# Patient Record
Sex: MALE | Race: WHITE | NOT HISPANIC OR LATINO | Employment: OTHER | URBAN - METROPOLITAN AREA
[De-identification: names, ages, dates, MRNs, and addresses within clinical notes are randomized per-mention and may not be internally consistent; named-entity substitution may affect disease eponyms.]

---

## 2017-02-02 ENCOUNTER — HISTORICAL (OUTPATIENT)
Dept: LAB | Facility: HOSPITAL | Age: 67
End: 2017-02-02

## 2018-06-27 LAB — CRC RECOMMENDATION EXT: NORMAL

## 2019-01-23 ENCOUNTER — HISTORICAL (OUTPATIENT)
Dept: PREADMISSION TESTING | Facility: HOSPITAL | Age: 69
End: 2019-01-23

## 2019-01-23 ENCOUNTER — HISTORICAL (OUTPATIENT)
Dept: LAB | Facility: HOSPITAL | Age: 69
End: 2019-01-23

## 2019-02-06 ENCOUNTER — HISTORICAL (OUTPATIENT)
Dept: SURGERY | Facility: HOSPITAL | Age: 69
End: 2019-02-06

## 2019-07-02 ENCOUNTER — HISTORICAL (OUTPATIENT)
Dept: LAB | Facility: HOSPITAL | Age: 69
End: 2019-07-02

## 2019-12-02 ENCOUNTER — HISTORICAL (OUTPATIENT)
Dept: LAB | Facility: HOSPITAL | Age: 69
End: 2019-12-02

## 2019-12-02 LAB
BUN SERPL-MCNC: 28.5 MG/DL (ref 7–18)
CALCIUM SERPL-MCNC: 10.6 MG/DL (ref 8.5–10.1)
CHLORIDE SERPL-SCNC: 103 MMOL/L (ref 98–107)
CO2 SERPL-SCNC: 26.8 MMOL/L (ref 21–32)
CREAT SERPL-MCNC: 1.38 MG/DL (ref 0.6–1.3)
CREAT/UREA NIT SERPL: 21
GLUCOSE SERPL-MCNC: 159 MG/DL (ref 74–106)
POTASSIUM SERPL-SCNC: 4.8 MMOL/L (ref 3.5–5.1)
SODIUM SERPL-SCNC: 142 MMOL/L (ref 136–145)

## 2020-01-07 ENCOUNTER — HISTORICAL (OUTPATIENT)
Dept: LAB | Facility: HOSPITAL | Age: 70
End: 2020-01-07

## 2020-01-07 LAB
ABS NEUT (OLG): 5.26 X10(3)/MCL (ref 2.1–9.2)
ALBUMIN SERPL-MCNC: 4.2 GM/DL (ref 3.4–5)
ALBUMIN/GLOB SERPL: 1.2 RATIO (ref 1.1–2)
ALP SERPL-CCNC: 46 UNIT/L (ref 46–116)
ALT SERPL-CCNC: 44 UNIT/L (ref 12–78)
APPEARANCE, UA: CLEAR
AST SERPL-CCNC: 20 UNIT/L (ref 15–37)
BACTERIA SPEC CULT: NORMAL
BASOPHILS # BLD AUTO: 0 X10(3)/MCL (ref 0–0.2)
BASOPHILS NFR BLD AUTO: 0 %
BILIRUB SERPL-MCNC: 0.7 MG/DL (ref 0.2–1)
BILIRUB UR QL STRIP: NEGATIVE
BILIRUBIN DIRECT+TOT PNL SERPL-MCNC: 0.14 MG/DL (ref 0–0.2)
BILIRUBIN DIRECT+TOT PNL SERPL-MCNC: 0.56 MG/DL (ref 0–0.8)
BUN SERPL-MCNC: 28.2 MG/DL (ref 7–18)
CALCIUM SERPL-MCNC: 10 MG/DL (ref 8.5–10.1)
CHLORIDE SERPL-SCNC: 103 MMOL/L (ref 98–107)
CHOLEST SERPL-MCNC: 146 MG/DL (ref 0–200)
CHOLEST/HDLC SERPL: 3.7 {RATIO} (ref 0–5)
CO2 SERPL-SCNC: 28.3 MMOL/L (ref 21–32)
COLOR UR: YELLOW
CREAT SERPL-MCNC: 1.44 MG/DL (ref 0.6–1.3)
EOSINOPHIL # BLD AUTO: 0.3 X10(3)/MCL (ref 0–0.9)
EOSINOPHIL NFR BLD AUTO: 4 %
ERYTHROCYTE [DISTWIDTH] IN BLOOD BY AUTOMATED COUNT: 13.5 % (ref 11.5–17)
EST. AVERAGE GLUCOSE BLD GHB EST-MCNC: 166 MG/DL
GLOBULIN SER-MCNC: 3.5 GM/DL (ref 2.4–3.5)
GLUCOSE (UA): NEGATIVE
GLUCOSE SERPL-MCNC: 158 MG/DL (ref 74–106)
HBA1C MFR BLD: 7.4 % (ref 4.5–6.2)
HCT VFR BLD AUTO: 41.7 % (ref 42–52)
HDLC SERPL-MCNC: 39 MG/DL (ref 40–60)
HGB BLD-MCNC: 13.9 GM/DL (ref 14–18)
HGB UR QL STRIP: NEGATIVE
IMM GRANULOCYTES # BLD AUTO: 0.11 % (ref 0–0.02)
IMM GRANULOCYTES NFR BLD AUTO: 1.2 % (ref 0–0.43)
KETONES UR QL STRIP: NEGATIVE
LDLC SERPL CALC-MCNC: 72 MG/DL (ref 0–129)
LEUKOCYTE ESTERASE UR QL STRIP: NEGATIVE
LYMPHOCYTES # BLD AUTO: 2.3 X10(3)/MCL (ref 0.6–4.6)
LYMPHOCYTES NFR BLD AUTO: 26 %
MCH RBC QN AUTO: 29.3 PG (ref 27–31)
MCHC RBC AUTO-ENTMCNC: 33.3 GM/DL (ref 33–36)
MCV RBC AUTO: 87.8 FL (ref 80–94)
MONOCYTES # BLD AUTO: 0.8 X10(3)/MCL (ref 0.1–1.3)
MONOCYTES NFR BLD AUTO: 9 %
NEUTROPHILS # BLD AUTO: 5.26 X10(3)/MCL (ref 1.4–7.9)
NEUTROPHILS NFR BLD AUTO: 60 %
NITRITE UR QL STRIP: NEGATIVE
PH UR STRIP: 5 [PH] (ref 5–9)
PLATELET # BLD AUTO: 312 X10(3)/MCL (ref 130–400)
PMV BLD AUTO: 9.5 FL (ref 9.4–12.4)
POTASSIUM SERPL-SCNC: 5.5 MMOL/L (ref 3.5–5.1)
PROT SERPL-MCNC: 7.7 GM/DL (ref 6.4–8.2)
PROT UR QL STRIP: NEGATIVE
RBC # BLD AUTO: 4.75 X10(6)/MCL (ref 4.7–6.1)
RBC #/AREA URNS HPF: NORMAL /[HPF]
SODIUM SERPL-SCNC: 141 MMOL/L (ref 136–145)
SP GR UR STRIP: 1.02 (ref 1–1.03)
SQUAMOUS EPITHELIAL, UA: NORMAL
TRIGL SERPL-MCNC: 176 MG/DL
TSH SERPL-ACNC: 1.12 MIU/ML (ref 0.36–3.74)
UROBILINOGEN UR STRIP-ACNC: 0.2
VLDLC SERPL CALC-MCNC: 35 MG/DL
WBC # SPEC AUTO: 8.8 X10(3)/MCL (ref 4.5–11.5)
WBC #/AREA URNS HPF: NORMAL /[HPF]

## 2020-06-19 PROBLEM — G51.0 BELL'S PALSY: Status: ACTIVE | Noted: 2020-06-19

## 2020-06-20 ENCOUNTER — HOSPITAL ENCOUNTER (INPATIENT)
Facility: HOSPITAL | Age: 70
LOS: 1 days | Discharge: ANOTHER HOSPITAL | DRG: 280 | End: 2020-06-21
Attending: EMERGENCY MEDICINE | Admitting: INTERNAL MEDICINE
Payer: COMMERCIAL

## 2020-06-20 DIAGNOSIS — I16.1 HYPERTENSIVE EMERGENCY: Primary | ICD-10-CM

## 2020-06-20 DIAGNOSIS — N17.9 AKI (ACUTE KIDNEY INJURY): ICD-10-CM

## 2020-06-20 DIAGNOSIS — I16.0 HYPERTENSIVE URGENCY: ICD-10-CM

## 2020-06-20 DIAGNOSIS — J96.01 ACUTE RESPIRATORY FAILURE WITH HYPOXIA: ICD-10-CM

## 2020-06-20 DIAGNOSIS — R73.9 HYPERGLYCEMIA: ICD-10-CM

## 2020-06-20 DIAGNOSIS — R07.9 CHEST PAIN: ICD-10-CM

## 2020-06-20 DIAGNOSIS — I21.4 NSTEMI (NON-ST ELEVATED MYOCARDIAL INFARCTION): ICD-10-CM

## 2020-06-20 PROBLEM — E11.65 TYPE 2 DIABETES MELLITUS WITH HYPERGLYCEMIA, WITH LONG-TERM CURRENT USE OF INSULIN: Status: ACTIVE | Noted: 2020-06-20

## 2020-06-20 PROBLEM — Z79.4 TYPE 2 DIABETES MELLITUS WITH HYPERGLYCEMIA, WITH LONG-TERM CURRENT USE OF INSULIN: Status: ACTIVE | Noted: 2020-06-20

## 2020-06-20 PROBLEM — E78.5 HLD (HYPERLIPIDEMIA): Status: ACTIVE | Noted: 2020-06-20

## 2020-06-20 PROBLEM — I25.10 CORONARY ARTERY DISEASE INVOLVING NATIVE CORONARY ARTERY OF NATIVE HEART: Status: ACTIVE | Noted: 2020-06-20

## 2020-06-20 PROBLEM — R79.89 ELEVATED TROPONIN: Status: ACTIVE | Noted: 2020-06-20

## 2020-06-20 PROBLEM — G47.33 OSA (OBSTRUCTIVE SLEEP APNEA): Status: ACTIVE | Noted: 2020-06-20

## 2020-06-20 LAB
ALBUMIN SERPL BCP-MCNC: 4.1 G/DL (ref 3.5–5.2)
ALLENS TEST: ABNORMAL
ALP SERPL-CCNC: 51 U/L (ref 55–135)
ALT SERPL W/O P-5'-P-CCNC: 38 U/L (ref 10–44)
ANION GAP SERPL CALC-SCNC: 14 MMOL/L (ref 8–16)
AST SERPL-CCNC: 17 U/L (ref 10–40)
B-OH-BUTYR BLD STRIP-SCNC: 0.5 MMOL/L (ref 0–0.5)
BACTERIA #/AREA URNS HPF: NORMAL /HPF
BASOPHILS # BLD AUTO: 0.03 K/UL (ref 0–0.2)
BASOPHILS NFR BLD: 0.3 % (ref 0–1.9)
BILIRUB SERPL-MCNC: 0.7 MG/DL (ref 0.1–1)
BILIRUB UR QL STRIP: NEGATIVE
BNP SERPL-MCNC: 29 PG/ML (ref 0–99)
BUN SERPL-MCNC: 36 MG/DL (ref 8–23)
CALCIUM SERPL-MCNC: 9.6 MG/DL (ref 8.7–10.5)
CHLORIDE SERPL-SCNC: 104 MMOL/L (ref 95–110)
CLARITY UR: CLEAR
CO2 SERPL-SCNC: 20 MMOL/L (ref 23–29)
COLOR UR: YELLOW
CREAT SERPL-MCNC: 1.8 MG/DL (ref 0.5–1.4)
D DIMER PPP IA.FEU-MCNC: <0.19 MG/L FEU
DELSYS: ABNORMAL
DIFFERENTIAL METHOD: ABNORMAL
EOSINOPHIL # BLD AUTO: 0 K/UL (ref 0–0.5)
EOSINOPHIL NFR BLD: 0 % (ref 0–8)
ERYTHROCYTE [DISTWIDTH] IN BLOOD BY AUTOMATED COUNT: 13.8 % (ref 11.5–14.5)
EST. GFR  (AFRICAN AMERICAN): 44 ML/MIN/1.73 M^2
EST. GFR  (NON AFRICAN AMERICAN): 38 ML/MIN/1.73 M^2
GLUCOSE SERPL-MCNC: 616 MG/DL (ref 70–110)
GLUCOSE UR QL STRIP: ABNORMAL
HCO3 UR-SCNC: 22.5 MMOL/L (ref 24–28)
HCT VFR BLD AUTO: 37.2 % (ref 40–54)
HGB BLD-MCNC: 12.3 G/DL (ref 14–18)
HGB UR QL STRIP: ABNORMAL
IMM GRANULOCYTES # BLD AUTO: 0.19 K/UL (ref 0–0.04)
IMM GRANULOCYTES NFR BLD AUTO: 2.2 % (ref 0–0.5)
KETONES UR QL STRIP: NEGATIVE
LEUKOCYTE ESTERASE UR QL STRIP: NEGATIVE
LYMPHOCYTES # BLD AUTO: 0.5 K/UL (ref 1–4.8)
LYMPHOCYTES NFR BLD: 6.1 % (ref 18–48)
MCH RBC QN AUTO: 28.9 PG (ref 27–31)
MCHC RBC AUTO-ENTMCNC: 33.1 G/DL (ref 32–36)
MCV RBC AUTO: 88 FL (ref 82–98)
MICROSCOPIC COMMENT: NORMAL
MONOCYTES # BLD AUTO: 0.2 K/UL (ref 0.3–1)
MONOCYTES NFR BLD: 1.7 % (ref 4–15)
NEUTROPHILS # BLD AUTO: 7.8 K/UL (ref 1.8–7.7)
NEUTROPHILS NFR BLD: 89.7 % (ref 38–73)
NITRITE UR QL STRIP: NEGATIVE
NRBC BLD-RTO: 0 /100 WBC
PCO2 BLDA: 38.9 MMHG (ref 35–45)
PH SMN: 7.37 [PH] (ref 7.35–7.45)
PH UR STRIP: 6 [PH] (ref 5–8)
PLATELET # BLD AUTO: 229 K/UL (ref 150–350)
PMV BLD AUTO: 10.4 FL (ref 9.2–12.9)
PO2 BLDA: 26 MMHG (ref 40–60)
POC BE: -3 MMOL/L
POC SATURATED O2: 47 % (ref 95–100)
POC TCO2: 24 MMOL/L (ref 24–29)
POCT GLUCOSE: 373 MG/DL (ref 70–110)
POCT GLUCOSE: 397 MG/DL (ref 70–110)
POCT GLUCOSE: 414 MG/DL (ref 70–110)
POTASSIUM SERPL-SCNC: 5.4 MMOL/L (ref 3.5–5.1)
PROT SERPL-MCNC: 7.5 G/DL (ref 6–8.4)
PROT UR QL STRIP: ABNORMAL
RBC # BLD AUTO: 4.25 M/UL (ref 4.6–6.2)
RBC #/AREA URNS HPF: 1 /HPF (ref 0–4)
SAMPLE: ABNORMAL
SARS-COV-2 RDRP RESP QL NAA+PROBE: NEGATIVE
SITE: ABNORMAL
SODIUM SERPL-SCNC: 138 MMOL/L (ref 136–145)
SP GR UR STRIP: 1.01 (ref 1–1.03)
SQUAMOUS #/AREA URNS HPF: 1 /HPF
TROPONIN I SERPL DL<=0.01 NG/ML-MCNC: 0.06 NG/ML (ref 0–0.03)
URN SPEC COLLECT METH UR: ABNORMAL
UROBILINOGEN UR STRIP-ACNC: 1 EU/DL
WBC # BLD AUTO: 8.67 K/UL (ref 3.9–12.7)
WBC #/AREA URNS HPF: 0 /HPF (ref 0–5)
YEAST URNS QL MICRO: NORMAL

## 2020-06-20 PROCEDURE — 36415 COLL VENOUS BLD VENIPUNCTURE: CPT

## 2020-06-20 PROCEDURE — 84484 ASSAY OF TROPONIN QUANT: CPT

## 2020-06-20 PROCEDURE — 80053 COMPREHEN METABOLIC PANEL: CPT

## 2020-06-20 PROCEDURE — 96374 THER/PROPH/DIAG INJ IV PUSH: CPT

## 2020-06-20 PROCEDURE — 82962 GLUCOSE BLOOD TEST: CPT

## 2020-06-20 PROCEDURE — 85379 FIBRIN DEGRADATION QUANT: CPT

## 2020-06-20 PROCEDURE — 20000000 HC ICU ROOM

## 2020-06-20 PROCEDURE — 63600175 PHARM REV CODE 636 W HCPCS: Performed by: EMERGENCY MEDICINE

## 2020-06-20 PROCEDURE — 83880 ASSAY OF NATRIURETIC PEPTIDE: CPT

## 2020-06-20 PROCEDURE — 81000 URINALYSIS NONAUTO W/SCOPE: CPT

## 2020-06-20 PROCEDURE — 82010 KETONE BODYS QUAN: CPT

## 2020-06-20 PROCEDURE — 82803 BLOOD GASES ANY COMBINATION: CPT

## 2020-06-20 PROCEDURE — 25000003 PHARM REV CODE 250: Performed by: NURSE PRACTITIONER

## 2020-06-20 PROCEDURE — 96361 HYDRATE IV INFUSION ADD-ON: CPT

## 2020-06-20 PROCEDURE — U0002 COVID-19 LAB TEST NON-CDC: HCPCS

## 2020-06-20 PROCEDURE — 99285 EMERGENCY DEPT VISIT HI MDM: CPT | Mod: 25

## 2020-06-20 PROCEDURE — 25000003 PHARM REV CODE 250: Performed by: EMERGENCY MEDICINE

## 2020-06-20 PROCEDURE — 63600175 PHARM REV CODE 636 W HCPCS: Performed by: NURSE PRACTITIONER

## 2020-06-20 PROCEDURE — 96375 TX/PRO/DX INJ NEW DRUG ADDON: CPT

## 2020-06-20 PROCEDURE — 85025 COMPLETE CBC W/AUTO DIFF WBC: CPT

## 2020-06-20 PROCEDURE — 93005 ELECTROCARDIOGRAM TRACING: CPT

## 2020-06-20 RX ORDER — AMOXICILLIN 250 MG
1 CAPSULE ORAL DAILY PRN
Status: DISCONTINUED | OUTPATIENT
Start: 2020-06-20 | End: 2020-06-21 | Stop reason: HOSPADM

## 2020-06-20 RX ORDER — ACYCLOVIR 200 MG/1
400 CAPSULE ORAL
Status: DISCONTINUED | OUTPATIENT
Start: 2020-06-20 | End: 2020-06-21 | Stop reason: HOSPADM

## 2020-06-20 RX ORDER — FENOFIBRATE 134 MG/1
134 CAPSULE ORAL
COMMUNITY
End: 2023-03-28

## 2020-06-20 RX ORDER — PANTOPRAZOLE SODIUM 40 MG/10ML
40 INJECTION, POWDER, LYOPHILIZED, FOR SOLUTION INTRAVENOUS DAILY
Status: DISCONTINUED | OUTPATIENT
Start: 2020-06-21 | End: 2020-06-21 | Stop reason: HOSPADM

## 2020-06-20 RX ORDER — NITROGLYCERIN 0.4 MG/1
0.4 TABLET SUBLINGUAL EVERY 5 MIN PRN
Status: DISCONTINUED | OUTPATIENT
Start: 2020-06-20 | End: 2020-06-21 | Stop reason: HOSPADM

## 2020-06-20 RX ORDER — IBUPROFEN 200 MG
16 TABLET ORAL
Status: DISCONTINUED | OUTPATIENT
Start: 2020-06-20 | End: 2020-06-21 | Stop reason: HOSPADM

## 2020-06-20 RX ORDER — SODIUM CHLORIDE 0.9 % (FLUSH) 0.9 %
10 SYRINGE (ML) INJECTION
Status: DISCONTINUED | OUTPATIENT
Start: 2020-06-20 | End: 2020-06-21 | Stop reason: HOSPADM

## 2020-06-20 RX ORDER — PRAVASTATIN SODIUM 40 MG/1
40 TABLET ORAL NIGHTLY
Status: DISCONTINUED | OUTPATIENT
Start: 2020-06-20 | End: 2020-06-21 | Stop reason: HOSPADM

## 2020-06-20 RX ORDER — ACETAMINOPHEN 325 MG/1
650 TABLET ORAL EVERY 6 HOURS PRN
Status: DISCONTINUED | OUTPATIENT
Start: 2020-06-20 | End: 2020-06-21 | Stop reason: HOSPADM

## 2020-06-20 RX ORDER — HYDROCHLOROTHIAZIDE 12.5 MG/1
12.5 TABLET ORAL DAILY
Status: DISCONTINUED | OUTPATIENT
Start: 2020-06-21 | End: 2020-06-21

## 2020-06-20 RX ORDER — FENOFIBRATE 134 MG/1
134 CAPSULE ORAL
Status: DISCONTINUED | OUTPATIENT
Start: 2020-06-21 | End: 2020-06-20 | Stop reason: CLARIF

## 2020-06-20 RX ORDER — FENOFIBRATE 145 MG/1
145 TABLET, FILM COATED ORAL
Status: DISCONTINUED | OUTPATIENT
Start: 2020-06-21 | End: 2020-06-21 | Stop reason: HOSPADM

## 2020-06-20 RX ORDER — AMLODIPINE BESYLATE 5 MG/1
10 TABLET ORAL DAILY
Status: DISCONTINUED | OUTPATIENT
Start: 2020-06-21 | End: 2020-06-21

## 2020-06-20 RX ORDER — ENOXAPARIN SODIUM 100 MG/ML
40 INJECTION SUBCUTANEOUS EVERY 24 HOURS
Status: DISCONTINUED | OUTPATIENT
Start: 2020-06-20 | End: 2020-06-21

## 2020-06-20 RX ORDER — DULOXETIN HYDROCHLORIDE 30 MG/1
30 CAPSULE, DELAYED RELEASE ORAL DAILY
Status: ON HOLD | COMMUNITY
End: 2020-06-25

## 2020-06-20 RX ORDER — NITROGLYCERIN 20 MG/100ML
10 INJECTION INTRAVENOUS CONTINUOUS
Status: DISCONTINUED | OUTPATIENT
Start: 2020-06-20 | End: 2020-06-20

## 2020-06-20 RX ORDER — INSULIN ASPART 100 [IU]/ML
40 INJECTION, SUSPENSION SUBCUTANEOUS 2 TIMES DAILY WITH MEALS
Status: DISCONTINUED | OUTPATIENT
Start: 2020-06-21 | End: 2020-06-21 | Stop reason: HOSPADM

## 2020-06-20 RX ORDER — IBUPROFEN 200 MG
24 TABLET ORAL
Status: DISCONTINUED | OUTPATIENT
Start: 2020-06-20 | End: 2020-06-21 | Stop reason: HOSPADM

## 2020-06-20 RX ORDER — DULOXETIN HYDROCHLORIDE 30 MG/1
30 CAPSULE, DELAYED RELEASE ORAL DAILY
Status: DISCONTINUED | OUTPATIENT
Start: 2020-06-21 | End: 2020-06-21

## 2020-06-20 RX ORDER — ASPIRIN 81 MG/1
81 TABLET ORAL DAILY
Status: DISCONTINUED | OUTPATIENT
Start: 2020-06-21 | End: 2020-06-21 | Stop reason: HOSPADM

## 2020-06-20 RX ORDER — GLUCAGON 1 MG
1 KIT INJECTION
Status: DISCONTINUED | OUTPATIENT
Start: 2020-06-20 | End: 2020-06-21 | Stop reason: HOSPADM

## 2020-06-20 RX ORDER — TALC
6 POWDER (GRAM) TOPICAL NIGHTLY PRN
Status: DISCONTINUED | OUTPATIENT
Start: 2020-06-20 | End: 2020-06-21 | Stop reason: HOSPADM

## 2020-06-20 RX ORDER — DULOXETIN HYDROCHLORIDE 60 MG/1
60 CAPSULE, DELAYED RELEASE ORAL DAILY
COMMUNITY
End: 2020-06-20 | Stop reason: CLARIF

## 2020-06-20 RX ORDER — PREDNISONE 20 MG/1
60 TABLET ORAL DAILY
Status: DISCONTINUED | OUTPATIENT
Start: 2020-06-21 | End: 2020-06-21 | Stop reason: HOSPADM

## 2020-06-20 RX ORDER — PRAVASTATIN SODIUM 40 MG/1
40 TABLET ORAL NIGHTLY
Status: ON HOLD | COMMUNITY
End: 2020-07-01 | Stop reason: HOSPADM

## 2020-06-20 RX ORDER — METFORMIN HYDROCHLORIDE 1000 MG/1
1000 TABLET ORAL 2 TIMES DAILY WITH MEALS
COMMUNITY
End: 2023-01-05

## 2020-06-20 RX ORDER — AMLODIPINE BESYLATE 10 MG/1
10 TABLET ORAL DAILY
COMMUNITY
End: 2022-11-21

## 2020-06-20 RX ORDER — INSULIN ASPART 100 [IU]/ML
1-10 INJECTION, SOLUTION INTRAVENOUS; SUBCUTANEOUS
Status: DISCONTINUED | OUTPATIENT
Start: 2020-06-20 | End: 2020-06-21 | Stop reason: HOSPADM

## 2020-06-20 RX ORDER — NICARDIPINE HYDROCHLORIDE 0.2 MG/ML
1 INJECTION INTRAVENOUS CONTINUOUS
Status: DISCONTINUED | OUTPATIENT
Start: 2020-06-20 | End: 2020-06-20

## 2020-06-20 RX ORDER — NICARDIPINE HYDROCHLORIDE 0.2 MG/ML
1 INJECTION INTRAVENOUS CONTINUOUS
Status: DISCONTINUED | OUTPATIENT
Start: 2020-06-20 | End: 2020-06-21

## 2020-06-20 RX ORDER — ONDANSETRON 2 MG/ML
4 INJECTION INTRAMUSCULAR; INTRAVENOUS EVERY 8 HOURS PRN
Status: DISCONTINUED | OUTPATIENT
Start: 2020-06-20 | End: 2020-06-21 | Stop reason: HOSPADM

## 2020-06-20 RX ORDER — LISINOPRIL 10 MG/1
20 TABLET ORAL DAILY
Status: DISCONTINUED | OUTPATIENT
Start: 2020-06-21 | End: 2020-06-20

## 2020-06-20 RX ADMIN — INSULIN ASPART 5 UNITS: 100 INJECTION, SOLUTION INTRAVENOUS; SUBCUTANEOUS at 11:06

## 2020-06-20 RX ADMIN — PRAVASTATIN SODIUM 40 MG: 40 TABLET ORAL at 11:06

## 2020-06-20 RX ADMIN — INSULIN HUMAN 9 UNITS: 100 INJECTION, SOLUTION PARENTERAL at 07:06

## 2020-06-20 RX ADMIN — SODIUM CHLORIDE 1000 ML: 0.9 INJECTION, SOLUTION INTRAVENOUS at 06:06

## 2020-06-20 RX ADMIN — ACYCLOVIR 400 MG: 200 CAPSULE ORAL at 11:06

## 2020-06-20 RX ADMIN — SODIUM CHLORIDE 1000 ML: 0.9 INJECTION, SOLUTION INTRAVENOUS at 07:06

## 2020-06-20 RX ADMIN — NITROGLYCERIN 5 MCG/MIN: 20 INJECTION INTRAVENOUS at 08:06

## 2020-06-20 RX ADMIN — ENOXAPARIN SODIUM 40 MG: 40 INJECTION SUBCUTANEOUS at 11:06

## 2020-06-20 NOTE — ED PROVIDER NOTES
Encounter Date: 6/20/2020    SCRIBE #1 NOTE: IGayatri, am scribing for, and in the presence of, Dr. Rosa.       History     Chief Complaint   Patient presents with    Chest Pain     pt presents to ED with c/o MS chest pain onset 1630; also c/o SOB; Pt received 2 SL Nitro and 325 ASA PTA; Pain resolved at present        Time seen by provider: 6:14 PM on 06/20/2020    Sincere Malave is a 70 y.o. male with DM and HTN who presents to the ED via EMS with c/o non radiating CP x2 hours that started suddenly when he was walking.  He reports associated SOB, diaphoresis and dental pain. Patient was given 2 SL NTG and 325 mg of ASA en route. His CP and SOB have resolved. He reports prior episodes of CP but this episode was more intense. Patient reports a blood sugar in the 500s after eating cake. No n/v/d, fever, dysuria, abdominal pain, changes in urination or weakness. Family hx of MI. No known cardiac PSHx.     The history is provided by the patient and the EMS personnel.     Review of patient's allergies indicates:  No Known Allergies  Past Medical History:   Diagnosis Date    Anemia     Bell's palsy 06/20/2020    CAD (coronary artery disease)     Diabetes mellitus     Diabetic polyneuropathy     HLD (hyperlipidemia)     Hypertension     Sleep apnea      History reviewed. No pertinent surgical history.  Family History   Problem Relation Age of Onset    No Known Problems Mother     Heart disease Father     Heart attack Father     Heart attack Sister      Social History     Tobacco Use    Smoking status: Never Smoker   Substance Use Topics    Alcohol use: Yes     Alcohol/week: 1.0 standard drinks     Types: 1 Cans of beer per week     Comment: couple of times a year    Drug use: Never     Review of Systems   Constitutional: Positive for diaphoresis. Negative for activity change and fever.   HENT: Negative for drooling, rhinorrhea, sore throat and trouble swallowing.         + dental pain    Eyes: Negative for pain and visual disturbance.   Respiratory: Positive for shortness of breath. Negative for cough and stridor.    Cardiovascular: Positive for chest pain. Negative for leg swelling.   Gastrointestinal: Negative for abdominal distention, abdominal pain, constipation, diarrhea, nausea and vomiting.   Genitourinary: Negative for discharge, dysuria and hematuria.   Musculoskeletal: Negative for gait problem and myalgias.   Skin: Negative for rash.   Neurological: Negative for seizures, facial asymmetry and headaches.   Psychiatric/Behavioral: Negative for hallucinations and suicidal ideas.       Physical Exam     Initial Vitals   BP Pulse Resp Temp SpO2   06/20/20 1803 06/20/20 1747 06/20/20 1747 06/20/20 1747 06/20/20 1747   (!) 222/86 67 18 98.2 °F (36.8 °C) 98 %      MAP       --                Physical Exam    Nursing note and vitals reviewed.  Constitutional: He appears well-developed. He is Obese . No distress.   HENT:   Head: Normocephalic and atraumatic.   Nose: Nose normal.   Eyes: EOM are normal.   Neck: Neck supple. No tracheal deviation present. No JVD present.   Cardiovascular: Normal rate, regular rhythm, normal heart sounds and intact distal pulses. Exam reveals no gallop and no friction rub.    No murmur heard.  Pulmonary/Chest: Breath sounds normal. No respiratory distress. He has no wheezes. He has no rhonchi. He has no rales.   Abdominal: Soft. Bowel sounds are normal. There is no abdominal tenderness.   Musculoskeletal: Normal range of motion.   Neurological: He is alert and oriented to person, place, and time. No cranial nerve deficit.   Slight facial droop on the right.    Skin: Skin is warm and dry. Capillary refill takes less than 2 seconds. No rash noted.   Psychiatric: He has a normal mood and affect.         ED Course   Critical Care    Date/Time: 6/22/2020 10:23 AM  Performed by: Kevin Rosa MD  Authorized by: Thomas Rai MD   Total critical care time  (exclusive of procedural time) : 45 minutes  Critical care time was exclusive of separately billable procedures and treating other patients and teaching time.  Critical care was necessary to treat or prevent imminent or life-threatening deterioration of the following conditions: respiratory failure and cardiac failure.  Critical care was time spent personally by me on the following activities: discussions with primary provider, development of treatment plan with patient or surrogate, interpretation of cardiac output measurements, evaluation of patient's response to treatment, examination of patient, obtaining history from patient or surrogate, ordering and review of laboratory studies, ordering and review of radiographic studies, re-evaluation of patient's condition and pulse oximetry.  Subsequent provider of critical care: I assumed direction of critical care for this patient from another provider of my specialty.        Labs Reviewed   CBC W/ AUTO DIFFERENTIAL - Abnormal; Notable for the following components:       Result Value    RBC 4.25 (*)     Hemoglobin 12.3 (*)     Hematocrit 37.2 (*)     Immature Granulocytes 2.2 (*)     Gran # (ANC) 7.8 (*)     Immature Grans (Abs) 0.19 (*)     Lymph # 0.5 (*)     Mono # 0.2 (*)     Gran% 89.7 (*)     Lymph% 6.1 (*)     Mono% 1.7 (*)     All other components within normal limits   COMPREHENSIVE METABOLIC PANEL - Abnormal; Notable for the following components:    Potassium 5.4 (*)     CO2 20 (*)     Glucose 616 (*)     BUN, Bld 36 (*)     Creatinine 1.8 (*)     Alkaline Phosphatase 51 (*)     eGFR if  44 (*)     eGFR if non  38 (*)     All other components within normal limits    Narrative:      GLU  critical result(s) called and verbal readback obtained from Efren Olivares by BTI1 06/20/2020 19:04   URINALYSIS, REFLEX TO URINE CULTURE - Abnormal; Notable for the following components:    Protein, UA Trace (*)     Glucose, UA 4+ (*)     Occult  Blood UA Trace (*)     All other components within normal limits    Narrative:     Preferred Collection Type->Urine, Clean Catch  Specimen Source->Urine   TROPONIN I - Abnormal; Notable for the following components:    Troponin I 0.061 (*)     All other components within normal limits   ISTAT PROCEDURE - Abnormal; Notable for the following components:    POC PO2 26 (*)     POC HCO3 22.5 (*)     POC SATURATED O2 47 (*)     All other components within normal limits   BETA - HYDROXYBUTYRATE, SERUM   B-TYPE NATRIURETIC PEPTIDE   URINALYSIS MICROSCOPIC    Narrative:     Preferred Collection Type->Urine, Clean Catch  Specimen Source->Urine   SARS-COV-2 RNA AMPLIFICATION, QUAL   D DIMER, QUANTITATIVE     EKG Readings: (Independently Interpreted)   Initial Reading: No STEMI.   Normal sinus rhythm at rate of 71 bpm with normal axis and normal intervals. LVH; non specific ST changes             ECG Results          EKG 12-lead (Final result)  Result time 06/22/20 09:23:22    Final result by Interface, Lab In [facility] (06/22/20 09:23:22)                 Narrative:    Test Reason : R07.9,    Vent. Rate : 071 BPM     Atrial Rate : 071 BPM     P-R Int : 142 ms          QRS Dur : 104 ms      QT Int : 388 ms       P-R-T Axes : 000 -53 097 degrees     QTc Int : 421 ms    Normal sinus rhythm  Left anterior fascicular block  LVH with repolarization abnormality  Cannot rule out Septal infarct ,age undetermined  Abnormal ECG lvh  When compared with ECG of 19-JUN-2020 13:07,  Vent. rate has decreased BY  53 BPM  Left anterior fascicular block is now Present  Minimal criteria for Septal infarct are now Present  T wave inversion no longer evident in Anterior leads  Confirmed by Job LOPEZ, James ROBERSON ([id number]) on 6/22/2020 9:23:09 AM    Referred By: [email or web address]   SELF           Confirmed By:James Kaiser MD                            Imaging Results          X-Ray Chest AP Portable (Final result)  Result time 06/20/20 20:10:44    Final result by  Terrence Metz MD (06/20/20 20:10:44)                 Impression:      1. Pulmonary hypoinflation.  2. Subtle bilateral interstitial opacities.  This may represent interstitial edema.  These changes can also be seen with viral pneumonia.  Correlate clinically with possible fever.  3. Cardiomegaly.  As deemed clinically necessary, further evaluation may be obtained with dedicated PA and lateral views of the chest.      Electronically signed by: Terrence Metz  Date:    06/20/2020  Time:    20:10             Narrative:    EXAMINATION:  XR CHEST AP PORTABLE    CLINICAL HISTORY:  hyperglycemia;    TECHNIQUE:  Single frontal view of the chest was performed.    COMPARISON:  06/19/2020.    FINDINGS:  Mild pulmonary hypoinflation mild crowding of the bronchopulmonary vessels.  There are subtle bilateral interstitial opacities within the perihilar lungs extending into the bilateral lower lobes.  This may represent interstitial edema.  No significant pleural effusion.    Heart size is enlarged.  Mediastinal contours unremarkable.  Trachea midline.    Bony thorax intact.                                 Medical Decision Making:   History:   Old Medical Records: I decided to obtain old medical records.  Independently Interpreted Test(s):   I have ordered and independently interpreted EKG Reading(s) - see prior notes  Clinical Tests:   Lab Tests: Ordered and Reviewed  Radiological Study: Ordered and Reviewed  Medical Tests: Ordered and Reviewed            Scribe Attestation:   Scribe #1: I performed the above scribed service and the documentation accurately describes the services I performed. I attest to the accuracy of the note.            ED Course as of Jun 22 1009   Sat Jun 20, 2020 1950 70-year-old male past medical history of diabetes, hypertension presents today with chest pressure. Patient was diagnosed yesterday with Bell's palsy given steroids. AF. Hypertensive on arrival with otherwise reassuring VS. Lungs CTAB  on arrival. Pt hyperglycemic with glucose 616 with worsening acute kidney injury with a creatinine of 1.8 as well as elevated troponin of 0.061. EKG with no STEMI. Pt with worsening hypoxia in the ED therefore started on NC with improvement. CXR with possible edema. Pt remained hypertensive and CP returned therefore started on NTG gtt for possible hypertensive emergency.  Will admit to ICU for further management of hypoxia, hyperglycemia, acute kidney injury, hypertensive emergency and acs workup.    [BD]   2023 Patient accepted to ICU for further workup and management.  D-dimer pending at the time of admission.    [BD]      ED Course User Index  [BD] Kevin Rosa MD           Attending Attestation:     Physician Attestation for Scribe:    I, Dr. Kevin Rosa, personally performed the services described in this documentation.   All medical record entries made by the scribe were at my direction and in my presence.   I have reviewed the chart and agree that the record is accurate and complete.   Kevin Rosa MD  10:23 AM 06/22/2020     DISCLAIMER: This note was prepared with [employer] voice recognition transcription software. Garbled syntax, mangled pronouns, and other bizarre constructions may be attributed to that software system.          Clinical Impression:       ICD-10-CM ICD-9-CM   1. Hypertensive emergency  I16.1 401.9   2. Chest pain  R07.9 786.50   3. Hyperglycemia  R73.9 790.29   4. Hypertensive urgency  I16.0 401.9   5. Acute respiratory failure with hypoxia  J96.01 518.81   6. NSTEMI (non-ST elevated myocardial infarction)  I21.4 410.70   7. OLGA (acute kidney injury)  N17.9 584.9         Disposition:   Disposition: Admitted     ED Disposition Condition    Admit                           Kevin Rosa MD  06/22/20 1027

## 2020-06-21 ENCOUNTER — HOSPITAL ENCOUNTER (INPATIENT)
Facility: HOSPITAL | Age: 70
LOS: 11 days | Discharge: HOME | DRG: 233 | End: 2020-07-02
Attending: HOSPITALIST | Admitting: HOSPITALIST
Payer: COMMERCIAL

## 2020-06-21 VITALS
WEIGHT: 203.06 LBS | OXYGEN SATURATION: 93 % | HEIGHT: 67 IN | TEMPERATURE: 99 F | DIASTOLIC BLOOD PRESSURE: 65 MMHG | BODY MASS INDEX: 31.87 KG/M2 | RESPIRATION RATE: 27 BRPM | HEART RATE: 52 BPM | SYSTOLIC BLOOD PRESSURE: 148 MMHG

## 2020-06-21 DIAGNOSIS — Z79.4 TYPE 2 DIABETES MELLITUS WITH HYPERGLYCEMIA, WITH LONG-TERM CURRENT USE OF INSULIN: ICD-10-CM

## 2020-06-21 DIAGNOSIS — G47.33 OSA (OBSTRUCTIVE SLEEP APNEA): ICD-10-CM

## 2020-06-21 DIAGNOSIS — I25.10 CORONARY ARTERY DISEASE, ANGINA PRESENCE UNSPECIFIED, UNSPECIFIED VESSEL OR LESION TYPE, UNSPECIFIED WHETHER NATIVE OR TRANSPLANTED HEART: ICD-10-CM

## 2020-06-21 DIAGNOSIS — I25.10 CORONARY ARTERY DISEASE: ICD-10-CM

## 2020-06-21 DIAGNOSIS — I16.0 HYPERTENSIVE URGENCY: ICD-10-CM

## 2020-06-21 DIAGNOSIS — Z98.890 POST-OPERATIVE STATE: ICD-10-CM

## 2020-06-21 DIAGNOSIS — Z95.1 HX OF CABG: ICD-10-CM

## 2020-06-21 DIAGNOSIS — I25.10 ASCVD (ARTERIOSCLEROTIC CARDIOVASCULAR DISEASE): ICD-10-CM

## 2020-06-21 DIAGNOSIS — I10 ESSENTIAL HYPERTENSION: ICD-10-CM

## 2020-06-21 DIAGNOSIS — Z95.1 S/P CABG X 6: ICD-10-CM

## 2020-06-21 DIAGNOSIS — N18.3 ACUTE RENAL FAILURE SUPERIMPOSED ON STAGE 3 CHRONIC KIDNEY DISEASE, UNSPECIFIED ACUTE RENAL FAILURE TYPE: ICD-10-CM

## 2020-06-21 DIAGNOSIS — R07.9 CHEST PAIN: ICD-10-CM

## 2020-06-21 DIAGNOSIS — E78.5 HYPERLIPIDEMIA, UNSPECIFIED HYPERLIPIDEMIA TYPE: ICD-10-CM

## 2020-06-21 DIAGNOSIS — I97.410 FEMORAL ARTERY HEMATOMA COMPLICATING CARDIAC CATHETERIZATION: ICD-10-CM

## 2020-06-21 DIAGNOSIS — G51.0 BELL'S PALSY: ICD-10-CM

## 2020-06-21 DIAGNOSIS — E11.65 TYPE 2 DIABETES MELLITUS WITH HYPERGLYCEMIA, WITH LONG-TERM CURRENT USE OF INSULIN: ICD-10-CM

## 2020-06-21 DIAGNOSIS — I25.110 CORONARY ARTERY DISEASE INVOLVING NATIVE CORONARY ARTERY OF NATIVE HEART WITH UNSTABLE ANGINA PECTORIS: Primary | ICD-10-CM

## 2020-06-21 DIAGNOSIS — I21.4 NSTEMI (NON-ST ELEVATED MYOCARDIAL INFARCTION): ICD-10-CM

## 2020-06-21 DIAGNOSIS — R07.9 CHEST PAIN SYNDROME: ICD-10-CM

## 2020-06-21 DIAGNOSIS — N17.9 ACUTE RENAL FAILURE SUPERIMPOSED ON STAGE 3 CHRONIC KIDNEY DISEASE, UNSPECIFIED ACUTE RENAL FAILURE TYPE: ICD-10-CM

## 2020-06-21 PROBLEM — E11.40 DIABETIC NEUROPATHY ASSOCIATED WITH TYPE 2 DIABETES MELLITUS: Status: ACTIVE | Noted: 2020-06-21

## 2020-06-21 PROBLEM — G47.30 SLEEP APNEA: Status: ACTIVE | Noted: 2020-06-21

## 2020-06-21 PROBLEM — D64.9 ANEMIA: Status: ACTIVE | Noted: 2020-06-21

## 2020-06-21 PROBLEM — J81.0 PULMONARY EDEMA, ACUTE: Status: ACTIVE | Noted: 2020-06-21

## 2020-06-21 PROBLEM — E66.09 CLASS 1 OBESITY DUE TO EXCESS CALORIES WITH SERIOUS COMORBIDITY IN ADULT: Status: ACTIVE | Noted: 2020-06-21

## 2020-06-21 PROBLEM — E87.5 HYPERKALEMIA: Status: ACTIVE | Noted: 2020-06-21

## 2020-06-21 PROBLEM — N18.9 ACUTE-ON-CHRONIC KIDNEY INJURY: Status: ACTIVE | Noted: 2020-06-21

## 2020-06-21 PROBLEM — E66.811 CLASS 1 OBESITY DUE TO EXCESS CALORIES WITH SERIOUS COMORBIDITY IN ADULT: Status: ACTIVE | Noted: 2020-06-21

## 2020-06-21 LAB
ALBUMIN SERPL BCP-MCNC: 4 G/DL (ref 3.5–5.2)
ALP SERPL-CCNC: 52 U/L (ref 55–135)
ALT SERPL W/O P-5'-P-CCNC: 42 U/L (ref 10–44)
ANION GAP SERPL CALC-SCNC: 11 MMOL/L (ref 8–16)
ANION GAP SERPL CALC-SCNC: 12 MMOL/L (ref 8–16)
AST SERPL-CCNC: 23 U/L (ref 10–40)
BASOPHILS # BLD AUTO: 0.04 K/UL (ref 0–0.2)
BASOPHILS NFR BLD: 0.2 % (ref 0–1.9)
BILIRUB SERPL-MCNC: 0.7 MG/DL (ref 0.1–1)
BUN SERPL-MCNC: 31 MG/DL (ref 8–23)
BUN SERPL-MCNC: 32 MG/DL (ref 8–23)
CALCIUM SERPL-MCNC: 9.3 MG/DL (ref 8.7–10.5)
CALCIUM SERPL-MCNC: 9.7 MG/DL (ref 8.7–10.5)
CHLORIDE SERPL-SCNC: 106 MMOL/L (ref 95–110)
CHLORIDE SERPL-SCNC: 108 MMOL/L (ref 95–110)
CHOLEST SERPL-MCNC: 169 MG/DL (ref 120–199)
CHOLEST/HDLC SERPL: 3.4 {RATIO} (ref 2–5)
CO2 SERPL-SCNC: 22 MMOL/L (ref 23–29)
CO2 SERPL-SCNC: 25 MMOL/L (ref 23–29)
CREAT SERPL-MCNC: 1.5 MG/DL (ref 0.5–1.4)
CREAT SERPL-MCNC: 1.6 MG/DL (ref 0.5–1.4)
CREAT UR-MCNC: 16.1 MG/DL (ref 23–375)
DIFFERENTIAL METHOD: ABNORMAL
EOSINOPHIL # BLD AUTO: 0 K/UL (ref 0–0.5)
EOSINOPHIL NFR BLD: 0.1 % (ref 0–8)
ERYTHROCYTE [DISTWIDTH] IN BLOOD BY AUTOMATED COUNT: 14.1 % (ref 11.5–14.5)
EST. GFR  (AFRICAN AMERICAN): 50 ML/MIN/1.73 M^2
EST. GFR  (AFRICAN AMERICAN): 55 ML/MIN/1.73 M^2
EST. GFR  (NON AFRICAN AMERICAN): 44 ML/MIN/1.73 M^2
EST. GFR  (NON AFRICAN AMERICAN): 47 ML/MIN/1.73 M^2
ESTIMATED AVG GLUCOSE: 174 MG/DL (ref 68–131)
GLUCOSE SERPL-MCNC: 310 MG/DL (ref 70–110)
GLUCOSE SERPL-MCNC: 349 MG/DL (ref 70–110)
GLUCOSE SERPL-MCNC: 369 MG/DL (ref 70–110)
HBA1C MFR BLD HPLC: 7.7 % (ref 4–5.6)
HCT VFR BLD AUTO: 40.9 % (ref 40–54)
HDLC SERPL-MCNC: 49 MG/DL (ref 40–75)
HDLC SERPL: 29 % (ref 20–50)
HGB BLD-MCNC: 13.2 G/DL (ref 14–18)
IMM GRANULOCYTES # BLD AUTO: 0.22 K/UL (ref 0–0.04)
IMM GRANULOCYTES NFR BLD AUTO: 1.3 % (ref 0–0.5)
LDLC SERPL CALC-MCNC: 91.4 MG/DL (ref 63–159)
LYMPHOCYTES # BLD AUTO: 1.6 K/UL (ref 1–4.8)
LYMPHOCYTES NFR BLD: 9.6 % (ref 18–48)
MAGNESIUM SERPL-MCNC: 1.6 MG/DL (ref 1.6–2.6)
MCH RBC QN AUTO: 28.3 PG (ref 27–31)
MCHC RBC AUTO-ENTMCNC: 32.3 G/DL (ref 32–36)
MCV RBC AUTO: 88 FL (ref 82–98)
MONOCYTES # BLD AUTO: 1.5 K/UL (ref 0.3–1)
MONOCYTES NFR BLD: 8.9 % (ref 4–15)
NEUTROPHILS # BLD AUTO: 13.1 K/UL (ref 1.8–7.7)
NEUTROPHILS NFR BLD: 79.9 % (ref 38–73)
NONHDLC SERPL-MCNC: 120 MG/DL
NRBC BLD-RTO: 0 /100 WBC
PHOSPHATE SERPL-MCNC: 3.4 MG/DL (ref 2.7–4.5)
PLATELET # BLD AUTO: 266 K/UL (ref 150–350)
PMV BLD AUTO: 9.9 FL (ref 9.2–12.9)
POCT GLUCOSE: 202 MG/DL (ref 70–110)
POCT GLUCOSE: 257 MG/DL (ref 70–110)
POCT GLUCOSE: 298 MG/DL (ref 70–110)
POTASSIUM SERPL-SCNC: 4.8 MMOL/L (ref 3.5–5.1)
POTASSIUM SERPL-SCNC: 5.6 MMOL/L (ref 3.5–5.1)
PROCALCITONIN SERPL IA-MCNC: 0.09 NG/ML
PROT SERPL-MCNC: 7.4 G/DL (ref 6–8.4)
PROT UR-MCNC: <7 MG/DL (ref 0–15)
RBC # BLD AUTO: 4.67 M/UL (ref 4.6–6.2)
SODIUM SERPL-SCNC: 142 MMOL/L (ref 136–145)
SODIUM SERPL-SCNC: 142 MMOL/L (ref 136–145)
SODIUM UR-SCNC: 128 MMOL/L (ref 20–250)
TRIGL SERPL-MCNC: 143 MG/DL (ref 30–150)
TROPONIN I SERPL DL<=0.01 NG/ML-MCNC: 1.52 NG/ML (ref 0–0.03)
TROPONIN I SERPL DL<=0.01 NG/ML-MCNC: 2.92 NG/ML (ref 0–0.03)
TROPONIN I SERPL DL<=0.01 NG/ML-MCNC: 3.62 NG/ML (ref 0–0.03)
TROPONIN I SERPL DL<=0.01 NG/ML-MCNC: 3.64 NG/ML (ref 0–0.03)
TSH SERPL DL<=0.005 MIU/L-ACNC: 0.9 UIU/ML (ref 0.4–4)
WBC # BLD AUTO: 16.43 K/UL (ref 3.9–12.7)

## 2020-06-21 PROCEDURE — 25000003 PHARM REV CODE 250: Performed by: INTERNAL MEDICINE

## 2020-06-21 PROCEDURE — 27000221 HC OXYGEN, UP TO 24 HOURS

## 2020-06-21 PROCEDURE — 83735 ASSAY OF MAGNESIUM: CPT

## 2020-06-21 PROCEDURE — 25000003 PHARM REV CODE 250: Performed by: SPECIALIST

## 2020-06-21 PROCEDURE — C9248 INJ, CLEVIDIPINE BUTYRATE: HCPCS | Performed by: HOSPITALIST

## 2020-06-21 PROCEDURE — 27000190 HC CPAP FULL FACE MASK W/VALVE

## 2020-06-21 PROCEDURE — 94761 N-INVAS EAR/PLS OXIMETRY MLT: CPT

## 2020-06-21 PROCEDURE — 63700000 PHARM REV CODE 250 ALT 637 W/O HCPCS: Performed by: INTERNAL MEDICINE

## 2020-06-21 PROCEDURE — 84145 PROCALCITONIN (PCT): CPT

## 2020-06-21 PROCEDURE — 84156 ASSAY OF PROTEIN URINE: CPT

## 2020-06-21 PROCEDURE — 94660 CPAP INITIATION&MGMT: CPT

## 2020-06-21 PROCEDURE — 84484 ASSAY OF TROPONIN QUANT: CPT | Mod: 91

## 2020-06-21 PROCEDURE — 25000003 PHARM REV CODE 250: Performed by: HOSPITALIST

## 2020-06-21 PROCEDURE — 36415 COLL VENOUS BLD VENIPUNCTURE: CPT

## 2020-06-21 PROCEDURE — 63600175 PHARM REV CODE 636 W HCPCS: Performed by: NURSE PRACTITIONER

## 2020-06-21 PROCEDURE — 99900035 HC TECH TIME PER 15 MIN (STAT)

## 2020-06-21 PROCEDURE — 80053 COMPREHEN METABOLIC PANEL: CPT

## 2020-06-21 PROCEDURE — 85025 COMPLETE CBC W/AUTO DIFF WBC: CPT

## 2020-06-21 PROCEDURE — 80061 LIPID PANEL: CPT

## 2020-06-21 PROCEDURE — 84484 ASSAY OF TROPONIN QUANT: CPT

## 2020-06-21 PROCEDURE — 82570 ASSAY OF URINE CREATININE: CPT

## 2020-06-21 PROCEDURE — 84443 ASSAY THYROID STIM HORMONE: CPT

## 2020-06-21 PROCEDURE — 84300 ASSAY OF URINE SODIUM: CPT

## 2020-06-21 PROCEDURE — C9113 INJ PANTOPRAZOLE SODIUM, VIA: HCPCS | Performed by: NURSE PRACTITIONER

## 2020-06-21 PROCEDURE — 25000003 PHARM REV CODE 250: Performed by: NURSE PRACTITIONER

## 2020-06-21 PROCEDURE — 63600175 PHARM REV CODE 636 W HCPCS: Performed by: INTERNAL MEDICINE

## 2020-06-21 PROCEDURE — 83036 HEMOGLOBIN GLYCOSYLATED A1C: CPT

## 2020-06-21 PROCEDURE — 84100 ASSAY OF PHOSPHORUS: CPT

## 2020-06-21 PROCEDURE — 63600175 PHARM REV CODE 636 W HCPCS: Performed by: HOSPITALIST

## 2020-06-21 PROCEDURE — 80048 BASIC METABOLIC PNL TOTAL CA: CPT

## 2020-06-21 PROCEDURE — 20000000 HC ICU ROOM

## 2020-06-21 RX ORDER — INSULIN ASPART 100 [IU]/ML
1-10 INJECTION, SOLUTION INTRAVENOUS; SUBCUTANEOUS
Status: CANCELLED | OUTPATIENT
Start: 2020-06-21

## 2020-06-21 RX ORDER — ASPIRIN 325 MG
325 TABLET, DELAYED RELEASE (ENTERIC COATED) ORAL DAILY
Status: DISCONTINUED | OUTPATIENT
Start: 2020-06-22 | End: 2020-06-22

## 2020-06-21 RX ORDER — NITROGLYCERIN 0.4 MG/1
0.4 TABLET SUBLINGUAL EVERY 5 MIN PRN
Status: CANCELLED | OUTPATIENT
Start: 2020-06-21

## 2020-06-21 RX ORDER — DULOXETIN HYDROCHLORIDE 30 MG/1
30 CAPSULE, DELAYED RELEASE ORAL NIGHTLY
Status: CANCELLED | OUTPATIENT
Start: 2020-06-21

## 2020-06-21 RX ORDER — CALCIUM CHLORIDE IN 0.9 % NACL 1 G/100 ML
1 INTRAVENOUS SOLUTION, PIGGYBACK (ML) INTRAVENOUS
Status: DISCONTINUED | OUTPATIENT
Start: 2020-06-21 | End: 2020-06-25

## 2020-06-21 RX ORDER — ENOXAPARIN SODIUM 100 MG/ML
1 INJECTION SUBCUTANEOUS
Status: DISCONTINUED | OUTPATIENT
Start: 2020-06-22 | End: 2020-06-21

## 2020-06-21 RX ORDER — MAGNESIUM SULFATE HEPTAHYDRATE 40 MG/ML
4 INJECTION, SOLUTION INTRAVENOUS
Status: DISCONTINUED | OUTPATIENT
Start: 2020-06-21 | End: 2020-06-25

## 2020-06-21 RX ORDER — GLUCAGON 1 MG
1 KIT INJECTION
Status: CANCELLED | OUTPATIENT
Start: 2020-06-21

## 2020-06-21 RX ORDER — FUROSEMIDE 10 MG/ML
40 INJECTION INTRAMUSCULAR; INTRAVENOUS ONCE
Status: COMPLETED | OUTPATIENT
Start: 2020-06-21 | End: 2020-06-21

## 2020-06-21 RX ORDER — ATORVASTATIN CALCIUM 40 MG/1
40 TABLET, FILM COATED ORAL NIGHTLY
Status: DISCONTINUED | OUTPATIENT
Start: 2020-06-21 | End: 2020-06-25

## 2020-06-21 RX ORDER — INSULIN ASPART 100 [IU]/ML
40 INJECTION, SUSPENSION SUBCUTANEOUS 2 TIMES DAILY WITH MEALS
Status: DISCONTINUED | OUTPATIENT
Start: 2020-06-22 | End: 2020-06-25

## 2020-06-21 RX ORDER — FUROSEMIDE 10 MG/ML
20 INJECTION INTRAMUSCULAR; INTRAVENOUS ONCE
Status: DISCONTINUED | OUTPATIENT
Start: 2020-06-21 | End: 2020-06-21

## 2020-06-21 RX ORDER — ASPIRIN 81 MG/1
81 TABLET ORAL DAILY
Status: CANCELLED | OUTPATIENT
Start: 2020-06-22

## 2020-06-21 RX ORDER — AZITHROMYCIN 250 MG/1
250 TABLET, FILM COATED ORAL DAILY
Status: DISCONTINUED | OUTPATIENT
Start: 2020-06-22 | End: 2020-06-21 | Stop reason: HOSPADM

## 2020-06-21 RX ORDER — IBUPROFEN 200 MG
24 TABLET ORAL
Status: CANCELLED | OUTPATIENT
Start: 2020-06-21

## 2020-06-21 RX ORDER — POTASSIUM CHLORIDE 7.45 MG/ML
20 INJECTION INTRAVENOUS
Status: DISCONTINUED | OUTPATIENT
Start: 2020-06-21 | End: 2020-06-25

## 2020-06-21 RX ORDER — HYDROCHLOROTHIAZIDE 25 MG/1
25 TABLET ORAL DAILY
Status: DISCONTINUED | OUTPATIENT
Start: 2020-06-22 | End: 2020-06-21

## 2020-06-21 RX ORDER — AZITHROMYCIN 250 MG/1
250 TABLET, FILM COATED ORAL DAILY
Status: CANCELLED | OUTPATIENT
Start: 2020-06-22

## 2020-06-21 RX ORDER — MAGNESIUM SULFATE HEPTAHYDRATE 40 MG/ML
2 INJECTION, SOLUTION INTRAVENOUS
Status: DISCONTINUED | OUTPATIENT
Start: 2020-06-21 | End: 2020-06-25

## 2020-06-21 RX ORDER — POTASSIUM CHLORIDE 7.45 MG/ML
40 INJECTION INTRAVENOUS
Status: DISCONTINUED | OUTPATIENT
Start: 2020-06-21 | End: 2020-06-25

## 2020-06-21 RX ORDER — IBUPROFEN 200 MG
16 TABLET ORAL
Status: CANCELLED | OUTPATIENT
Start: 2020-06-21

## 2020-06-21 RX ORDER — TALC
6 POWDER (GRAM) TOPICAL NIGHTLY PRN
Status: CANCELLED | OUTPATIENT
Start: 2020-06-21

## 2020-06-21 RX ORDER — PANTOPRAZOLE SODIUM 40 MG/10ML
40 INJECTION, POWDER, LYOPHILIZED, FOR SOLUTION INTRAVENOUS DAILY
Status: CANCELLED | OUTPATIENT
Start: 2020-06-22

## 2020-06-21 RX ORDER — ONDANSETRON 2 MG/ML
4 INJECTION INTRAMUSCULAR; INTRAVENOUS EVERY 8 HOURS PRN
Status: DISCONTINUED | OUTPATIENT
Start: 2020-06-21 | End: 2020-06-25

## 2020-06-21 RX ORDER — AZITHROMYCIN 250 MG/1
500 TABLET, FILM COATED ORAL ONCE
Status: COMPLETED | OUTPATIENT
Start: 2020-06-21 | End: 2020-06-21

## 2020-06-21 RX ORDER — IBUPROFEN 200 MG
16 TABLET ORAL
Status: DISCONTINUED | OUTPATIENT
Start: 2020-06-21 | End: 2020-06-25

## 2020-06-21 RX ORDER — NICARDIPINE HYDROCHLORIDE 0.2 MG/ML
1 INJECTION INTRAVENOUS CONTINUOUS
Status: DISCONTINUED | OUTPATIENT
Start: 2020-06-21 | End: 2020-06-21

## 2020-06-21 RX ORDER — POTASSIUM CHLORIDE 20 MEQ/1
40 TABLET, EXTENDED RELEASE ORAL
Status: DISCONTINUED | OUTPATIENT
Start: 2020-06-21 | End: 2020-06-25

## 2020-06-21 RX ORDER — GLUCAGON 1 MG
1 KIT INJECTION
Status: DISCONTINUED | OUTPATIENT
Start: 2020-06-21 | End: 2020-06-25

## 2020-06-21 RX ORDER — HYDROCHLOROTHIAZIDE 12.5 MG/1
25 TABLET ORAL DAILY
Status: DISCONTINUED | OUTPATIENT
Start: 2020-06-22 | End: 2020-06-21 | Stop reason: HOSPADM

## 2020-06-21 RX ORDER — METOPROLOL SUCCINATE 25 MG/1
25 TABLET, EXTENDED RELEASE ORAL DAILY
Status: DISCONTINUED | OUTPATIENT
Start: 2020-06-21 | End: 2020-06-21 | Stop reason: HOSPADM

## 2020-06-21 RX ORDER — TALC
6 POWDER (GRAM) TOPICAL NIGHTLY PRN
Status: DISCONTINUED | OUTPATIENT
Start: 2020-06-21 | End: 2020-06-25

## 2020-06-21 RX ORDER — ASPIRIN 81 MG/1
81 TABLET ORAL DAILY
Status: DISCONTINUED | OUTPATIENT
Start: 2020-06-22 | End: 2020-06-21

## 2020-06-21 RX ORDER — ENOXAPARIN SODIUM 100 MG/ML
1 INJECTION SUBCUTANEOUS
Status: DISCONTINUED | OUTPATIENT
Start: 2020-06-22 | End: 2020-06-21 | Stop reason: HOSPADM

## 2020-06-21 RX ORDER — DULOXETIN HYDROCHLORIDE 30 MG/1
30 CAPSULE, DELAYED RELEASE ORAL NIGHTLY
Status: DISCONTINUED | OUTPATIENT
Start: 2020-06-21 | End: 2020-06-21 | Stop reason: HOSPADM

## 2020-06-21 RX ORDER — PREDNISONE 20 MG/1
60 TABLET ORAL DAILY
Status: CANCELLED | OUTPATIENT
Start: 2020-06-22 | End: 2020-06-23

## 2020-06-21 RX ORDER — HYDROCHLOROTHIAZIDE 12.5 MG/1
25 TABLET ORAL DAILY
Status: CANCELLED | OUTPATIENT
Start: 2020-06-22

## 2020-06-21 RX ORDER — ENOXAPARIN SODIUM 100 MG/ML
1 INJECTION SUBCUTANEOUS
Status: CANCELLED | OUTPATIENT
Start: 2020-06-22

## 2020-06-21 RX ORDER — POTASSIUM CHLORIDE 20 MEQ/1
20 TABLET, EXTENDED RELEASE ORAL
Status: DISCONTINUED | OUTPATIENT
Start: 2020-06-21 | End: 2020-06-25

## 2020-06-21 RX ORDER — PRAVASTATIN SODIUM 40 MG/1
40 TABLET ORAL NIGHTLY
Status: DISCONTINUED | OUTPATIENT
Start: 2020-06-21 | End: 2020-06-21

## 2020-06-21 RX ORDER — NICARDIPINE HYDROCHLORIDE 0.2 MG/ML
1 INJECTION INTRAVENOUS CONTINUOUS
Status: DISCONTINUED | OUTPATIENT
Start: 2020-06-21 | End: 2020-06-21 | Stop reason: HOSPADM

## 2020-06-21 RX ORDER — ACYCLOVIR 200 MG/1
400 CAPSULE ORAL
Status: DISPENSED | OUTPATIENT
Start: 2020-06-21 | End: 2020-06-29

## 2020-06-21 RX ORDER — PRAVASTATIN SODIUM 40 MG/1
40 TABLET ORAL NIGHTLY
Status: CANCELLED | OUTPATIENT
Start: 2020-06-21

## 2020-06-21 RX ORDER — ACYCLOVIR 200 MG/1
400 CAPSULE ORAL
Status: CANCELLED | OUTPATIENT
Start: 2020-06-21 | End: 2020-06-29

## 2020-06-21 RX ORDER — FENOFIBRATE 145 MG/1
145 TABLET, FILM COATED ORAL
Status: CANCELLED | OUTPATIENT
Start: 2020-06-22

## 2020-06-21 RX ORDER — INSULIN ASPART 100 [IU]/ML
40 INJECTION, SUSPENSION SUBCUTANEOUS 2 TIMES DAILY WITH MEALS
Status: CANCELLED | OUTPATIENT
Start: 2020-06-22

## 2020-06-21 RX ORDER — ENOXAPARIN SODIUM 100 MG/ML
50 INJECTION SUBCUTANEOUS ONCE
Status: DISCONTINUED | OUTPATIENT
Start: 2020-06-21 | End: 2020-06-21 | Stop reason: HOSPADM

## 2020-06-21 RX ORDER — METOPROLOL SUCCINATE 25 MG/1
25 TABLET, EXTENDED RELEASE ORAL DAILY
Status: DISCONTINUED | OUTPATIENT
Start: 2020-06-22 | End: 2020-06-21

## 2020-06-21 RX ORDER — METOPROLOL SUCCINATE 25 MG/1
25 TABLET, EXTENDED RELEASE ORAL DAILY
Status: CANCELLED | OUTPATIENT
Start: 2020-06-22

## 2020-06-21 RX ORDER — ONDANSETRON 2 MG/ML
4 INJECTION INTRAMUSCULAR; INTRAVENOUS EVERY 8 HOURS PRN
Status: CANCELLED | OUTPATIENT
Start: 2020-06-21

## 2020-06-21 RX ORDER — ACETAMINOPHEN 325 MG/1
650 TABLET ORAL EVERY 6 HOURS PRN
Status: DISCONTINUED | OUTPATIENT
Start: 2020-06-21 | End: 2020-06-25

## 2020-06-21 RX ORDER — SODIUM CHLORIDE 0.9 % (FLUSH) 0.9 %
10 SYRINGE (ML) INJECTION
Status: CANCELLED | OUTPATIENT
Start: 2020-06-21

## 2020-06-21 RX ORDER — MAGNESIUM SULFATE 1 G/100ML
1 INJECTION INTRAVENOUS
Status: DISCONTINUED | OUTPATIENT
Start: 2020-06-21 | End: 2020-06-25

## 2020-06-21 RX ORDER — DULOXETIN HYDROCHLORIDE 30 MG/1
30 CAPSULE, DELAYED RELEASE ORAL NIGHTLY
Status: DISCONTINUED | OUTPATIENT
Start: 2020-06-21 | End: 2020-06-25

## 2020-06-21 RX ORDER — INSULIN ASPART 100 [IU]/ML
1-10 INJECTION, SOLUTION INTRAVENOUS; SUBCUTANEOUS
Status: DISCONTINUED | OUTPATIENT
Start: 2020-06-21 | End: 2020-06-23

## 2020-06-21 RX ORDER — NITROGLYCERIN 0.4 MG/1
0.4 TABLET SUBLINGUAL EVERY 5 MIN PRN
Status: DISCONTINUED | OUTPATIENT
Start: 2020-06-21 | End: 2020-06-26

## 2020-06-21 RX ORDER — AMOXICILLIN 250 MG
1 CAPSULE ORAL DAILY PRN
Status: DISCONTINUED | OUTPATIENT
Start: 2020-06-21 | End: 2020-06-25

## 2020-06-21 RX ORDER — AMOXICILLIN 250 MG
1 CAPSULE ORAL DAILY PRN
Status: CANCELLED | OUTPATIENT
Start: 2020-06-21

## 2020-06-21 RX ORDER — IBUPROFEN 200 MG
24 TABLET ORAL
Status: DISCONTINUED | OUTPATIENT
Start: 2020-06-21 | End: 2020-06-25

## 2020-06-21 RX ORDER — NICARDIPINE HYDROCHLORIDE 0.2 MG/ML
1 INJECTION INTRAVENOUS CONTINUOUS
Status: CANCELLED | OUTPATIENT
Start: 2020-06-21

## 2020-06-21 RX ORDER — FENOFIBRATE 145 MG/1
145 TABLET, FILM COATED ORAL
Status: DISCONTINUED | OUTPATIENT
Start: 2020-06-22 | End: 2020-06-25

## 2020-06-21 RX ORDER — LANOLIN ALCOHOL/MO/W.PET/CERES
800 CREAM (GRAM) TOPICAL
Status: DISCONTINUED | OUTPATIENT
Start: 2020-06-21 | End: 2020-06-25

## 2020-06-21 RX ORDER — SODIUM CHLORIDE 0.9 % (FLUSH) 0.9 %
10 SYRINGE (ML) INJECTION
Status: DISCONTINUED | OUTPATIENT
Start: 2020-06-21 | End: 2020-06-25

## 2020-06-21 RX ORDER — HYDROCHLOROTHIAZIDE 12.5 MG/1
50 TABLET ORAL DAILY
Status: DISCONTINUED | OUTPATIENT
Start: 2020-06-21 | End: 2020-06-21

## 2020-06-21 RX ORDER — PANTOPRAZOLE SODIUM 40 MG/10ML
40 INJECTION, POWDER, LYOPHILIZED, FOR SOLUTION INTRAVENOUS DAILY
Status: DISCONTINUED | OUTPATIENT
Start: 2020-06-22 | End: 2020-06-25

## 2020-06-21 RX ORDER — ACETAMINOPHEN 325 MG/1
650 TABLET ORAL EVERY 6 HOURS PRN
Status: CANCELLED | OUTPATIENT
Start: 2020-06-21

## 2020-06-21 RX ORDER — PREDNISONE 20 MG/1
60 TABLET ORAL DAILY
Status: ACTIVE | OUTPATIENT
Start: 2020-06-22 | End: 2020-06-23

## 2020-06-21 RX ORDER — NIFEDIPINE 30 MG/1
30 TABLET, EXTENDED RELEASE ORAL 2 TIMES DAILY
Status: DISCONTINUED | OUTPATIENT
Start: 2020-06-21 | End: 2020-06-25

## 2020-06-21 RX ORDER — AZITHROMYCIN 250 MG/1
250 TABLET, FILM COATED ORAL DAILY
Status: DISCONTINUED | OUTPATIENT
Start: 2020-06-22 | End: 2020-06-21

## 2020-06-21 RX ADMIN — CLEVIPIDINE 4 MG/HR: 0.5 EMULSION INTRAVENOUS at 07:06

## 2020-06-21 RX ADMIN — NITROGLYCERIN 1 INCH: 20 OINTMENT TOPICAL at 09:06

## 2020-06-21 RX ADMIN — NICARDIPINE HYDROCHLORIDE 5 MG/HR: 0.2 INJECTION, SOLUTION INTRAVENOUS at 06:06

## 2020-06-21 RX ADMIN — NICARDIPINE HYDROCHLORIDE 8 MG/HR: 0.2 INJECTION, SOLUTION INTRAVENOUS at 12:06

## 2020-06-21 RX ADMIN — ENOXAPARIN SODIUM 40 MG: 40 INJECTION SUBCUTANEOUS at 04:06

## 2020-06-21 RX ADMIN — NITROGLYCERIN 1 INCH: 20 OINTMENT TOPICAL at 03:06

## 2020-06-21 RX ADMIN — FENOFIBRATE 145 MG: 145 TABLET ORAL at 09:06

## 2020-06-21 RX ADMIN — FUROSEMIDE 40 MG: 10 INJECTION, SOLUTION INTRAMUSCULAR; INTRAVENOUS at 01:06

## 2020-06-21 RX ADMIN — ACYCLOVIR 400 MG: 200 CAPSULE ORAL at 06:06

## 2020-06-21 RX ADMIN — DEXTROSE MONOHYDRATE 25 G: 25 INJECTION, SOLUTION INTRAVENOUS at 01:06

## 2020-06-21 RX ADMIN — MAGNESIUM OXIDE 800 MG: 400 TABLET ORAL at 08:06

## 2020-06-21 RX ADMIN — METOPROLOL SUCCINATE 25 MG: 25 TABLET, EXTENDED RELEASE ORAL at 09:06

## 2020-06-21 RX ADMIN — DULOXETINE 30 MG: 30 CAPSULE, DELAYED RELEASE ORAL at 08:06

## 2020-06-21 RX ADMIN — SENNOSIDES AND DOCUSATE SODIUM 1 TABLET: 8.6; 5 TABLET ORAL at 09:06

## 2020-06-21 RX ADMIN — INSULIN HUMAN 10 UNITS: 100 INJECTION, SOLUTION PARENTERAL at 01:06

## 2020-06-21 RX ADMIN — NIFEDIPINE 30 MG: 30 TABLET, FILM COATED, EXTENDED RELEASE ORAL at 08:06

## 2020-06-21 RX ADMIN — ATORVASTATIN CALCIUM 40 MG: 40 TABLET, FILM COATED ORAL at 08:06

## 2020-06-21 RX ADMIN — INSULIN ASPART 4 UNITS: 100 INJECTION, SOLUTION INTRAVENOUS; SUBCUTANEOUS at 11:06

## 2020-06-21 RX ADMIN — INSULIN ASPART 40 UNITS: 100 INJECTION, SUSPENSION SUBCUTANEOUS at 09:06

## 2020-06-21 RX ADMIN — PANTOPRAZOLE SODIUM 40 MG: 40 INJECTION, POWDER, LYOPHILIZED, FOR SOLUTION INTRAVENOUS at 09:06

## 2020-06-21 RX ADMIN — HYPROMELLOSE 2910 1 DROP: 5 SOLUTION OPHTHALMIC at 02:06

## 2020-06-21 RX ADMIN — INSULIN ASPART 6 UNITS: 100 INJECTION, SOLUTION INTRAVENOUS; SUBCUTANEOUS at 06:06

## 2020-06-21 RX ADMIN — HYPROMELLOSE 2910 1 DROP: 5 SOLUTION OPHTHALMIC at 09:06

## 2020-06-21 RX ADMIN — PREDNISONE 60 MG: 20 TABLET ORAL at 09:06

## 2020-06-21 RX ADMIN — AZITHROMYCIN MONOHYDRATE 500 MG: 250 TABLET ORAL at 03:06

## 2020-06-21 RX ADMIN — HYDROCHLOROTHIAZIDE 50 MG: 12.5 CAPSULE ORAL at 09:06

## 2020-06-21 RX ADMIN — NICARDIPINE HYDROCHLORIDE 1 MG/HR: 0.2 INJECTION, SOLUTION INTRAVENOUS at 12:06

## 2020-06-21 RX ADMIN — ASPIRIN 81 MG: 81 TABLET, COATED ORAL at 09:06

## 2020-06-21 RX ADMIN — CEFTRIAXONE 2 G: 1 INJECTION, SOLUTION INTRAVENOUS at 03:06

## 2020-06-21 RX ADMIN — INSULIN ASPART 40 UNITS: 100 INJECTION, SUSPENSION SUBCUTANEOUS at 04:06

## 2020-06-21 RX ADMIN — ACYCLOVIR 400 MG: 200 CAPSULE ORAL at 08:06

## 2020-06-21 RX ADMIN — INSULIN ASPART 6 UNITS: 100 INJECTION, SOLUTION INTRAVENOUS; SUBCUTANEOUS at 04:06

## 2020-06-21 RX ADMIN — NITROGLYCERIN 1 INCH: 20 OINTMENT TOPICAL at 08:06

## 2020-06-21 RX ADMIN — ACYCLOVIR 400 MG: 200 CAPSULE ORAL at 02:06

## 2020-06-21 RX ADMIN — ACYCLOVIR 400 MG: 200 CAPSULE ORAL at 09:06

## 2020-06-21 NOTE — PLAN OF CARE
Pt has denied chest pains all day. Pt has been NPO all day pending what cardiologist wanted to do. Tray ordered. Wife remains at bedside. New troponin pending. Pt's wife is upset about the fact that pt can not be transferred to [place]. She is very concerned about being able to visit pt and how long she will be able to stay with him.will find out times when report called to [facility].

## 2020-06-21 NOTE — ASSESSMENT & PLAN NOTE
Likely secondary to pulmonary edema.  Administer Lasix IV.  Patient's CXR and last ABG were reviewed.       ABG  Recent Labs   Lab 06/20/20  1827   PH 7.370   PO2 26*   PCO2 38.9   HCO3 22.5*   BE -3     Continue supplemental oxygen to keep oxygen saturation >92%.

## 2020-06-21 NOTE — NURSING
Pt received to room 2215 as transfer from [facility]. Pt awake and alert without complaints. Attached to monitor. See nursing exam.

## 2020-06-21 NOTE — NURSING
Pt's primary MD is Dr Himanshu Huggins. Pt's cardiologist is Dr Tony Lantigua. Both doctors are at [facility].

## 2020-06-21 NOTE — ASSESSMENT & PLAN NOTE
Acute, trend troponin, ASA, telemetry, consult cardiology for further risk stratification with non-invasive stress test.  Echo ordered.  NPO for now pending cardiology consult.  Is likely related to hypertensive urgency.   Patient is currently chest pain-free.  Sublingual nitroglycerin p.r.n. for any additional chest pain.

## 2020-06-21 NOTE — HOSPITAL COURSE
Patient was admitted with the suspicion of NSTEMI in hypertensive emergency.  Patient developed chest pain shortness of breath and pain radiating to the jaw  the night of 6/21 and came in the emergency room where his blood pressure is high.  He received 2 nitroglycerin and the chest pain abated he has not had any further chest pain and EKGs have shown no acute changes but troponins are trending upward.  He has a history of high blood pressure and diabetes he was started 2 days ago on prednisone therapy for Bell's palsy on the right side.  Patient has a significant cardiac history with history of stent placed Fifteen years ago . Six months ago he had 2 treadmills because he was feeling a little bit off but no chest pain.  Patient symptomatically has been feeling better blood pressure improving  on Cardene drip nitroglycerin.  Started on beta-blockers continued hydrochlorothiazide but at higher dose. Chest x-ray is abnormal demonstrating opacification right lower lung with some fluid in the fissures BNP is normal and he is not coughing up any phlegm COVID test is negative.  Started on ceftriaxone azithromycin for community-acquired pneumonia.  Cardiology evaluated patient and recommended patient to be transferred to [facility] for angiogram.  The patient discussed with the hospitalist team at [facility] who accepted the patient.  Patient was given full-dose Lovenox.  Currently stable for discharge for higher level of care.

## 2020-06-21 NOTE — H&P
[facility] Medicine  History & Physical    Dos: 06/21/2020 at 06:10pm    Patient Name: Sincere Malave  MRN: [id number]  Admission Date: 6/21/2020  Attending Physician: Pita Herbert MD   Primary Care Provider: Primary Doctor No         Patient information was obtained from patient and ER records.     Subjective:     Principal Problem:NSTEMI (non-ST elevated myocardial infarction)    Chief Complaint:   Chief Complaint   Patient presents with    Chest Pain        HPI: 70 y.o. male with a PMHx of CAD with remote PCI, CKD 3, IDDM, hypertension, hyperlipidemia, neuropathy, sleep apnea, anemia who presents to [facility] ED with chief complaint of chest pain for 2 hr.  He described chest pain as a substernal chest tightness and tells me that his lower jaw hurts.  Patient was placed in ICU on Cardene drip however subsequent cardiac enzymes up trended and Cardiology was involved.  Patient was also given Rocephin and azithromycin for presumed pneumonia.  He also received aspirin, statin, beta-blocker and weight based Lovenox.  Patient was transferred to [facility] for angiographic evaluation tomorrow morning.  Upon my interview he reports mild chest discomfort and jaw pain and associated shortness of breath.  Otherwise denies any fever, chills, headache, dizziness, palpitations, nausea, vomiting, bladder or bowel symptoms.    Past Medical History:   Diagnosis Date    Anemia     Bell's palsy 06/20/2020    CAD (coronary artery disease)     Diabetes mellitus     Diabetic polyneuropathy     HLD (hyperlipidemia)     Hypertension     Sleep apnea        History reviewed. No pertinent surgical history.    Review of patient's allergies indicates:  No Known Allergies    Current Facility-Administered Medications on File Prior to Encounter   Medication    [COMPLETED] azithromycin tablet 500 mg    [COMPLETED] dextrose 50% injection 25 g    [COMPLETED] furosemide injection 40 mg    [COMPLETED]  insulin regular injection 10 Units    [COMPLETED] insulin regular injection 9 Units    [COMPLETED] sodium chloride 0.9% bolus 1,000 mL    [COMPLETED] sodium chloride 0.9% bolus 1,000 mL    [DISCONTINUED] acetaminophen tablet 650 mg    [DISCONTINUED] acyclovir capsule 400 mg    [DISCONTINUED] amLODIPine tablet 10 mg    [DISCONTINUED] artificial tears 0.5 % ophthalmic solution 1 drop    [DISCONTINUED] aspirin EC tablet 81 mg    [DISCONTINUED] azithromycin tablet 250 mg    [DISCONTINUED] cefTRIAXone (ROCEPHIN) 1 g/50 mL D5W IVPB    [DISCONTINUED] dextrose 50% injection 12.5 g    [DISCONTINUED] dextrose 50% injection 25 g    [DISCONTINUED] DULoxetine DR capsule 30 mg    [DISCONTINUED] DULoxetine DR capsule 30 mg    [DISCONTINUED] enoxaparin injection 40 mg    [DISCONTINUED] enoxaparin injection 50 mg    [DISCONTINUED] enoxaparin injection 90 mg    [DISCONTINUED] fenofibrate micronized capsule 134 mg    [DISCONTINUED] fenofibrate tablet 145 mg    [DISCONTINUED] furosemide injection 20 mg    [DISCONTINUED] furosemide injection 20 mg    [DISCONTINUED] glucagon (human recombinant) injection 1 mg    [DISCONTINUED] glucose chewable tablet 16 g    [DISCONTINUED] glucose chewable tablet 24 g    [DISCONTINUED] hydroCHLOROthiazide tablet 12.5 mg    [DISCONTINUED] hydroCHLOROthiazide tablet 25 mg    [DISCONTINUED] hydroCHLOROthiazide tablet 50 mg    [DISCONTINUED] insulin aspart protamine-insulin aspart (NovoLOG 70/30) injection    [DISCONTINUED] insulin aspart U-100 pen 1-10 Units    [DISCONTINUED] lisinopriL tablet 20 mg    [DISCONTINUED] melatonin tablet 6 mg    [DISCONTINUED] metoprolol succinate (TOPROL-XL) 24 hr tablet 25 mg    [DISCONTINUED] niCARdipine 40 mg/200 mL infusion    [DISCONTINUED] niCARdipine 40 mg/200 mL infusion    [DISCONTINUED] niCARdipine 40 mg/200 mL infusion    [DISCONTINUED] nitroGLYCERIN 2% TD oint ointment 0.5 inch    [DISCONTINUED] nitroGLYCERIN 2% TD oint ointment  1 inch    [DISCONTINUED] nitroGLYCERIN 50 mg in dextrose 5 % 250 mL infusion (TITRATING)    [DISCONTINUED] nitroGLYCERIN SL tablet 0.4 mg    [DISCONTINUED] ondansetron injection 4 mg    [DISCONTINUED] pantoprazole injection 40 mg    [DISCONTINUED] pravastatin tablet 40 mg    [DISCONTINUED] predniSONE tablet 60 mg    [DISCONTINUED] senna-docusate 8.6-50 mg per tablet 1 tablet    [DISCONTINUED] sodium chloride 0.9% flush 10 mL     Current Outpatient Medications on File Prior to Encounter   Medication Sig    acyclovir (ZOVIRAX) 400 MG tablet Take 1 tablet (400 mg total) by mouth 5 (five) times daily. for 10 days    amLODIPine (NORVASC) 10 MG tablet Take 10 mg by mouth once daily.    aspirin (ASPIR-81 ORAL) Take 81 mg by mouth once daily.    DULoxetine (CYMBALTA) 30 MG capsule Take 30 mg by mouth once daily.    fenofibrate micronized (LOFIBRA) 134 MG Cap Take 134 mg by mouth daily with breakfast.    insulin NPH-insulin regular, 70/30, (NOVOLIN 70/30) 100 unit/mL (70-30) injection Inject into the skin 2 (two) times daily before meals. 40 units Q a.m. and 60 units q.p.m.    INV LISINOPRIL-HCTZ 20-12.5 Take 1 tablet by mouth once daily. FOR INVESTIGATIONAL USE ONLY    metFORMIN (GLUCOPHAGE) 1000 MG tablet Take 1,000 mg by mouth 2 (two) times daily with meals.    pravastatin (PRAVACHOL) 40 MG tablet Take 40 mg by mouth every evening.    predniSONE (DELTASONE) 20 MG tablet Take 3 tablets (60 mg total) by mouth once daily. for 4 days     Family History     Problem Relation (Age of Onset)    Heart attack Father, Sister    Heart disease Father    No Known Problems Mother        Tobacco Use    Smoking status: Never Smoker   Substance and Sexual Activity    Alcohol use: Yes     Alcohol/week: 1.0 standard drinks     Types: 1 Cans of beer per week     Comment: couple of times a year    Drug use: Never    Sexual activity: Not Currently     Review of Systems   Constitutional: Positive for diaphoresis. Negative  for activity change and appetite change.   HENT: Negative for congestion and sore throat.    Eyes: Negative for discharge and visual disturbance.   Respiratory: Positive for chest tightness and shortness of breath. Negative for cough.    Cardiovascular: Negative for chest pain and leg swelling.   Gastrointestinal: Negative for abdominal pain and nausea.   Genitourinary: Negative for dysuria and hematuria.   Musculoskeletal: Positive for back pain. Negative for myalgias.   Skin: Negative for pallor and rash.   Neurological: Negative for dizziness and weakness.   Psychiatric/Behavioral: Negative for behavioral problems. The patient is not nervous/anxious.    All other systems reviewed and are negative.    Objective:     Vital Signs (Most Recent):    Vital Signs (24h Range):  Temp:  [97.2 °F (36.2 °C)-98.6 °F (37 °C)] 98.6 °F (37 °C)  Pulse:  [48-87] 52  Resp:  [19-30] 27  SpO2:  [84 %-100 %] 93 %  BP: (128-243)/() 148/65        There is no height or weight on file to calculate BMI.    Physical Exam  Vitals signs and nursing note reviewed.   Constitutional:       Appearance: He is well-developed.   HENT:      Head: Normocephalic and atraumatic.   Eyes:      Pupils: Pupils are equal, round, and reactive to light.   Neck:      Musculoskeletal: Normal range of motion and neck supple.      Vascular: No JVD.   Cardiovascular:      Rate and Rhythm: Normal rate and regular rhythm.      Heart sounds: Normal heart sounds. No murmur. No gallop.    Pulmonary:      Effort: No respiratory distress.      Breath sounds: Normal breath sounds. No wheezing.      Comments: On nasal cannula  Abdominal:      General: Bowel sounds are normal. There is no distension.      Palpations: Abdomen is soft.      Tenderness: There is no guarding or rebound.   Lymphadenopathy:      Cervical: No cervical adenopathy.   Skin:     General: Skin is warm.      Capillary Refill: Capillary refill takes less than 2 seconds.      Findings: No rash.    Neurological:      Mental Status: He is alert and oriented to person, place, and time.      Cranial Nerves: No cranial nerve deficit.   Psychiatric:         Behavior: Behavior normal.           CRANIAL NERVES     CN III, IV, VI   Pupils are equal, round, and reactive to light.       Significant Labs: All pertinent labs within the past 24 hours have been reviewed.    Significant Imaging: I have reviewed all pertinent imaging results/findings within the past 24 hours.    Assessment/Plan:     Assessment:  70-year-old gentleman presented to our sister facility with chest pain, accelerated hypertension, acute hypoxic respiratory failure due to flash pulmonary edema was referred to our facility for angiographic evaluation.  He received medical management including aspirin, statin, weight based Lovenox at that facility.  Procalcitonin is negative.     Active Hospital Problems    Diagnosis  POA    *NSTEMI (non-ST elevated myocardial infarction) [I21.4]  Yes     Priority: 1 - High    Coronary artery disease involving native coronary artery of native heart [I25.10]  Yes     Priority: 2     Acute-on-chronic kidney injury [N17.9, N18.9]  Yes     Priority: 3     Type 2 diabetes mellitus with hyperglycemia, with long-term current use of insulin [E11.65, Z79.4]  Not Applicable     Priority: 4     Essential hypertension [I10]  Yes     Priority: 5     Pulmonary edema, acute [J81.0]  Yes     Priority: 6     HLD (hyperlipidemia) [E78.5]  Yes     Priority: 7     TERESO (obstructive sleep apnea) [G47.33]  Yes     Priority: 8     Bell's palsy [G51.0]  Yes     Priority: 9     Acute hypoxemic respiratory failure [J96.01]  Yes     Priority: 10       Resolved Hospital Problems   No resolved problems to display.     Plan:  -Already received aspirin, statin, BB,  weight based Lovenox, was on Cardene drip.  He is bradycardic with heart rate in 50s.  Will hold beta-blocker  -serial EKGs, cardiac enzymes, 2D echo, telemetry  monitor  -procalcitonin negative- discontinue antibiotics  -hold on further diuretics considering his poor kidney functions and anticipated LHC in AM  -cardiac diet.  NPO after midnight  -switch Cardene to Cleviprex drip.  If active chest pain we might switch it to nitroglycerin drip  -continue outpatient prednisone and acyclovir he is getting for his Bell's palsy  -elevated blood sugars due to steroid use.  Basal bolus insulin regimen, Accu-Checks  -daily labs  -consult Dr. RIMA Poe-Planning C in AM  -further management as per [facility] findings    VTE Risk Mitigation (From admission, onward)         Ordered     enoxaparin injection 90 mg  Every 12 hours (non-standard times)      06/21/20 1804     IP VTE HIGH RISK PATIENT  Once      06/21/20 1804     Place sequential compression device  Until discontinued      06/21/20 1804                   Pita Herbert MD  Department of Hospital Medicine   [facility]

## 2020-06-21 NOTE — CONSULTS
[facility]  Cardiology  Consult Note    Patient Name: Sincere Malave  MRN: [id number]  Admission Date: 6/20/2020  Hospital Length of Stay: 1 days  Code Status: Full Code   Attending Provider: Thomas Rai MD   Consulting Provider: James Kaiser MD  Primary Care Physician: Primary Doctor No  Principal Problem:Hypertensive urgency    Patient information was obtained from patient, spouse/SO and ER records.     Consults  Subjective:     Chief Complaint:  Chest pain     HPI:  Patient developed chest pain shortness of breath and pain radiating to the jaw was yesterday and came in the emergency room where his blood pressure is high.  He received 2 nitroglycerin and the pain abated he has not had any further chest pain and EKGs have shown no acute changes but troponins are trending upward.  He has a history of high blood pressure and diabetes he was started 2 days ago on prednisone therapy for Bell's palsy on the right  Fifteen years ago he had stent in the heart  Six months ago he had 2 treadmills because he was feeling a little bit off but no chest pain he is currently feeling well on Cardene drip nitroglycerin beta-blockers    Chest x-ray is abnormal demonstrating opacification right lower lung with some fluid in the fissures BNP is normal and he is not coughing up any phlegm COVID test is negative    Past Medical History:   Diagnosis Date    Anemia     Bell's palsy 06/20/2020    CAD (coronary artery disease)     Diabetes mellitus     Diabetic polyneuropathy     HLD (hyperlipidemia)     Hypertension     Sleep apnea        History reviewed. No pertinent surgical history.    Review of patient's allergies indicates:  No Known Allergies    No current facility-administered medications on file prior to encounter.      Current Outpatient Medications on File Prior to Encounter   Medication Sig    acyclovir (ZOVIRAX) 400 MG tablet Take 1 tablet (400 mg total) by mouth 5 (five) times daily.  for 10 days    amLODIPine (NORVASC) 10 MG tablet Take 10 mg by mouth once daily.    aspirin (ASPIR-81 ORAL) Take 81 mg by mouth once daily.    fenofibrate micronized (LOFIBRA) 134 MG Cap Take 134 mg by mouth daily with breakfast.    insulin NPH-insulin regular, 70/30, (NOVOLIN 70/30) 100 unit/mL (70-30) injection Inject into the skin 2 (two) times daily before meals. 40 units Q a.m. and 60 units q.p.m.    INV LISINOPRIL-HCTZ 20-12.5 Take 1 tablet by mouth once daily. FOR INVESTIGATIONAL USE ONLY    metFORMIN (GLUCOPHAGE) 1000 MG tablet Take 1,000 mg by mouth 2 (two) times daily with meals.    pravastatin (PRAVACHOL) 40 MG tablet Take 40 mg by mouth every evening.    predniSONE (DELTASONE) 20 MG tablet Take 3 tablets (60 mg total) by mouth once daily. for 4 days    DULoxetine (CYMBALTA) 30 MG capsule Take 30 mg by mouth once daily.     Family History     Problem Relation (Age of Onset)    Heart attack Father, Sister    Heart disease Father    No Known Problems Mother        Tobacco Use    Smoking status: Never Smoker   Substance and Sexual Activity    Alcohol use: Yes     Alcohol/week: 1.0 standard drinks     Types: 1 Cans of beer per week     Comment: couple of times a year    Drug use: Never    Sexual activity: Not Currently     ROS  Objective:     Vital Signs (Most Recent):  Temp: 98.3 °F (36.8 °C) (06/21/20 1200)  Pulse: (!) 52 (06/21/20 1245)  Resp: (!) 26 (06/21/20 1245)  BP: (!) 140/67 (06/21/20 1245)  SpO2: (!) 93 % (06/21/20 1245) Vital Signs (24h Range):  Temp:  [97.2 °F (36.2 °C)-98.3 °F (36.8 °C)] 98.3 °F (36.8 °C)  Pulse:  [51-87] 52  Resp:  [18-30] 26  SpO2:  [84 %-100 %] 93 %  BP: (136-243)/() 140/67     Weight: 92.1 kg (203 lb 0.7 oz)  Body mass index is 31.8 kg/m².    SpO2: (!) 93 %  O2 Device (Oxygen Therapy): nasal cannula      Intake/Output Summary (Last 24 hours) at 6/21/2020 1350  Last data filed at 6/21/2020 1215  Gross per 24 hour   Intake 311.75 ml   Output 2425 ml   Net  -2113.25 ml       Lines/Drains/Airways     Peripheral Intravenous Line                 Peripheral IV - Single Lumen 06/20/20 1824 20 G Left Antecubital less than 1 day         Peripheral IV - Single Lumen 06/20/20 2200 20 G Right Hand less than 1 day                Physical Exam blood pressure is 140/67 pulse rate is 60  Head neck question of bruit on the right carotid noted  Lungs reveal few crackles but no rales  Cardiac sounds are normal without rub  Abdomen soft  Of femoral pulses and posterior tibials are palpable normal  Neurologic he has paresis of the right side of the face    Significant Labs:   CMP   Recent Labs   Lab 06/20/20 1824 06/21/20  0012 06/21/20  0455    142 142   K 5.4* 5.6* 4.8    108 106   CO2 20* 22* 25   * 369* 310*   BUN 36* 32* 31*   CREATININE 1.8* 1.6* 1.5*   CALCIUM 9.6 9.7 9.3   PROT 7.5  --  7.4   ALBUMIN 4.1  --  4.0   BILITOT 0.7  --  0.7   ALKPHOS 51*  --  52*   AST 17  --  23   ALT 38  --  42   ANIONGAP 14 12 11   ESTGFRAFRICA 44* 50* 55*   EGFRNONAA 38* 44* 47*   , CBC   Recent Labs   Lab 06/20/20 1824 06/21/20  0455   WBC 8.67 16.43*   HGB 12.3* 13.2*   HCT 37.2* 40.9    266   , Lipid Panel   Recent Labs   Lab 06/21/20  0456   CHOL 169   HDL 49   LDLCALC 91.4   TRIG 143   CHOLHDL 29.0    and Troponin   Recent Labs   Lab 06/21/20  0012 06/21/20  0456 06/21/20  0944   TROPONINI 1.519* 2.924* 3.625*       Significant Imaging: EKG: EKG demonstrates sinus rhythm left axis old anterior infarction  Assessment and Plan:   1.  Non-STEMI with elevation of troponin patient is stable now without chest pain or shortness of breath EKG shows no acute ST-T changes  2.  Abnormal chest x-ray demonstrating infiltrate right lower lung question of edema and some atelectasis  3.  Diabetes mellitus out of control secondary to prednisone secondary to Rx for Bell's palsy  4.  Question of right carotid bruit  5.  Chronic kidney disease  Plan continue Lovenox aspirin Cardene  nitrates low-dose beta-blocker  Patient needs to have cardiac catheterization  Family wishes to transfer [facility]ur to [facility] for angiogram of this is being looked into  Active Diagnoses:    Diagnosis Date Noted POA    PRINCIPAL PROBLEM:  Hypertensive urgency [I16.0] 06/20/2020 Yes    Pulmonary edema, acute [J81.0] 06/21/2020 Yes    Hyperkalemia [E87.5] 06/21/2020 Yes    Essential hypertension [I10] 06/21/2020 Yes    Acute-on-chronic kidney injury [N17.9, N18.9] 06/21/2020 Yes    Diabetic neuropathy associated with type 2 diabetes mellitus [E11.40] 06/21/2020 Yes    Anemia [D64.9] 06/21/2020 Yes    Class 1 obesity due to excess calories with serious comorbidity in adult [E66.09] 06/21/2020 Yes    Coronary artery disease involving native coronary artery of native heart [I25.10] 06/20/2020 Yes    Elevated troponin [R79.89] 06/20/2020 Yes    Type 2 diabetes mellitus with hyperglycemia, with long-term current use of insulin [E11.65, Z79.4] 06/20/2020 Not Applicable    TERESO (obstructive sleep apnea) [G47.33] 06/20/2020 Yes    HLD (hyperlipidemia) [E78.5] 06/20/2020 Yes    Chest pain [R07.9] 06/20/2020 Yes    Acute hypoxemic respiratory failure [J96.01] 06/20/2020 Yes    Bell's palsy [G51.0] 06/19/2020 Yes      Problems Resolved During this Admission:       VTE Risk Mitigation (From admission, onward)         Ordered     IP VTE HIGH RISK PATIENT  Once      06/20/20 2229     Place sequential compression device  Until discontinued      06/20/20 2229     enoxaparin injection 40 mg  Every 24 hours      06/20/20 2229              I personally reviewed chart and imaging studies ,examined patient, and discussed with the patient her illness and treatment including diet and follow-up care. This consult was prolonged and required approximately 50% time for review and 50% time examining patient and writing notes and orders     Thank you for your consult.     James Kaiser MD  Cardiology   [facility]

## 2020-06-21 NOTE — ASSESSMENT & PLAN NOTE
Patient appears to have some chronic kidney disease at baseline but no prior labs to compare.  Appears to have some worsening of kidney function over the last 24 hours.  Unable to continue IV fluid hydration at this point given acute pulmonary edema.  Follow renal panel and electrolytes closely.  Adjust renal dose medications for Estimated Creatinine Clearance: 47.8 mL/min (A) (based on SCr of 1.6 mg/dL (H)).   Avoid NSAIDs, Bee-II inhibitors, ACE-I, Angiotensin Receptor Blockers, or Aminoglycosides.  Urinalysis.  Check  Urine Na, Cr, Protein.

## 2020-06-21 NOTE — H&P
[facility] Hospital Medicine  History & Physical    Patient Name: Sincere Malave  MRN: [id number]  Admission Date: 6/20/2020  Attending Physician: Thomas Rai MD   Primary Care Provider: Primary Doctor No         Patient information was obtained from patient, past medical records and ER records.     Subjective:     Principal Problem:Hypertensive urgency    Chief Complaint:   Chief Complaint   Patient presents with    Chest Pain     pt presents to ED with c/o MS chest pain onset 1630; also c/o SOB; Pt received 2 SL Nitro and 325 ASA PTA; Pain resolved at present         HPI: Sincere Malave is a 70 y.o. male with a PMHx of hypertension, hyperlipidemia, neuropathy, sleep apnea, CAD with stent placement, anemia, and insulin-dependent type 2 diabetes who presents to the ED with complaints of chest pain x2 hours that started suddenly when he was walking.  The patient describes the pain as pressure in the substernal chest with no radiation of pain.  He endorses associated shortness of breath, diaphoresis, and dental pain. The patient received 2 sublingual nitroglycerin and aspirin EN route per EMS with resolution of his symptoms.  The patient does report prior episodes of chest pain but states this one was more intense than usual.  He does note that his blood sugars were in the 500s at home after eating cake and jambalaya.  The patient was also placed on steroids yesterday after being diagnosed with Bell's palsy.  The patient currently denies any chest pain, shortness of breath, fever/chills, weakness, nausea, vomiting, diarrhea, abdominal pain, dysuria, or bilateral lower extremity edema.  He does report having a mild headache earlier today but denies having one currently.  The patient does endorse a nonproductive cough that has begun since admit to the hospital. His ED workup is significant for hypertensive urgency requiring IV nitroglycerin, elevated troponin, hyperglycemia, hypoxia,  hyperkalemia, and acute on chronic kidney injury. The patient will be admitted to ICU under Hospital Medicine for further workup and evaluation.    Past Medical History:   Diagnosis Date    Anemia     Bell's palsy 06/20/2020    CAD (coronary artery disease)     Diabetes mellitus     Diabetic polyneuropathy     HLD (hyperlipidemia)     Hypertension     Sleep apnea        History reviewed. No pertinent surgical history.    Review of patient's allergies indicates:  No Known Allergies    No current facility-administered medications on file prior to encounter.      Current Outpatient Medications on File Prior to Encounter   Medication Sig    acyclovir (ZOVIRAX) 400 MG tablet Take 1 tablet (400 mg total) by mouth 5 (five) times daily. for 10 days    amLODIPine (NORVASC) 10 MG tablet Take 10 mg by mouth once daily.    aspirin (ASPIR-81 ORAL) Take 81 mg by mouth once daily.    DULoxetine (CYMBALTA) 30 MG capsule Take 30 mg by mouth once daily.    fenofibrate micronized (LOFIBRA) 134 MG Cap Take 134 mg by mouth daily with breakfast.    insulin NPH-insulin regular, 70/30, (NOVOLIN 70/30) 100 unit/mL (70-30) injection Inject into the skin 2 (two) times daily before meals. 40 units Q a.m. and 60 units q.p.m.    INV LISINOPRIL-HCTZ 20-12.5 Take 1 tablet by mouth once daily. FOR INVESTIGATIONAL USE ONLY    metFORMIN (GLUCOPHAGE) 1000 MG tablet Take 1,000 mg by mouth 2 (two) times daily with meals.    pravastatin (PRAVACHOL) 40 MG tablet Take 40 mg by mouth every evening.    predniSONE (DELTASONE) 20 MG tablet Take 3 tablets (60 mg total) by mouth once daily. for 4 days     Family History     None        Tobacco Use    Smoking status: Never Smoker   Substance and Sexual Activity    Alcohol use: Yes     Alcohol/week: 1.0 standard drinks     Types: 1 Cans of beer per week     Comment: couple of times a year    Drug use: Never    Sexual activity: Not Currently     Review of Systems   Constitutional: Positive for  diaphoresis. Negative for activity change, appetite change, chills, fatigue and fever.   HENT: Negative for congestion, ear pain, rhinorrhea, sneezing and sore throat.         +dental pain   Eyes: Negative for photophobia and visual disturbance.   Respiratory: Positive for cough (non productive) and shortness of breath (resolved). Negative for choking and wheezing.    Cardiovascular: Positive for chest pain (resolved). Negative for palpitations and leg swelling.   Gastrointestinal: Negative for abdominal distention, abdominal pain, constipation, diarrhea, nausea and vomiting.   Endocrine:        Elevated blood glucose level of >500   Genitourinary: Negative for dysuria, flank pain, frequency and hematuria.   Musculoskeletal: Negative for arthralgias, gait problem, myalgias and neck pain.   Skin: Negative for color change, rash and wound.   Neurological: Negative for dizziness, speech difficulty, weakness, numbness and headaches.   Psychiatric/Behavioral: Negative for agitation, confusion and hallucinations. The patient is not nervous/anxious.      Objective:     Vital Signs (Most Recent):  Temp: 97.7 °F (36.5 °C) (06/21/20 0054)  Pulse: 60 (06/21/20 0054)  Resp: (!) 21 (06/21/20 0054)  BP: (!) 155/71 (06/21/20 0054)  SpO2: (!) 91 % (06/21/20 0054) Vital Signs (24h Range):  Temp:  [97.7 °F (36.5 °C)-98.2 °F (36.8 °C)] 97.7 °F (36.5 °C)  Pulse:  [60-87] 60  Resp:  [18-28] 21  SpO2:  [84 %-98 %] 91 %  BP: (155-243)/() 155/71     Weight: 92.1 kg (203 lb 0.7 oz)  Body mass index is 31.8 kg/m².    Physical Exam  Vitals signs and nursing note reviewed.   Constitutional:       General: He is not in acute distress.     Appearance: He is well-developed. He is obese. He is not diaphoretic.   HENT:      Head: Normocephalic and atraumatic.      Right Ear: External ear normal.      Left Ear: External ear normal.      Mouth/Throat:      Mouth: Mucous membranes are moist.   Eyes:      Pupils: Pupils are equal, round, and  reactive to light.   Neck:      Musculoskeletal: Normal range of motion.      Vascular: No JVD.   Cardiovascular:      Rate and Rhythm: Normal rate and regular rhythm.      Heart sounds: No murmur.   Pulmonary:      Effort: Tachypnea present. No respiratory distress.      Breath sounds: Examination of the right-lower field reveals decreased breath sounds. Examination of the left-lower field reveals decreased breath sounds. Decreased breath sounds present. No wheezing.      Comments: Crackles noted throughout; currently on 3 L nasal cannula.  Nonproductive cough noted intermittently throughout exam.  Chest:      Chest wall: No tenderness.   Abdominal:      General: Bowel sounds are normal. There is no distension.      Palpations: Abdomen is soft.      Tenderness: There is no abdominal tenderness.   Genitourinary:     Penis: Normal.    Musculoskeletal: Normal range of motion.         General: No deformity.      Right lower leg: No edema.      Left lower leg: No edema.   Skin:     General: Skin is warm and dry.      Capillary Refill: Capillary refill takes less than 2 seconds.   Neurological:      Mental Status: He is alert and oriented to person, place, and time.      Cranial Nerves: Facial asymmetry (Right facial droop, + history for Bell's palsy) present. No dysarthria.      Motor: No weakness or tremor.   Psychiatric:         Mood and Affect: Mood normal.         Behavior: Behavior normal.         Thought Content: Thought content normal.         Judgment: Judgment normal.           CRANIAL NERVES     CN III, IV, VI   Pupils are equal, round, and reactive to light.       Significant Labs:   CBC:   Recent Labs   Lab 06/19/20  1245 06/20/20  1824   WBC 6.85 8.67   HGB 13.6* 12.3*   HCT 41.3 37.2*    229     CMP:   Recent Labs   Lab 06/19/20  1245 06/20/20  1824 06/21/20  0012    138 142   K 4.8 5.4* 5.6*    104 108   CO2 26 20* 22*   * 616* 369*   BUN 29* 36* 32*   CREATININE 1.5* 1.8* 1.6*    CALCIUM 10.0 9.6 9.7   PROT 7.7 7.5  --    ALBUMIN 4.1 4.1  --    BILITOT 0.7 0.7  --    ALKPHOS 52* 51*  --    AST 23 17  --    ALT 39 38  --    ANIONGAP 8 14 12   EGFRNONAA 47* 38* 44*     Troponin:   Recent Labs   Lab 06/20/20  1824 06/21/20  0012   TROPONINI 0.061* 1.519*     Urine Studies:   Recent Labs   Lab 06/20/20  1814   COLORU Yellow   APPEARANCEUA Clear   PHUR 6.0   SPECGRAV 1.010   PROTEINUA Trace*   GLUCUA 4+*   KETONESU Negative   BILIRUBINUA Negative   OCCULTUA Trace*   NITRITE Negative   UROBILINOGEN 1.0   LEUKOCYTESUR Negative   RBCUA 1   WBCUA 0   BACTERIA None   SQUAMEPITHEL 1     All pertinent labs within the past 24 hours have been reviewed.    Significant Imaging: I have reviewed and interpreted all pertinent imaging results/findings within the past 24 hours.    Assessment/Plan:     * Hypertensive urgency   uncontrolled.  Latest blood pressure and vitals reviewed-   Temp:  [97.7 °F (36.5 °C)-98.2 °F (36.8 °C)]   Pulse:  [60-87]   Resp:  [18-28]   BP: (155-243)/()   SpO2:  [84 %-98 %] .   Hold lisinopril given continued hyperkalemia and restart when clinically indicated.  Continue Cardene drip for blood pressure control.  Cardiology consulted.  Echo ordered.  Trend troponin overnight.  Chest x-ray suggestive of pulmonary edema, give 40 mg of Lasix x1.    Anemia  Patient's anemia is currently controlled. S/p 0 units of PRBCs.  Current CBC reviewed-   Lab Results   Component Value Date    HGB 12.3 (L) 06/20/2020    HCT 37.2 (L) 06/20/2020     Monitor serial CBC and transfuse if patient becomes hemodynamically unstable, symptomatic or H/H drops below 7/21.     Diabetic neuropathy associated with type 2 diabetes mellitus  Chronic, stable.  Continue home Cymbalta.    Acute-on-chronic kidney injury  Patient appears to have some chronic kidney disease at baseline but no prior labs to compare.  Appears to have some worsening of kidney function over the last 24 hours.  Unable to continue IV  fluid hydration at this point given acute pulmonary edema.  Follow renal panel and electrolytes closely.  Adjust renal dose medications for Estimated Creatinine Clearance: 47.8 mL/min (A) (based on SCr of 1.6 mg/dL (H)).   Avoid NSAIDs, Bee-II inhibitors, ACE-I, Angiotensin Receptor Blockers, or Aminoglycosides.  Urinalysis.  Check  Urine Na, Cr, Protein.    Essential hypertension  Chronic, uncontrolled.  Latest blood pressure and vitals reviewed-   Temp:  [97.7 °F (36.5 °C)-98.2 °F (36.8 °C)]   Pulse:  [58-87]   Resp:  [18-28]   BP: (150-243)/()   SpO2:  [84 %-98 %] .   Home meds for hypertension were reviewed and noted below. Hospital anti-hypertensive changes were made as shown below.  Hypertension Medications             amLODIPine (NORVASC) 10 MG tablet Take 10 mg by mouth once daily.      Hospital Medications             amLODIPine tablet 10 mg 10 mg, Oral, Daily    furosemide injection 40 mg (Completed) 40 mg, Intravenous, Once, Administer IV push at a max rate of 40 mg/min    hydroCHLOROthiazide tablet 12.5 mg 12.5 mg, Oral, Daily    niCARdipine 40 mg/200 mL infusion 1 mg/hr (1 mg/hr), Intravenous, Continuous @ 5 mL/hr, Administer nicardipine only through LARGE peripheral veins or central veins.  If nicardipine is administered in a peripheral vein, to minimize the risk of venous irritation, change the site of infusion every 12 hours.    nitroGLYCERIN SL tablet 0.4 mg 0.4 mg, Sublingual, Every 5 min PRN, DO NOT CRUSH OR CHEW; DISSOLVE UNDER TONGUE; MAXIMUM OF 3 DOSES IN 15 MINUTES        Will utilize p.r.n. blood pressure medication only if patient's blood pressure greater than  180/110 and he develops symptoms such as worsening chest pain or shortness of breath.    Hyperkalemia  Patient has hyperkalemia which is currently uncontrolled. Last electrolytes reviewed-   Recent Labs   Lab 06/19/20  1245 06/20/20  1824 06/21/20  0012   K 4.8 5.4* 5.6*   .  Shifted patient using regular insulin and D50.   Continue to trend.  Continuous telemetry.    Pulmonary edema, acute  Likely secondary to hypertensive urgency.  Patient received 2 L of normal saline in the ED.  Upon my examination in the ICU the patient was exhibiting signs of pulmonary edema with worsening hypoxia so a chest x-ray was obtained.  The chest x-ray revealed pulmonary edema.  The patient received 40 mg of Lasix IV with improvement of symptoms.  Will continue med management of hypertensive urgency with IV Cardene.  Will continue supplemental oxygen as needed and monitor respiratory status closely.    Acute hypoxemic respiratory failure  Likely secondary to pulmonary edema.  Administer Lasix IV.  Patient's CXR and last ABG were reviewed.       ABG  Recent Labs   Lab 06/20/20  1827   PH 7.370   PO2 26*   PCO2 38.9   HCO3 22.5*   BE -3     Continue supplemental oxygen to keep oxygen saturation >92%.    Chest pain  Acute, trend troponin, ASA, telemetry, consult cardiology for further risk stratification with non-invasive stress test.  Echo ordered.  NPO for now pending cardiology consult.  Is likely related to hypertensive urgency.   Patient is currently chest pain-free.  Sublingual nitroglycerin p.r.n. for any additional chest pain.    HLD (hyperlipidemia)   Patient is chronically on statin.will continue for now. Monitor clinically. Last LDL was   Lab Results   Component Value Date    LDLCALC 57.2 (L) 06/19/2020        TERESO (obstructive sleep apnea)  Chronic, stable.  Continue home CPAP.    Type 2 diabetes mellitus with hyperglycemia, with long-term current use of insulin  Patient's FSGs are not controlled on current hypoglycemics.  Likely extremely elevated secondary to receiving steroids.  Last A1c reviewed- No results found for: LABA1C, HGBA1C  Most recent fingerstick glucose reviewed-   Recent Labs   Lab 06/20/20 2009 06/20/20 2130 06/20/20  2256   POCTGLUCOSE 414* 397* 373*     Current correctional scale  Medium  Maintain anti-hyperglycemic dose as  follows-   Antihyperglycemics (From admission, onward)    Start     Stop Route Frequency Ordered    06/21/20 0715  insulin aspart protamine-insulin aspart (NovoLOG 70/30) injection      -- SubQ 2 times daily with meals 06/20/20 2229 06/20/20 2229  insulin aspart U-100 pen 1-10 Units      -- SubQ Before meals & nightly PRN 06/20/20 2229       Hemoglobin A1c ordered.  Accu-Cheks a.c./HS.  Continue NovoLog 70/30 b.i.d. and moderate dose sliding scale p.r.n.  Dietitian consult.  Diabetic diet.    Elevated troponin  Likely secondary to hypertensive urgency.  EKG reviewed.  Continue to trend overnight.  Cardiology consulted.    Coronary artery disease involving native coronary artery of native heart  Patient with known CAD s/p stent placement. Will continue ASA and Statin and monitor for S/Sx of angina/ACS. Continue to monitor on telemetry.     Bell's palsy  Appears stable. Patient was started on acyclovir and prednisone yesterday, will continue for now.  House Brackmann Grade III per vascular neurologist evaluation.    Add lubricating eyedrops for right eye.      VTE Risk Mitigation (From admission, onward)         Ordered     IP VTE HIGH RISK PATIENT  Once      06/20/20 2229     Place sequential compression device  Until discontinued      06/20/20 2229     enoxaparin injection 40 mg  Every 24 hours      06/20/20 2229              Critical care time spent on the evaluation and treatment of severe organ dysfunction, review of pertinent labs and imaging studies, discussions with consulting providers and discussions with patient/family: 60 minutes.     Elicia Ireland NP  Department of Hospital Medicine   [facility]

## 2020-06-21 NOTE — Clinical Note
Catheter is repositioned to the ostium   left main. Angiography performed of the left coronary arteries. Angiography performed via power injection with 10 mL contrast at 5 mL/s.

## 2020-06-21 NOTE — HPI
Sincere Malave is a 70 y.o. male with a PMHx of hypertension, hyperlipidemia, neuropathy, sleep apnea, CAD with stent placement, anemia, and insulin-dependent type 2 diabetes who presents to the ED with complaints of chest pain x2 hours that started suddenly when he was walking.  The patient describes the pain as pressure in the substernal chest with no radiation of pain.  He endorses associated shortness of breath, diaphoresis, and dental pain. The patient received 2 sublingual nitroglycerin and aspirin EN route per EMS with resolution of his symptoms.  The patient does report prior episodes of chest pain but states this one was more intense than usual.  He does note that his blood sugars were in the 500s at home after eating cake and jambalaya.  The patient was also placed on steroids yesterday after being diagnosed with Bell's palsy.  The patient currently denies any chest pain, shortness of breath, fever/chills, weakness, nausea, vomiting, diarrhea, abdominal pain, dysuria, or bilateral lower extremity edema.  He does report having a mild headache earlier today but denies having one currently.  The patient does endorse a nonproductive cough that has begun since admit to the hospital. His ED workup is significant for hypertensive urgency requiring IV nitroglycerin, elevated troponin, hyperglycemia, hypoxia, hyperkalemia, and acute on chronic kidney injury. The patient will be admitted to ICU under Hospital Medicine for further workup and evaluation.

## 2020-06-21 NOTE — ASSESSMENT & PLAN NOTE
Patient's FSGs are not controlled on current hypoglycemics.  Likely extremely elevated secondary to receiving steroids.  Last A1c reviewed- No results found for: LABA1C, HGBA1C  Most recent fingerstick glucose reviewed-   Recent Labs   Lab 06/20/20 2009 06/20/20 2130 06/20/20 2256   POCTGLUCOSE 414* 397* 373*     Current correctional scale  Medium  Maintain anti-hyperglycemic dose as follows-   Antihyperglycemics (From admission, onward)    Start     Stop Route Frequency Ordered    06/21/20 0715  insulin aspart protamine-insulin aspart (NovoLOG 70/30) injection      -- SubQ 2 times daily with meals 06/20/20 2229 06/20/20 2229  insulin aspart U-100 pen 1-10 Units      -- SubQ Before meals & nightly PRN 06/20/20 2229       Hemoglobin A1c ordered.  Accu-Cheks a.c./HS.  Continue NovoLog 70/30 b.i.d. and moderate dose sliding scale p.r.n.  Dietitian consult.  Diabetic diet.

## 2020-06-21 NOTE — DISCHARGE SUMMARY
[facility] Hospital Medicine  Discharge Summary      Patient Name: Sincere Malave  MRN: [id number]  Admission Date: 6/20/2020  Hospital Length of Stay: 1 days  Discharge Date and Time:  06/21/2020 4:48 PM  Attending Physician: Thomas Rai MD   Discharging Provider: Thomas Rai MD  Primary Care Provider: Primary Doctor No      HPI:   Sincere Malave is a 70 y.o. male with a PMHx of hypertension, hyperlipidemia, neuropathy, sleep apnea, CAD with stent placement, anemia, and insulin-dependent type 2 diabetes who presents to the ED with complaints of chest pain x2 hours that started suddenly when he was walking.  The patient describes the pain as pressure in the substernal chest with no radiation of pain.  He endorses associated shortness of breath, diaphoresis, and dental pain. The patient received 2 sublingual nitroglycerin and aspirin EN route per EMS with resolution of his symptoms.  The patient does report prior episodes of chest pain but states this one was more intense than usual.  He does note that his blood sugars were in the 500s at home after eating cake and jambalaya.  The patient was also placed on steroids yesterday after being diagnosed with Bell's palsy.  The patient currently denies any chest pain, shortness of breath, fever/chills, weakness, nausea, vomiting, diarrhea, abdominal pain, dysuria, or bilateral lower extremity edema.  He does report having a mild headache earlier today but denies having one currently.  The patient does endorse a nonproductive cough that has begun since admit to the hospital. His ED workup is significant for hypertensive urgency requiring IV nitroglycerin, elevated troponin, hyperglycemia, hypoxia, hyperkalemia, and acute on chronic kidney injury. The patient will be admitted to ICU under Hospital Medicine for further workup and evaluation.    * No surgery found *      Hospital Course:    Patient was admitted with the suspicion of  NSTEMI in hypertensive emergency.  Patient developed chest pain shortness of breath and pain radiating to the jaw  the night of 6/21 and came in the emergency room where his blood pressure is high.  He received 2 nitroglycerin and the chest pain abated he has not had any further chest pain and EKGs have shown no acute changes but troponins are trending upward.  He has a history of high blood pressure and diabetes he was started 2 days ago on prednisone therapy for Bell's palsy on the right side.  Patient has a significant cardiac history with history of stent placed Fifteen years ago . Six months ago he had 2 treadmills because he was feeling a little bit off but no chest pain.  Patient symptomatically has been feeling better blood pressure improving  on Cardene drip nitroglycerin.  Started on beta-blockers continued hydrochlorothiazide but at higher dose. Chest x-ray is abnormal demonstrating opacification right lower lung with some fluid in the fissures BNP is normal and he is not coughing up any phlegm COVID test is negative.  Started on ceftriaxone azithromycin for community-acquired pneumonia.  Cardiology evaluated patient and recommended patient to be transferred to [facility] for angiogram.  The patient discussed with the hospitalist team at [facility] who accepted the patient.  Patient was given full-dose Lovenox.  Currently stable for discharge for higher level of care.      Consults:   Consults (From admission, onward)        Status Ordering Provider     Inpatient consult to Cardiology  Once     Provider:  James Kaiser MD    Acknowledged RICHARD ZEE     Inpatient consult to Registered Dietitian/Nutritionist  Once     Provider:  (Not yet assigned)    Acknowledged OPAL HEREDIA          No new Assessment & Plan notes have been filed under this hospital service since the last note was generated.  Service: Hospital Medicine    Final Active Diagnoses:    Diagnosis  Date Noted POA    PRINCIPAL PROBLEM:  Hypertensive urgency [I16.0] 06/20/2020 Yes    Pulmonary edema, acute [J81.0] 06/21/2020 Yes    Hyperkalemia [E87.5] 06/21/2020 Yes    Essential hypertension [I10] 06/21/2020 Yes    Acute-on-chronic kidney injury [N17.9, N18.9] 06/21/2020 Yes    Diabetic neuropathy associated with type 2 diabetes mellitus [E11.40] 06/21/2020 Yes    Anemia [D64.9] 06/21/2020 Yes    Class 1 obesity due to excess calories with serious comorbidity in adult [E66.09] 06/21/2020 Yes    NSTEMI (non-ST elevated myocardial infarction) [I21.4] 06/21/2020 Yes    Coronary artery disease involving native coronary artery of native heart [I25.10] 06/20/2020 Yes    Elevated troponin [R79.89] 06/20/2020 Yes    Type 2 diabetes mellitus with hyperglycemia, with long-term current use of insulin [E11.65, Z79.4] 06/20/2020 Not Applicable    TERESO (obstructive sleep apnea) [G47.33] 06/20/2020 Yes    HLD (hyperlipidemia) [E78.5] 06/20/2020 Yes    Chest pain [R07.9] 06/20/2020 Yes    Acute hypoxemic respiratory failure [J96.01] 06/20/2020 Yes    Bell's palsy [G51.0] 06/19/2020 Yes      Problems Resolved During this Admission:       Discharged Condition: stable    Disposition: Another Health Care Inst*    Follow Up:    Patient Instructions:   No discharge procedures on file.    Significant Diagnostic Studies: Labs:   BMP:   Recent Labs   Lab 06/20/20 1824 06/21/20  0012 06/21/20  0455   * 369* 310*    142 142   K 5.4* 5.6* 4.8    108 106   CO2 20* 22* 25   BUN 36* 32* 31*   CREATININE 1.8* 1.6* 1.5*   CALCIUM 9.6 9.7 9.3   MG  --   --  1.6   , CBC   Recent Labs   Lab 06/20/20  1824 06/21/20  0455   WBC 8.67 16.43*   HGB 12.3* 13.2*   HCT 37.2* 40.9    266   , INR   Lab Results   Component Value Date    INR 0.9 06/19/2020   , Troponin   Recent Labs   Lab 06/21/20  0944   TROPONINI 3.625*    and A1C:   Recent Labs   Lab 06/21/20  0456   HGBA1C 7.7*     Cardiac Graphics: ECG:  No ST  changes noted    Pending Diagnostic Studies:     Procedure Component Value Units Date/Time    EKG 12-lead [[id number]]     Order Status: Sent Lab Status: No result     Echo Color Flow Doppler? Yes [[id number]]     Order Status: Sent Lab Status: No result     Troponin I [[id number]]     Order Status: Sent Lab Status: No result     Specimen: Blood          Medications:  Transfer Medications (for Discharge Readmit only):   Current Facility-Administered Medications   Medication Dose Route Frequency Provider Last Rate Last Dose    acetaminophen tablet 650 mg  650 mg Oral Q6H PRN Elicia Ireland NP        acyclovir capsule 400 mg  400 mg Oral 5x Daily Elicia Ireland NP   400 mg at 06/21/20 1444    artificial tears 0.5 % ophthalmic solution 1 drop  1 drop Right Eye TID Elicia Ireland NP   1 drop at 06/21/20 1444    aspirin EC tablet 81 mg  81 mg Oral Daily Elicia Ireland NP   81 mg at 06/21/20 0929    [START ON 6/22/2020] azithromycin tablet 250 mg  250 mg Oral Daily Thomas Rai MD        cefTRIAXone (ROCEPHIN) 1 g/50 mL D5W IVPB  2 g Intravenous Q24H Thomas Rai MD   2 g at 06/21/20 1541    dextrose 50% injection 12.5 g  12.5 g Intravenous PRN Elicia Ireland NP        dextrose 50% injection 25 g  25 g Intravenous PRN Elicia Ireland NP        DULoxetine DR capsule 30 mg  30 mg Oral QHS Thomas Rai MD        enoxaparin injection 50 mg  50 mg Subcutaneous Once Thomas Rai MD        [START ON 6/22/2020] enoxaparin injection 90 mg  1 mg/kg Subcutaneous Q12H Thomas Rai MD        fenofibrate tablet 145 mg  145 mg Oral Daily with breakfast Thomas Rai MD   145 mg at 06/21/20 0928    glucagon (human recombinant) injection 1 mg  1 mg Intramuscular PRN Elicia Ireland NP        glucose chewable tablet 16 g  16 g Oral PRN Elicia Ireland NP        glucose chewable tablet 24 g  24 g Oral PRN Elicia Ireland NP        [START ON 6/22/2020]  hydroCHLOROthiazide tablet 25 mg  25 mg Oral Daily James Kaiser MD        insulin aspart protamine-insulin aspart (NovoLOG 70/30) injection  40 Units Subcutaneous BIDWM Elicia Ireland NP   40 Units at 06/21/20 1611    insulin aspart U-100 pen 1-10 Units  1-10 Units Subcutaneous QID (AC + HS) PRN Elicia Ireland NP   6 Units at 06/21/20 1611    melatonin tablet 6 mg  6 mg Oral Nightly PRN Elicia Ireland NP        metoprolol succinate (TOPROL-XL) 24 hr tablet 25 mg  25 mg Oral Daily Thomas Rai MD   25 mg at 06/21/20 0928    niCARdipine 40 mg/200 mL infusion  1 mg/hr Intravenous Continuous James Kaiser MD 35 mL/hr at 06/21/20 1319 7 mg/hr at 06/21/20 1319    nitroGLYCERIN 2% TD oint ointment 1 inch  1 inch Topical (Top) Q6H James Kaiser MD   1 inch at 06/21/20 1540    nitroGLYCERIN SL tablet 0.4 mg  0.4 mg Sublingual Q5 Min PRN Elicia Ireland NP        ondansetron injection 4 mg  4 mg Intravenous Q8H PRN Elicia Ireland NP        pantoprazole injection 40 mg  40 mg Intravenous Daily Elicia Ireland NP   40 mg at 06/21/20 0929    pravastatin tablet 40 mg  40 mg Oral QHS Elicia Ireland NP   40 mg at 06/20/20 2318    predniSONE tablet 60 mg  60 mg Oral Daily Elicia Ireland NP   60 mg at 06/21/20 0929    senna-docusate 8.6-50 mg per tablet 1 tablet  1 tablet Oral Daily PRN Elicia Ireland NP   1 tablet at 06/21/20 0929    sodium chloride 0.9% flush 10 mL  10 mL Intravenous PRN Elicia Ireland NP           Indwelling Lines/Drains at time of discharge:   Lines/Drains/Airways     None                 Time spent on the discharge of patient: 60 minutes  Patient was seen and examined on the date of discharge and determined to be suitable for discharge.    Critical care time spent on the evaluation and treatment of severe organ dysfunction, review of pertinent labs and imaging studies, discussions with consulting providers and discussions with patient/family: 60  minutes.     Thomas Rai MD  Department of Hospital Medicine  [facility]

## 2020-06-21 NOTE — ASSESSMENT & PLAN NOTE
uncontrolled.  Latest blood pressure and vitals reviewed-   Temp:  [97.7 °F (36.5 °C)-98.2 °F (36.8 °C)]   Pulse:  [60-87]   Resp:  [18-28]   BP: (155-243)/()   SpO2:  [84 %-98 %] .   Hold lisinopril given continued hyperkalemia and restart when clinically indicated.  Continue Cardene drip for blood pressure control.  Cardiology consulted.  Echo ordered.  Trend troponin overnight.  Chest x-ray suggestive of pulmonary edema, give 40 mg of Lasix x1.

## 2020-06-21 NOTE — ASSESSMENT & PLAN NOTE
Chronic, uncontrolled.  Latest blood pressure and vitals reviewed-   Temp:  [97.7 °F (36.5 °C)-98.2 °F (36.8 °C)]   Pulse:  [58-87]   Resp:  [18-28]   BP: (150-243)/()   SpO2:  [84 %-98 %] .   Home meds for hypertension were reviewed and noted below. Hospital anti-hypertensive changes were made as shown below.  Hypertension Medications             amLODIPine (NORVASC) 10 MG tablet Take 10 mg by mouth once daily.      Hospital Medications             amLODIPine tablet 10 mg 10 mg, Oral, Daily    furosemide injection 40 mg (Completed) 40 mg, Intravenous, Once, Administer IV push at a max rate of 40 mg/min    hydroCHLOROthiazide tablet 12.5 mg 12.5 mg, Oral, Daily    niCARdipine 40 mg/200 mL infusion 1 mg/hr (1 mg/hr), Intravenous, Continuous @ 5 mL/hr, Administer nicardipine only through LARGE peripheral veins or central veins.  If nicardipine is administered in a peripheral vein, to minimize the risk of venous irritation, change the site of infusion every 12 hours.    nitroGLYCERIN SL tablet 0.4 mg 0.4 mg, Sublingual, Every 5 min PRN, DO NOT CRUSH OR CHEW; DISSOLVE UNDER TONGUE; MAXIMUM OF 3 DOSES IN 15 MINUTES        Will utilize p.r.n. blood pressure medication only if patient's blood pressure greater than  180/110 and he develops symptoms such as worsening chest pain or shortness of breath.

## 2020-06-21 NOTE — SUBJECTIVE & OBJECTIVE
Past Medical History:   Diagnosis Date    Anemia     Bell's palsy 06/20/2020    CAD (coronary artery disease)     Diabetes mellitus     Diabetic polyneuropathy     HLD (hyperlipidemia)     Hypertension     Sleep apnea        History reviewed. No pertinent surgical history.    Review of patient's allergies indicates:  No Known Allergies    Current Facility-Administered Medications on File Prior to Encounter   Medication    [COMPLETED] azithromycin tablet 500 mg    [COMPLETED] dextrose 50% injection 25 g    [COMPLETED] furosemide injection 40 mg    [COMPLETED] insulin regular injection 10 Units    [COMPLETED] insulin regular injection 9 Units    [COMPLETED] sodium chloride 0.9% bolus 1,000 mL    [COMPLETED] sodium chloride 0.9% bolus 1,000 mL    [DISCONTINUED] acetaminophen tablet 650 mg    [DISCONTINUED] acyclovir capsule 400 mg    [DISCONTINUED] amLODIPine tablet 10 mg    [DISCONTINUED] artificial tears 0.5 % ophthalmic solution 1 drop    [DISCONTINUED] aspirin EC tablet 81 mg    [DISCONTINUED] azithromycin tablet 250 mg    [DISCONTINUED] cefTRIAXone (ROCEPHIN) 1 g/50 mL D5W IVPB    [DISCONTINUED] dextrose 50% injection 12.5 g    [DISCONTINUED] dextrose 50% injection 25 g    [DISCONTINUED] DULoxetine DR capsule 30 mg    [DISCONTINUED] DULoxetine DR capsule 30 mg    [DISCONTINUED] enoxaparin injection 40 mg    [DISCONTINUED] enoxaparin injection 50 mg    [DISCONTINUED] enoxaparin injection 90 mg    [DISCONTINUED] fenofibrate micronized capsule 134 mg    [DISCONTINUED] fenofibrate tablet 145 mg    [DISCONTINUED] furosemide injection 20 mg    [DISCONTINUED] furosemide injection 20 mg    [DISCONTINUED] glucagon (human recombinant) injection 1 mg    [DISCONTINUED] glucose chewable tablet 16 g    [DISCONTINUED] glucose chewable tablet 24 g    [DISCONTINUED] hydroCHLOROthiazide tablet 12.5 mg    [DISCONTINUED] hydroCHLOROthiazide tablet 25 mg    [DISCONTINUED] hydroCHLOROthiazide  tablet 50 mg    [DISCONTINUED] insulin aspart protamine-insulin aspart (NovoLOG 70/30) injection    [DISCONTINUED] insulin aspart U-100 pen 1-10 Units    [DISCONTINUED] lisinopriL tablet 20 mg    [DISCONTINUED] melatonin tablet 6 mg    [DISCONTINUED] metoprolol succinate (TOPROL-XL) 24 hr tablet 25 mg    [DISCONTINUED] niCARdipine 40 mg/200 mL infusion    [DISCONTINUED] niCARdipine 40 mg/200 mL infusion    [DISCONTINUED] niCARdipine 40 mg/200 mL infusion    [DISCONTINUED] nitroGLYCERIN 2% TD oint ointment 0.5 inch    [DISCONTINUED] nitroGLYCERIN 2% TD oint ointment 1 inch    [DISCONTINUED] nitroGLYCERIN 50 mg in dextrose 5 % 250 mL infusion (TITRATING)    [DISCONTINUED] nitroGLYCERIN SL tablet 0.4 mg    [DISCONTINUED] ondansetron injection 4 mg    [DISCONTINUED] pantoprazole injection 40 mg    [DISCONTINUED] pravastatin tablet 40 mg    [DISCONTINUED] predniSONE tablet 60 mg    [DISCONTINUED] senna-docusate 8.6-50 mg per tablet 1 tablet    [DISCONTINUED] sodium chloride 0.9% flush 10 mL     Current Outpatient Medications on File Prior to Encounter   Medication Sig    acyclovir (ZOVIRAX) 400 MG tablet Take 1 tablet (400 mg total) by mouth 5 (five) times daily. for 10 days    amLODIPine (NORVASC) 10 MG tablet Take 10 mg by mouth once daily.    aspirin (ASPIR-81 ORAL) Take 81 mg by mouth once daily.    DULoxetine (CYMBALTA) 30 MG capsule Take 30 mg by mouth once daily.    fenofibrate micronized (LOFIBRA) 134 MG Cap Take 134 mg by mouth daily with breakfast.    insulin NPH-insulin regular, 70/30, (NOVOLIN 70/30) 100 unit/mL (70-30) injection Inject into the skin 2 (two) times daily before meals. 40 units Q a.m. and 60 units q.p.m.    INV LISINOPRIL-HCTZ 20-12.5 Take 1 tablet by mouth once daily. FOR INVESTIGATIONAL USE ONLY    metFORMIN (GLUCOPHAGE) 1000 MG tablet Take 1,000 mg by mouth 2 (two) times daily with meals.    pravastatin (PRAVACHOL) 40 MG tablet Take 40 mg by mouth every evening.     predniSONE (DELTASONE) 20 MG tablet Take 3 tablets (60 mg total) by mouth once daily. for 4 days     Family History     Problem Relation (Age of Onset)    Heart attack Father, Sister    Heart disease Father    No Known Problems Mother        Tobacco Use    Smoking status: Never Smoker   Substance and Sexual Activity    Alcohol use: Yes     Alcohol/week: 1.0 standard drinks     Types: 1 Cans of beer per week     Comment: couple of times a year    Drug use: Never    Sexual activity: Not Currently     Review of Systems   Constitutional: Positive for diaphoresis. Negative for activity change and appetite change.   HENT: Negative for congestion and sore throat.    Eyes: Negative for discharge and visual disturbance.   Respiratory: Positive for chest tightness and shortness of breath. Negative for cough.    Cardiovascular: Negative for chest pain and leg swelling.   Gastrointestinal: Negative for abdominal pain and nausea.   Genitourinary: Negative for dysuria and hematuria.   Musculoskeletal: Positive for back pain. Negative for myalgias.   Skin: Negative for pallor and rash.   Neurological: Negative for dizziness and weakness.   Psychiatric/Behavioral: Negative for behavioral problems. The patient is not nervous/anxious.    All other systems reviewed and are negative.    Objective:     Vital Signs (Most Recent):    Vital Signs (24h Range):  Temp:  [97.2 °F (36.2 °C)-98.6 °F (37 °C)] 98.6 °F (37 °C)  Pulse:  [48-87] 52  Resp:  [19-30] 27  SpO2:  [84 %-100 %] 93 %  BP: (128-243)/() 148/65        There is no height or weight on file to calculate BMI.    Physical Exam  Vitals signs and nursing note reviewed.   Constitutional:       Appearance: He is well-developed.   HENT:      Head: Normocephalic and atraumatic.   Eyes:      Pupils: Pupils are equal, round, and reactive to light.   Neck:      Musculoskeletal: Normal range of motion and neck supple.      Vascular: No JVD.   Cardiovascular:      Rate and  Rhythm: Normal rate and regular rhythm.      Heart sounds: Normal heart sounds. No murmur. No gallop.    Pulmonary:      Effort: No respiratory distress.      Breath sounds: Normal breath sounds. No wheezing.      Comments: On nasal cannula  Abdominal:      General: Bowel sounds are normal. There is no distension.      Palpations: Abdomen is soft.      Tenderness: There is no guarding or rebound.   Lymphadenopathy:      Cervical: No cervical adenopathy.   Skin:     General: Skin is warm.      Capillary Refill: Capillary refill takes less than 2 seconds.      Findings: No rash.   Neurological:      Mental Status: He is alert and oriented to person, place, and time.      Cranial Nerves: No cranial nerve deficit.   Psychiatric:         Behavior: Behavior normal.           CRANIAL NERVES     CN III, IV, VI   Pupils are equal, round, and reactive to light.       Significant Labs: All pertinent labs within the past 24 hours have been reviewed.    Significant Imaging: I have reviewed all pertinent imaging results/findings within the past 24 hours.

## 2020-06-21 NOTE — CARE UPDATE
06/21/20 0737   PRE-TX-O2   O2 Device (Oxygen Therapy) nasal cannula   $ Is the patient on Low Flow Oxygen? Yes   Flow (L/min) 3   SpO2 95 %   Pulse Oximetry Type Continuous   $ Pulse Oximetry - Multiple Charge Pulse Oximetry - Multiple   Pulse (!) 55   Resp 20   BP (!) 166/68

## 2020-06-21 NOTE — ASSESSMENT & PLAN NOTE
Patient has hyperkalemia which is currently uncontrolled. Last electrolytes reviewed-   Recent Labs   Lab 06/19/20  1245 06/20/20  1824 06/21/20  0012   K 4.8 5.4* 5.6*   .  Shifted patient using regular insulin and D50.  Continue to trend.  Continuous telemetry.

## 2020-06-21 NOTE — Clinical Note
Catheter is repositioned to the ostium   left main. Angiography performed of the left coronary arteries in multiple views. Angiography performed via power injection with 10 mL contrast at 5 mL/s. Used JL4 Catheter

## 2020-06-21 NOTE — ASSESSMENT & PLAN NOTE
Patient with known CAD s/p stent placement. Will continue ASA and Statin and monitor for S/Sx of angina/ACS. Continue to monitor on telemetry.

## 2020-06-21 NOTE — ASSESSMENT & PLAN NOTE
Likely secondary to hypertensive urgency.  Patient received 2 L of normal saline in the ED.  Upon my examination in the ICU the patient was exhibiting signs of pulmonary edema with worsening hypoxia so a chest x-ray was obtained.  The chest x-ray revealed pulmonary edema.  The patient received 40 mg of Lasix IV with improvement of symptoms.  Will continue med management of hypertensive urgency with IV Cardene.  Will continue supplemental oxygen as needed and monitor respiratory status closely.

## 2020-06-21 NOTE — PROGRESS NOTES
Results for RAJEEV TRAN (MRN [id number]) as of 6/21/2020 01:33   Ref. Range 6/21/2020 00:12   Troponin I Latest Ref Range: 0.000 - 0.026 ng/mL 1.519 (H)          Results for RAJEEV TRAN (MRN [id number]) as of 6/21/2020 02:10   Ref. Range 6/21/2020 00:12   Potassium Latest Ref Range: 3.5 - 5.1 mmol/L 5.6 (H)   Troponin reported to JEROME Bermudez Np pt currently with no chest pain    Potassium treated with IV insulin and D 50. Lasix IV given also

## 2020-06-21 NOTE — PLAN OF CARE
GRACIE spoke with patient and wife to complete a discharge planning assessment. Patient and Caregiver presents as alert and oriented x 4, pleasant, good eye contact, well groomed, recall good, concentration/judgement good, average intelligence, calm, communicative, cooperative, and asking and answering questions appropriately. GRACIE verified all information on demographic sheet is correct. Patient reports living with wife and that he is independent with ADLs at this time and does drive. Patient reports wife will transport pt home.    Patient reports does not have home health. Patient reports that he is not receiving outside resources. Patient reports having a CPAP and glucometer. Patient reports [employer] as their insurance. Patient reports receiving medications from [facility] or [facility] in [place] and reports that he is able to afford medications at this time and at time of discharge. Patient reports that he is not on dialysis at this time. Patient reports that he is not receiving services with the coumadin clinic. Patient reports has not been admitted to the hospital within the last 30 days.    Patient reports does have a Living Will or Healthcare Power of . Patient denies mental health issues.    Patient and Caregiver expressed no other needs at this time. SW remains available.         06/21/20 9512   Discharge Assessment   Assessment Type Discharge Planning Assessment   Confirmed/corrected address and phone number on facesheet? Yes   Assessment information obtained from? Patient;Caregiver   Communicated expected length of stay with patient/caregiver no   Prior to hospitilization cognitive status: Alert/Oriented   Prior to hospitalization functional status: Independent   Current cognitive status: Alert/Oriented   Current Functional Status: Independent   Lives With spouse   Able to Return to Prior Arrangements yes   Is patient able to care for self after discharge? Yes   Who are your  caregiver(s) and their phone number(s)? India Malave (wife) [phone] or [phone]   Patient's perception of discharge disposition home or selfcare   Readmission Within the Last 30 Days no previous admission in last 30 days   Patient currently being followed by outpatient case management? No   Patient currently receives any other outside agency services? No   Equipment Currently Used at Home CPAP;glucometer   Part D Coverage Pt has private insurance   Do you have any problems affording any of your prescribed medications? No   Is the patient taking medications as prescribed? yes   Does the patient have transportation home? Yes   Transportation Anticipated family or friend will provide;car, drives self   Dialysis Name and Scheduled days N/A   Does the patient receive services at the Coumadin Clinic? No   Discharge Plan A Home;Home with family   Discharge Plan B Home;Home with family   DME Needed Upon Discharge  none   Patient/Family in Agreement with Plan yes   Does the patient have transportation to healthcare appointments? Yes

## 2020-06-21 NOTE — Clinical Note
Catheter is repositioned to the ostium   right coronary artery. Angiography performed of the right coronary arteries in multiple views. Angiography performed via power injection with 8 mL contrast at 4 mL/s. Used JR4 Catheter

## 2020-06-21 NOTE — SUBJECTIVE & OBJECTIVE
Past Medical History:   Diagnosis Date    Anemia     Bell's palsy 06/20/2020    CAD (coronary artery disease)     Diabetes mellitus     Diabetic polyneuropathy     HLD (hyperlipidemia)     Hypertension     Sleep apnea        History reviewed. No pertinent surgical history.    Review of patient's allergies indicates:  No Known Allergies    No current facility-administered medications on file prior to encounter.      Current Outpatient Medications on File Prior to Encounter   Medication Sig    acyclovir (ZOVIRAX) 400 MG tablet Take 1 tablet (400 mg total) by mouth 5 (five) times daily. for 10 days    amLODIPine (NORVASC) 10 MG tablet Take 10 mg by mouth once daily.    aspirin (ASPIR-81 ORAL) Take 81 mg by mouth once daily.    DULoxetine (CYMBALTA) 30 MG capsule Take 30 mg by mouth once daily.    fenofibrate micronized (LOFIBRA) 134 MG Cap Take 134 mg by mouth daily with breakfast.    insulin NPH-insulin regular, 70/30, (NOVOLIN 70/30) 100 unit/mL (70-30) injection Inject into the skin 2 (two) times daily before meals. 40 units Q a.m. and 60 units q.p.m.    INV LISINOPRIL-HCTZ 20-12.5 Take 1 tablet by mouth once daily. FOR INVESTIGATIONAL USE ONLY    metFORMIN (GLUCOPHAGE) 1000 MG tablet Take 1,000 mg by mouth 2 (two) times daily with meals.    pravastatin (PRAVACHOL) 40 MG tablet Take 40 mg by mouth every evening.    predniSONE (DELTASONE) 20 MG tablet Take 3 tablets (60 mg total) by mouth once daily. for 4 days     Family History     None        Tobacco Use    Smoking status: Never Smoker   Substance and Sexual Activity    Alcohol use: Yes     Alcohol/week: 1.0 standard drinks     Types: 1 Cans of beer per week     Comment: couple of times a year    Drug use: Never    Sexual activity: Not Currently     Review of Systems   Constitutional: Positive for diaphoresis. Negative for activity change, appetite change, chills, fatigue and fever.   HENT: Negative for congestion, ear pain, rhinorrhea,  sneezing and sore throat.         +dental pain   Eyes: Negative for photophobia and visual disturbance.   Respiratory: Positive for cough (non productive) and shortness of breath (resolved). Negative for choking and wheezing.    Cardiovascular: Positive for chest pain (resolved). Negative for palpitations and leg swelling.   Gastrointestinal: Negative for abdominal distention, abdominal pain, constipation, diarrhea, nausea and vomiting.   Endocrine:        Elevated blood glucose level of >500   Genitourinary: Negative for dysuria, flank pain, frequency and hematuria.   Musculoskeletal: Negative for arthralgias, gait problem, myalgias and neck pain.   Skin: Negative for color change, rash and wound.   Neurological: Negative for dizziness, speech difficulty, weakness, numbness and headaches.   Psychiatric/Behavioral: Negative for agitation, confusion and hallucinations. The patient is not nervous/anxious.      Objective:     Vital Signs (Most Recent):  Temp: 97.7 °F (36.5 °C) (06/21/20 0054)  Pulse: 60 (06/21/20 0054)  Resp: (!) 21 (06/21/20 0054)  BP: (!) 155/71 (06/21/20 0054)  SpO2: (!) 91 % (06/21/20 0054) Vital Signs (24h Range):  Temp:  [97.7 °F (36.5 °C)-98.2 °F (36.8 °C)] 97.7 °F (36.5 °C)  Pulse:  [60-87] 60  Resp:  [18-28] 21  SpO2:  [84 %-98 %] 91 %  BP: (155-243)/() 155/71     Weight: 92.1 kg (203 lb 0.7 oz)  Body mass index is 31.8 kg/m².    Physical Exam  Vitals signs and nursing note reviewed.   Constitutional:       General: He is not in acute distress.     Appearance: He is well-developed. He is obese. He is not diaphoretic.   HENT:      Head: Normocephalic and atraumatic.      Right Ear: External ear normal.      Left Ear: External ear normal.      Mouth/Throat:      Mouth: Mucous membranes are moist.   Eyes:      Pupils: Pupils are equal, round, and reactive to light.   Neck:      Musculoskeletal: Normal range of motion.      Vascular: No JVD.   Cardiovascular:      Rate and Rhythm: Normal  rate and regular rhythm.      Heart sounds: No murmur.   Pulmonary:      Effort: Tachypnea present. No respiratory distress.      Breath sounds: Examination of the right-lower field reveals decreased breath sounds. Examination of the left-lower field reveals decreased breath sounds. Decreased breath sounds present. No wheezing.      Comments: Crackles noted throughout; currently on 3 L nasal cannula.  Nonproductive cough noted intermittently throughout exam.  Chest:      Chest wall: No tenderness.   Abdominal:      General: Bowel sounds are normal. There is no distension.      Palpations: Abdomen is soft.      Tenderness: There is no abdominal tenderness.   Genitourinary:     Penis: Normal.    Musculoskeletal: Normal range of motion.         General: No deformity.      Right lower leg: No edema.      Left lower leg: No edema.   Skin:     General: Skin is warm and dry.      Capillary Refill: Capillary refill takes less than 2 seconds.   Neurological:      Mental Status: He is alert and oriented to person, place, and time.      Cranial Nerves: Facial asymmetry (Right facial droop, + history for Bell's palsy) present. No dysarthria.      Motor: No weakness or tremor.   Psychiatric:         Mood and Affect: Mood normal.         Behavior: Behavior normal.         Thought Content: Thought content normal.         Judgment: Judgment normal.           CRANIAL NERVES     CN III, IV, VI   Pupils are equal, round, and reactive to light.       Significant Labs:   CBC:   Recent Labs   Lab 06/19/20  1245 06/20/20  1824   WBC 6.85 8.67   HGB 13.6* 12.3*   HCT 41.3 37.2*    229     CMP:   Recent Labs   Lab 06/19/20  1245 06/20/20  1824 06/21/20  0012    138 142   K 4.8 5.4* 5.6*    104 108   CO2 26 20* 22*   * 616* 369*   BUN 29* 36* 32*   CREATININE 1.5* 1.8* 1.6*   CALCIUM 10.0 9.6 9.7   PROT 7.7 7.5  --    ALBUMIN 4.1 4.1  --    BILITOT 0.7 0.7  --    ALKPHOS 52* 51*  --    AST 23 17  --    ALT 39 38  --     ANIONGAP 8 14 12   EGFRNONAA 47* 38* 44*     Troponin:   Recent Labs   Lab 06/20/20  1824 06/21/20  0012   TROPONINI 0.061* 1.519*     Urine Studies:   Recent Labs   Lab 06/20/20  1814   COLORU Yellow   APPEARANCEUA Clear   PHUR 6.0   SPECGRAV 1.010   PROTEINUA Trace*   GLUCUA 4+*   KETONESU Negative   BILIRUBINUA Negative   OCCULTUA Trace*   NITRITE Negative   UROBILINOGEN 1.0   LEUKOCYTESUR Negative   RBCUA 1   WBCUA 0   BACTERIA None   SQUAMEPITHEL 1     All pertinent labs within the past 24 hours have been reviewed.    Significant Imaging: I have reviewed and interpreted all pertinent imaging results/findings within the past 24 hours.

## 2020-06-21 NOTE — ASSESSMENT & PLAN NOTE
Appears stable. Patient was started on acyclovir and prednisone yesterday, will continue for now.  House Brackmann Grade III per vascular neurologist evaluation.    Add lubricating eyedrops for right eye.

## 2020-06-21 NOTE — Clinical Note
Cleviprex 16mg/h  infusing upon pts arrival to cath lab. Okay to leave on during procedure per MD. Okay to leave Nitro paste patch on during procedure per MD.

## 2020-06-21 NOTE — ASSESSMENT & PLAN NOTE
Patient's anemia is currently controlled. S/p 0 units of PRBCs.  Current CBC reviewed-   Lab Results   Component Value Date    HGB 12.3 (L) 06/20/2020    HCT 37.2 (L) 06/20/2020     Monitor serial CBC and transfuse if patient becomes hemodynamically unstable, symptomatic or H/H drops below 7/21.

## 2020-06-21 NOTE — HPI
70 y.o. male with a PMHx of CAD with remote PCI, CKD 3, IDDM, hypertension, hyperlipidemia, neuropathy, sleep apnea, anemia who presents to [facility] ED with chief complaint of chest pain for 2 hr.  He described chest pain as a substernal chest tightness and tells me that his lower jaw hurts.  Patient was placed in ICU on Cardene drip however subsequent cardiac enzymes up trended and Cardiology was involved.  Patient was also given Rocephin and azithromycin for presumed pneumonia.  He also received aspirin, statin, beta-blocker and weight based Lovenox.  Patient was transferred to [facility] for angiographic evaluation tomorrow morning.  Upon my interview he reports mild chest discomfort and jaw pain and associated shortness of breath.  Otherwise denies any fever, chills, headache, dizziness, palpitations, nausea, vomiting, bladder or bowel symptoms.

## 2020-06-21 NOTE — Clinical Note
160 ml injected throughout the case. 140 mL total wasted during the case. 300 mL total used in the case.

## 2020-06-21 NOTE — Clinical Note
Catheter is repositioned to the aorta.  Angiography performed via power injection with 40 mL contrast at 20 mL/sPower injection PSI was 800.. Used Pigtail

## 2020-06-21 NOTE — PROGRESS NOTES
Pt admitted last pm from ER with hypertension. Initially on Tridil gtt changed to Cardene per orders to keep SBP less than 180. When changing drips SBP up to 210 when pt complaining of non radiating chest pain briefly  relieved when BP controlled. O2 sats down to 80's on 4 lpm nasal cannula. Changed to high flow and then placed on cpap as pt wears machine at home. This am cpap removed at pt request and placed on high flow titrating down to 4 lpm . Dieurising from Lasix given. Received phone call from pts wife updated on pt condition. Pt spoke to wife self and password established. Safety measures in place.

## 2020-06-21 NOTE — NURSING
Pt is from [facility] near [place]. Pt's cardiologist is at [place]. They were here with family for a get away. Family have since returned to [place]. She is currently having to stay at a hotel here. Pt and wife request to go to [place] if any intervention is needed. Denies chest pains at present.

## 2020-06-21 NOTE — Clinical Note
Catheter is inserted into the and hemodynamics recorded left ventricle. Angiography performed of the lv. Angiography performed via power injection with . Used Pigtail Cathter

## 2020-06-21 NOTE — ASSESSMENT & PLAN NOTE
Likely secondary to hypertensive urgency.  EKG reviewed.  Continue to trend overnight.  Cardiology consulted.

## 2020-06-21 NOTE — ASSESSMENT & PLAN NOTE
Patient is chronically on statin.will continue for now. Monitor clinically. Last LDL was   Lab Results   Component Value Date    LDLCALC 57.2 (L) 06/19/2020

## 2020-06-22 ENCOUNTER — CLINICAL SUPPORT (OUTPATIENT)
Dept: CARDIOLOGY | Facility: HOSPITAL | Age: 70
DRG: 233 | End: 2020-06-22
Attending: HOSPITALIST
Payer: COMMERCIAL

## 2020-06-22 VITALS — HEIGHT: 67 IN | WEIGHT: 202 LBS | BODY MASS INDEX: 31.71 KG/M2

## 2020-06-22 LAB
ALBUMIN SERPL BCP-MCNC: 3.9 G/DL (ref 3.5–5.2)
ALP SERPL-CCNC: 45 U/L (ref 55–135)
ALT SERPL W/O P-5'-P-CCNC: 37 U/L (ref 10–44)
ANION GAP SERPL CALC-SCNC: 12 MMOL/L (ref 8–16)
APTT PPP: 28.8 SEC (ref 23.6–33.3)
AST SERPL-CCNC: 47 U/L (ref 10–40)
BASOPHILS # BLD AUTO: 0.09 K/UL (ref 0–0.2)
BASOPHILS NFR BLD: 0.5 % (ref 0–1.9)
BILIRUB SERPL-MCNC: 1.1 MG/DL (ref 0.1–1)
BNP SERPL-MCNC: 661 PG/ML (ref 0–99)
BUN SERPL-MCNC: 37 MG/DL (ref 8–23)
CALCIUM SERPL-MCNC: 9 MG/DL (ref 8.7–10.5)
CATH EF QUANTITATIVE: 55 %
CHLORIDE SERPL-SCNC: 105 MMOL/L (ref 95–110)
CO2 SERPL-SCNC: 23 MMOL/L (ref 23–29)
CREAT SERPL-MCNC: 1.4 MG/DL (ref 0.5–1.4)
DIFFERENTIAL METHOD: ABNORMAL
EOSINOPHIL # BLD AUTO: 0 K/UL (ref 0–0.5)
EOSINOPHIL NFR BLD: 0.1 % (ref 0–8)
ERYTHROCYTE [DISTWIDTH] IN BLOOD BY AUTOMATED COUNT: 13.9 % (ref 11.5–14.5)
EST. GFR  (AFRICAN AMERICAN): 59.3 ML/MIN/1.73 M^2
EST. GFR  (NON AFRICAN AMERICAN): 51.3 ML/MIN/1.73 M^2
GLUCOSE SERPL-MCNC: 205 MG/DL (ref 70–110)
GLUCOSE SERPL-MCNC: 242 MG/DL (ref 70–110)
GLUCOSE SERPL-MCNC: 273 MG/DL (ref 70–110)
GLUCOSE SERPL-MCNC: 316 MG/DL (ref 70–110)
HCT VFR BLD AUTO: 40.5 % (ref 40–54)
HGB BLD-MCNC: 13.8 G/DL (ref 14–18)
IMM GRANULOCYTES # BLD AUTO: 0.28 K/UL (ref 0–0.04)
IMM GRANULOCYTES NFR BLD AUTO: 1.6 % (ref 0–0.5)
INR PPP: 1.1
LYMPHOCYTES # BLD AUTO: 2.1 K/UL (ref 1–4.8)
LYMPHOCYTES NFR BLD: 11.8 % (ref 18–48)
MAGNESIUM SERPL-MCNC: 1.8 MG/DL (ref 1.6–2.6)
MCH RBC QN AUTO: 29.3 PG (ref 27–31)
MCHC RBC AUTO-ENTMCNC: 34.1 G/DL (ref 32–36)
MCV RBC AUTO: 86 FL (ref 82–98)
MONOCYTES # BLD AUTO: 1.3 K/UL (ref 0.3–1)
MONOCYTES NFR BLD: 7.1 % (ref 4–15)
NEUTROPHILS # BLD AUTO: 13.9 K/UL (ref 1.8–7.7)
NEUTROPHILS NFR BLD: 78.9 % (ref 38–73)
NRBC BLD-RTO: 0 /100 WBC
PHOSPHATE SERPL-MCNC: 4.1 MG/DL (ref 2.7–4.5)
PLATELET # BLD AUTO: 281 K/UL (ref 150–350)
PMV BLD AUTO: 10.9 FL (ref 9.2–12.9)
POCT GLUCOSE: >500 MG/DL (ref 70–110)
POTASSIUM SERPL-SCNC: 4.1 MMOL/L (ref 3.5–5.1)
PROT SERPL-MCNC: 7.2 G/DL (ref 6–8.4)
PROTHROMBIN TIME: 14 SEC (ref 10.6–14.8)
RBC # BLD AUTO: 4.71 M/UL (ref 4.6–6.2)
SODIUM SERPL-SCNC: 140 MMOL/L (ref 136–145)
TROPONIN I SERPL DL<=0.01 NG/ML-MCNC: 6.46 NG/ML
TROPONIN I SERPL DL<=0.01 NG/ML-MCNC: 7.32 NG/ML
WBC # BLD AUTO: 17.66 K/UL (ref 3.9–12.7)

## 2020-06-22 PROCEDURE — 94761 N-INVAS EAR/PLS OXIMETRY MLT: CPT

## 2020-06-22 PROCEDURE — C9113 INJ PANTOPRAZOLE SODIUM, VIA: HCPCS | Performed by: INTERNAL MEDICINE

## 2020-06-22 PROCEDURE — 63600175 PHARM REV CODE 636 W HCPCS

## 2020-06-22 PROCEDURE — 25000003 PHARM REV CODE 250

## 2020-06-22 PROCEDURE — 99223 1ST HOSP IP/OBS HIGH 75: CPT | Mod: ,,, | Performed by: NURSE PRACTITIONER

## 2020-06-22 PROCEDURE — 25500020 PHARM REV CODE 255: Performed by: INTERNAL MEDICINE

## 2020-06-22 PROCEDURE — C9248 INJ, CLEVIDIPINE BUTYRATE: HCPCS | Performed by: HOSPITALIST

## 2020-06-22 PROCEDURE — 85610 PROTHROMBIN TIME: CPT

## 2020-06-22 PROCEDURE — 20000000 HC ICU ROOM

## 2020-06-22 PROCEDURE — 63600175 PHARM REV CODE 636 W HCPCS: Performed by: INTERNAL MEDICINE

## 2020-06-22 PROCEDURE — C1752 CATH,HEMODIALYSIS,SHORT-TERM: HCPCS | Performed by: INTERNAL MEDICINE

## 2020-06-22 PROCEDURE — 93005 ELECTROCARDIOGRAM TRACING: CPT | Performed by: INTERNAL MEDICINE

## 2020-06-22 PROCEDURE — 94660 CPAP INITIATION&MGMT: CPT

## 2020-06-22 PROCEDURE — 80053 COMPREHEN METABOLIC PANEL: CPT

## 2020-06-22 PROCEDURE — 84484 ASSAY OF TROPONIN QUANT: CPT | Mod: 91

## 2020-06-22 PROCEDURE — 63600175 PHARM REV CODE 636 W HCPCS: Performed by: SPECIALIST

## 2020-06-22 PROCEDURE — 99223 PR INITIAL HOSPITAL CARE,LEVL III: ICD-10-PCS | Mod: ,,, | Performed by: NURSE PRACTITIONER

## 2020-06-22 PROCEDURE — 25000003 PHARM REV CODE 250: Performed by: HOSPITALIST

## 2020-06-22 PROCEDURE — 25000003 PHARM REV CODE 250: Performed by: SPECIALIST

## 2020-06-22 PROCEDURE — 63600175 PHARM REV CODE 636 W HCPCS: Performed by: HOSPITALIST

## 2020-06-22 PROCEDURE — 84100 ASSAY OF PHOSPHORUS: CPT

## 2020-06-22 PROCEDURE — 25000003 PHARM REV CODE 250: Performed by: INTERNAL MEDICINE

## 2020-06-22 PROCEDURE — C1894 INTRO/SHEATH, NON-LASER: HCPCS | Performed by: INTERNAL MEDICINE

## 2020-06-22 PROCEDURE — 99152 MOD SED SAME PHYS/QHP 5/>YRS: CPT | Performed by: INTERNAL MEDICINE

## 2020-06-22 PROCEDURE — 83880 ASSAY OF NATRIURETIC PEPTIDE: CPT

## 2020-06-22 PROCEDURE — 85025 COMPLETE CBC W/AUTO DIFF WBC: CPT

## 2020-06-22 PROCEDURE — 93306 TTE W/DOPPLER COMPLETE: CPT

## 2020-06-22 PROCEDURE — 75625 CONTRAST EXAM ABDOMINL AORTA: CPT | Mod: XU | Performed by: INTERNAL MEDICINE

## 2020-06-22 PROCEDURE — C1887 CATHETER, GUIDING: HCPCS | Performed by: INTERNAL MEDICINE

## 2020-06-22 PROCEDURE — C1769 GUIDE WIRE: HCPCS | Performed by: INTERNAL MEDICINE

## 2020-06-22 PROCEDURE — 93459 L HRT ART/GRFT ANGIO: CPT | Performed by: INTERNAL MEDICINE

## 2020-06-22 PROCEDURE — 83735 ASSAY OF MAGNESIUM: CPT

## 2020-06-22 PROCEDURE — 36222 PLACE CATH CAROTID/INOM ART: CPT | Mod: 50 | Performed by: INTERNAL MEDICINE

## 2020-06-22 PROCEDURE — 99153 MOD SED SAME PHYS/QHP EA: CPT | Performed by: INTERNAL MEDICINE

## 2020-06-22 PROCEDURE — 36415 COLL VENOUS BLD VENIPUNCTURE: CPT

## 2020-06-22 PROCEDURE — 27000221 HC OXYGEN, UP TO 24 HOURS

## 2020-06-22 PROCEDURE — 99900035 HC TECH TIME PER 15 MIN (STAT)

## 2020-06-22 PROCEDURE — 84484 ASSAY OF TROPONIN QUANT: CPT

## 2020-06-22 PROCEDURE — 85730 THROMBOPLASTIN TIME PARTIAL: CPT

## 2020-06-22 RX ORDER — FUROSEMIDE 10 MG/ML
INJECTION INTRAMUSCULAR; INTRAVENOUS
Status: COMPLETED
Start: 2020-06-22 | End: 2020-06-22

## 2020-06-22 RX ORDER — NITROGLYCERIN 20 MG/100ML
INJECTION INTRAVENOUS
Status: COMPLETED
Start: 2020-06-22 | End: 2020-06-22

## 2020-06-22 RX ORDER — FENTANYL CITRATE 50 UG/ML
INJECTION, SOLUTION INTRAMUSCULAR; INTRAVENOUS
Status: DISCONTINUED | OUTPATIENT
Start: 2020-06-22 | End: 2020-06-22 | Stop reason: HOSPADM

## 2020-06-22 RX ORDER — HEPARIN SODIUM,PORCINE/D5W 25000/250
10 INTRAVENOUS SOLUTION INTRAVENOUS CONTINUOUS
Status: DISCONTINUED | OUTPATIENT
Start: 2020-06-22 | End: 2020-06-24

## 2020-06-22 RX ORDER — IODIXANOL 320 MG/ML
INJECTION, SOLUTION INTRAVASCULAR
Status: DISCONTINUED | OUTPATIENT
Start: 2020-06-22 | End: 2020-06-22 | Stop reason: HOSPADM

## 2020-06-22 RX ORDER — SODIUM CHLORIDE 450 MG/100ML
100 INJECTION, SOLUTION INTRAVENOUS CONTINUOUS
Status: DISCONTINUED | OUTPATIENT
Start: 2020-06-22 | End: 2020-06-22

## 2020-06-22 RX ORDER — LIDOCAINE HYDROCHLORIDE 10 MG/ML
INJECTION, SOLUTION EPIDURAL; INFILTRATION; INTRACAUDAL; PERINEURAL
Status: DISCONTINUED | OUTPATIENT
Start: 2020-06-22 | End: 2020-06-22 | Stop reason: HOSPADM

## 2020-06-22 RX ORDER — NITROGLYCERIN 20 MG/100ML
5 INJECTION INTRAVENOUS CONTINUOUS
Status: DISCONTINUED | OUTPATIENT
Start: 2020-06-22 | End: 2020-06-25

## 2020-06-22 RX ORDER — ENALAPRILAT 1.25 MG/ML
1.25 INJECTION INTRAVENOUS 2 TIMES DAILY
Status: DISCONTINUED | OUTPATIENT
Start: 2020-06-22 | End: 2020-06-25

## 2020-06-22 RX ORDER — HYDRALAZINE HYDROCHLORIDE 20 MG/ML
10 INJECTION INTRAMUSCULAR; INTRAVENOUS EVERY 6 HOURS PRN
Status: DISCONTINUED | OUTPATIENT
Start: 2020-06-22 | End: 2020-06-25

## 2020-06-22 RX ORDER — FUROSEMIDE 10 MG/ML
20 INJECTION INTRAMUSCULAR; INTRAVENOUS ONCE
Status: COMPLETED | OUTPATIENT
Start: 2020-06-22 | End: 2020-06-22

## 2020-06-22 RX ORDER — FUROSEMIDE 10 MG/ML
20 INJECTION INTRAMUSCULAR; INTRAVENOUS
Status: DISCONTINUED | OUTPATIENT
Start: 2020-06-22 | End: 2020-06-24

## 2020-06-22 RX ORDER — MIDAZOLAM HYDROCHLORIDE 1 MG/ML
INJECTION INTRAMUSCULAR; INTRAVENOUS
Status: DISCONTINUED | OUTPATIENT
Start: 2020-06-22 | End: 2020-06-22 | Stop reason: HOSPADM

## 2020-06-22 RX ORDER — ASPIRIN 81 MG/1
81 TABLET ORAL DAILY
Status: DISCONTINUED | OUTPATIENT
Start: 2020-06-23 | End: 2020-06-25

## 2020-06-22 RX ADMIN — CLEVIPIDINE 16 MG/HR: 0.5 EMULSION INTRAVENOUS at 01:06

## 2020-06-22 RX ADMIN — NITROGLYCERIN 1 INCH: 20 OINTMENT TOPICAL at 02:06

## 2020-06-22 RX ADMIN — NIFEDIPINE 30 MG: 30 TABLET, FILM COATED, EXTENDED RELEASE ORAL at 09:06

## 2020-06-22 RX ADMIN — NITROGLYCERIN 1 INCH: 20 OINTMENT TOPICAL at 08:06

## 2020-06-22 RX ADMIN — INSULIN ASPART 4 UNITS: 100 INJECTION, SOLUTION INTRAVENOUS; SUBCUTANEOUS at 11:06

## 2020-06-22 RX ADMIN — SODIUM CHLORIDE 100 ML/HR: 0.45 INJECTION, SOLUTION INTRAVENOUS at 10:06

## 2020-06-22 RX ADMIN — INSULIN ASPART 6 UNITS: 100 INJECTION, SOLUTION INTRAVENOUS; SUBCUTANEOUS at 04:06

## 2020-06-22 RX ADMIN — DEXTRAN 70, AND HYPROMELLOSE 2910 1 DROP: 1; 3 SOLUTION/ DROPS OPHTHALMIC at 02:06

## 2020-06-22 RX ADMIN — FUROSEMIDE 20 MG: 10 INJECTION, SOLUTION INTRAMUSCULAR; INTRAVENOUS at 11:06

## 2020-06-22 RX ADMIN — INSULIN ASPART 8 UNITS: 100 INJECTION, SOLUTION INTRAVENOUS; SUBCUTANEOUS at 10:06

## 2020-06-22 RX ADMIN — FUROSEMIDE 20 MG: 10 INJECTION INTRAMUSCULAR; INTRAVENOUS at 11:06

## 2020-06-22 RX ADMIN — HEPARIN SODIUM 12 UNITS/KG/HR: 10000 INJECTION, SOLUTION INTRAVENOUS at 09:06

## 2020-06-22 RX ADMIN — ACYCLOVIR 200 MG: 200 CAPSULE ORAL at 09:06

## 2020-06-22 RX ADMIN — CLEVIPIDINE 16 MG/HR: 0.5 EMULSION INTRAVENOUS at 12:06

## 2020-06-22 RX ADMIN — PANTOPRAZOLE SODIUM 40 MG: 40 INJECTION, POWDER, FOR SOLUTION INTRAVENOUS at 08:06

## 2020-06-22 RX ADMIN — ENALAPRILAT 1.25 MG: 1.25 INJECTION INTRAVENOUS at 09:06

## 2020-06-22 RX ADMIN — NITROGLYCERIN 5 MCG/MIN: 20 INJECTION INTRAVENOUS at 04:06

## 2020-06-22 RX ADMIN — DULOXETINE 30 MG: 30 CAPSULE, DELAYED RELEASE ORAL at 09:06

## 2020-06-22 RX ADMIN — INSULIN ASPART 6 UNITS: 100 INJECTION, SOLUTION INTRAVENOUS; SUBCUTANEOUS at 06:06

## 2020-06-22 RX ADMIN — FUROSEMIDE 20 MG: 10 INJECTION, SOLUTION INTRAMUSCULAR; INTRAVENOUS at 09:06

## 2020-06-22 RX ADMIN — NITROGLYCERIN 1 INCH: 20 OINTMENT TOPICAL at 03:06

## 2020-06-22 RX ADMIN — CLEVIPIDINE 16 MG/HR: 0.5 EMULSION INTRAVENOUS at 08:06

## 2020-06-22 RX ADMIN — ATORVASTATIN CALCIUM 40 MG: 40 TABLET, FILM COATED ORAL at 09:06

## 2020-06-22 RX ADMIN — DEXTRAN 70, AND HYPROMELLOSE 2910 1 DROP: 1; 3 SOLUTION/ DROPS OPHTHALMIC at 08:06

## 2020-06-22 RX ADMIN — DEXTRAN 70, AND HYPROMELLOSE 2910 1 DROP: 1; 3 SOLUTION/ DROPS OPHTHALMIC at 09:06

## 2020-06-22 RX ADMIN — CLEVIPIDINE 14 MG/HR: 0.5 EMULSION INTRAVENOUS at 03:06

## 2020-06-22 NOTE — CARE UPDATE
06/21/20 2200   Patient Assessment/Suction   Level of Consciousness (AVPU) alert   Respiratory Effort Unlabored   Expansion/Accessory Muscles/Retractions no use of accessory muscles   All Lung Fields Breath Sounds clear;equal bilaterally   Rhythm/Pattern, Respiratory no shortness of breath reported   Cough Frequency no cough   PRE-TX-O2   O2 Device (Oxygen Therapy) CPAP   $ Is the patient on Low Flow Oxygen? Yes   Flow (L/min) 2   SpO2 (!) 92 %   Pulse Oximetry Type Continuous   $ Pulse Oximetry - Multiple Charge Pulse Oximetry - Multiple   Pulse (!) 57   Resp (!) 25   BP (!) 208/83   Positioning   Head of Bed (HOB) HOB elevated   Preset CPAP/BiPAP Settings   Mode Of Delivery CPAP   $ CPAP/BiPAP Daily Charge BiPAP/CPAP Daily   $ Initial CPAP/BiPAP Setup? Yes   $ Is patient using? Yes   Size of Mask Medium/Large   Sized Appropriately? Yes   Equipment Type DeVilbiss   Airway Device Type medium full face mask   Humidifier not applicable   Patient CPAP/BiPAP Settings   FiO2 Auto Set yes

## 2020-06-22 NOTE — CONSULTS
CVT SURGERY CONSULT NOTE    Patient Info:   Sincere Malave                                                        70 y.o.                          1950    Date of Consult: 6/22/2020    Reason for Consult: MV-CAD    Referring Physician: James Kaiser MD    Subjective:      HPI: 70 y.o. male with a PMHx of CAD with remote PCI roughly 15 years ago, CKD 3, IDDM, hypertension, hyperlipidemia, neuropathy, sleep apnea, anemia who presents to [facility] ED with chief complaint of chest pain for 2 hr.  He described chest pain as a substernal chest tightness and jaw pain.  Subsequent cardiac enzymes trended up and Cardiology was involved.  He was transferred to [facility] where he underwent a LHC today showing multi vessel CAD including left main. Cardiothoracic surgery was consulted for CABG eval.    On exam he resting in bed on CPAP with his wife at bedside.  He denied any shortness of breath chest pains or palpitations.     Past Medical History:   Diagnosis Date    Anemia     Bell's palsy 06/20/2020    CAD (coronary artery disease)     Diabetes mellitus     Diabetic polyneuropathy     HLD (hyperlipidemia)     Hypertension     Sleep apnea        History reviewed. No pertinent surgical history.    Social History     Tobacco Use    Smoking status: Never Smoker   Substance Use Topics    Alcohol use: Yes     Alcohol/week: 1.0 standard drinks     Types: 1 Cans of beer per week     Comment: couple of times a year       Social History     Substance and Sexual Activity   Drug Use Never       Family History   Problem Relation Age of Onset    No Known Problems Mother     Heart disease Father     Heart attack Father     Heart attack Sister        Review of patient's allergies indicates:  No Known Allergies    Prior to Admission medications    Medication Sig Start Date End Date Taking? Authorizing Provider   acyclovir (ZOVIRAX) 400 MG tablet Take 1 tablet (400 mg total) by mouth 5 (five)  times daily. for 10 days 6/19/20 6/29/20  Kevin Rosa MD   amLODIPine (NORVASC) 10 MG tablet Take 10 mg by mouth once daily.    Historical Provider, MD   aspirin (ASPIR-81 ORAL) Take 81 mg by mouth once daily.    Historical Provider, MD   DULoxetine (CYMBALTA) 30 MG capsule Take 30 mg by mouth once daily.    Historical Provider, MD   fenofibrate micronized (LOFIBRA) 134 MG Cap Take 134 mg by mouth daily with breakfast.    Historical Provider, MD   insulin NPH-insulin regular, 70/30, (NOVOLIN 70/30) 100 unit/mL (70-30) injection Inject into the skin 2 (two) times daily before meals. 40 units Q a.m. and 60 units q.p.m.    Historical ProviderMD MERCADO LISINOPRIL-HCTZ 20-12.5 Take 1 tablet by mouth once daily. FOR INVESTIGATIONAL USE ONLY    Historical Provider, MD   metFORMIN (GLUCOPHAGE) 1000 MG tablet Take 1,000 mg by mouth 2 (two) times daily with meals.    Historical Provider, MD   pravastatin (PRAVACHOL) 40 MG tablet Take 40 mg by mouth every evening.    Historical Provider, MD   predniSONE (DELTASONE) 20 MG tablet Take 3 tablets (60 mg total) by mouth once daily. for 4 days 6/19/20 6/23/20  Kevin Rosa MD       Review of Systems   Constitutional: Negative for chills, fever, malaise/fatigue and weight loss.   HENT: Negative for congestion, nosebleeds and sinus pain.    Eyes: Negative for blurred vision, double vision, photophobia and pain.   Respiratory: Positive for shortness of breath ( on exertion). Negative for stridor.    Cardiovascular: Positive for chest pain (On exertion). Negative for palpitations, leg swelling and PND.   Gastrointestinal: Negative for abdominal pain, heartburn, nausea and vomiting.   Musculoskeletal: Negative for back pain, falls, joint pain, myalgias and neck pain.   Neurological: Negative for dizziness, focal weakness, weakness and headaches.   Psychiatric/Behavioral: Negative for depression and substance abuse. The patient is not nervous/anxious and does not have insomnia.   "      Objective:    Physical Exam   Constitutional: He is oriented to person, place, and time. He appears well-developed and well-nourished.   HENT:   Head: Normocephalic and atraumatic.   Neck: Normal range of motion. Neck supple. JVD present. No tracheal deviation present.   Cardiovascular: Normal rate, regular rhythm, normal heart sounds and normal pulses. Exam reveals no gallop and no friction rub.   No murmur heard.  Pulses:       Carotid pulses are 2+ on the right side and 2+ on the left side.       Radial pulses are 2+ on the right side and 2+ on the left side.        Posterior tibial pulses are 2+ on the right side and 2+ on the left side.   Pulmonary/Chest: Effort normal and breath sounds normal. No stridor. No tachypnea. No respiratory distress. He has no wheezes. He has no rales.   Abdominal: Normal aorta and bowel sounds are normal. There is no abdominal tenderness.   Neurological: He is alert and oriented to person, place, and time. No cranial nerve deficit.   Skin: Abrasion noted. He is not diaphoretic.        Psychiatric: He has a normal mood and affect. His speech is normal. Judgment and thought content normal.     BP (!) 157/69   Pulse (!) 50   Temp 97.7 °F (36.5 °C) (Oral)   Resp (!) 25   Ht 5' 7" (1.702 m)   Wt 91.7 kg (202 lb 2.6 oz)   SpO2 95%   BMI 31.66 kg/m²   INPATIENT MEDS:    sodium chloride 0.45% 100 mL/hr (06/22/20 1400)    clevidipine 14 mg/hr (06/22/20 1500)      acyclovir  400 mg Oral 5x Daily    aspirin  325 mg Oral Daily    atorvastatin  40 mg Oral QHS    dextran 70-hypromellose  1 drop Right Eye TID    DULoxetine  30 mg Oral QHS    fenofibrate  145 mg Oral Daily with breakfast    insulin aspart protamine-insulin aspart  40 Units Subcutaneous BIDWM    NIFEdipine  30 mg Oral BID    nitroGLYCERIN 2% TD oint  1 inch Topical (Top) Q6H    pantoprazole  40 mg Intravenous Daily    predniSONE  60 mg Oral Daily     acetaminophen, calcium chloride IVPB, calcium chloride " IVPB, calcium chloride IVPB, dextrose 50%, dextrose 50%, glucagon (human recombinant), glucose, glucose, insulin aspart U-100, magnesium oxide, magnesium sulfate IVPB, magnesium sulfate IVPB, magnesium sulfate IVPB, magnesium sulfate IVPB, melatonin, nitroGLYCERIN, ondansetron, potassium chloride in water, potassium chloride in water, potassium chloride in water, potassium chloride in water, potassium chloride, potassium chloride, potassium chloride, potassium chloride, senna-docusate 8.6-50 mg, sodium chloride 0.9%, sodium phosphate IVPB, sodium phosphate IVPB, sodium phosphate IVPB, sodium phosphate IVPB, sodium phosphate IVPB     RECENT O2 THERAPY  NC  I/O last 24 hours  Intake/Output - Last 3 Shifts       06/20 0700 - 06/21 0659 06/21 0700 - 06/22 0659 06/22 0700 - 06/23 0659    P.O.  240     IV Piggyback   64    Total Intake(mL/kg)  240 (2.6) 64 (0.7)    Urine (mL/kg/hr)  800 100 (0.1)    Total Output  800 100    Net  -560 -36               Recent cardiac rhythm:  Sinus rhythm  Recent Pain Assessment:  Denies any current pain.  CBC:  Recent Labs   Lab 06/20/20  1824 06/21/20  0455 06/22/20  0350   WBC 8.67 16.43* 17.66*   RBC 4.25* 4.67 4.71   HGB 12.3* 13.2* 13.8*    266 281   MCV 88 88 86   MCH 28.9 28.3 29.3   MCHC 33.1 32.3 34.1     BMP:  Recent Labs   Lab 06/21/20  0012 06/21/20  0455 06/22/20  0350   CO2 22* 25 23   BUN 32* 31* 37*   CREATININE 1.6* 1.5* 1.4   CALCIUM 9.7 9.3 9.0     CARDIAC ENZYMES:  Recent Labs   Lab 06/21/20  0944 06/21/20  1657 06/22/20  0350   TROPONINI 3.625* 3.639* 7.320*     BNP:  Recent Labs   Lab 06/20/20  1824 06/22/20  0350   BNP 29 661*     PT/INR:  INR   Date Value Ref Range Status   06/19/2020 0.9 0.8 - 1.2 Final     Comment:     Coumadin Therapy:  2.0 - 3.0 for INR for all indicators except mechanical heart valves  and antiphospholipid syndromes which should use 2.5 - 3.5.       DRUG LEVEL  No results found for: DIGOXIN  DIAGNOSTIC RESULTS  LHC, Ventriculogram, Left  Angio, Carotid 6/22/2020  · There is mild left ventricular systolic dysfunction.  · LV end diastolic pressure is elevated.  · LVEDP (Pre): 19  · LVEDP (Post): 22  · The ejection fraction is calculated to be 55%.  · Estimated blood loss: none  · Three vessel coronary artery disease. Left Main disease.  · The distal left main has stenosis greater than 50%  · Ostial and proximal LAD has 75% stenosis and proximal 1st diagonal branch has 70% stenosis.  · Long tubular stenosis in the proximal ramus intermedius was stenosis of 70%.  · Ostial and proximal left circumflex artery as critical stenosis about 90-95%. And the proximal 2nd obtuse marginal branch has 95% stenosis.  · Proximal tubular narrowing of the RCA with 50-60% stenosis mid stenosis of 85%. High bifurcation of the PLV B and PDA  · Preserved LV function with mild anterior apical and inferior apical wall hypokinesis with EF of 55%.  · Bilateral renal artery stenosis, proximal right renal artery has 50% stenosis. Proximal left renal artery before bifurcation has 60% stenosis.  · Right common carotid artery before the bifurcation is patent.  · Left common carotid artery proximally is patent.  · Left subclavian artery is patent. Left internal mammary artery is patent proximally.\  · Patent left common femoral artery and SFA and PFA.             CURRENT CONSULTS  Consults (From admission, onward)        Status Ordering Provider     Inpatient consult to Cardiology  Once     Provider:  James Kaiser MD    Acknowledged RICHARD ZEE     Inpatient consult to Cardiothoracic Surgery  Once     Provider:  Rao Larose MD    Acknowledged JAMES KAISER     Inpatient consult to Hospitalist  Once     Provider:  Shasta Hennessy MD    Acknowledged JAMES KAISER     Inpatient consult to Registered Dietitian/Nutritionist  Once     Provider:  (Not yet assigned)    Acknowledged RICHARD ZEE            Assessment/Plan:    [unfilled]  NSTEMI  MV-CAD  - VSS.  NAD  - denies any chest pain, palpitations, shortness of breath, nausea, or radiating pain.  - Troponin 7.32  - palpable distal pulses  - LHC as above with MV disease including LM.  - Carotid US pending  - Will discuss case with Dr. Larose.  Waiting further surgical recommendations.    Hypertension  - currently on clevidipine gtt  - cardiology following    IDDM  Sleep apnea  Neuropathy  CKD 3  Anemia  - Defer to primary      Case and plan of care discussed with MD.  GI Prophylaxis:  PPI:proton pump inhibitor per orders  DVT Prophylaxis: pending surgery  Anticoagulants   Medication Route Frequency       Signed:  OZZIE Hilario-BC  Cardiothoracic Surgery  6/22/2020  3:04 PM

## 2020-06-22 NOTE — PROGRESS NOTES
Cardiac Rehab     Sincere Malave   [id number]   6/22/2020         Cardiac Rehab Phase Taught: Phase 1    Teaching Method: Verbal, Written    Handouts: Pre-Op CABG Booklet    Educational Videos: None    Understanding:  Knowledge indicated by feedback, Learning indicated by feedback and Verbalize understanding    Comments: discussed pre and postop cabg education, questions answered. Pt wife staying in local hotel, request for guest tray. Spoke with bedside RN. Will follow for further education.     Total Time Spent:20mins            Shraddha Carpenter RN

## 2020-06-22 NOTE — PROGRESS NOTES
[facility] Medicine  Progress Note    DOS: 06/22/2020 AT 10:25AM    Patient name: Sincere Malave  MRN: [id number]  Admit Date: 6/21/2020   LOS: 1 day     SUBJECTIVE:     Principal problem: NSTEMI (non-ST elevated myocardial infarction)    Interval History:  Patient was seen and examined bedside after left heart catheterization.  Denies any active chest pain.  Having some difficulty breathing and is requiring non-rebreather.  Patient was found to have multivessel coronary artery disease including left main and is currently waiting for Cardiothoracic surgery evaluation    Scheduled Meds:   acyclovir  400 mg Oral 5x Daily    aspirin  325 mg Oral Daily    atorvastatin  40 mg Oral QHS    dextran 70-hypromellose  1 drop Right Eye TID    DULoxetine  30 mg Oral QHS    fenofibrate  145 mg Oral Daily with breakfast    insulin aspart protamine-insulin aspart  40 Units Subcutaneous BIDWM    NIFEdipine  30 mg Oral BID    nitroGLYCERIN 2% TD oint  1 inch Topical (Top) Q6H    pantoprazole  40 mg Intravenous Daily    predniSONE  60 mg Oral Daily     Continuous Infusions:   sodium chloride 0.45%      clevidipine 16 mg/hr (06/22/20 0800)     PRN Meds:acetaminophen, calcium chloride IVPB, calcium chloride IVPB, calcium chloride IVPB, dextrose 50%, dextrose 50%, fentaNYL, glucagon (human recombinant), glucose, glucose, insulin aspart U-100, iodixanoL, lidocaine (PF) 10 mg/ml (1%), magnesium oxide, magnesium sulfate IVPB, magnesium sulfate IVPB, magnesium sulfate IVPB, magnesium sulfate IVPB, melatonin, midazolam, nitroGLYCERIN, ondansetron, potassium chloride in water, potassium chloride in water, potassium chloride in water, potassium chloride in water, potassium chloride, potassium chloride, potassium chloride, potassium chloride, senna-docusate 8.6-50 mg, sodium chloride 0.9%, sodium phosphate IVPB, sodium phosphate IVPB, sodium phosphate IVPB, sodium phosphate IVPB, sodium phosphate  IVPB    Review of patient's allergies indicates:  No Known Allergies    Review of Systems: As per interval history    OBJECTIVE:     Vital Signs (Most Recent)  Temp: 98.3 °F (36.8 °C) (06/22/20 0704)  Pulse: (!) 53 (06/22/20 0830)  Resp: (!) 27 (06/22/20 0830)  BP: (!) 145/67 (06/22/20 0830)  SpO2: (!) 93 % (06/22/20 0830)    Vital Signs Range (Last 24H):  Temp:  [98 °F (36.7 °C)-98.9 °F (37.2 °C)]   Pulse:  [48-62]   Resp:  [16-34]   BP: (128-208)/()   SpO2:  [88 %-98 %]     I & O (Last 24H):    Intake/Output Summary (Last 24 hours) at 6/22/2020 1036  Last data filed at 6/22/2020 0844  Gross per 24 hour   Intake 304 ml   Output 900 ml   Net -596 ml       Physical Exam:  General:  In mild respiratory distress. Appears as stated age. Calm  Eyes: No conjunctival injection. No scleral icterus.  ENT: Hearing grossly intact. No discharge from ears. No nasal discharge.   Neck: Supple, trachea midline. No JVD  CVS: RRR. 1+ LE edema  Lungs:  On non-rebreather, bilateral crackles up to mid lung field, no accessory muscle use  Abdomen:  Soft, nontender and nondistended.  No organomegaly  Neuro: AOx3. Moves all extremities. Follows commands. Responds appropriately   Skin:  No rash or erythema noted.  Left groin site looks unremarkable    Laboratory:  CBC:   Recent Labs   Lab 06/22/20  0350   WBC 17.66*   RBC 4.71   HGB 13.8*   HCT 40.5      MCV 86   MCH 29.3   MCHC 34.1     BMP:   Recent Labs   Lab 06/22/20  0350   *      K 4.1      CO2 23   BUN 37*   CREATININE 1.4   CALCIUM 9.0   MG 1.8       Diagnostic Results:  Reviewed imaging    ASSESSMENT/PLAN:     70-year-old gentleman presented to our sister facility with chest pain, accelerated hypertension, acute hypoxic respiratory failure due to flash pulmonary edema was referred to our facility for angiographic evaluation.  He received medical management including aspirin, statin, weight based Lovenox at that facility.  Procalcitonin is negative.   Today left heart catheterization showed multivessel disease including left main.  Currently waiting for CABG evaluation.  Has worsening pulmonary edema, hypertensive crisis, up trended troponin.     Active Hospital Problems    Diagnosis  POA    *NSTEMI (non-ST elevated myocardial infarction) [I21.4]  Yes     Priority: 1 - High    Coronary artery disease involving native coronary artery of native heart [I25.10]  Yes     Priority: 2     Acute-on-chronic kidney injury [N17.9, N18.9]  Yes     Priority: 3     Type 2 diabetes mellitus with hyperglycemia, with long-term current use of insulin [E11.65, Z79.4]  Not Applicable     Priority: 4     Essential hypertension [I10]  Yes     Priority: 5     Pulmonary edema, acute [J81.0]  Yes     Priority: 6     HLD (hyperlipidemia) [E78.5]  Yes     Priority: 7     TERESO (obstructive sleep apnea) [G47.33]  Yes     Priority: 8     Bell's palsy [G51.0]  Yes     Priority: 9     Acute hypoxemic respiratory failure [J96.01]  Yes     Priority: 10       Resolved Hospital Problems   No resolved problems to display.         Plan:   Today left heart catheterization showed three-vessel coronary artery disease including left main.  Preserved LV ejection fraction.  Bilateral renal artery stenosis(50-60%)    Remains on aspirin, statin, Cleviprex drip.  Was receiving weight based Lovenox however currently on hold due to left heart catheterization.  Cannot tolerate beta-blocker due to bradycardia.  Not a candidate for ACEI/ARB yet    Discussed findings with Cardiology() who has reached out to Dr. Cifuentes for CABG evaluation.  Ordered carotid ultrasound    20 mg IV Lasix, BiPAP if needed, Pineda catheter    If active chest pain we can consider nitroglycerin drip    Continue outpatient prednisone and acyclovir he is getting for his Bell's palsy    Elevated blood sugars due to steroid use. Basal bolus insulin regimen, Accu-Checks    Daily labs      VTE Risk Mitigation (From admission,  onward)         Ordered     IP VTE HIGH RISK PATIENT  Once      06/21/20 1804     Place sequential compression device  Until discontinued      06/21/20 1804                  Department Hospital Medicine  [facility]  Pita Herbert MD  Date of service: 06/22/2020

## 2020-06-22 NOTE — PLAN OF CARE
06/22/20 0823   Final Note   Assessment Type Final Discharge Note   Anticipated Discharge Disposition Short Term

## 2020-06-22 NOTE — CONSULTS
[facility]  Adult Nutrition   Consult Note (Initial Assessment)     SUMMARY     Recommendations  Recommendation/Intervention: 1. RD to monitor NPO status and plans/ability to feed after surgery. 2. Recommend eventual advancement to 2000 calorie diabetic/ cardiac diet. 3. Hyperglycemia, recommend continued blood glucose monitoring and correction. Patient may require high dose sliding scale and basal insulin to obtain glycemic control. 4. RD provided written diabetic diet education and RD contact info. RD to reviewed diabetic diet recommendations and CABG nutrition therapy with patient when more appropriate.  Goals: 1. Diet to be initiated and advanced as medically able. 2. Patient to meet at least 75% of estimated energy and protein needs. 3. Blood glucose to trend towards target range. 4. Patient to express understanding of diet recommendations and lifestyle changes. RD to answer diet related questions should they arise.  Nutrition Goal Status: new  Communication of RD Recs: reviewed with RN    Dietitian Rounds Brief  · Patient assessed 2' consult for diabetic diet education. Patient in ICU on CPAP at time of RD interview. Patient s/p left heart cath today and patient's wife states patient it tired and llikely will not retain information provided at this time. Per RN and per progress notes, plans for CABG. RD gave patient's wife written diet education and RD contact info. RD to return at a more appropriate time to provide/review diet education with patient. Will also provide post CABG/ heart healthy diet education at follow up.   · Patient NPO at time of rounds, but prior to NPO status patient was on 2000 calorie diabetic; low sodium diet. PO intake was good % per documentation.     Reason for Assessment  Reason For Assessment: consult  Diagnosis: cardiac disease, diabetes diagnosis/complications  Relevant Medical History: bell's palsy; anemia; CAD; DM; HLD; HTN; sleep apnea  Interdisciplinary  "Rounds: did not attend    Nutrition Risk Screen  Nutrition Risk Screen: no indicators present             Nutrition/Diet History  Patient Reported Diet/Restrictions/Preferences: general  Typical Food/Fluid Intake: good per patient; doesn't really follow diet at home. Patient does check blood glucose regularly as recommended  Food Allergies: NKFA  Factors Affecting Nutritional Intake: NPO    Anthropometrics  Temp: 98.3 °F (36.8 °C)  Height Method: Stated  Height: 5' 7" (170.2 cm)  Height (inches): 67 in  Weight Method: Bed Scale  Weight: 91.7 kg (202 lb 2.6 oz)  Weight (lb): 202.16 lb  Ideal Body Weight (IBW), Male: 148 lb  % Ideal Body Weight, Male (lb): 136.59 %  BMI (Calculated): 31.7  BMI Grade: 30 - 34.9- obesity - grade I       Weight History:  Wt Readings from Last 10 Encounters:   06/21/20 91.7 kg (202 lb 2.6 oz)   06/22/20 91.6 kg (202 lb)   06/21/20 92.1 kg (203 lb 0.7 oz)       Lab/Procedures/Meds: Pertinent Labs Reviewed  Clinical Chemistry:  Recent Labs   Lab 06/21/20  0455 06/22/20  0350    140   K 4.8 4.1    105   CO2 25 23   * 242*   BUN 31* 37*   CREATININE 1.5* 1.4   CALCIUM 9.3 9.0   PROT 7.4 7.2   ALBUMIN 4.0 3.9   BILITOT 0.7 1.1*   ALKPHOS 52* 45*   AST 23 47*   ALT 42 37   ANIONGAP 11 12   ESTGFRAFRICA 55* 59.3*   EGFRNONAA 47* 51.3*   MG 1.6 1.8   PHOS 3.4 4.1     CBC:   Recent Labs   Lab 06/22/20  0350   WBC 17.66*   RBC 4.71   HGB 13.8*   HCT 40.5      MCV 86   MCH 29.3   MCHC 34.1     Lipid Panel:  Recent Labs   Lab 06/21/20  0456   CHOL 169   HDL 49   LDLCALC 91.4   TRIG 143   CHOLHDL 29.0     Cardiac Profile:  Recent Labs   Lab 06/20/20  1824  06/21/20  0944 06/21/20  1657 06/22/20  0350   BNP 29  --   --   --  661*   TROPONINI 0.061*   < > 3.625* 3.639* 7.320*    < > = values in this interval not displayed.     Diabetes:  Recent Labs   Lab 06/21/20  0456 06/21/20  0522 06/21/20  1133 06/21/20  1609   HGBA1C 7.7*  --   --   --    POCTGLUCOSE  --  298* 202* 257* "     Thyroid & Parathyroid:  Recent Labs   Lab 06/21/20  0456   TSH 0.902     Medications: Pertinent Medications reviewed  Scheduled Meds:   acyclovir  400 mg Oral 5x Daily    aspirin  325 mg Oral Daily    atorvastatin  40 mg Oral QHS    dextran 70-hypromellose  1 drop Right Eye TID    DULoxetine  30 mg Oral QHS    fenofibrate  145 mg Oral Daily with breakfast    insulin aspart protamine-insulin aspart  40 Units Subcutaneous BIDWM    NIFEdipine  30 mg Oral BID    pantoprazole  40 mg Intravenous Daily    predniSONE  60 mg Oral Daily     Continuous Infusions:   nitroGLYCERIN 11 mcg/min (06/22/20 1700)     PRN Meds:.acetaminophen, calcium chloride IVPB, calcium chloride IVPB, calcium chloride IVPB, dextrose 50%, dextrose 50%, glucagon (human recombinant), glucose, glucose, insulin aspart U-100, magnesium oxide, magnesium sulfate IVPB, magnesium sulfate IVPB, magnesium sulfate IVPB, magnesium sulfate IVPB, melatonin, nitroGLYCERIN, ondansetron, potassium chloride in water, potassium chloride in water, potassium chloride in water, potassium chloride in water, potassium chloride, potassium chloride, potassium chloride, potassium chloride, senna-docusate 8.6-50 mg, sodium chloride 0.9%, sodium phosphate IVPB, sodium phosphate IVPB, sodium phosphate IVPB, sodium phosphate IVPB, sodium phosphate IVPB    Estimated/Assessed Needs  Weight Used For Calorie Calculations: 87.9 kg (193 lb 12.6 oz)  Energy Calorie Requirements (kcal): 1758 - 2198 (20 - 25)  Energy Need Method: Kcal/kg  Protein Requirements: 106 - 132 (1.2 - 1.5 g/kg)  Weight Used For Protein Calculations: 87.9 kg (193 lb 12.6 oz)     Estimated Fluid Requirement Method: RDA Method  RDA Method (mL): 1758       Nutrition Prescription Ordered  Current Diet Order: NPO    Evaluation of Received Nutrient/Fluid Intake  Energy Calories Required: not meeting needs  Protein Required: not meeting needs  Fluid Required: not meeting needs     Intake/Output Summary (Last  24 hours) at 6/22/2020 1752  Last data filed at 6/22/2020 1600  Gross per 24 hour   Intake 1128 ml   Output 900 ml   Net 228 ml      % Intake of Estimated Energy Needs: 0%  % Meal Intake: NPO    Nutrition Risk  Level of Risk/Frequency of Follow-up: high     Monitor and Evaluation  Food and Nutrient Intake: energy intake, food and beverage intake  Food and Nutrient Adminstration: diet order  Knowledge/Beliefs/Attitudes: food and nutrition knowledge/skill, beliefs and attitudes  Physical Activity and Function: nutrition-related ADLs and IADLs, factors affecting access to physical activity  Anthropometric Measurements: weight, weight change, body mass index  Biochemical Data, Medical Tests and Procedures: electrolyte and renal panel, inflammatory profile, gastrointestinal profile, lipid profile, glucose/endocrine profile  Nutrition-Focused Physical Findings: overall appearance     Nutrition Follow-Up  RD Follow-up?: Yes     Lissa Coburn RD 06/22/2020 5:53 PM

## 2020-06-22 NOTE — NURSING
Patient returned to room, connected to ICU monitor. IV gtts infusing per MAR. VS stable, Dressing to left groin CDI, no swelling redness or hematoma noted to site. BLE upper and lower pulses +2. MD at bedside to assess patient. Orders received and completed. Will continue to monitor closely.

## 2020-06-22 NOTE — PLAN OF CARE
Patient free of falls/traumas/injuries. Nitro gtt infusing per MAR. Left groin from [facility] CDI with no swelling or hematoma noted to site. Skin clean, dry, and intact. Patient educated on plan of care and verbalized understanding. Patients VS stable and no distress. Will continue to monitor.

## 2020-06-22 NOTE — CONSULTS
[facility]  Cardiology  Progress Note    Patient Name: Sincere Malave  MRN: [id number]  Admission Date: 6/21/2020  Hospital Length of Stay: 1 days  Code Status: Full Code   Attending Physician: Pita Herbert MD   Primary Care Physician: Primary Doctor No  Expected Discharge Date:   Principal Problem:NSTEMI (non-ST elevated myocardial infarction)    Subjective:     Hospital Course: *cath shows apical  Mi  And severe 3 vessel  Cad   Interval History:TNi 7  BNP niow increased   ROS  Objective:     Vital Signs (Most Recent):  Temp: 98.3 °F (36.8 °C) (06/22/20 0704)  Pulse: (!) 53 (06/22/20 0830)  Resp: (!) 27 (06/22/20 0830)  BP: (!) 145/67 (06/22/20 0830)  SpO2: (!) 93 % (06/22/20 0830) Vital Signs (24h Range):  Temp:  [98 °F (36.7 °C)-98.9 °F (37.2 °C)] 98.3 °F (36.8 °C)  Pulse:  [48-62] 53  Resp:  [16-34] 27  SpO2:  [88 %-98 %] 93 %  BP: (128-208)/() 145/67     Weight: 91.7 kg (202 lb 2.6 oz)  Body mass index is 31.66 kg/m².    SpO2: (!) 93 %  O2 Device (Oxygen Therapy): nasal cannula      Intake/Output Summary (Last 24 hours) at 6/22/2020 1053  Last data filed at 6/22/2020 0844  Gross per 24 hour   Intake 304 ml   Output 900 ml   Net -596 ml       Lines/Drains/Airways     Peripheral Intravenous Line                 Peripheral IV - Single Lumen 06/20/20 1824 20 G Left Antecubital 1 day         Peripheral IV - Single Lumen 06/20/20 2200 20 G Right Hand 1 day                Physical Exam   Lungs crackles \ cor reg     Significant Labs:   CMP   Recent Labs   Lab 06/20/20  1824 06/21/20  0012 06/21/20  0455 06/22/20  0350    142 142 140   K 5.4* 5.6* 4.8 4.1    108 106 105   CO2 20* 22* 25 23   * 369* 310* 242*   BUN 36* 32* 31* 37*   CREATININE 1.8* 1.6* 1.5* 1.4   CALCIUM 9.6 9.7 9.3 9.0   PROT 7.5  --  7.4 7.2   ALBUMIN 4.1  --  4.0 3.9   BILITOT 0.7  --  0.7 1.1*   ALKPHOS 51*  --  52* 45*   AST 17  --  23 47*   ALT 38  --  42 37   ANIONGAP 14 12 11 12   ESTGFRAFRICA 44*  50* 55* 59.3*   EGFRNONAA 38* 44* 47* 51.3*   , CBC   Recent Labs   Lab 06/20/20  1824 06/21/20  0455 06/22/20  0350   WBC 8.67 16.43* 17.66*   HGB 12.3* 13.2* 13.8*   HCT 37.2* 40.9 40.5    266 281    and Troponin   Recent Labs   Lab 06/21/20  0944 06/21/20  1657 06/22/20  0350   TROPONINI 3.625* 3.639* 7.320*       Significant Imaging:   Assessment and Plan:    severe 3 vessel disease and apical  Mi   Small-  Too ill to transfer-   I talked to wife-  [facility] surgeons will see  Today         Brief HPI: *    Active Diagnoses:    Diagnosis Date Noted POA    PRINCIPAL PROBLEM:  NSTEMI (non-ST elevated myocardial infarction) [I21.4] 06/21/2020 Yes    Pulmonary edema, acute [J81.0] 06/21/2020 Yes    Essential hypertension [I10] 06/21/2020 Yes    Acute-on-chronic kidney injury [N17.9, N18.9] 06/21/2020 Yes    Coronary artery disease involving native coronary artery of native heart [I25.10] 06/20/2020 Yes    Type 2 diabetes mellitus with hyperglycemia, with long-term current use of insulin [E11.65, Z79.4] 06/20/2020 Not Applicable    HLD (hyperlipidemia) [E78.5] 06/20/2020 Yes    TERESO (obstructive sleep apnea) [G47.33] 06/20/2020 Yes    Acute hypoxemic respiratory failure [J96.01] 06/20/2020 Yes    Bell's palsy [G51.0] 06/19/2020 Yes      Problems Resolved During this Admission:       VTE Risk Mitigation (From admission, onward)         Ordered     IP VTE HIGH RISK PATIENT  Once      06/21/20 1804     Place sequential compression device  Until discontinued      06/21/20 1804                James Kaiser MD  Cardiology  [facility]

## 2020-06-23 LAB
ABO + RH BLD: NORMAL
ALBUMIN SERPL BCP-MCNC: 3.7 G/DL (ref 3.5–5.2)
ALP SERPL-CCNC: 49 U/L (ref 55–135)
ALT SERPL W/O P-5'-P-CCNC: 33 U/L (ref 10–44)
ANION GAP SERPL CALC-SCNC: 12 MMOL/L (ref 8–16)
APTT PPP: 59.1 SEC (ref 23.6–33.3)
APTT PPP: 60.4 SEC (ref 23.6–33.3)
APTT PPP: 72 SEC (ref 23.6–33.3)
AST SERPL-CCNC: 26 U/L (ref 10–40)
BASOPHILS # BLD AUTO: 0.11 K/UL (ref 0–0.2)
BASOPHILS NFR BLD: 0.9 % (ref 0–1.9)
BILIRUB SERPL-MCNC: 1.7 MG/DL (ref 0.1–1)
BLD GP AB SCN CELLS X3 SERPL QL: NORMAL
BUN SERPL-MCNC: 35 MG/DL (ref 8–23)
CALCIUM SERPL-MCNC: 9 MG/DL (ref 8.7–10.5)
CHLORIDE SERPL-SCNC: 99 MMOL/L (ref 95–110)
CO2 SERPL-SCNC: 28 MMOL/L (ref 23–29)
CREAT SERPL-MCNC: 1.4 MG/DL (ref 0.5–1.4)
DIFFERENTIAL METHOD: ABNORMAL
EOSINOPHIL # BLD AUTO: 0.1 K/UL (ref 0–0.5)
EOSINOPHIL NFR BLD: 1.1 % (ref 0–8)
ERYTHROCYTE [DISTWIDTH] IN BLOOD BY AUTOMATED COUNT: 13.8 % (ref 11.5–14.5)
EST. GFR  (AFRICAN AMERICAN): 59.3 ML/MIN/1.73 M^2
EST. GFR  (NON AFRICAN AMERICAN): 51.3 ML/MIN/1.73 M^2
GLUCOSE SERPL-MCNC: 221 MG/DL (ref 70–110)
GLUCOSE SERPL-MCNC: 254 MG/DL (ref 70–110)
GLUCOSE SERPL-MCNC: 284 MG/DL (ref 70–110)
GLUCOSE SERPL-MCNC: 311 MG/DL (ref 70–110)
GLUCOSE SERPL-MCNC: 314 MG/DL (ref 70–110)
HCT VFR BLD AUTO: 39.3 % (ref 40–54)
HGB BLD-MCNC: 13.2 G/DL (ref 14–18)
IMM GRANULOCYTES # BLD AUTO: 0.24 K/UL (ref 0–0.04)
IMM GRANULOCYTES NFR BLD AUTO: 2.1 % (ref 0–0.5)
LYMPHOCYTES # BLD AUTO: 2.7 K/UL (ref 1–4.8)
LYMPHOCYTES NFR BLD: 23.1 % (ref 18–48)
MAGNESIUM SERPL-MCNC: 1.6 MG/DL (ref 1.6–2.6)
MCH RBC QN AUTO: 28.7 PG (ref 27–31)
MCHC RBC AUTO-ENTMCNC: 33.6 G/DL (ref 32–36)
MCV RBC AUTO: 85 FL (ref 82–98)
MONOCYTES # BLD AUTO: 1 K/UL (ref 0.3–1)
MONOCYTES NFR BLD: 8.3 % (ref 4–15)
NEUTROPHILS # BLD AUTO: 7.5 K/UL (ref 1.8–7.7)
NEUTROPHILS NFR BLD: 64.5 % (ref 38–73)
NRBC BLD-RTO: 0 /100 WBC
PHOSPHATE SERPL-MCNC: 4.2 MG/DL (ref 2.7–4.5)
PLATELET # BLD AUTO: 239 K/UL (ref 150–350)
PMV BLD AUTO: 10.4 FL (ref 9.2–12.9)
POTASSIUM SERPL-SCNC: 4.1 MMOL/L (ref 3.5–5.1)
PROT SERPL-MCNC: 7.2 G/DL (ref 6–8.4)
RBC # BLD AUTO: 4.6 M/UL (ref 4.6–6.2)
SODIUM SERPL-SCNC: 139 MMOL/L (ref 136–145)
WBC # BLD AUTO: 11.63 K/UL (ref 3.9–12.7)

## 2020-06-23 PROCEDURE — 80053 COMPREHEN METABOLIC PANEL: CPT

## 2020-06-23 PROCEDURE — 20000000 HC ICU ROOM

## 2020-06-23 PROCEDURE — 63600175 PHARM REV CODE 636 W HCPCS: Performed by: INTERNAL MEDICINE

## 2020-06-23 PROCEDURE — 25000003 PHARM REV CODE 250: Performed by: INTERNAL MEDICINE

## 2020-06-23 PROCEDURE — 36415 COLL VENOUS BLD VENIPUNCTURE: CPT

## 2020-06-23 PROCEDURE — C9113 INJ PANTOPRAZOLE SODIUM, VIA: HCPCS | Performed by: INTERNAL MEDICINE

## 2020-06-23 PROCEDURE — 86920 COMPATIBILITY TEST SPIN: CPT

## 2020-06-23 PROCEDURE — 94761 N-INVAS EAR/PLS OXIMETRY MLT: CPT

## 2020-06-23 PROCEDURE — 85025 COMPLETE CBC W/AUTO DIFF WBC: CPT

## 2020-06-23 PROCEDURE — 63600175 PHARM REV CODE 636 W HCPCS: Performed by: SPECIALIST

## 2020-06-23 PROCEDURE — 85730 THROMBOPLASTIN TIME PARTIAL: CPT

## 2020-06-23 PROCEDURE — 86850 RBC ANTIBODY SCREEN: CPT

## 2020-06-23 PROCEDURE — 99900035 HC TECH TIME PER 15 MIN (STAT)

## 2020-06-23 PROCEDURE — 25000003 PHARM REV CODE 250: Performed by: HOSPITALIST

## 2020-06-23 PROCEDURE — 85730 THROMBOPLASTIN TIME PARTIAL: CPT | Mod: 91

## 2020-06-23 PROCEDURE — 82962 GLUCOSE BLOOD TEST: CPT

## 2020-06-23 PROCEDURE — 63600175 PHARM REV CODE 636 W HCPCS: Performed by: HOSPITALIST

## 2020-06-23 PROCEDURE — 94799 UNLISTED PULMONARY SVC/PX: CPT

## 2020-06-23 PROCEDURE — 27000221 HC OXYGEN, UP TO 24 HOURS

## 2020-06-23 PROCEDURE — 25000003 PHARM REV CODE 250

## 2020-06-23 PROCEDURE — 84100 ASSAY OF PHOSPHORUS: CPT

## 2020-06-23 PROCEDURE — 94660 CPAP INITIATION&MGMT: CPT

## 2020-06-23 PROCEDURE — 83735 ASSAY OF MAGNESIUM: CPT

## 2020-06-23 PROCEDURE — 25000003 PHARM REV CODE 250: Performed by: SPECIALIST

## 2020-06-23 RX ORDER — CHLORHEXIDINE GLUCONATE ORAL RINSE 1.2 MG/ML
15 SOLUTION DENTAL 2 TIMES DAILY
Status: DISCONTINUED | OUTPATIENT
Start: 2020-06-23 | End: 2020-06-28

## 2020-06-23 RX ORDER — MUPIROCIN 20 MG/G
OINTMENT TOPICAL 2 TIMES DAILY
Status: DISCONTINUED | OUTPATIENT
Start: 2020-06-23 | End: 2020-06-25

## 2020-06-23 RX ADMIN — NIFEDIPINE 30 MG: 30 TABLET, FILM COATED, EXTENDED RELEASE ORAL at 08:06

## 2020-06-23 RX ADMIN — ACYCLOVIR 400 MG: 200 CAPSULE ORAL at 04:06

## 2020-06-23 RX ADMIN — ACETAMINOPHEN 650 MG: 325 TABLET ORAL at 04:06

## 2020-06-23 RX ADMIN — HUMAN INSULIN 12 UNITS: 100 INJECTION, SOLUTION SUBCUTANEOUS at 03:06

## 2020-06-23 RX ADMIN — HEPARIN SODIUM 14 UNITS/KG/HR: 10000 INJECTION, SOLUTION INTRAVENOUS at 02:06

## 2020-06-23 RX ADMIN — ACYCLOVIR 400 MG: 200 CAPSULE ORAL at 09:06

## 2020-06-23 RX ADMIN — HYDRALAZINE HYDROCHLORIDE 10 MG: 20 INJECTION INTRAMUSCULAR; INTRAVENOUS at 08:06

## 2020-06-23 RX ADMIN — INSULIN ASPART 6 UNITS: 100 INJECTION, SOLUTION INTRAVENOUS; SUBCUTANEOUS at 07:06

## 2020-06-23 RX ADMIN — ACYCLOVIR 400 MG: 200 CAPSULE ORAL at 05:06

## 2020-06-23 RX ADMIN — HUMAN INSULIN 12 UNITS: 100 INJECTION, SOLUTION SUBCUTANEOUS at 11:06

## 2020-06-23 RX ADMIN — FUROSEMIDE 20 MG: 10 INJECTION, SOLUTION INTRAMUSCULAR; INTRAVENOUS at 09:06

## 2020-06-23 RX ADMIN — DEXTRAN 70, AND HYPROMELLOSE 2910 1 DROP: 1; 3 SOLUTION/ DROPS OPHTHALMIC at 09:06

## 2020-06-23 RX ADMIN — NIFEDIPINE 30 MG: 30 TABLET, FILM COATED, EXTENDED RELEASE ORAL at 09:06

## 2020-06-23 RX ADMIN — ENALAPRILAT 1.25 MG: 1.25 INJECTION INTRAVENOUS at 08:06

## 2020-06-23 RX ADMIN — HUMAN INSULIN 6 UNITS: 100 INJECTION, SOLUTION SUBCUTANEOUS at 10:06

## 2020-06-23 RX ADMIN — CHLORHEXIDINE GLUCONATE 15 ML: 1.2 RINSE ORAL at 09:06

## 2020-06-23 RX ADMIN — MUPIROCIN: 20 OINTMENT TOPICAL at 09:06

## 2020-06-23 RX ADMIN — INSULIN ASPART 40 UNITS: 100 INJECTION, SUSPENSION SUBCUTANEOUS at 08:06

## 2020-06-23 RX ADMIN — FENOFIBRATE 145 MG: 145 TABLET, FILM COATED ORAL at 08:06

## 2020-06-23 RX ADMIN — HEPARIN SODIUM 14 UNITS/KG/HR: 10000 INJECTION, SOLUTION INTRAVENOUS at 12:06

## 2020-06-23 RX ADMIN — NITROGLYCERIN 30 MCG/MIN: 20 INJECTION INTRAVENOUS at 02:06

## 2020-06-23 RX ADMIN — DULOXETINE 30 MG: 30 CAPSULE, DELAYED RELEASE ORAL at 09:06

## 2020-06-23 RX ADMIN — INSULIN ASPART 40 UNITS: 100 INJECTION, SUSPENSION SUBCUTANEOUS at 03:06

## 2020-06-23 RX ADMIN — PANTOPRAZOLE SODIUM 40 MG: 40 INJECTION, POWDER, FOR SOLUTION INTRAVENOUS at 08:06

## 2020-06-23 RX ADMIN — MAGNESIUM SULFATE 2 G: 2 INJECTION INTRAVENOUS at 07:06

## 2020-06-23 RX ADMIN — ENALAPRILAT 1.25 MG: 1.25 INJECTION INTRAVENOUS at 09:06

## 2020-06-23 RX ADMIN — DEXTRAN 70, AND HYPROMELLOSE 2910 1 DROP: 1; 3 SOLUTION/ DROPS OPHTHALMIC at 03:06

## 2020-06-23 RX ADMIN — FUROSEMIDE 20 MG: 10 INJECTION, SOLUTION INTRAMUSCULAR; INTRAVENOUS at 08:06

## 2020-06-23 RX ADMIN — ATORVASTATIN CALCIUM 40 MG: 40 TABLET, FILM COATED ORAL at 09:06

## 2020-06-23 RX ADMIN — ASPIRIN 81 MG: 81 TABLET, DELAYED RELEASE ORAL at 08:06

## 2020-06-23 RX ADMIN — ACYCLOVIR 400 MG: 200 CAPSULE ORAL at 01:06

## 2020-06-23 RX ADMIN — ACETAMINOPHEN 650 MG: 325 TABLET ORAL at 07:06

## 2020-06-23 NOTE — CONSULTS
[facility]  Cardiology  Progress Note    Patient Name: Sincere Malave  MRN: [id number]  Admission Date: 6/21/2020  Hospital Length of Stay: 1 days  Code Status: Full Code   Attending Physician: Pita Herbert MD   Primary Care Physician: Primary Doctor No  Expected Discharge Date:   Principal Problem:NSTEMI (non-ST elevated myocardial infarction)    Subjective:     Hospital Course:  Patient short of breath was given diuretics today    Interval History:  Echocardiogram demonstrates a discrete anterior apical myocardial infarction with akinetic anterior wall.    ROS  Objective:     Vital Signs (Most Recent):  Temp: 98.3 °F (36.8 °C) (06/22/20 1515)  Pulse: (!) 50 (06/22/20 1815)  Resp: (!) 24 (06/22/20 1815)  BP: (!) 171/77 (06/22/20 1815)  SpO2: 96 % (06/22/20 1815) Vital Signs (24h Range):  Temp:  [97.7 °F (36.5 °C)-98.3 °F (36.8 °C)] 98.3 °F (36.8 °C)  Pulse:  [49-59] 50  Resp:  [16-34] 24  SpO2:  [88 %-100 %] 96 %  BP: (145-214)/() 171/77     Weight: 91.7 kg (202 lb 2.6 oz)  Body mass index is 31.64 kg/m².    SpO2: 96 %  O2 Device (Oxygen Therapy): High Flow nasal Cannula      Intake/Output Summary (Last 24 hours) at 6/22/2020 2010  Last data filed at 6/22/2020 1800  Gross per 24 hour   Intake 1132.43 ml   Output 1900 ml   Net -767.57 ml       Lines/Drains/Airways     Drain                 Urethral Catheter 06/22/20 1030 less than 1 day          Peripheral Intravenous Line                 Peripheral IV - Single Lumen 06/20/20 1824 20 G Left Antecubital 2 days         Peripheral IV - Single Lumen 06/20/20 2200 20 G Right Hand 1 day                Physical Exam of blood pressure is elevated    Significant Labs:   CMP   Recent Labs   Lab 06/21/20  0012 06/21/20  0455 06/22/20  0350    142 140   K 5.6* 4.8 4.1    106 105   CO2 22* 25 23   * 310* 242*   BUN 32* 31* 37*   CREATININE 1.6* 1.5* 1.4   CALCIUM 9.7 9.3 9.0   PROT  --  7.4 7.2   ALBUMIN  --  4.0 3.9   BILITOT  --   0.7 1.1*   ALKPHOS  --  52* 45*   AST  --  23 47*   ALT  --  42 37   ANIONGAP 12 11 12   ESTGFRAFRICA 50* 55* 59.3*   EGFRNONAA 44* 47* 51.3*    and Troponin   Recent Labs   Lab 06/21/20  0944 06/21/20  1657 06/22/20  0350   TROPONINI 3.625* 3.639* 7.320*       Significant Imaging:  Preliminarily echo demonstrated discrete anterior apical infarct and area with basal walls moving well overall global ejection fraction is approximately 40%.  Mean left atrial pressure is normal no pulmonary hypertension is seen  Assessment and Plan:   1.  Patient has had anterior apical infarction.  He has heart failure reduced ejection fraction the area of the heart this involved and appears infarcted may be stunned myocardium  For  now start low-dose aspirin low-dose heparin and low-dose ACE-inhibitor and Lasix recheck troponin.  He still may be a candidate for bypass surgery at this time.  We are awaiting disposition from Cardiac surgery  Brief HPI:     Active Diagnoses:    Diagnosis Date Noted POA    PRINCIPAL PROBLEM:  NSTEMI (non-ST elevated myocardial infarction) [I21.4] 06/21/2020 Yes    ASCVD (arteriosclerotic cardiovascular disease) [I25.10]  Unknown    Pulmonary edema, acute [J81.0] 06/21/2020 Yes    Essential hypertension [I10] 06/21/2020 Yes    Acute-on-chronic kidney injury [N17.9, N18.9] 06/21/2020 Yes    Coronary artery disease involving native coronary artery of native heart [I25.10] 06/20/2020 Yes    Type 2 diabetes mellitus with hyperglycemia, with long-term current use of insulin [E11.65, Z79.4] 06/20/2020 Not Applicable    HLD (hyperlipidemia) [E78.5] 06/20/2020 Yes    TERESO (obstructive sleep apnea) [G47.33] 06/20/2020 Yes    Acute hypoxemic respiratory failure [J96.01] 06/20/2020 Yes    Bell's palsy [G51.0] 06/19/2020 Yes      Problems Resolved During this Admission:       VTE Risk Mitigation (From admission, onward)         Ordered     heparin 25,000 units in dextrose 5% (100 units/ml) IV bolus from bag  INITIAL BOLUS (max bolus 4000 units)  Once     Question:  Angiotensin II Infusion Adjustment (DO NOT MODIFY ANSWER)  Answer:  [email or web address] LOW INTENSITY nomogram for [facility] [email or web address]    06/22/20 2006     IP VTE HIGH RISK PATIENT  Once      06/21/20 1804     Place sequential compression device  Until discontinued      06/21/20 1804                James Kaiser MD  Cardiology  [facility]

## 2020-06-23 NOTE — PLAN OF CARE
Patient free of falls/traumas/injuries. Nitro and heparin gtt infusing. Skin clean, dry, and intact. Patient educated on plan of care and verbalized understanding. Patients VS stable and no distress. Will continue to monitor.

## 2020-06-23 NOTE — PROGRESS NOTES
Cardiac Rehab     Sincere Malave   [id number]   6/23/2020         Cardiac Rehab Phase Taught: Phase 1    Teaching Method: Verbal    Handouts: None    Educational Videos: None    Understanding:  Knowledge indicated by feedback, Learning indicated by feedback and Verbalize understanding    Comments: re discussed pre and post  CABG. Questions answered. Will follow for further education    Total Time Spent:15mins            Shraddha Carpenter RN

## 2020-06-23 NOTE — CONSULTS
On license of [facility]  Cardiology  Progress Note    Patient Name: Sincere Malave  MRN: [id number]  Admission Date: 6/21/2020  Hospital Length of Stay: 2 days  Code Status: Full Code   Attending Physician: Manolo Alberto MD   Primary Care Physician: Primary Doctor No  Expected Discharge Date:   Principal Problem:NSTEMI (non-ST elevated myocardial infarction)    Subjective:     Hospital Course: *  Feels well   Breathing ok     Interval History: on asa/ heparin , tridil,  Enalapril -/lasix  CHF better,   Renal  Stable   50-69% stenoisis LICA   ROS  Objective:     Vital Signs (Most Recent):  Temp: 98.4 °F (36.9 °C) (06/23/20 0702)  Pulse: (!) 47 (06/23/20 0715)  Resp: (!) 22 (06/23/20 0715)  BP: (!) 153/67 (06/23/20 0715)  SpO2: 95 % (06/23/20 0715) Vital Signs (24h Range):  Temp:  [97.7 °F (36.5 °C)-98.4 °F (36.9 °C)] 98.4 °F (36.9 °C)  Pulse:  [47-57] 47  Resp:  [19-29] 22  SpO2:  [88 %-100 %] 95 %  BP: (145-214)/(65-88) 153/67     Weight: 91.7 kg (202 lb 2.6 oz)  Body mass index is 31.64 kg/m².    SpO2: 95 %  O2 Device (Oxygen Therapy): High Flow nasal Cannula      Intake/Output Summary (Last 24 hours) at 6/23/2020 0819  Last data filed at 6/23/2020 0600  Gross per 24 hour   Intake 1360.28 ml   Output 2100 ml   Net -739.72 ml       Lines/Drains/Airways     Drain                 Urethral Catheter 06/22/20 1030 less than 1 day          Peripheral Intravenous Line                 Peripheral IV - Single Lumen 06/20/20 1824 20 G Left Antecubital 2 days         Peripheral IV - Single Lumen 06/20/20 2200 20 G Right Hand 2 days                Physical Exam    lungs clear  Few post crackles   Cor reg no  m  No  s3   Legs  Great + 4 post tibial pulses     Significant Labs:   CMP   Recent Labs   Lab 06/22/20  0350 06/23/20  0400    139   K 4.1 4.1    99   CO2 23 28   * 254*   BUN 37* 35*   CREATININE 1.4 1.4   CALCIUM 9.0 9.0   PROT 7.2 7.2   ALBUMIN 3.9 3.7   BILITOT 1.1* 1.7*   ALKPHOS 45* 49*   AST 47*  26   ALT 37 33   ANIONGAP 12 12   ESTGFRAFRICA 59.3* 59.3*   EGFRNONAA 51.3* 51.3*   , CBC   Recent Labs   Lab 06/22/20  0350 06/23/20  0400   WBC 17.66* 11.63   HGB 13.8* 13.2*   HCT 40.5 39.3*    239    and Troponin   Recent Labs   Lab 06/21/20  1657 06/22/20  0350 06/22/20 2021   TROPONINI 3.639* 7.320* 6.456*       Significant Imaging:   Assessment and Plan:   1) ascvd  3 vessel - hopefully stunned  Ant apical lv-   2) hf r ef under control   3) bp  Ok on tridil + ace   4) 50-69% LICA stenosis   Cleared for surg 6-24   Brief HPI:    Active Diagnoses:    Diagnosis Date Noted POA    PRINCIPAL PROBLEM:  NSTEMI (non-ST elevated myocardial infarction) [I21.4] 06/21/2020 Yes    ASCVD (arteriosclerotic cardiovascular disease) [I25.10]  Yes    Pulmonary edema, acute [J81.0] 06/21/2020 Yes    Essential hypertension [I10] 06/21/2020 Yes    Acute-on-chronic kidney injury [N17.9, N18.9] 06/21/2020 Yes    Coronary artery disease involving native coronary artery of native heart [I25.10] 06/20/2020 Yes    Type 2 diabetes mellitus with hyperglycemia, with long-term current use of insulin [E11.65, Z79.4] 06/20/2020 Not Applicable    HLD (hyperlipidemia) [E78.5] 06/20/2020 Yes    TERESO (obstructive sleep apnea) [G47.33] 06/20/2020 Yes    Acute hypoxemic respiratory failure [J96.01] 06/20/2020 Yes    Bell's palsy [G51.0] 06/19/2020 Yes      Problems Resolved During this Admission:       VTE Risk Mitigation (From admission, onward)         Ordered     heparin 25,000 units in dextrose 5% 250 mL (100 units/mL) infusion LOW INTENSITY nomogram - [facility]  Continuous     Question:  Angiotensin II Infusion Adjustment (DO NOT MODIFY ANSWER)  Answer:  [email or web address]\epic\Images\Pharmacy\HeparinInfusions\heparin LOW INTENSITY nomogram for [facility] [id number].pdf    06/22/20 2047     heparin 25,000 units in dextrose 5% (100 units/ml) IV bolus from bag - ADDITIONAL PRN BOLUS - 60 units/kg (max bolus 4000 units)  As needed (PRN)      Question:  Angiotensin II Infusion Adjustment (DO NOT MODIFY ANSWER)  Answer:  \\[email or web address]\epic\Images\Pharmacy\HeparinInfusions\heparin LOW INTENSITY nomogram for [facility] [id number].pdf    06/22/20 2047     IP VTE HIGH RISK PATIENT  Once      06/21/20 1804     Place sequential compression device  Until discontinued      06/21/20 1804                James Kaiser MD  Cardiology  Formerly [facility]

## 2020-06-23 NOTE — CARE UPDATE
06/22/20 2102   Patient Assessment/Suction   Level of Consciousness (AVPU) alert   PRE-TX-O2   O2 Device (Oxygen Therapy) High Flow nasal Cannula   $ Is the patient on Low Flow Oxygen? Yes   Flow (L/min) 5   SpO2 95 %   Pulse Oximetry Type Continuous   $ Pulse Oximetry - Multiple Charge Pulse Oximetry - Multiple   Pulse (!) 52   Resp (!) 24

## 2020-06-23 NOTE — PLAN OF CARE
Pt discharge plan TBD. Cm to follow until discharge from hospital.   06/23/20 1427   Discharge Assessment   Assessment Type Discharge Planning Assessment   Confirmed/corrected address and phone number on facesheet? Yes   Assessment information obtained from? Patient   Communicated expected length of stay with patient/caregiver no   Prior to hospitilization cognitive status: Alert/Oriented   Prior to hospitalization functional status: Independent   Current cognitive status: Alert/Oriented   Current Functional Status: Needs Assistance   Lives With spouse   Able to Return to Prior Arrangements yes   Is patient able to care for self after discharge? Yes   Who are your caregiver(s) and their phone number(s)? India Malave  wife  [phone]   Patient's perception of discharge disposition home or selfcare   Readmission Within the Last 30 Days no previous admission in last 30 days   Patient currently being followed by outpatient case management? No   Patient currently receives any other outside agency services? No   Equipment Currently Used at Home cane, straight;CPAP   Part D Coverage [employer]   Do you have any problems affording any of your prescribed medications? No   Is the patient taking medications as prescribed? yes   Does the patient have transportation home? Yes   Transportation Anticipated family or friend will provide   Does the patient receive services at the [facility]mad[facility] Clinic? No   DME Needed Upon Discharge  none   Patient/Family in Agreement with Plan yes   Does the patient have transportation to healthcare appointments? Yes

## 2020-06-23 NOTE — PLAN OF CARE
06/23/20 0715   PRE-TX-O2   O2 Device (Oxygen Therapy) High Flow nasal Cannula   $ Is the patient on Low Flow Oxygen? Yes   Flow (L/min) 2   SpO2 95 %   Pulse Oximetry Type Continuous   $ Pulse Oximetry - Multiple Charge Pulse Oximetry - Multiple   Pulse (!) 47   Resp (!) 22   BP (!) 153/67   Preset CPAP/BiPAP Settings   Mode Of Delivery BiPAP S/T   $ CPAP/BiPAP Daily Charge BiPAP/CPAP Daily   $ Is patient using? No/refused   Equipment Type DeVilbiss   patient has devibiss available

## 2020-06-23 NOTE — PROGRESS NOTES
[facility] Medicine    Progress Note    Patient Name: Sincere Malave  MRN: [id number]  Patient Class: IP- Inpatient   Admission Date: 6/21/2020  6:02 PM  Length of Stay: 2  Attending Physician: ZAKI LEON MD  Primary Care Provider: Primary Doctor No  Face-to-Face encounter date: 06/23/2020  Chief Complaint: Chest Pain         Patient ID: Sincere Malave is a 70 y.o. male admitted for   Active Hospital Problems    Diagnosis  POA    *NSTEMI (non-ST elevated myocardial infarction) [I21.4]  Yes    ASCVD (arteriosclerotic cardiovascular disease) [I25.10]  Yes    Pulmonary edema, acute [J81.0]  Yes    Essential hypertension [I10]  Yes    Acute-on-chronic kidney injury [N17.9, N18.9]  Yes    Coronary artery disease involving native coronary artery of native heart [I25.10]  Yes    Type 2 diabetes mellitus with hyperglycemia, with long-term current use of insulin [E11.65, Z79.4]  Not Applicable    HLD (hyperlipidemia) [E78.5]  Yes    TERESO (obstructive sleep apnea) [G47.33]  Yes    Acute hypoxemic respiratory failure [J96.01]  Yes    Bell's palsy [G51.0]  Yes      Resolved Hospital Problems   No resolved problems to display.      HPI by Dr Herbert 06/21/2020: 70 y.o. male with a PMHx of CAD with remote PCI, CKD 3, IDDM, hypertension, hyperlipidemia, neuropathy, sleep apnea, anemia who presents to [facility] ED with chief complaint of chest pain for 2 hr.  He described chest pain as a substernal chest tightness and tells me that his lower jaw hurts.  Patient was placed in ICU on Cardene drip however subsequent cardiac enzymes up trended and Cardiology was involved.  Patient was also given Rocephin and azithromycin for presumed pneumonia.  He also received aspirin, statin, beta-blocker and weight based Lovenox.  Patient was transferred to [facility] for angiographic evaluation tomorrow morning.  Upon my interview he reports mild chest discomfort and jaw pain and associated shortness of  breath.  Otherwise denies any fever, chills, headache, dizziness, palpitations, nausea, vomiting, bladder or bowel symptoms.      Subjective:    Interval History: Patient seen and examined this morning., reports he  is doing well.  Denies any chest pain since in the hospital.  Informed cardiothoracic surgeon saw him this morning and plan is to undergo CABG tomorrow at 12:30 p.m.  Family present at bedside.  No concerns/issues overnight reported by the patient or the nursing staff.    Review of Systems All other Review of Systems were found to be negative expect for that mentioned already in HPI.     Objective:     Physical Exam  Vitals:    06/23/20 0715   BP: (!) 153/67   Pulse: (!) 47   Resp: (!) 22   Temp:      Wt Readings from Last 1 Encounters:   06/21/20 91.7 kg (202 lb 2.6 oz)      Body mass index is 31.64 kg/m².    Vitals reviewed.  Constitutional: No distress.  Obese  HENT: NC, large neck circumference  Head: Atraumatic.   Cardiovascular: Normal rate, regular rhythm and normal heart sounds.  Trace lower extremity edema  Pulmonary/Chest: Effort normal. No wheezes.   Abdominal: Soft.  obese Bowel sounds are normal. No distension and no mass. No tenderness  Neurological: Alert.   Skin: Skin is warm and dry.   Psych: Appropriate mood and affect    Following labs were Reviewed   CBC:  Recent Labs   Lab 06/23/20  0400   WBC 11.63   HGB 13.2*   HCT 39.3*        CMP:  Recent Labs   Lab 06/23/20  0400   CALCIUM 9.0   ALBUMIN 3.7   PROT 7.2      K 4.1   CO2 28   CL 99   BUN 35*   CREATININE 1.4   ALKPHOS 49*   ALT 33   AST 26   BILITOT 1.7*     Last 72 hour POCT GLUCOSE  POCT Glucose   Date Value Ref Range Status   06/21/2020 257 (H) 70 - 110 mg/dL Final   06/21/2020 202 (H) 70 - 110 mg/dL Final   06/21/2020 298 (H) 70 - 110 mg/dL Final   06/20/2020 373 (H) 70 - 110 mg/dL Final   06/20/2020 397 (H) 70 - 110 mg/dL Final   06/20/2020 414 (H) 70 - 110 mg/dL Final   06/20/2020 >500 (H) 70 - 110 mg/dL Final         Microbiology Results (last 7 days)     ** No results found for the last 168 hours. **            US Carotid Bilateral   Final Result      X-Ray Chest AP Portable   Final Result            Scheduled Meds:   acyclovir  400 mg Oral 5x Daily    aspirin  81 mg Oral Daily    atorvastatin  40 mg Oral QHS    dextran 70-hypromellose  1 drop Right Eye TID    DULoxetine  30 mg Oral QHS    enalaprilat  1.25 mg Intravenous BID    fenofibrate  145 mg Oral Daily with breakfast    furosemide (LASIX) IV  20 mg Intravenous Q12H    insulin aspart protamine-insulin aspart  40 Units Subcutaneous BIDWM    NIFEdipine  30 mg Oral BID    pantoprazole  40 mg Intravenous Daily    predniSONE  60 mg Oral Daily     Continuous Infusions:   heparin (porcine) in D5W 12 Units/kg/hr (06/23/20 0700)    nitroGLYCERIN 30 mcg/min (06/23/20 0700)     PRN Meds:.acetaminophen, calcium chloride IVPB, calcium chloride IVPB, calcium chloride IVPB, dextrose 50%, dextrose 50%, glucagon (human recombinant), glucose, glucose, heparin (PORCINE), hydrALAZINE, insulin aspart U-100, magnesium oxide, magnesium sulfate IVPB, magnesium sulfate IVPB, magnesium sulfate IVPB, magnesium sulfate IVPB, melatonin, nitroGLYCERIN, ondansetron, potassium chloride in water, potassium chloride in water, potassium chloride in water, potassium chloride in water, potassium chloride, potassium chloride, potassium chloride, potassium chloride, senna-docusate 8.6-50 mg, sodium chloride 0.9%, sodium phosphate IVPB, sodium phosphate IVPB, sodium phosphate IVPB, sodium phosphate IVPB, sodium phosphate IVPB    06/22/2020: Craotid U/S b/l  1. Moderate bilateral carotid atheromatous plaque, with mildly elevated left ICA peak systolic velocities suggesting 50-69% left ICA stenosis.  2. No findings of hemodynamically significant right ICA stenosis.  3. Patent vertebral arteries with antegrade flow bilaterally.     Electronically Signed by Deandre CELAYA on 6/22/2020 4:06  PM  Assessment & Plan:     Multivessel CAD including L Main  SOB- now resolved  Carotid artery disease  Left heart catheterization showed three-vessel coronary artery disease including left main.  Preserved LV ejection fraction.  Bilateral renal artery stenosis(50-60%)  CT Dr Cifuentes consulted by Dr. CANALES, plan for CABG tomorrow 6/24/2028 12:30 p.m.  Remains on aspirin, statin, Cleviprex drip.    Cannot tolerate beta-blocker due to bradycardia.    Not a candidate for ACEI/ARB yet  Carotid ultrasound shows moderate bilateral carotid atheromatous plaque with mildly elevated left ICA peak systolic velocity suggesting 50-69% left ICA stenosis  Continue Lasix 20 mg IV b.i.d.  BiPAP if needed,   Pineda catheter  If active chest pain we can consider nitroglycerin drip     Bell's Palsy  Continue outpatient prednisone and acyclovir he is getting for his Bell's palsy     Diabetes, uncontrolled  A1C 7.7  Elevated blood sugars due to steroid use.   Increase ISS to high  Basal bolus insulin regimen, Accu-Checks       Discharge Planning:   Plan for CABG 06/24/2020 with Dr. Cifuentes    Above encounter included review of the medical records, interviewing and examining the patient face-to-face, discussion with family and other health care providers, ordering and interpreting lab/test results and formulating a plan of care.     Medical Decision Making:      [_] Low Complexity  [_] Moderate Complexity  [x] High Complexity      Manolo Alberto MD  Department of Hospital Medicine   [facility]

## 2020-06-24 ENCOUNTER — ANESTHESIA EVENT (OUTPATIENT)
Dept: SURGERY | Facility: HOSPITAL | Age: 70
DRG: 233 | End: 2020-06-24
Payer: COMMERCIAL

## 2020-06-24 PROBLEM — I97.410 FEMORAL ARTERY HEMATOMA COMPLICATING CARDIAC CATHETERIZATION: Status: ACTIVE | Noted: 2020-06-24

## 2020-06-24 LAB
ALBUMIN SERPL BCP-MCNC: 3.6 G/DL (ref 3.5–5.2)
ALP SERPL-CCNC: 47 U/L (ref 55–135)
ALT SERPL W/O P-5'-P-CCNC: 34 U/L (ref 10–44)
ANION GAP SERPL CALC-SCNC: 12 MMOL/L (ref 8–16)
AORTIC ROOT ANNULUS: 2.98 CM
AORTIC VALVE CUSP SEPERATION: 2.22 CM
APTT PPP: 31.1 SEC (ref 23.6–33.3)
APTT PPP: 62.5 SEC (ref 23.6–33.3)
APTT PPP: 84 SEC (ref 23.6–33.3)
AST SERPL-CCNC: 20 U/L (ref 10–40)
AV INDEX (PROSTH): 0.79
AV MEAN GRADIENT: 6 MMHG
AV PEAK GRADIENT: 12 MMHG
AV VALVE AREA: 2.93 CM2
AV VELOCITY RATIO: 66.35
BASOPHILS # BLD AUTO: 0.09 K/UL (ref 0–0.2)
BASOPHILS NFR BLD: 0.9 % (ref 0–1.9)
BILIRUB SERPL-MCNC: 1.2 MG/DL (ref 0.1–1)
BSA FOR ECHO PROCEDURE: 2.08 M2
BUN SERPL-MCNC: 38 MG/DL (ref 8–23)
CALCIUM SERPL-MCNC: 8.9 MG/DL (ref 8.7–10.5)
CHLORIDE SERPL-SCNC: 98 MMOL/L (ref 95–110)
CO2 SERPL-SCNC: 26 MMOL/L (ref 23–29)
CREAT SERPL-MCNC: 1.3 MG/DL (ref 0.5–1.4)
CV ECHO LV RWT: 0.56 CM
DIFFERENTIAL METHOD: ABNORMAL
DOP CALC AO PEAK VEL: 1.71 M/S
DOP CALC AO VTI: 31.89 CM
DOP CALC LVOT AREA: 3.7 CM2
DOP CALC LVOT DIAMETER: 2.17 CM
DOP CALC LVOT PEAK VEL: 113.45 M/S
DOP CALC LVOT STROKE VOLUME: 93.41 CM3
DOP CALCLVOT PEAK VEL VTI: 25.27 CM
E WAVE DECELERATION TIME: 219.63 MSEC
E/A RATIO: 0.76
E/E' RATIO: 9.73 M/S
ECHO LV POSTERIOR WALL: 1.21 CM (ref 0.6–1.1)
EOSINOPHIL # BLD AUTO: 0.3 K/UL (ref 0–0.5)
EOSINOPHIL NFR BLD: 2.5 % (ref 0–8)
ERYTHROCYTE [DISTWIDTH] IN BLOOD BY AUTOMATED COUNT: 13.2 % (ref 11.5–14.5)
ERYTHROCYTE [DISTWIDTH] IN BLOOD BY AUTOMATED COUNT: 13.3 % (ref 11.5–14.5)
EST. GFR  (AFRICAN AMERICAN): >60 ML/MIN/1.73 M^2
EST. GFR  (NON AFRICAN AMERICAN): 56.1 ML/MIN/1.73 M^2
FRACTIONAL SHORTENING: 37 % (ref 28–44)
GLUCOSE SERPL-MCNC: 194 MG/DL (ref 70–110)
GLUCOSE SERPL-MCNC: 195 MG/DL (ref 70–110)
GLUCOSE SERPL-MCNC: 249 MG/DL (ref 70–110)
GLUCOSE SERPL-MCNC: 261 MG/DL (ref 70–110)
GLUCOSE SERPL-MCNC: 268 MG/DL (ref 70–110)
GLUCOSE SERPL-MCNC: 279 MG/DL (ref 70–110)
HCT VFR BLD AUTO: 34.4 % (ref 40–54)
HCT VFR BLD AUTO: 37.5 % (ref 40–54)
HGB BLD-MCNC: 11.6 G/DL (ref 14–18)
HGB BLD-MCNC: 12.8 G/DL (ref 14–18)
IMM GRANULOCYTES # BLD AUTO: 0.21 K/UL (ref 0–0.04)
IMM GRANULOCYTES NFR BLD AUTO: 2.1 % (ref 0–0.5)
INTERVENTRICULAR SEPTUM: 1.2 CM (ref 0.6–1.1)
LEFT ATRIUM SIZE: 4.08 CM
LEFT INTERNAL DIMENSION IN SYSTOLE: 2.69 CM (ref 2.1–4)
LEFT VENTRICLE DIASTOLIC VOLUME INDEX: 77.35 ML/M2
LEFT VENTRICLE DIASTOLIC VOLUME: 157.07 ML
LEFT VENTRICLE MASS INDEX: 91 G/M2
LEFT VENTRICLE SYSTOLIC VOLUME INDEX: 42.9 ML/M2
LEFT VENTRICLE SYSTOLIC VOLUME: 87.11 ML
LEFT VENTRICULAR INTERNAL DIMENSION IN DIASTOLE: 4.29 CM (ref 3.5–6)
LEFT VENTRICULAR MASS: 185.15 G
LV LATERAL E/E' RATIO: 9.13 M/S
LV SEPTAL E/E' RATIO: 10.43 M/S
LYMPHOCYTES # BLD AUTO: 2.1 K/UL (ref 1–4.8)
LYMPHOCYTES NFR BLD: 20.5 % (ref 18–48)
MAGNESIUM SERPL-MCNC: 1.9 MG/DL (ref 1.6–2.6)
MCH RBC QN AUTO: 28.9 PG (ref 27–31)
MCH RBC QN AUTO: 29.1 PG (ref 27–31)
MCHC RBC AUTO-ENTMCNC: 33.7 G/DL (ref 32–36)
MCHC RBC AUTO-ENTMCNC: 34.1 G/DL (ref 32–36)
MCV RBC AUTO: 85 FL (ref 82–98)
MCV RBC AUTO: 86 FL (ref 82–98)
MONOCYTES # BLD AUTO: 0.8 K/UL (ref 0.3–1)
MONOCYTES NFR BLD: 7.6 % (ref 4–15)
MV PEAK A VEL: 0.96 M/S
MV PEAK E VEL: 0.73 M/S
NEUTROPHILS # BLD AUTO: 6.7 K/UL (ref 1.8–7.7)
NEUTROPHILS NFR BLD: 66.4 % (ref 38–73)
NRBC BLD-RTO: 0 /100 WBC
PHOSPHATE SERPL-MCNC: 3.8 MG/DL (ref 2.7–4.5)
PLATELET # BLD AUTO: 215 K/UL (ref 150–350)
PLATELET # BLD AUTO: 239 K/UL (ref 150–350)
PMV BLD AUTO: 10.2 FL (ref 9.2–12.9)
PMV BLD AUTO: 10.4 FL (ref 9.2–12.9)
POTASSIUM SERPL-SCNC: 3.4 MMOL/L (ref 3.5–5.1)
POTASSIUM SERPL-SCNC: 4 MMOL/L (ref 3.5–5.1)
PROT SERPL-MCNC: 7.1 G/DL (ref 6–8.4)
PV PEAK VELOCITY: 179.92 CM/S
RA PRESSURE: 3 MMHG
RBC # BLD AUTO: 4.01 M/UL (ref 4.6–6.2)
RBC # BLD AUTO: 4.4 M/UL (ref 4.6–6.2)
RIGHT VENTRICULAR END-DIASTOLIC DIMENSION: 274 CM
SODIUM SERPL-SCNC: 136 MMOL/L (ref 136–145)
TDI LATERAL: 0.08 M/S
TDI SEPTAL: 0.07 M/S
TDI: 0.08 M/S
WBC # BLD AUTO: 10.1 K/UL (ref 3.9–12.7)
WBC # BLD AUTO: 9.69 K/UL (ref 3.9–12.7)

## 2020-06-24 PROCEDURE — 20000000 HC ICU ROOM

## 2020-06-24 PROCEDURE — 27000221 HC OXYGEN, UP TO 24 HOURS

## 2020-06-24 PROCEDURE — 85027 COMPLETE CBC AUTOMATED: CPT

## 2020-06-24 PROCEDURE — C9113 INJ PANTOPRAZOLE SODIUM, VIA: HCPCS | Performed by: INTERNAL MEDICINE

## 2020-06-24 PROCEDURE — 63600175 PHARM REV CODE 636 W HCPCS: Performed by: INTERNAL MEDICINE

## 2020-06-24 PROCEDURE — 83735 ASSAY OF MAGNESIUM: CPT

## 2020-06-24 PROCEDURE — 25000003 PHARM REV CODE 250

## 2020-06-24 PROCEDURE — 85730 THROMBOPLASTIN TIME PARTIAL: CPT

## 2020-06-24 PROCEDURE — 84132 ASSAY OF SERUM POTASSIUM: CPT

## 2020-06-24 PROCEDURE — 63600175 PHARM REV CODE 636 W HCPCS: Performed by: SPECIALIST

## 2020-06-24 PROCEDURE — 82962 GLUCOSE BLOOD TEST: CPT

## 2020-06-24 PROCEDURE — 99232 PR SUBSEQUENT HOSPITAL CARE,LEVL II: ICD-10-PCS | Mod: ,,, | Performed by: THORACIC SURGERY (CARDIOTHORACIC VASCULAR SURGERY)

## 2020-06-24 PROCEDURE — 94761 N-INVAS EAR/PLS OXIMETRY MLT: CPT

## 2020-06-24 PROCEDURE — 99900035 HC TECH TIME PER 15 MIN (STAT)

## 2020-06-24 PROCEDURE — 25000003 PHARM REV CODE 250: Performed by: SPECIALIST

## 2020-06-24 PROCEDURE — 25000003 PHARM REV CODE 250: Performed by: HOSPITALIST

## 2020-06-24 PROCEDURE — 25000003 PHARM REV CODE 250: Performed by: INTERNAL MEDICINE

## 2020-06-24 PROCEDURE — 84100 ASSAY OF PHOSPHORUS: CPT

## 2020-06-24 PROCEDURE — 80053 COMPREHEN METABOLIC PANEL: CPT

## 2020-06-24 PROCEDURE — 94660 CPAP INITIATION&MGMT: CPT

## 2020-06-24 PROCEDURE — 36415 COLL VENOUS BLD VENIPUNCTURE: CPT

## 2020-06-24 PROCEDURE — 63600175 PHARM REV CODE 636 W HCPCS

## 2020-06-24 PROCEDURE — 99232 SBSQ HOSP IP/OBS MODERATE 35: CPT | Mod: ,,, | Performed by: THORACIC SURGERY (CARDIOTHORACIC VASCULAR SURGERY)

## 2020-06-24 PROCEDURE — 85025 COMPLETE CBC W/AUTO DIFF WBC: CPT

## 2020-06-24 RX ORDER — MORPHINE SULFATE 2 MG/ML
2 INJECTION, SOLUTION INTRAMUSCULAR; INTRAVENOUS EVERY 4 HOURS PRN
Status: DISCONTINUED | OUTPATIENT
Start: 2020-06-24 | End: 2020-06-25

## 2020-06-24 RX ORDER — CALCIUM CHLORIDE, MAGNESIUM CHLORIDE, POTASSIUM CHLORIDE AND SODIUM CHLORIDE 17.6; 325.3; 119.3; 643 MG/100ML; MG/100ML; MG/100ML; MG/100ML
SOLUTION INTRA-ARTERIAL ONCE
Status: COMPLETED | OUTPATIENT
Start: 2020-06-25 | End: 2020-06-25

## 2020-06-24 RX ORDER — CALCIUM CHLORIDE, MAGNESIUM CHLORIDE, POTASSIUM CHLORIDE AND SODIUM CHLORIDE 17.6; 325.3; 119.3; 643 MG/100ML; MG/100ML; MG/100ML; MG/100ML
SOLUTION INTRA-ARTERIAL ONCE
Status: COMPLETED | OUTPATIENT
Start: 2020-06-24 | End: 2020-06-25

## 2020-06-24 RX ORDER — CEFAZOLIN SODIUM 2 G/50ML
2 SOLUTION INTRAVENOUS
Status: COMPLETED | OUTPATIENT
Start: 2020-06-25 | End: 2020-06-25

## 2020-06-24 RX ORDER — HYDROCODONE BITARTRATE AND ACETAMINOPHEN 5; 325 MG/1; MG/1
1 TABLET ORAL EVERY 6 HOURS PRN
Status: DISCONTINUED | OUTPATIENT
Start: 2020-06-24 | End: 2020-06-25

## 2020-06-24 RX ORDER — FUROSEMIDE 10 MG/ML
20 INJECTION INTRAMUSCULAR; INTRAVENOUS DAILY
Status: DISCONTINUED | OUTPATIENT
Start: 2020-06-25 | End: 2020-06-25

## 2020-06-24 RX ORDER — MORPHINE SULFATE 4 MG/ML
4 INJECTION, SOLUTION INTRAMUSCULAR; INTRAVENOUS EVERY 4 HOURS PRN
Status: DISCONTINUED | OUTPATIENT
Start: 2020-06-24 | End: 2020-06-25

## 2020-06-24 RX ORDER — MUPIROCIN 20 MG/G
OINTMENT TOPICAL
Status: DISCONTINUED | OUTPATIENT
Start: 2020-06-24 | End: 2020-06-25

## 2020-06-24 RX ORDER — MORPHINE SULFATE 4 MG/ML
INJECTION, SOLUTION INTRAMUSCULAR; INTRAVENOUS
Status: COMPLETED
Start: 2020-06-24 | End: 2020-06-24

## 2020-06-24 RX ORDER — POTASSIUM CHLORIDE 20 MEQ/1
20 TABLET, EXTENDED RELEASE ORAL 2 TIMES DAILY
Status: DISCONTINUED | OUTPATIENT
Start: 2020-06-24 | End: 2020-06-25

## 2020-06-24 RX ADMIN — ACYCLOVIR 400 MG: 200 CAPSULE ORAL at 10:06

## 2020-06-24 RX ADMIN — MUPIROCIN: 20 OINTMENT TOPICAL at 09:06

## 2020-06-24 RX ADMIN — HUMAN INSULIN 3 UNITS: 100 INJECTION, SOLUTION SUBCUTANEOUS at 09:06

## 2020-06-24 RX ADMIN — ACYCLOVIR 400 MG: 200 CAPSULE ORAL at 03:06

## 2020-06-24 RX ADMIN — HUMAN INSULIN 6 UNITS: 100 INJECTION, SOLUTION SUBCUTANEOUS at 07:06

## 2020-06-24 RX ADMIN — DEXTRAN 70, AND HYPROMELLOSE 2910 1 DROP: 1; 3 SOLUTION/ DROPS OPHTHALMIC at 03:06

## 2020-06-24 RX ADMIN — HUMAN INSULIN 9 UNITS: 100 INJECTION, SOLUTION SUBCUTANEOUS at 11:06

## 2020-06-24 RX ADMIN — POTASSIUM CHLORIDE 20 MEQ: 20 TABLET, EXTENDED RELEASE ORAL at 09:06

## 2020-06-24 RX ADMIN — HUMAN INSULIN 9 UNITS: 100 INJECTION, SOLUTION SUBCUTANEOUS at 04:06

## 2020-06-24 RX ADMIN — PANTOPRAZOLE SODIUM 40 MG: 40 INJECTION, POWDER, FOR SOLUTION INTRAVENOUS at 11:06

## 2020-06-24 RX ADMIN — FENOFIBRATE 145 MG: 145 TABLET, FILM COATED ORAL at 07:06

## 2020-06-24 RX ADMIN — NIFEDIPINE 30 MG: 30 TABLET, FILM COATED, EXTENDED RELEASE ORAL at 09:06

## 2020-06-24 RX ADMIN — POTASSIUM CHLORIDE 20 MEQ: 20 TABLET, EXTENDED RELEASE ORAL at 11:06

## 2020-06-24 RX ADMIN — ACYCLOVIR 400 MG: 200 CAPSULE ORAL at 06:06

## 2020-06-24 RX ADMIN — DULOXETINE 30 MG: 30 CAPSULE, DELAYED RELEASE ORAL at 09:06

## 2020-06-24 RX ADMIN — ASPIRIN 81 MG: 81 TABLET, DELAYED RELEASE ORAL at 11:06

## 2020-06-24 RX ADMIN — CHLORHEXIDINE GLUCONATE 15 ML: 1.2 RINSE ORAL at 09:06

## 2020-06-24 RX ADMIN — CHLORHEXIDINE GLUCONATE 15 ML: 1.2 RINSE ORAL at 11:06

## 2020-06-24 RX ADMIN — ENALAPRILAT 1.25 MG: 1.25 INJECTION INTRAVENOUS at 11:06

## 2020-06-24 RX ADMIN — HEPARIN SODIUM 16 UNITS/KG/HR: 10000 INJECTION, SOLUTION INTRAVENOUS at 10:06

## 2020-06-24 RX ADMIN — NITROGLYCERIN 35 MCG/MIN: 20 INJECTION INTRAVENOUS at 04:06

## 2020-06-24 RX ADMIN — POTASSIUM CHLORIDE 40 MEQ: 20 TABLET, EXTENDED RELEASE ORAL at 04:06

## 2020-06-24 RX ADMIN — ATORVASTATIN CALCIUM 40 MG: 40 TABLET, FILM COATED ORAL at 09:06

## 2020-06-24 RX ADMIN — MORPHINE SULFATE 4 MG: 4 INJECTION INTRAVENOUS at 04:06

## 2020-06-24 RX ADMIN — ENALAPRILAT 1.25 MG: 1.25 INJECTION INTRAVENOUS at 09:06

## 2020-06-24 RX ADMIN — NIFEDIPINE 30 MG: 30 TABLET, FILM COATED, EXTENDED RELEASE ORAL at 11:06

## 2020-06-24 RX ADMIN — HYDROCODONE BITARTRATE AND ACETAMINOPHEN 1 TABLET: 5; 325 TABLET ORAL at 03:06

## 2020-06-24 RX ADMIN — DEXTRAN 70, AND HYPROMELLOSE 2910 1 DROP: 1; 3 SOLUTION/ DROPS OPHTHALMIC at 09:06

## 2020-06-24 RX ADMIN — INSULIN ASPART 40 UNITS: 100 INJECTION, SUSPENSION SUBCUTANEOUS at 06:06

## 2020-06-24 RX ADMIN — ACYCLOVIR 400 MG: 200 CAPSULE ORAL at 09:06

## 2020-06-24 RX ADMIN — MORPHINE SULFATE 4 MG: 4 INJECTION, SOLUTION INTRAMUSCULAR; INTRAVENOUS at 04:06

## 2020-06-24 RX ADMIN — MUPIROCIN: 20 OINTMENT TOPICAL at 11:06

## 2020-06-24 RX ADMIN — MORPHINE SULFATE 2 MG: 2 INJECTION, SOLUTION INTRAMUSCULAR; INTRAVENOUS at 03:06

## 2020-06-24 RX ADMIN — INSULIN ASPART 40 UNITS: 100 INJECTION, SUSPENSION SUBCUTANEOUS at 07:06

## 2020-06-24 RX ADMIN — ACYCLOVIR 400 MG: 200 CAPSULE ORAL at 05:06

## 2020-06-24 NOTE — ANESTHESIA PREPROCEDURE EVALUATION
06/24/2020  Sincere Malave is a 70 y.o., male.      Patient Active Problem List   Diagnosis    Bell's palsy    Coronary artery disease involving native coronary artery of native heart    Elevated troponin    Hypertensive urgency    Type 2 diabetes mellitus with hyperglycemia, with long-term current use of insulin    TERESO (obstructive sleep apnea)    HLD (hyperlipidemia)    Chest pain    Acute hypoxemic respiratory failure    Pulmonary edema, acute    Hyperkalemia    Essential hypertension    Acute-on-chronic kidney injury    Diabetic neuropathy associated with type 2 diabetes mellitus    Anemia    Class 1 obesity due to excess calories with serious comorbidity in adult    NSTEMI (non-ST elevated myocardial infarction)    Sleep apnea    Chest pain syndrome    ASCVD (arteriosclerotic cardiovascular disease)    Femoral artery hematoma complicating cardiac catheterization       Past Surgical History:   Procedure Laterality Date    ANGIOGRAPHY OF ABDOMEN Left 6/22/2020    Procedure: Angiogram, Abdomen;  Surgeon: Sergio Poe MD;  Location: [facility] CATH/EP LAB;  Service: Cardiology;  Laterality: Left;    LEFT HEART CATHETERIZATION Left 6/22/2020    Procedure: Left heart cath;  Surgeon: Sergio Poe MD;  Location: [facility] CATH/EP LAB;  Service: Cardiology;  Laterality: Left;        Tobacco Use:  The patient  reports that he has never smoked. He does not have any smokeless tobacco history on file.     Results for orders placed or performed during the hospital encounter of 06/21/20   EKG 12-lead    Collection Time: 06/22/20  1:06 PM    Narrative    Test Reason : I25.10,    Vent. Rate : 053 BPM     Atrial Rate : 053 BPM     P-R Int : 154 ms          QRS Dur : 092 ms      QT Int : 500 ms       P-R-T Axes : -15 -57 -64 degrees     QTc Int : 469 ms    Sinus bradycardia  Left anterior  fascicular block  Moderate voltage criteria for LVH, may be normal variant  Possible Lateral infarct ,age undetermined  T wave abnormality, consider inferior ischemia  T wave abnormality, consider anterior ischemia  Abnormal ECG  When compared with ECG of 21-JUN-2020 09:06,  T wave inversion now evident in Anterior leads  QT has lengthened  Confirmed by Sergio Poe MD ([id number]) on 6/23/2020 11:18:10 AM    Referred By: ELOISA CANALES           Confirmed By:Sergio Poe MD             Lab Results   Component Value Date    WBC 10.80 06/25/2020    HGB 11.3 (L) 06/25/2020    HCT 34.1 (L) 06/25/2020    MCV 86 06/25/2020     06/25/2020     BMP  Lab Results   Component Value Date     06/25/2020    K 4.6 06/25/2020     06/25/2020    CO2 26 06/25/2020    BUN 42 (H) 06/25/2020    CREATININE 1.4 06/25/2020    CALCIUM 9.1 06/25/2020    ANIONGAP 11 06/25/2020     (H) 06/25/2020     (H) 06/24/2020     (H) 06/23/2020       Results for orders placed during the hospital encounter of 06/21/20   Echo Color Flow Doppler? Yes    Narrative · Concentric left ventricular remodeling.  · Mildly decreased left ventricular systolic function. The estimated   ejection fraction is 40%.  · Grade I (mild) left ventricular diastolic dysfunction consistent with   impaired relaxation.  · Local segmental wall motion abnormalities.  · Normal right ventricular systolic function.  · No pulmonary hypertension present.  · Mild tricuspid regurgitation.  · Normal central venous pressure (3 mmHg).     Evidence anterior apical scar is seen with slight expansion of the apex   during systole  I had ejection fraction of left ventricle globally is approximately 40%  L evidence of elevation of left ventricular end-diastolic pressure is seen  No hemodynamically significant valvular regurgitation             Anesthesia Evaluation    I have reviewed the Patient Summary Reports.    I have reviewed the Nursing Notes.    I have  reviewed the Medications.     Review of Systems  Anesthesia Hx:  Denies Family Hx of Anesthesia complications.   Denies Personal Hx of Anesthesia complications.   Social:  Social Alcohol Use, Non-Smoker    Hematology/Oncology:         -- Anemia:   EENT/Dental:EENT/Dental Normal   Cardiovascular:   Hypertension Past MI CAD  CABG/stent (remote hx of stent)  CHF (upon admit 6/21 (with MI), currently compensated) PVD hyperlipidemia Recent pulm edema, 40% EF, mild TR.  Left carotid stenosis 50-69%   Pulmonary:   Sleep Apnea, CPAP    Education provided regarding risk of obstructive sleep apnea     Hepatic/GI:  Hepatic/GI Normal    Musculoskeletal:  Musculoskeletal Normal    Neurological:   Right Burnett's Palsy now. Finished a four-day course of steroids yesterday.  Peripheral Neuropathy (feet and occ left hand numbness and tingling)    Endocrine:   Diabetes, using insulin        Physical Exam  General:  Well nourished    Airway/Jaw/Neck:  Airway Findings: Mouth Opening: Normal Tongue: Normal  Mallampati: III  TM Distance: Normal, at least 6 cm  Jaw/Neck Findings:  Neck ROM: Normal ROM      Dental:  Dental Findings: In tact   Chest/Lungs:  Chest/Lungs Findings: Clear to auscultation, Normal Respiratory Rate     Heart/Vascular:  Heart Findings: Rate: Normal  Rhythm: Regular Rhythm  Sounds: Normal  Heart murmur: negative       Mental Status:  Mental Status Findings:  Cooperative, Alert and Oriented         Anesthesia Plan  Type of Anesthesia, risks & benefits discussed:  Anesthesia Type:  general  Patient's Preference:   Intra-op Monitoring Plan: arterial line, [place], cardiac output, standard ASA monitors and central line  Intra-op Monitoring Plan Comments:   Post Op Pain Control Plan: multimodal analgesia and IV/PO Opioids PRN  Post Op Pain Control Plan Comments:   Induction:   IV  Beta Blocker:         Informed Consent: Patient understands risks and agrees with Anesthesia plan.  Questions answered. Anesthesia consent  signed with patient.  ASA Score: 4     Day of Surgery Review of History & Physical:        Anesthesia Plan Notes: ABY POLANCO,LELO Fernández        Ready For Surgery From Anesthesia Perspective.

## 2020-06-24 NOTE — PROGRESS NOTES
Critical access hospital Medicine    Progress Note    Patient Name: Sincere Malave  MRN: [id number]  Patient Class: IP- Inpatient   Admission Date: 6/21/2020  6:02 PM  Length of Stay: 3  Attending Physician: ZAKI LEON MD  Primary Care Provider: Primary Doctor No  Face-to-Face encounter date: 06/24/2020  Chief Complaint: Chest Pain         Patient ID: Sincere Malave is a 70 y.o. male admitted for   Active Hospital Problems    Diagnosis  POA    *NSTEMI (non-ST elevated myocardial infarction) [I21.4]  Yes    ASCVD (arteriosclerotic cardiovascular disease) [I25.10]  Yes    Pulmonary edema, acute [J81.0]  Yes    Essential hypertension [I10]  Yes    Acute-on-chronic kidney injury [N17.9, N18.9]  Yes    Coronary artery disease involving native coronary artery of native heart [I25.10]  Yes    Type 2 diabetes mellitus with hyperglycemia, with long-term current use of insulin [E11.65, Z79.4]  Not Applicable    HLD (hyperlipidemia) [E78.5]  Yes    TERESO (obstructive sleep apnea) [G47.33]  Yes    Acute hypoxemic respiratory failure [J96.01]  Yes    Bell's palsy [G51.0]  Yes      Resolved Hospital Problems   No resolved problems to display.      HPI by Dr Herbert 06/21/2020: 70 y.o. male with a PMHx of CAD with remote PCI, CKD 3, IDDM, hypertension, hyperlipidemia, neuropathy, sleep apnea, anemia who presents to [facility] ED with chief complaint of chest pain for 2 hr.  He described chest pain as a substernal chest tightness and tells me that his lower jaw hurts.  Patient was placed in ICU on Cardene drip however subsequent cardiac enzymes up trended and Cardiology was involved.  Patient was also given Rocephin and azithromycin for presumed pneumonia.  He also received aspirin, statin, beta-blocker and weight based Lovenox.  Patient was transferred to [facility] for angiographic evaluation tomorrow morning.  Upon my interview he reports mild chest discomfort and jaw pain and associated shortness of  breath.  Otherwise denies any fever, chills, headache, dizziness, palpitations, nausea, vomiting, bladder or bowel symptoms.      Subjective:      Interval History: Patient seen and examined this morning., reports he is doing well.  Denies any chest pain since in the hospital.  Informed his surgery has been postponed to tomororow 06/25/2020 at 7 am.   Family present at bedside.  No concerns/issues overnight reported by the patient or the nursing staff.  Still noting dark, blood tinged urine with rbc sediments int he tube     Review of Systems All other Review of Systems were found to be negative expect for that mentioned already in HPI.     Objective:     Physical Exam  Vitals:    06/24/20 0400   BP: (!) 155/70   Pulse: 62   Resp: (!) 27   Temp: 98.7 °F (37.1 °C)     Wt Readings from Last 1 Encounters:   06/21/20 91.7 kg (202 lb 2.6 oz)      Body mass index is 31.64 kg/m².    Vitals reviewed.  Constitutional: No distress.  Obese  HENT: NC, large neck circumference  Head: Atraumatic.   Cardiovascular: Normal rate, regular rhythm and normal heart sounds.  Trace lower extremity edema  Pulmonary/Chest: Effort normal. No wheezes.   Abdominal: Soft.  obese Bowel sounds are normal. No distension and no mass. No tenderness  : Pineda in place with blood tinged urine with sediments in tube  Neurological: Alert, awake and oriented .   Skin: Skin is warm and dry.   Psych: Appropriate mood and affect    Following labs were Reviewed   CBC:  Recent Labs   Lab 06/24/20  0245   WBC 10.10   HGB 12.8*   HCT 37.5*        CMP:  Recent Labs   Lab 06/24/20  0245   CALCIUM 8.9   ALBUMIN 3.6   PROT 7.1      K 3.4*   CO2 26   CL 98   BUN 38*   CREATININE 1.3   ALKPHOS 47*   ALT 34   AST 20   BILITOT 1.2*     Last 72 hour POCT GLUCOSE  POCT Glucose   Date Value Ref Range Status   06/21/2020 257 (H) 70 - 110 mg/dL Final   06/21/2020 202 (H) 70 - 110 mg/dL Final        Microbiology Results (last 7 days)     ** No results found for  the last 168 hours. **            US Carotid Bilateral   Final Result      X-Ray Chest AP Portable   Final Result            Scheduled Meds:   acyclovir  400 mg Oral 5x Daily    aspirin  81 mg Oral Daily    atorvastatin  40 mg Oral QHS    chlorhexidine  15 mL Mouth/Throat BID    dextran 70-hypromellose  1 drop Right Eye TID    DULoxetine  30 mg Oral QHS    enalaprilat  1.25 mg Intravenous BID    fenofibrate  145 mg Oral Daily with breakfast    furosemide (LASIX) IV  20 mg Intravenous Q12H    insulin aspart protamine-insulin aspart  40 Units Subcutaneous BIDWM    mupirocin   Nasal BID    NIFEdipine  30 mg Oral BID    pantoprazole  40 mg Intravenous Daily     Continuous Infusions:   heparin (porcine) in D5W 16 Units/kg/hr (06/23/20 2100)    nitroGLYCERIN 30 mcg/min (06/23/20 1800)     PRN Meds:.acetaminophen, calcium chloride IVPB, calcium chloride IVPB, calcium chloride IVPB, dextrose 50%, dextrose 50%, glucagon (human recombinant), glucose, glucose, heparin (PORCINE), heparin (PORCINE), hydrALAZINE, insulin regular, magnesium oxide, magnesium sulfate IVPB, magnesium sulfate IVPB, magnesium sulfate IVPB, magnesium sulfate IVPB, melatonin, nitroGLYCERIN, ondansetron, potassium chloride in water, potassium chloride in water, potassium chloride in water, potassium chloride in water, potassium chloride, potassium chloride, potassium chloride, potassium chloride, senna-docusate 8.6-50 mg, sodium chloride 0.9%, sodium phosphate IVPB, sodium phosphate IVPB, sodium phosphate IVPB, sodium phosphate IVPB, sodium phosphate IVPB    6/22/20: Echo  · Concentric left ventricular remodeling.  · Mildly decreased left ventricular systolic function. The estimated ejection fraction is 40%.  · Grade I (mild) left ventricular diastolic dysfunction consistent with impaired relaxation.  · Local segmental wall motion abnormalities.  · Normal right ventricular systolic function.  · No pulmonary hypertension present.  · Mild  tricuspid regurgitation.  · Normal central venous pressure (3 mmHg).  Evidence anterior apical scar is seen with slight expansion of the apex during systole  I had ejection fraction of left ventricle globally is approximately 40%  L evidence of elevation of left ventricular end-diastolic pressure is seen  No hemodynamically significant valvular regurgitation      06/22/2020: Craotid U/S b/l  1. Moderate bilateral carotid atheromatous plaque, with mildly elevated left ICA peak systolic velocities suggesting 50-69% left ICA stenosis.  2. No findings of hemodynamically significant right ICA stenosis.  3. Patent vertebral arteries with antegrade flow bilaterally.  Electronically Signed by Deandre CELAYA on 6/22/2020 4:06 PM    Assessment & Plan:     Multivessel CAD including L Main  SOB- now resolved  Carotid artery disease  Left heart catheterization showed three-vessel coronary artery disease including left main.  Preserved LV ejection fraction.  Bilateral renal artery stenosis(50-60%)  CT Dr Cifuentes consulted by Dr. CANALES, plan for CABG tomorrow 6/25/2020 at 7 am now  Remains on aspirin, statin, Cleviprex drip.    Cannot tolerate beta-blocker due to bradycardia.    Not a candidate for ACEI/ARB yet  Carotid ultrasound shows moderate bilateral carotid atheromatous plaque with mildly elevated left ICA peak systolic velocity suggesting 50-69% left ICA stenosis  Continue Lasix 20 mg IV daily  BiPAP if needed  Pineda catheter in place, strict I/O  If active chest pain we can consider nitroglycerin drip     Bell's Palsy  Continue outpatient acyclovir, PREDNISONE COMPLETED      Diabetes, uncontrolled  A1C 7.7  Elevated blood sugars due to steroid use.   Increase ISS to high  Basal bolus insulin regimen, Accu-Checks       Discharge Planning:   Plan for CABG 06/25/2020 with Dr. Cifuentes    Above encounter included review of the medical records, interviewing and examining the patient face-to-face, discussion with family and  other health care providers, ordering and interpreting lab/test results and formulating a plan of care.     Medical Decision Making:      [_] Low Complexity  [_] Moderate Complexity  [x] High Complexity      Manolo Alberto MD  Department of Hospital Medicine   [facility]

## 2020-06-24 NOTE — NURSING
Since initial start of Groin pain and palpated hematoma, pt has had manual pressure held almost continually . There were a few moments when  The hematoma had developed hemostasis.  Pressure held by this RN, Galen chavez RN, Mayco alexis RN , Karla Freeman RN, and Kasie from Cath lab.   Bedside US done during one of those times Groin size appeared stable.      Updates x3 to Dr Kaiser.      Latest update is H&H results ( reduced count noted).      See additional orders for pain meds ( Morphine, and PTT)    Overall,  Hematoma has grown in size, and discomfort.    Heparin Gtt is off since 1330

## 2020-06-24 NOTE — CONSULTS
[facility]  Cardiology  Progress Note    Patient Name: Sincere Malave  MRN: [id number]  Admission Date: 6/21/2020  Hospital Length of Stay: 3 days  Code Status: Full Code   Attending Physician: Manolo Alberto MD   Primary Care Physician: Primary Doctor No  Expected Discharge Date:   Principal Problem:NSTEMI (non-ST elevated myocardial infarction)    Subjective:   Cardiovascular surgery has rescheduled case for Thursday  Hospital Course: * patient is feeling well  Interval History:  Echo demonstrates apical damage  Patient is still on IV Tridil IV heparin IV enalaprit,  He is on acyclovir for for Bell's palsy but is not on prednisone anymore    ROS  Objective:     Vital Signs (Most Recent):  Temp: 98.5 °F (36.9 °C) (06/24/20 0701)  Pulse: 63 (06/24/20 0936)  Resp: 19 (06/24/20 0936)  BP: (!) 146/65 (06/24/20 0930)  SpO2: 99 % (06/24/20 0936) Vital Signs (24h Range):  Temp:  [97.9 °F (36.6 °C)-98.7 °F (37.1 °C)] 98.5 °F (36.9 °C)  Pulse:  [48-73] 63  Resp:  [17-28] 19  SpO2:  [92 %-100 %] 99 %  BP: (130-182)/() 146/65     Weight: 91.7 kg (202 lb 2.6 oz)  Body mass index is 31.64 kg/m².    SpO2: 99 %  O2 Device (Oxygen Therapy): High Flow nasal Cannula      Intake/Output Summary (Last 24 hours) at 6/24/2020 0949  Last data filed at 6/24/2020 0600  Gross per 24 hour   Intake 1582.73 ml   Output 2400 ml   Net -817.27 ml       Lines/Drains/Airways     Drain                 Urethral Catheter 06/22/20 1030 1 day          Peripheral Intravenous Line                 Peripheral IV - Single Lumen 06/20/20 1824 20 G Left Antecubital 3 days         Peripheral IV - Single Lumen 06/20/20 2200 20 G Right Hand 3 days                Physical Exam blood pressure is 120/80  Pulses 60  Lungs are clear  Cardiac regular      Significant Labs:   BMP:   Recent Labs   Lab 06/23/20  0400 06/24/20  0245   * 195*    136   K 4.1 3.4*   CL 99 98   CO2 28 26   BUN 35* 38*   CREATININE 1.4 1.3   CALCIUM 9.0 8.9   MG  1.6 1.9    and CMP   Recent Labs   Lab 06/23/20  0400 06/24/20  0245    136   K 4.1 3.4*   CL 99 98   CO2 28 26   * 195*   BUN 35* 38*   CREATININE 1.4 1.3   CALCIUM 9.0 8.9   PROT 7.2 7.1   ALBUMIN 3.7 3.6   BILITOT 1.7* 1.2*   ALKPHOS 49* 47*   AST 26 20   ALT 33 34   ANIONGAP 12 12   ESTGFRAFRICA 59.3* >60.0   EGFRNONAA 51.3* 56.1*       Significant Imaging  echo 6-22   Evidence anterior apical scar is seen with slight expansion of the apex during systole  I had ejection fraction of left ventricle globally is approximately 40%  L evidence of elevation of left ventricular end-diastolic pressure is seen  No hemodynamically significant valvular regurgitation    Assessment and Plan:   1.  Status post apical myocardial infarction and severe triple-vessel disease  2.  Heart failure reduced ejection fraction compensated current  3.  Hypokalemia  For diabetes mellitus  Five Bell's palsy  Six 50-69% stenosis left internal carotid  Half patient is clinically stable, reduce IV Lasix and heparin dose  Of for cardiac bypass surgery 625-20  Brief HPI:  Active Diagnoses:    Diagnosis Date Noted POA    PRINCIPAL PROBLEM:  NSTEMI (non-ST elevated myocardial infarction) [I21.4] 06/21/2020 Yes    ASCVD (arteriosclerotic cardiovascular disease) [I25.10]  Yes    Pulmonary edema, acute [J81.0] 06/21/2020 Yes    Essential hypertension [I10] 06/21/2020 Yes    Acute-on-chronic kidney injury [N17.9, N18.9] 06/21/2020 Yes    Coronary artery disease involving native coronary artery of native heart [I25.10] 06/20/2020 Yes    Type 2 diabetes mellitus with hyperglycemia, with long-term current use of insulin [E11.65, Z79.4] 06/20/2020 Not Applicable    HLD (hyperlipidemia) [E78.5] 06/20/2020 Yes    TERESO (obstructive sleep apnea) [G47.33] 06/20/2020 Yes    Acute hypoxemic respiratory failure [J96.01] 06/20/2020 Yes    Bell's palsy [G51.0] 06/19/2020 Yes      Problems Resolved During this Admission:       VTE Risk  Mitigation (From admission, onward)         Ordered     heparin 25,000 units in dextrose 5% (100 units/ml) IV bolus from bag - ADDITIONAL PRN BOLUS - 30 units/kg (max bolus 4000 units)  As needed (PRN)     Question:  Angiotensin II Infusion Adjustment (DO NOT MODIFY ANSWER)  Answer:  [email or web address]\epic\Images\Pharmacy\HeparinInfusions\heparin LOW INTENSITY nomogram for [facility] [id number]    06/23/20 1213     heparin 25,000 units in dextrose 5% 250 mL (100 units/mL) infusion LOW INTENSITY nomogram - [facility]  Continuous     Question:  Angiotensin II Infusion Adjustment (DO NOT MODIFY ANSWER)  Answer:  \\[email or web address]\epic\Images\Pharmacy\HeparinInfusions\heparin LOW INTENSITY nomogram for [facility] [id number]    06/22/20 2047     heparin 25,000 units in dextrose 5% (100 units/ml) IV bolus from bag - ADDITIONAL PRN BOLUS - 60 units/kg (max bolus 4000 units)  As needed (PRN)     Question:  Angiotensin II Infusion Adjustment (DO NOT MODIFY ANSWER)  Answer:  \\[email or web address]\epic\Images\Pharmacy\HeparinInfusions\heparin LOW INTENSITY nomogram for [facility] [id number]    06/22/20 2047     IP VTE HIGH RISK PATIENT  Once      06/21/20 1804     Place sequential compression device  Until discontinued      06/21/20 1804                James Kaiser MD  Cardiology  [facility]

## 2020-06-24 NOTE — CARE UPDATE
06/23/20 2058   Patient Assessment/Suction   Level of Consciousness (AVPU) alert   PRE-TX-O2   O2 Device (Oxygen Therapy) High Flow nasal Cannula   $ Is the patient on Low Flow Oxygen? Yes   Flow (L/min) 1   SpO2 95 %   Pulse Oximetry Type Continuous   $ Pulse Oximetry - Multiple Charge Pulse Oximetry - Multiple   Pulse 62   Resp (!) 21

## 2020-06-24 NOTE — NURSING
Charge nurse notified that patient's wife wants to speak with someone in charge about her husbands bypass surgery being pushed back till Thursday morning. Angelica Onofre RN spoke with wife in private setting.

## 2020-06-24 NOTE — PROGRESS NOTES
"Sincere Malave is a 70 y.o. male patient.    Chief Complaint   Patient presents with    Chest Pain       Active Hospital Problems    Diagnosis  POA    *NSTEMI (non-ST elevated myocardial infarction) [I21.4]  Yes    ASCVD (arteriosclerotic cardiovascular disease) [I25.10]  Yes    Pulmonary edema, acute [J81.0]  Yes    Essential hypertension [I10]  Yes    Acute-on-chronic kidney injury [N17.9, N18.9]  Yes    Coronary artery disease involving native coronary artery of native heart [I25.10]  Yes    Type 2 diabetes mellitus with hyperglycemia, with long-term current use of insulin [E11.65, Z79.4]  Not Applicable    HLD (hyperlipidemia) [E78.5]  Yes    TERESO (obstructive sleep apnea) [G47.33]  Yes    Acute hypoxemic respiratory failure [J96.01]  Yes    Bell's palsy [G51.0]  Yes      Resolved Hospital Problems   No resolved problems to display.       Temp: 98.5 °F (36.9 °C) (06/24/20 0701)  Pulse: 63 (06/24/20 0936)  Resp: (!) 25 (06/24/20 1607)  BP: (!) 146/65 (06/24/20 0930)  SpO2: 99 % (06/24/20 0936)  Weight: 91.7 kg (202 lb 2.6 oz) (06/21/20 1815)  Height: 5' 7" (170.2 cm) (06/22/20 1740)    CBC:  Recent Labs   Lab 06/22/20  0350 06/23/20  0400 06/24/20  0245 06/24/20  1526   WBC 17.66* 11.63 10.10 9.69   HGB 13.8* 13.2* 12.8* 11.6*   HCT 40.5 39.3* 37.5* 34.4*    239 215 239   MCV 86 85 85 86     BMP:  Recent Labs   Lab 06/22/20  0350 06/23/20  0400 06/24/20  0245 06/24/20  0950   * 254* 195*  --     139 136  --    K 4.1 4.1 3.4* 4.0    99 98  --    CO2 23 28 26  --    BUN 37* 35* 38*  --    CREATININE 1.4 1.4 1.3  --    CALCIUM 9.0 9.0 8.9  --    MG 1.8 1.6 1.9  --    PHOS 4.1 4.2 3.8  --      LFTs:  Recent Labs   Lab 06/22/20  0350 06/23/20  0400 06/24/20  0245   ALT 37 33 34   AST 47* 26 20   ALKPHOS 45* 49* 47*   BILITOT 1.1* 1.7* 1.2*   PROT 7.2 7.2 7.1   ALBUMIN 3.9 3.7 3.6     COAGS:  Recent Labs   Lab 06/22/20 2021 06/23/20  1912 06/24/20  0245 06/24/20  0950 " "06/24/20  1526   INR 1.1  --   --   --   --   --    APTT 28.8   < > 59.1* 84.0* 62.5* 31.1    < > = values in this interval not displayed.     CARDIAC MARKERS:  Recent Labs   Lab 06/22/20  0350 06/22/20 2021   TROPONINI 7.320* 6.456*       Output (mL)  Urine: 100 mL        Subjective:  Symptoms:  No chest pain or chest pressure.  (Shortly before 2:00 p.m. today patient began to develop pain in the left groin area.  He was noted to have a hematoma, heparin was held, and pressure with has been held at the site since that time.  Patient no longer complains of significant pain although he has received morphine.).    Pain:  He complains of pain that is moderate (Left groin only, and improved).        Objective:  General Appearance:  Comfortable.    Vital signs: (most recent): Blood pressure (!) 146/65, pulse 63, temperature 98.5 °F (36.9 °C), temperature source Oral, resp. rate (!) 25, height 5' 7" (1.702 m), weight 91.7 kg (202 lb 2.6 oz), SpO2 99 %.  Vital signs are normal.  (Blood pressure improved with systolic blood pressure 119).    Output: Producing urine (Still with some mild hematuria).    Heart: Normal rate.  Regular rhythm.    Extremities: (There is a hematoma and the upper, medial left thigh below the inguinal crease.  There is no obvious pulsatility that is abnormal above this.  Per nursing evaluation since they have been holding pressure at the site, there does not appear to be an expanding hematoma.)      US Extremity Non Vascular Limited Left  Order: [id number]  Status:  Final result   Visible to patient:  No (not released) Next appt:  None  Details    Narrative & Impression     Reason: Left groin hematoma , arteriogram 2 days ago     COMPARISON:None     FINDINGS:  Targeted ultrasound of the left groin shows 53 x 77 x 73 mm fluid  collection containing fluid fluid level, suggesting hematoma, as this  contains no internal vascular flow on color and spectral Doppler  imaging.     Partially visualized left " common femoral artery and vein are patent on  color Doppler imaging. Negative for pseudoaneurysm.     IMPRESSION:  Hematoma in left groin, without associated pseudoaneurysm.     Electronically Signed by Yrn Clarke M.D. on 6/24/2020 2:56 PM               Assessment:  (1.  Coronary artery disease native arteries native heart with unstable angina.  He has been without angina the last 48 hr.  He remains without angina despite the left femoral hematoma.  2.  Non ST elevation myocardial infarction.  3.  Left femoral/inguinal hematoma at catheterization site likely related to heparin infusion.  4. Hematuria likely related to intravenous heparin infusion.  This has improved but not resolved  ).     Plan:   1.  Left femoral site will be monitored overnight.  Should he developed an expanding hematoma at the site, he may require more urgent surgical intervention.  If the site remains unchanged before during and immediately after surgery tomorrow, I would not explore it; however, should the area expand during surgery, then exploration would be performed.  2.  Since patient is asymptomatic from cardiac standpoint right now, plans remained for coronary artery bypass surgery tomorrow morning.  Should he developed significant chest pain, plans will be expedited.  3.  I had discussion with patient's wife regarding timing of surgery as well as regarding possible addition of femoral artery expiration during the operative procedure.  I discussed surgical risks as well..       Rao Larose MD  6/24/2020

## 2020-06-24 NOTE — NURSING
Report reveals pt's  CABG surgery has been post poned by surgeon--   And wife expresses frustration and concern.       Jaime Aldridge,  And Dr Bae and Pt advocate visit with wife.   Others in upper management-- CNO, and Dr Fall also notified.      Wife encouraged to express her concerns and frustration.

## 2020-06-25 ENCOUNTER — ANESTHESIA (OUTPATIENT)
Dept: SURGERY | Facility: HOSPITAL | Age: 70
DRG: 233 | End: 2020-06-25
Payer: COMMERCIAL

## 2020-06-25 PROBLEM — Z95.5 HISTORY OF HEART ARTERY STENT: Status: ACTIVE | Noted: 2020-06-25

## 2020-06-25 LAB
ALBUMIN SERPL BCP-MCNC: 3.6 G/DL (ref 3.5–5.2)
ALLENS TEST: ABNORMAL
ALP SERPL-CCNC: 49 U/L (ref 55–135)
ALT SERPL W/O P-5'-P-CCNC: 48 U/L (ref 10–44)
ANION GAP SERPL CALC-SCNC: 11 MMOL/L (ref 8–16)
ANION GAP SERPL CALC-SCNC: 7 MMOL/L (ref 8–16)
ANION GAP SERPL CALC-SCNC: 8 MMOL/L (ref 8–16)
APTT PPP: 44 SEC (ref 23.6–33.3)
AST SERPL-CCNC: 25 U/L (ref 10–40)
BASOPHILS # BLD AUTO: 0.02 K/UL (ref 0–0.2)
BASOPHILS # BLD AUTO: 0.08 K/UL (ref 0–0.2)
BASOPHILS NFR BLD: 0.4 % (ref 0–1.9)
BASOPHILS NFR BLD: 0.7 % (ref 0–1.9)
BILIRUB SERPL-MCNC: 1.2 MG/DL (ref 0.1–1)
BUN SERPL-MCNC: 37 MG/DL (ref 8–23)
BUN SERPL-MCNC: 38 MG/DL (ref 8–23)
BUN SERPL-MCNC: 42 MG/DL (ref 8–23)
CA-I BLDV-SCNC: 1.19 MMOL/L (ref 1.06–1.42)
CA-I BLDV-SCNC: 1.2 MMOL/L (ref 1.06–1.42)
CALCIUM SERPL-MCNC: 7.2 MG/DL (ref 8.7–10.5)
CALCIUM SERPL-MCNC: 8.4 MG/DL (ref 8.7–10.5)
CALCIUM SERPL-MCNC: 9.1 MG/DL (ref 8.7–10.5)
CHLORIDE SERPL-SCNC: 101 MMOL/L (ref 95–110)
CHLORIDE SERPL-SCNC: 110 MMOL/L (ref 95–110)
CHLORIDE SERPL-SCNC: 112 MMOL/L (ref 95–110)
CO2 SERPL-SCNC: 21 MMOL/L (ref 23–29)
CO2 SERPL-SCNC: 23 MMOL/L (ref 23–29)
CO2 SERPL-SCNC: 26 MMOL/L (ref 23–29)
CREAT SERPL-MCNC: 1.2 MG/DL (ref 0.5–1.4)
CREAT SERPL-MCNC: 1.4 MG/DL (ref 0.5–1.4)
CREAT SERPL-MCNC: 1.4 MG/DL (ref 0.5–1.4)
DELSYS: ABNORMAL
DIFFERENTIAL METHOD: ABNORMAL
DIFFERENTIAL METHOD: ABNORMAL
EOSINOPHIL # BLD AUTO: 0 K/UL (ref 0–0.5)
EOSINOPHIL # BLD AUTO: 0.3 K/UL (ref 0–0.5)
EOSINOPHIL NFR BLD: 0.5 % (ref 0–8)
EOSINOPHIL NFR BLD: 2.8 % (ref 0–8)
ERYTHROCYTE [DISTWIDTH] IN BLOOD BY AUTOMATED COUNT: 13.5 % (ref 11.5–14.5)
ERYTHROCYTE [DISTWIDTH] IN BLOOD BY AUTOMATED COUNT: 14.4 % (ref 11.5–14.5)
ERYTHROCYTE [DISTWIDTH] IN BLOOD BY AUTOMATED COUNT: 14.9 % (ref 11.5–14.5)
ERYTHROCYTE [SEDIMENTATION RATE] IN BLOOD BY WESTERGREN METHOD: 16 MM/H
ERYTHROCYTE [SEDIMENTATION RATE] IN BLOOD BY WESTERGREN METHOD: 550 MM/H
EST. GFR  (AFRICAN AMERICAN): 59.3 ML/MIN/1.73 M^2
EST. GFR  (AFRICAN AMERICAN): 59.3 ML/MIN/1.73 M^2
EST. GFR  (AFRICAN AMERICAN): >60 ML/MIN/1.73 M^2
EST. GFR  (NON AFRICAN AMERICAN): 51.3 ML/MIN/1.73 M^2
EST. GFR  (NON AFRICAN AMERICAN): 51.3 ML/MIN/1.73 M^2
EST. GFR  (NON AFRICAN AMERICAN): >60 ML/MIN/1.73 M^2
FIO2: 100
FIO2: 65
FIO2: 70
FIO2: 70
FLOW: 65
GLUCOSE SERPL-MCNC: 136 MG/DL (ref 70–110)
GLUCOSE SERPL-MCNC: 143 MG/DL (ref 70–110)
GLUCOSE SERPL-MCNC: 170 MG/DL (ref 70–110)
GLUCOSE SERPL-MCNC: 176 MG/DL (ref 70–110)
GLUCOSE SERPL-MCNC: 178 MG/DL (ref 70–110)
GLUCOSE SERPL-MCNC: 183 MG/DL (ref 70–110)
GLUCOSE SERPL-MCNC: 183 MG/DL (ref 70–110)
GLUCOSE SERPL-MCNC: 189 MG/DL (ref 70–110)
GLUCOSE SERPL-MCNC: 200 MG/DL (ref 70–110)
GLUCOSE SERPL-MCNC: 203 MG/DL (ref 70–110)
GLUCOSE SERPL-MCNC: 204 MG/DL (ref 70–110)
GLUCOSE SERPL-MCNC: 234 MG/DL (ref 70–110)
GLUCOSE SERPL-MCNC: 241 MG/DL (ref 70–110)
GLUCOSE SERPL-MCNC: 242 MG/DL (ref 70–110)
GLUCOSE SERPL-MCNC: 250 MG/DL (ref 70–110)
GLUCOSE SERPL-MCNC: 262 MG/DL (ref 70–110)
GLUCOSE SERPL-MCNC: 271 MG/DL (ref 70–110)
GLUCOSE SERPL-MCNC: 271 MG/DL (ref 70–110)
HCO3 UR-SCNC: 19.5 MMOL/L (ref 24–28)
HCO3 UR-SCNC: 21.4 MMOL/L (ref 24–28)
HCO3 UR-SCNC: 21.8 MMOL/L (ref 24–28)
HCO3 UR-SCNC: 22.5 MMOL/L (ref 24–28)
HCO3 UR-SCNC: 22.9 MMOL/L (ref 24–28)
HCO3 UR-SCNC: 24.2 MMOL/L (ref 24–28)
HCO3 UR-SCNC: 24.5 MMOL/L (ref 24–28)
HCO3 UR-SCNC: 25 MMOL/L (ref 24–28)
HCO3 UR-SCNC: 25.4 MMOL/L (ref 24–28)
HCO3 UR-SCNC: 26.9 MMOL/L (ref 24–28)
HCO3 UR-SCNC: 27 MMOL/L (ref 24–28)
HCO3 UR-SCNC: 27.2 MMOL/L (ref 24–28)
HCT VFR BLD AUTO: 25 % (ref 40–54)
HCT VFR BLD AUTO: 26.3 % (ref 40–54)
HCT VFR BLD AUTO: 34.1 % (ref 40–54)
HCT VFR BLD CALC: 20 %PCV (ref 36–54)
HCT VFR BLD CALC: 22 %PCV (ref 36–54)
HCT VFR BLD CALC: 23 %PCV (ref 36–54)
HCT VFR BLD CALC: 24 %PCV (ref 36–54)
HCT VFR BLD CALC: 25 %PCV (ref 36–54)
HCT VFR BLD CALC: 25 %PCV (ref 36–54)
HCT VFR BLD CALC: 28 %PCV (ref 36–54)
HGB BLD-MCNC: 11.3 G/DL (ref 14–18)
HGB BLD-MCNC: 8.4 G/DL (ref 14–18)
HGB BLD-MCNC: 8.9 G/DL (ref 14–18)
IMM GRANULOCYTES # BLD AUTO: 0.09 K/UL (ref 0–0.04)
IMM GRANULOCYTES # BLD AUTO: 0.23 K/UL (ref 0–0.04)
IMM GRANULOCYTES NFR BLD AUTO: 1.6 % (ref 0–0.5)
IMM GRANULOCYTES NFR BLD AUTO: 2.1 % (ref 0–0.5)
INR PPP: 1.5
LYMPHOCYTES # BLD AUTO: 0.4 K/UL (ref 1–4.8)
LYMPHOCYTES # BLD AUTO: 2 K/UL (ref 1–4.8)
LYMPHOCYTES NFR BLD: 18.9 % (ref 18–48)
LYMPHOCYTES NFR BLD: 7.1 % (ref 18–48)
MAGNESIUM SERPL-MCNC: 2.1 MG/DL (ref 1.6–2.6)
MAGNESIUM SERPL-MCNC: 2.1 MG/DL (ref 1.6–2.6)
MAGNESIUM SERPL-MCNC: 2.2 MG/DL (ref 1.6–2.6)
MCH RBC QN AUTO: 28.5 PG (ref 27–31)
MCH RBC QN AUTO: 29 PG (ref 27–31)
MCH RBC QN AUTO: 29.3 PG (ref 27–31)
MCHC RBC AUTO-ENTMCNC: 33.1 G/DL (ref 32–36)
MCHC RBC AUTO-ENTMCNC: 33.6 G/DL (ref 32–36)
MCHC RBC AUTO-ENTMCNC: 33.8 G/DL (ref 32–36)
MCV RBC AUTO: 86 FL (ref 82–98)
MCV RBC AUTO: 86 FL (ref 82–98)
MCV RBC AUTO: 87 FL (ref 82–98)
MODE: ABNORMAL
MONOCYTES # BLD AUTO: 0.7 K/UL (ref 0.3–1)
MONOCYTES # BLD AUTO: 0.9 K/UL (ref 0.3–1)
MONOCYTES NFR BLD: 12.8 % (ref 4–15)
MONOCYTES NFR BLD: 8.6 % (ref 4–15)
MRSA SCREEN BY PCR: NEGATIVE
NEUTROPHILS # BLD AUTO: 4.3 K/UL (ref 1.8–7.7)
NEUTROPHILS # BLD AUTO: 7.2 K/UL (ref 1.8–7.7)
NEUTROPHILS NFR BLD: 66.9 % (ref 38–73)
NEUTROPHILS NFR BLD: 77.6 % (ref 38–73)
NRBC BLD-RTO: 0 /100 WBC
NRBC BLD-RTO: 0 /100 WBC
PCO2 BLDA: 36.3 MMHG (ref 35–45)
PCO2 BLDA: 37.1 MMHG (ref 35–45)
PCO2 BLDA: 37.8 MMHG (ref 35–45)
PCO2 BLDA: 38.6 MMHG (ref 35–45)
PCO2 BLDA: 38.7 MMHG (ref 35–45)
PCO2 BLDA: 39.2 MMHG (ref 35–45)
PCO2 BLDA: 39.8 MMHG (ref 35–45)
PCO2 BLDA: 40.1 MMHG (ref 35–45)
PCO2 BLDA: 40.2 MMHG (ref 35–45)
PCO2 BLDA: 42.4 MMHG (ref 35–45)
PCO2 BLDA: 42.9 MMHG (ref 35–45)
PCO2 BLDA: 44.1 MMHG (ref 35–45)
PEEP: 5
PH SMN: 7.34 [PH] (ref 7.35–7.45)
PH SMN: 7.34 [PH] (ref 7.35–7.45)
PH SMN: 7.35 [PH] (ref 7.35–7.45)
PH SMN: 7.35 [PH] (ref 7.35–7.45)
PH SMN: 7.36 [PH] (ref 7.35–7.45)
PH SMN: 7.36 [PH] (ref 7.35–7.45)
PH SMN: 7.41 [PH] (ref 7.35–7.45)
PH SMN: 7.42 [PH] (ref 7.35–7.45)
PH SMN: 7.43 [PH] (ref 7.35–7.45)
PH SMN: 7.46 [PH] (ref 7.35–7.45)
PHOSPHATE SERPL-MCNC: 3.1 MG/DL (ref 2.7–4.5)
PHOSPHATE SERPL-MCNC: 4.1 MG/DL (ref 2.7–4.5)
PIP: 26
PLATELET # BLD AUTO: 107 K/UL (ref 150–350)
PLATELET # BLD AUTO: 270 K/UL (ref 150–350)
PLATELET # BLD AUTO: 94 K/UL (ref 150–350)
PLATELET BLD QL SMEAR: ABNORMAL
PMV BLD AUTO: 10.4 FL (ref 9.2–12.9)
PMV BLD AUTO: 10.7 FL (ref 9.2–12.9)
PMV BLD AUTO: 10.8 FL (ref 9.2–12.9)
PO2 BLDA: 136 MMHG (ref 80–100)
PO2 BLDA: 280 MMHG (ref 80–100)
PO2 BLDA: 281 MMHG (ref 80–100)
PO2 BLDA: 285 MMHG (ref 80–100)
PO2 BLDA: 293 MMHG (ref 80–100)
PO2 BLDA: 301 MMHG (ref 80–100)
PO2 BLDA: 377 MMHG (ref 80–100)
PO2 BLDA: 401 MMHG (ref 80–100)
PO2 BLDA: 433 MMHG (ref 80–100)
PO2 BLDA: 77 MMHG (ref 80–100)
PO2 BLDA: 86 MMHG (ref 80–100)
PO2 BLDA: 93 MMHG (ref 80–100)
POC ACTIVATED CLOTTING TIME K: 125 SEC (ref 74–137)
POC ACTIVATED CLOTTING TIME K: 147 SEC (ref 74–137)
POC ACTIVATED CLOTTING TIME K: 461 SEC (ref 74–137)
POC ACTIVATED CLOTTING TIME K: 472 SEC (ref 74–137)
POC ACTIVATED CLOTTING TIME K: 510 SEC (ref 74–137)
POC ACTIVATED CLOTTING TIME K: 516 SEC (ref 74–137)
POC ACTIVATED CLOTTING TIME K: 527 SEC (ref 74–137)
POC ACTIVATED CLOTTING TIME K: 599 SEC (ref 74–137)
POC BE: -3 MMOL/L
POC BE: -3 MMOL/L
POC BE: -4 MMOL/L
POC BE: -4 MMOL/L
POC BE: -6 MMOL/L
POC BE: 0 MMOL/L
POC BE: 1 MMOL/L
POC BE: 3 MMOL/L
POC IONIZED CALCIUM: 1.03 MMOL/L (ref 1.06–1.42)
POC IONIZED CALCIUM: 1.04 MMOL/L (ref 1.06–1.42)
POC IONIZED CALCIUM: 1.04 MMOL/L (ref 1.06–1.42)
POC IONIZED CALCIUM: 1.05 MMOL/L (ref 1.06–1.42)
POC IONIZED CALCIUM: 1.05 MMOL/L (ref 1.06–1.42)
POC IONIZED CALCIUM: 1.11 MMOL/L (ref 1.06–1.42)
POC IONIZED CALCIUM: 1.16 MMOL/L (ref 1.06–1.42)
POC IONIZED CALCIUM: 1.16 MMOL/L (ref 1.06–1.42)
POC IONIZED CALCIUM: 1.19 MMOL/L (ref 1.06–1.42)
POC PCO2 TEMP: 38.7 MMHG
POC PH TEMP: 7.35
POC PO2 TEMP: 86 MMHG
POC SATURATED O2: 100 % (ref 95–100)
POC SATURATED O2: 95 % (ref 95–100)
POC SATURATED O2: 96 % (ref 95–100)
POC SATURATED O2: 97 % (ref 95–100)
POC SATURATED O2: 99 % (ref 95–100)
POC TCO2: 21 MMOL/L (ref 23–27)
POC TCO2: 23 MMOL/L (ref 23–27)
POC TCO2: 23 MMOL/L (ref 23–27)
POC TCO2: 24 MMOL/L (ref 23–27)
POC TCO2: 24 MMOL/L (ref 23–27)
POC TCO2: 25 MMOL/L (ref 23–27)
POC TCO2: 26 MMOL/L (ref 23–27)
POC TCO2: 26 MMOL/L (ref 23–27)
POC TCO2: 27 MMOL/L (ref 23–27)
POC TCO2: 28 MMOL/L (ref 23–27)
POC TEMPERATURE: ABNORMAL
POTASSIUM BLD-SCNC: 3.9 MMOL/L (ref 3.5–5.1)
POTASSIUM BLD-SCNC: 4 MMOL/L (ref 3.5–5.1)
POTASSIUM BLD-SCNC: 4.1 MMOL/L (ref 3.5–5.1)
POTASSIUM BLD-SCNC: 4.3 MMOL/L (ref 3.5–5.1)
POTASSIUM BLD-SCNC: 4.3 MMOL/L (ref 3.5–5.1)
POTASSIUM BLD-SCNC: 4.4 MMOL/L (ref 3.5–5.1)
POTASSIUM BLD-SCNC: 4.5 MMOL/L (ref 3.5–5.1)
POTASSIUM BLD-SCNC: 4.7 MMOL/L (ref 3.5–5.1)
POTASSIUM BLD-SCNC: 4.9 MMOL/L (ref 3.5–5.1)
POTASSIUM SERPL-SCNC: 4.4 MMOL/L (ref 3.5–5.1)
POTASSIUM SERPL-SCNC: 4.6 MMOL/L (ref 3.5–5.1)
POTASSIUM SERPL-SCNC: 5.2 MMOL/L (ref 3.5–5.1)
POTASSIUM SERPL-SCNC: 5.2 MMOL/L (ref 3.5–5.1)
PROT SERPL-MCNC: 7.1 G/DL (ref 6–8.4)
PROTHROMBIN TIME: 17.7 SEC (ref 10.6–14.8)
PS: 10
RBC # BLD AUTO: 2.9 M/UL (ref 4.6–6.2)
RBC # BLD AUTO: 3.04 M/UL (ref 4.6–6.2)
RBC # BLD AUTO: 3.96 M/UL (ref 4.6–6.2)
SAMPLE: ABNORMAL
SAMPLE: NORMAL
SITE: ABNORMAL
SODIUM BLD-SCNC: 134 MMOL/L (ref 136–145)
SODIUM BLD-SCNC: 134 MMOL/L (ref 136–145)
SODIUM BLD-SCNC: 135 MMOL/L (ref 136–145)
SODIUM BLD-SCNC: 137 MMOL/L (ref 136–145)
SODIUM BLD-SCNC: 140 MMOL/L (ref 136–145)
SODIUM BLD-SCNC: 142 MMOL/L (ref 136–145)
SODIUM SERPL-SCNC: 138 MMOL/L (ref 136–145)
SODIUM SERPL-SCNC: 139 MMOL/L (ref 136–145)
SODIUM SERPL-SCNC: 142 MMOL/L (ref 136–145)
SP02: 98
VT: 16
VT: 550
WBC # BLD AUTO: 10.8 K/UL (ref 3.9–12.7)
WBC # BLD AUTO: 5.49 K/UL (ref 3.9–12.7)
WBC # BLD AUTO: 7.3 K/UL (ref 3.9–12.7)

## 2020-06-25 PROCEDURE — 63600175 PHARM REV CODE 636 W HCPCS: Performed by: THORACIC SURGERY (CARDIOTHORACIC VASCULAR SURGERY)

## 2020-06-25 PROCEDURE — 82330 ASSAY OF CALCIUM: CPT

## 2020-06-25 PROCEDURE — 33533 CABG ARTERIAL SINGLE: CPT | Mod: ,,, | Performed by: THORACIC SURGERY (CARDIOTHORACIC VASCULAR SURGERY)

## 2020-06-25 PROCEDURE — 33533 PR CABG, ARTERIAL, SINGLE: ICD-10-PCS | Mod: AS,,, | Performed by: NURSE PRACTITIONER

## 2020-06-25 PROCEDURE — P9045 ALBUMIN (HUMAN), 5%, 250 ML: HCPCS | Mod: JG | Performed by: NURSE ANESTHETIST, CERTIFIED REGISTERED

## 2020-06-25 PROCEDURE — 27100019 HC AMBU BAG ADULT/PED: Performed by: ANESTHESIOLOGY

## 2020-06-25 PROCEDURE — 25000003 PHARM REV CODE 250: Performed by: NURSE ANESTHETIST, CERTIFIED REGISTERED

## 2020-06-25 PROCEDURE — 27000656 HC EYE GOGGLES: Performed by: ANESTHESIOLOGY

## 2020-06-25 PROCEDURE — 85014 HEMATOCRIT: CPT

## 2020-06-25 PROCEDURE — 83735 ASSAY OF MAGNESIUM: CPT | Mod: 91

## 2020-06-25 PROCEDURE — P9016 RBC LEUKOCYTES REDUCED: HCPCS

## 2020-06-25 PROCEDURE — P9045 ALBUMIN (HUMAN), 5%, 250 ML: HCPCS | Mod: JG

## 2020-06-25 PROCEDURE — 85025 COMPLETE CBC W/AUTO DIFF WBC: CPT | Mod: 91

## 2020-06-25 PROCEDURE — 84100 ASSAY OF PHOSPHORUS: CPT | Mod: 91

## 2020-06-25 PROCEDURE — 27000652 HC HIGH FLOW IV SET: Performed by: ANESTHESIOLOGY

## 2020-06-25 PROCEDURE — 85610 PROTHROMBIN TIME: CPT

## 2020-06-25 PROCEDURE — 83735 ASSAY OF MAGNESIUM: CPT

## 2020-06-25 PROCEDURE — 82803 BLOOD GASES ANY COMBINATION: CPT

## 2020-06-25 PROCEDURE — 36000713 HC OR TIME LEV V EA ADD 15 MIN: Performed by: THORACIC SURGERY (CARDIOTHORACIC VASCULAR SURGERY)

## 2020-06-25 PROCEDURE — 27201107 HC STYLET, STANDARD: Performed by: ANESTHESIOLOGY

## 2020-06-25 PROCEDURE — 27202107 HC XP QUATRO SENSOR: Performed by: ANESTHESIOLOGY

## 2020-06-25 PROCEDURE — 27200066: Performed by: ANESTHESIOLOGY

## 2020-06-25 PROCEDURE — 27800505 HC CATH, RADIAL ARTERY KIT: Performed by: ANESTHESIOLOGY

## 2020-06-25 PROCEDURE — 84100 ASSAY OF PHOSPHORUS: CPT

## 2020-06-25 PROCEDURE — 93005 ELECTROCARDIOGRAM TRACING: CPT | Performed by: INTERNAL MEDICINE

## 2020-06-25 PROCEDURE — S0028 INJECTION, FAMOTIDINE, 20 MG: HCPCS | Performed by: NURSE ANESTHETIST, CERTIFIED REGISTERED

## 2020-06-25 PROCEDURE — 80048 BASIC METABOLIC PNL TOTAL CA: CPT | Mod: 91

## 2020-06-25 PROCEDURE — 27000658 HC ARTERIAL LINE ALL: Performed by: ANESTHESIOLOGY

## 2020-06-25 PROCEDURE — C1729 CATH, DRAINAGE: HCPCS | Performed by: THORACIC SURGERY (CARDIOTHORACIC VASCULAR SURGERY)

## 2020-06-25 PROCEDURE — 85730 THROMBOPLASTIN TIME PARTIAL: CPT

## 2020-06-25 PROCEDURE — 20000000 HC ICU ROOM

## 2020-06-25 PROCEDURE — 27000673 HC TUBING BLOOD Y: Performed by: ANESTHESIOLOGY

## 2020-06-25 PROCEDURE — 84295 ASSAY OF SERUM SODIUM: CPT

## 2020-06-25 PROCEDURE — 63600175 PHARM REV CODE 636 W HCPCS

## 2020-06-25 PROCEDURE — 99900035 HC TECH TIME PER 15 MIN (STAT)

## 2020-06-25 PROCEDURE — 85027 COMPLETE CBC AUTOMATED: CPT

## 2020-06-25 PROCEDURE — 82962 GLUCOSE BLOOD TEST: CPT

## 2020-06-25 PROCEDURE — C9248 INJ, CLEVIDIPINE BUTYRATE: HCPCS | Performed by: INTERNAL MEDICINE

## 2020-06-25 PROCEDURE — 27000676 HC TUBING PRIMARY PLUMSET: Performed by: ANESTHESIOLOGY

## 2020-06-25 PROCEDURE — 63600175 PHARM REV CODE 636 W HCPCS: Performed by: NURSE ANESTHETIST, CERTIFIED REGISTERED

## 2020-06-25 PROCEDURE — 36415 COLL VENOUS BLD VENIPUNCTURE: CPT

## 2020-06-25 PROCEDURE — 27000666 HC INFUSOR PRESSURE BAG: Performed by: ANESTHESIOLOGY

## 2020-06-25 PROCEDURE — 33508 PR ENDOSCOPY W/VIDEO-ASST VEIN HARVEST,CABG: ICD-10-PCS | Mod: ,,, | Performed by: THORACIC SURGERY (CARDIOTHORACIC VASCULAR SURGERY)

## 2020-06-25 PROCEDURE — 33533 PR CABG, ARTERIAL, SINGLE: ICD-10-PCS | Mod: ,,, | Performed by: THORACIC SURGERY (CARDIOTHORACIC VASCULAR SURGERY)

## 2020-06-25 PROCEDURE — S0017 INJECTION, AMINOCAPROIC ACID: HCPCS | Performed by: NURSE ANESTHETIST, CERTIFIED REGISTERED

## 2020-06-25 PROCEDURE — 25000003 PHARM REV CODE 250: Performed by: THORACIC SURGERY (CARDIOTHORACIC VASCULAR SURGERY)

## 2020-06-25 PROCEDURE — 27100025 HC TUBING, SET FLUID WARMER: Performed by: ANESTHESIOLOGY

## 2020-06-25 PROCEDURE — C9248 INJ, CLEVIDIPINE BUTYRATE: HCPCS

## 2020-06-25 PROCEDURE — 27200678 HC TRANSDUCER MONITOR KIT TRIPLE: Performed by: ANESTHESIOLOGY

## 2020-06-25 PROCEDURE — 33523 CABG ART-VEIN SIX OR MORE: CPT | Mod: ,,, | Performed by: THORACIC SURGERY (CARDIOTHORACIC VASCULAR SURGERY)

## 2020-06-25 PROCEDURE — 80048 BASIC METABOLIC PNL TOTAL CA: CPT

## 2020-06-25 PROCEDURE — 37000008 HC ANESTHESIA 1ST 15 MINUTES: Performed by: THORACIC SURGERY (CARDIOTHORACIC VASCULAR SURGERY)

## 2020-06-25 PROCEDURE — 33523: ICD-10-PCS | Mod: AS,,, | Performed by: NURSE PRACTITIONER

## 2020-06-25 PROCEDURE — 94002 VENT MGMT INPAT INIT DAY: CPT

## 2020-06-25 PROCEDURE — 94761 N-INVAS EAR/PLS OXIMETRY MLT: CPT

## 2020-06-25 PROCEDURE — 27000284 HC CANNULA NASAL: Performed by: ANESTHESIOLOGY

## 2020-06-25 PROCEDURE — 87641 MR-STAPH DNA AMP PROBE: CPT

## 2020-06-25 PROCEDURE — 33533 CABG ARTERIAL SINGLE: CPT | Mod: AS,,, | Performed by: NURSE PRACTITIONER

## 2020-06-25 PROCEDURE — 27000221 HC OXYGEN, UP TO 24 HOURS

## 2020-06-25 PROCEDURE — 36000712 HC OR TIME LEV V 1ST 15 MIN: Performed by: THORACIC SURGERY (CARDIOTHORACIC VASCULAR SURGERY)

## 2020-06-25 PROCEDURE — 33523 CABG ART-VEIN SIX OR MORE: CPT | Mod: AS,,, | Performed by: NURSE PRACTITIONER

## 2020-06-25 PROCEDURE — 27000080 OPTIME MED/SURG SUP & DEVICES GENERAL CLASSIFICATION: Performed by: THORACIC SURGERY (CARDIOTHORACIC VASCULAR SURGERY)

## 2020-06-25 PROCEDURE — 27201423 OPTIME MED/SURG SUP & DEVICES STERILE SUPPLY: Performed by: THORACIC SURGERY (CARDIOTHORACIC VASCULAR SURGERY)

## 2020-06-25 PROCEDURE — 25000003 PHARM REV CODE 250

## 2020-06-25 PROCEDURE — 27000675 HC TUBING MICRODRIP: Performed by: ANESTHESIOLOGY

## 2020-06-25 PROCEDURE — 84132 ASSAY OF SERUM POTASSIUM: CPT

## 2020-06-25 PROCEDURE — 33508 ENDOSCOPIC VEIN HARVEST: CPT | Mod: ,,, | Performed by: THORACIC SURGERY (CARDIOTHORACIC VASCULAR SURGERY)

## 2020-06-25 PROCEDURE — 33523: ICD-10-PCS | Mod: ,,, | Performed by: THORACIC SURGERY (CARDIOTHORACIC VASCULAR SURGERY)

## 2020-06-25 PROCEDURE — 37000009 HC ANESTHESIA EA ADD 15 MINS: Performed by: THORACIC SURGERY (CARDIOTHORACIC VASCULAR SURGERY)

## 2020-06-25 PROCEDURE — 82330 ASSAY OF CALCIUM: CPT | Mod: 91

## 2020-06-25 PROCEDURE — 37799 UNLISTED PX VASCULAR SURGERY: CPT

## 2020-06-25 PROCEDURE — 80053 COMPREHEN METABOLIC PANEL: CPT

## 2020-06-25 PROCEDURE — 63600175 PHARM REV CODE 636 W HCPCS: Performed by: INTERNAL MEDICINE

## 2020-06-25 PROCEDURE — 27202163 HC CATH MULTI LUMEN: Performed by: ANESTHESIOLOGY

## 2020-06-25 RX ORDER — EPHEDRINE SULFATE 50 MG/ML
INJECTION, SOLUTION INTRAVENOUS
Status: DISCONTINUED | OUTPATIENT
Start: 2020-06-25 | End: 2020-06-25

## 2020-06-25 RX ORDER — HYDROCODONE BITARTRATE AND ACETAMINOPHEN 5; 325 MG/1; MG/1
1 TABLET ORAL EVERY 4 HOURS PRN
Status: DISCONTINUED | OUTPATIENT
Start: 2020-06-25 | End: 2020-07-02 | Stop reason: HOSPADM

## 2020-06-25 RX ORDER — CEFAZOLIN SODIUM 2 G/50ML
2 SOLUTION INTRAVENOUS
Status: COMPLETED | OUTPATIENT
Start: 2020-06-25 | End: 2020-06-27

## 2020-06-25 RX ORDER — MIDAZOLAM HYDROCHLORIDE 1 MG/ML
INJECTION INTRAMUSCULAR; INTRAVENOUS
Status: DISCONTINUED | OUTPATIENT
Start: 2020-06-25 | End: 2020-06-25

## 2020-06-25 RX ORDER — SODIUM CHLORIDE 9 MG/ML
INJECTION, SOLUTION INTRAVENOUS CONTINUOUS PRN
Status: DISCONTINUED | OUTPATIENT
Start: 2020-06-25 | End: 2020-06-25

## 2020-06-25 RX ORDER — MORPHINE SULFATE 2 MG/ML
2 INJECTION, SOLUTION INTRAMUSCULAR; INTRAVENOUS EVERY 10 MIN PRN
Status: DISCONTINUED | OUTPATIENT
Start: 2020-06-25 | End: 2020-07-02 | Stop reason: HOSPADM

## 2020-06-25 RX ORDER — SUCCINYLCHOLINE CHLORIDE 20 MG/ML
INJECTION INTRAMUSCULAR; INTRAVENOUS
Status: DISCONTINUED | OUTPATIENT
Start: 2020-06-25 | End: 2020-06-25

## 2020-06-25 RX ORDER — LIDOCAINE HYDROCHLORIDE 20 MG/ML
INJECTION, SOLUTION EPIDURAL; INFILTRATION; INTRACAUDAL; PERINEURAL
Status: DISCONTINUED | OUTPATIENT
Start: 2020-06-25 | End: 2020-06-25

## 2020-06-25 RX ORDER — POTASSIUM CHLORIDE 29.8 MG/ML
40 INJECTION INTRAVENOUS
Status: DISCONTINUED | OUTPATIENT
Start: 2020-06-25 | End: 2020-06-28

## 2020-06-25 RX ORDER — TALC
6 POWDER (GRAM) TOPICAL NIGHTLY PRN
Status: DISCONTINUED | OUTPATIENT
Start: 2020-06-26 | End: 2020-07-02 | Stop reason: HOSPADM

## 2020-06-25 RX ORDER — CALCIUM CHLORIDE INJECTION 100 MG/ML
INJECTION, SOLUTION INTRAVENOUS
Status: COMPLETED
Start: 2020-06-25 | End: 2020-06-25

## 2020-06-25 RX ORDER — SODIUM BICARBONATE 1 MEQ/ML
SYRINGE (ML) INTRAVENOUS
Status: DISCONTINUED | OUTPATIENT
Start: 2020-06-25 | End: 2020-06-25

## 2020-06-25 RX ORDER — ATORVASTATIN CALCIUM 40 MG/1
40 TABLET, FILM COATED ORAL NIGHTLY
Status: DISCONTINUED | OUTPATIENT
Start: 2020-06-26 | End: 2020-07-02 | Stop reason: HOSPADM

## 2020-06-25 RX ORDER — DOCUSATE SODIUM 100 MG/1
100 CAPSULE, LIQUID FILLED ORAL DAILY
Status: DISCONTINUED | OUTPATIENT
Start: 2020-06-26 | End: 2020-07-02 | Stop reason: HOSPADM

## 2020-06-25 RX ORDER — ALBUMIN HUMAN 50 G/1000ML
SOLUTION INTRAVENOUS CONTINUOUS PRN
Status: DISCONTINUED | OUTPATIENT
Start: 2020-06-25 | End: 2020-06-25

## 2020-06-25 RX ORDER — ETOMIDATE 2 MG/ML
INJECTION INTRAVENOUS
Status: DISCONTINUED | OUTPATIENT
Start: 2020-06-25 | End: 2020-06-25

## 2020-06-25 RX ORDER — ALBUMIN HUMAN 50 G/1000ML
SOLUTION INTRAVENOUS
Status: COMPLETED
Start: 2020-06-25 | End: 2020-06-25

## 2020-06-25 RX ORDER — HEPARIN SODIUM 10000 [USP'U]/ML
INJECTION, SOLUTION INTRAVENOUS; SUBCUTANEOUS
Status: DISCONTINUED | OUTPATIENT
Start: 2020-06-25 | End: 2020-06-25 | Stop reason: HOSPADM

## 2020-06-25 RX ORDER — AMINOCAPROIC ACID 250 MG/ML
INJECTION, SOLUTION INTRAVENOUS
Status: DISCONTINUED | OUTPATIENT
Start: 2020-06-25 | End: 2020-06-25

## 2020-06-25 RX ORDER — ACETAMINOPHEN 325 MG/1
650 TABLET ORAL EVERY 6 HOURS PRN
Status: DISCONTINUED | OUTPATIENT
Start: 2020-06-25 | End: 2020-07-02 | Stop reason: HOSPADM

## 2020-06-25 RX ORDER — ONDANSETRON 2 MG/ML
INJECTION INTRAMUSCULAR; INTRAVENOUS
Status: DISCONTINUED | OUTPATIENT
Start: 2020-06-25 | End: 2020-06-25

## 2020-06-25 RX ORDER — PROTAMINE SULFATE 10 MG/ML
INJECTION, SOLUTION INTRAVENOUS
Status: DISCONTINUED | OUTPATIENT
Start: 2020-06-25 | End: 2020-06-25

## 2020-06-25 RX ORDER — MIDAZOLAM HYDROCHLORIDE 1 MG/ML
1 INJECTION INTRAMUSCULAR; INTRAVENOUS
Status: DISCONTINUED | OUTPATIENT
Start: 2020-06-25 | End: 2020-06-26

## 2020-06-25 RX ORDER — FENOFIBRATE 145 MG/1
145 TABLET, FILM COATED ORAL
Status: DISCONTINUED | OUTPATIENT
Start: 2020-06-26 | End: 2020-07-02 | Stop reason: HOSPADM

## 2020-06-25 RX ORDER — DULOXETIN HYDROCHLORIDE 30 MG/1
30 CAPSULE, DELAYED RELEASE ORAL NIGHTLY
Status: DISCONTINUED | OUTPATIENT
Start: 2020-06-26 | End: 2020-07-02 | Stop reason: HOSPADM

## 2020-06-25 RX ORDER — MAGNESIUM SULFATE HEPTAHYDRATE 40 MG/ML
2 INJECTION, SOLUTION INTRAVENOUS
Status: DISCONTINUED | OUTPATIENT
Start: 2020-06-25 | End: 2020-06-28

## 2020-06-25 RX ORDER — VECURONIUM BROMIDE FOR INJECTION 1 MG/ML
INJECTION, POWDER, LYOPHILIZED, FOR SOLUTION INTRAVENOUS
Status: DISCONTINUED | OUTPATIENT
Start: 2020-06-25 | End: 2020-06-25

## 2020-06-25 RX ORDER — MAGNESIUM SULFATE HEPTAHYDRATE 40 MG/ML
4 INJECTION, SOLUTION INTRAVENOUS
Status: DISCONTINUED | OUTPATIENT
Start: 2020-06-25 | End: 2020-06-28

## 2020-06-25 RX ORDER — ROCURONIUM BROMIDE 10 MG/ML
INJECTION, SOLUTION INTRAVENOUS
Status: DISCONTINUED | OUTPATIENT
Start: 2020-06-25 | End: 2020-06-25

## 2020-06-25 RX ORDER — HEPARIN SODIUM 10000 [USP'U]/ML
INJECTION, SOLUTION INTRAVENOUS; SUBCUTANEOUS
Status: DISCONTINUED | OUTPATIENT
Start: 2020-06-25 | End: 2020-06-25

## 2020-06-25 RX ORDER — SODIUM CHLORIDE 9 MG/ML
INJECTION, SOLUTION INTRAVENOUS CONTINUOUS
Status: DISCONTINUED | OUTPATIENT
Start: 2020-06-25 | End: 2020-06-30

## 2020-06-25 RX ORDER — FAMOTIDINE 10 MG/ML
INJECTION INTRAVENOUS
Status: DISCONTINUED | OUTPATIENT
Start: 2020-06-25 | End: 2020-06-25

## 2020-06-25 RX ORDER — PHENYLEPHRINE HYDROCHLORIDE 10 MG/ML
INJECTION INTRAVENOUS
Status: DISCONTINUED | OUTPATIENT
Start: 2020-06-25 | End: 2020-06-25

## 2020-06-25 RX ORDER — POTASSIUM CHLORIDE 14.9 MG/ML
60 INJECTION INTRAVENOUS
Status: DISCONTINUED | OUTPATIENT
Start: 2020-06-25 | End: 2020-06-28

## 2020-06-25 RX ORDER — FENTANYL CITRATE 50 UG/ML
INJECTION, SOLUTION INTRAMUSCULAR; INTRAVENOUS
Status: DISCONTINUED | OUTPATIENT
Start: 2020-06-25 | End: 2020-06-25

## 2020-06-25 RX ORDER — ASPIRIN 325 MG
325 TABLET ORAL DAILY
Status: DISCONTINUED | OUTPATIENT
Start: 2020-06-26 | End: 2020-07-02 | Stop reason: HOSPADM

## 2020-06-25 RX ORDER — POTASSIUM CHLORIDE 14.9 MG/ML
20 INJECTION INTRAVENOUS
Status: DISCONTINUED | OUTPATIENT
Start: 2020-06-25 | End: 2020-06-28

## 2020-06-25 RX ADMIN — ROCURONIUM BROMIDE 5 MG: 10 INJECTION, SOLUTION INTRAVENOUS at 06:06

## 2020-06-25 RX ADMIN — FENTANYL CITRATE 500 MCG: 50 INJECTION INTRAMUSCULAR; INTRAVENOUS at 06:06

## 2020-06-25 RX ADMIN — LIDOCAINE HYDROCHLORIDE 50 MG: 20 INJECTION, SOLUTION INTRAVENOUS at 06:06

## 2020-06-25 RX ADMIN — ALBUMIN (HUMAN) 25 G: 12.5 SOLUTION INTRAVENOUS at 03:06

## 2020-06-25 RX ADMIN — SUCCINYLCHOLINE CHLORIDE 120 MG: 20 INJECTION, SOLUTION INTRAMUSCULAR; INTRAVENOUS at 06:06

## 2020-06-25 RX ADMIN — SODIUM BICARBONATE 100 MEQ: 84 INJECTION, SOLUTION INTRAVENOUS at 01:06

## 2020-06-25 RX ADMIN — FAMOTIDINE 20 MG: 10 INJECTION, SOLUTION INTRAVENOUS at 11:06

## 2020-06-25 RX ADMIN — ETOMIDATE 10 MG: 2 INJECTION, SOLUTION INTRAVENOUS at 06:06

## 2020-06-25 RX ADMIN — MIDAZOLAM HYDROCHLORIDE 1 MG: 1 INJECTION, SOLUTION INTRAMUSCULAR; INTRAVENOUS at 05:06

## 2020-06-25 RX ADMIN — FENTANYL CITRATE 250 MCG: 50 INJECTION INTRAMUSCULAR; INTRAVENOUS at 12:06

## 2020-06-25 RX ADMIN — PHENYLEPHRINE HYDROCHLORIDE 100 MCG: 10 INJECTION INTRAVENOUS at 06:06

## 2020-06-25 RX ADMIN — MIDAZOLAM HYDROCHLORIDE 2 MG: 1 INJECTION, SOLUTION INTRAMUSCULAR; INTRAVENOUS at 06:06

## 2020-06-25 RX ADMIN — FENTANYL CITRATE 500 MCG: 50 INJECTION INTRAMUSCULAR; INTRAVENOUS at 08:06

## 2020-06-25 RX ADMIN — CLEVIPIDINE 2 MG/HR: 0.5 EMULSION INTRAVENOUS at 03:06

## 2020-06-25 RX ADMIN — VECURONIUM BROMIDE 7 MG: 1 INJECTION, POWDER, LYOPHILIZED, FOR SOLUTION INTRAVENOUS at 08:06

## 2020-06-25 RX ADMIN — SODIUM CHLORIDE: 0.9 INJECTION, SOLUTION INTRAVENOUS at 03:06

## 2020-06-25 RX ADMIN — FENTANYL CITRATE 500 MCG: 50 INJECTION INTRAMUSCULAR; INTRAVENOUS at 07:06

## 2020-06-25 RX ADMIN — HEPARIN SODIUM 35000 UNITS: 10000 INJECTION, SOLUTION INTRAVENOUS; SUBCUTANEOUS at 08:06

## 2020-06-25 RX ADMIN — SODIUM BICARBONATE: 84 INJECTION, SOLUTION INTRAVENOUS at 06:06

## 2020-06-25 RX ADMIN — EPHEDRINE SULFATE 10 MG: 50 INJECTION, SOLUTION INTRAVENOUS at 07:06

## 2020-06-25 RX ADMIN — HEPARIN SODIUM: 1000 INJECTION, SOLUTION INTRAVENOUS; SUBCUTANEOUS at 06:06

## 2020-06-25 RX ADMIN — CLEVIPIDINE 7 MG/HR: 0.5 EMULSION INTRAVENOUS at 10:06

## 2020-06-25 RX ADMIN — MORPHINE SULFATE 2 MG: 2 INJECTION, SOLUTION INTRAMUSCULAR; INTRAVENOUS at 05:06

## 2020-06-25 RX ADMIN — PHENYLEPHRINE HYDROCHLORIDE 100 MCG: 10 INJECTION INTRAVENOUS at 07:06

## 2020-06-25 RX ADMIN — FENTANYL CITRATE 250 MCG: 50 INJECTION INTRAMUSCULAR; INTRAVENOUS at 02:06

## 2020-06-25 RX ADMIN — ALBUMIN (HUMAN): 12.5 SOLUTION INTRAVENOUS at 01:06

## 2020-06-25 RX ADMIN — SODIUM BICARBONATE: 84 INJECTION, SOLUTION INTRAVENOUS at 07:06

## 2020-06-25 RX ADMIN — VECURONIUM BROMIDE 5 MG: 1 INJECTION, POWDER, LYOPHILIZED, FOR SOLUTION INTRAVENOUS at 07:06

## 2020-06-25 RX ADMIN — SODIUM CHLORIDE 5 UNITS/HR: 9 INJECTION, SOLUTION INTRAVENOUS at 07:06

## 2020-06-25 RX ADMIN — PROTAMINE SULFATE 300 MG: 10 INJECTION, SOLUTION INTRAVENOUS at 12:06

## 2020-06-25 RX ADMIN — MORPHINE SULFATE 2 MG: 2 INJECTION, SOLUTION INTRAMUSCULAR; INTRAVENOUS at 08:06

## 2020-06-25 RX ADMIN — AMINOCAPROIC ACID 5 G: 250 INJECTION, SOLUTION INTRAVENOUS at 12:06

## 2020-06-25 RX ADMIN — CEFAZOLIN SODIUM 2 G: 2 SOLUTION INTRAVENOUS at 06:06

## 2020-06-25 RX ADMIN — MIDAZOLAM HYDROCHLORIDE 3 MG: 1 INJECTION, SOLUTION INTRAMUSCULAR; INTRAVENOUS at 01:06

## 2020-06-25 RX ADMIN — SODIUM CHLORIDE 2 UNITS/HR: 9 INJECTION, SOLUTION INTRAVENOUS at 05:06

## 2020-06-25 RX ADMIN — CLEVIPIDINE 5 MG/HR: 0.5 EMULSION INTRAVENOUS at 06:06

## 2020-06-25 RX ADMIN — ROCURONIUM BROMIDE 45 MG: 10 INJECTION, SOLUTION INTRAVENOUS at 06:06

## 2020-06-25 RX ADMIN — MIDAZOLAM HYDROCHLORIDE 3 MG: 1 INJECTION, SOLUTION INTRAMUSCULAR; INTRAVENOUS at 11:06

## 2020-06-25 RX ADMIN — SODIUM CHLORIDE: 0.9 INJECTION, SOLUTION INTRAVENOUS at 06:06

## 2020-06-25 RX ADMIN — CHLORHEXIDINE GLUCONATE 15 ML: 1.2 RINSE ORAL at 08:06

## 2020-06-25 RX ADMIN — FENTANYL CITRATE 250 MCG: 50 INJECTION INTRAMUSCULAR; INTRAVENOUS at 06:06

## 2020-06-25 RX ADMIN — FENTANYL CITRATE 250 MCG: 50 INJECTION INTRAMUSCULAR; INTRAVENOUS at 01:06

## 2020-06-25 RX ADMIN — ONDANSETRON 4 MG: 2 INJECTION INTRAMUSCULAR; INTRAVENOUS at 11:06

## 2020-06-25 RX ADMIN — CEFAZOLIN SODIUM 2 G: 2 SOLUTION INTRAVENOUS at 04:06

## 2020-06-25 RX ADMIN — FENTANYL CITRATE 250 MCG: 50 INJECTION INTRAMUSCULAR; INTRAVENOUS at 07:06

## 2020-06-25 RX ADMIN — FENTANYL CITRATE 250 MCG: 50 INJECTION INTRAMUSCULAR; INTRAVENOUS at 08:06

## 2020-06-25 RX ADMIN — VECURONIUM BROMIDE 4 MG: 1 INJECTION, POWDER, LYOPHILIZED, FOR SOLUTION INTRAVENOUS at 11:06

## 2020-06-25 RX ADMIN — VECURONIUM BROMIDE 4 MG: 1 INJECTION, POWDER, LYOPHILIZED, FOR SOLUTION INTRAVENOUS at 01:06

## 2020-06-25 RX ADMIN — CALCIUM CHLORIDE: 100 INJECTION, SOLUTION INTRAVENOUS; INTRAVENTRICULAR at 02:06

## 2020-06-25 RX ADMIN — EPHEDRINE SULFATE 10 MG: 50 INJECTION, SOLUTION INTRAVENOUS at 06:06

## 2020-06-25 RX ADMIN — SODIUM CHLORIDE: 0.9 IRRIGANT IRRIGATION at 06:06

## 2020-06-25 RX ADMIN — AMINOCAPROIC ACID 5 G: 250 INJECTION, SOLUTION INTRAVENOUS at 07:06

## 2020-06-25 RX ADMIN — VANCOMYCIN HYDROCHLORIDE 1.5 G: 1.5 INJECTION, POWDER, LYOPHILIZED, FOR SOLUTION INTRAVENOUS at 06:06

## 2020-06-25 NOTE — NURSING
Pt groin had more manual pressure held until 5pm, and groing became soft, but bruised.  Dr Kaiser visited pt, and examined.       Frequent checks on groin q15min , and no further hematoma formation palpated.     Viewed groin with arriving JESSY Betancourt.

## 2020-06-25 NOTE — ANESTHESIA PROCEDURE NOTES
Arterial    Diagnosis: hemodynamic instability    Patient location during procedure: ICU  Procedure start time: 6/25/2020 6:15 AM  Timeout: 6/25/2020 6:15 AM  Procedure end time: 6/25/2020 6:26 AM    Staffing  Authorizing Provider: Dion Lee MD  Performing Provider: Dion Lee MD    Anesthesiologist was present at the time of the procedure.    Preanesthetic Checklist  Completed: patient identified, timeout performed, risks and benefits discussed, monitors and equipment checked and anesthesia consent givenArterial  Skin Prep: chlorhexidine gluconate  Local Infiltration: lidocaine  Orientation: right  Location: radial  Catheter Size: 20 G  Catheter placement by Ultrasound guidance. Heme positive aspiration all ports.  Vessel Caliber: medium, patent, compressibility normal  Vascular Doppler:  not done  Needle advanced into vessel with real time Ultrasound guidance.  Sterile sheath used.Insertion Attempts: 1  Assessment  Dressing: sutured in place and taped and tegaderm  Patient: Tolerated well

## 2020-06-25 NOTE — TRANSFER OF CARE
"Anesthesia Transfer of Care Note    Patient: Sincere Malave    Procedure(s) Performed: Procedure(s) (LRB):  CORONARY ARTERY BYPASS GRAFT (CABG) (N/A)    Patient location: ICU    Anesthesia Type: general    Transport from OR: Transported from OR intubated on 100% O2 by AMBU with adequate controlled ventilation. Upon arrival to PACU/ICU, patient attached to ventilator and auscultated to confirm bilateral breath sounds and adequate TV. Continuous ECG monitoring in transport. Continuous SpO2 monitoring in transport. Continuos invasive BP monitoring in transport. Continuous CVP monitoring in transport. Continuous PA pressure monitoring in transport    Post pain: adequate analgesia    Post assessment: no apparent anesthetic complications    Post vital signs: stable    Level of consciousness: awake    Nausea/Vomiting: no nausea/vomiting    Complications: none    Transfer of care protocol was followed      Last vitals:   Visit Vitals  BP (!) 171/72   Pulse 63   Temp (P) 36.7 °C (98 °F)   Resp (!) 21   Ht 5' 7" (1.702 m)   Wt 91.7 kg (202 lb 2.6 oz)   SpO2 100%   BMI 31.64 kg/m²     "

## 2020-06-25 NOTE — PLAN OF CARE
Plan of care reviewed with patient and patient's spouse via telephone. Both verbalized understanding and spouse states she will be seeing her  around 0400 prior to surgery.

## 2020-06-25 NOTE — NURSING
0400- Spouse at bedside visiting patient before surgery. Spouse has no questions or concerns at this time.    0604- Surgery team wheeled patient back to surgery Chart/consents given to Juju and cables, pumps, and splitter taken by surgery team. Spouse taken to waiting room and advised her someone will update her when surgery starts.

## 2020-06-25 NOTE — ANESTHESIA PROCEDURE NOTES
[place] Kassandra Line    Diagnosis: CAD  Patient location during procedure: done in OR  Procedure start time: 6/25/2020 6:37 AM  Timeout: 6/25/2020 6:35 AM  Procedure end time: 6/25/2020 6:57 AM    Staffing  Authorizing Provider: Dion Lee MD  Performing Provider: Dion Lee MD    Anesthesiologist was present at the time of the procedure.  Preanesthetic Checklist  Completed: patient identified, surgical consent, pre-op evaluation, timeout performed, IV checked, risks and benefits discussed, monitors and equipment checked and anesthesia consent given  [place] Kassandra Line  Skin Prep: chlorhexidine gluconate  Local Infiltration: none  Location: right,  internal jugular vein  Vessel Caliber: medium, patent, compressibility normal  Introducer: 9 Fr single lumen, manometry not used.  Device: CCO/Oximetric Catheter  Catheter Size: 7.5 Fr  Catheter placement by yes. Heme positive aspiration all ports. PAC floated with balloon up until wedgedSterile sheath used  Locked at: 49 cm.Insertion Attempts: 1  Indication: hemodynamic monitoring, intravenous therapy  Ultrasound Guidance  Needle advanced into vessel with real time Ultrasound guidance.  Guidewire confirmed in vessel.  Sterile sheath used.  Assessment  Central Line Bundle Protocol followed. Hand hygiene before procedure, surgical cap worn, surgical mask worn, sterile surgical gloves worn, large sterile drape used.  Verification: blood return, pulsatile blood flow and ultrasound  Dressing: sutured in place and taped and tegaderm  Patient: Tolerated Well

## 2020-06-25 NOTE — NURSING
Pt  From or  S/p cabg  On vent see resp flow sheet ett 8.5 at 23 cm  Blood  collected via jordan  And  Sent to lab sternum dsg  C/d/i   2 medialstinum  yed  tp  Olpe 20cm  Left  Gal[place]  To  bulmaro  Charged with sangenous drainage 20cc output per bulmaro left evh site  C/d/i remainding see flow sheet/ left  Groin  Area  Soft  Ecchymosis from previous hemotoma

## 2020-06-25 NOTE — PLAN OF CARE
This note also relates to the following rows which could not be included:  Oxygen Concentration (%) - Cannot attach notes to unvalidated device data  SpO2 - Cannot attach notes to unvalidated device data  Pulse - Cannot attach notes to unvalidated device data  Resp - Cannot attach notes to unvalidated device data  Ventilation Type - Cannot attach notes to unvalidated device data  Vent Mode - Cannot attach notes to unvalidated device data  Set Rate - Cannot attach notes to unvalidated device data  Vt Set - Cannot attach notes to unvalidated device data  PEEP/CPAP - Cannot attach notes to unvalidated device data  Pressure Support - Cannot attach notes to unvalidated device data  Waveform - Cannot attach notes to unvalidated device data  Peak Flow - Cannot attach notes to unvalidated device data  Plateau Set/Insp. Hold (sec) - Cannot attach notes to unvalidated device data  Insp Rise Time  - Cannot attach notes to unvalidated device data  Trigger Sensitivity Flow/I-Trigger - Cannot attach notes to unvalidated device data  Resp Rate Total - Cannot attach notes to unvalidated device data  Peak Airway Pressure - Cannot attach notes to unvalidated device data  Mean Airway Pressure - Cannot attach notes to unvalidated device data  Plateau Pressure - Cannot attach notes to unvalidated device data  Exhaled Vt - Cannot attach notes to unvalidated device data  Total Ve - Cannot attach notes to unvalidated device data  Spont Ve - Cannot attach notes to unvalidated device data  I:E Ratio Measured - Cannot attach notes to unvalidated device data  Resp Rate High Alarm - Cannot attach notes to unvalidated device data  Press High Alarm - Cannot attach notes to unvalidated device data  Apnea Rate - Cannot attach notes to unvalidated device data  Apnea Volume (mL) - Cannot attach notes to unvalidated device data  Apnea Oxygen Concentration  - Cannot attach notes to unvalidated device data  Apnea Flow Rate (L/min) - Cannot attach notes  to unvalidated device data  T Apnea - Cannot attach notes to unvalidated device data       06/25/20 1442   PRE-TX-O2   O2 Device (Oxygen Therapy) ventilator   $ Is the patient on Low Flow Oxygen? Yes   Pulse Oximetry Type Continuous   $ Pulse Oximetry - Multiple Charge Pulse Oximetry - Multiple   [REMOVED]      Airway - Non-Surgical 06/25/20 0607 Endotracheal Tube   Removal Date/Time: 06/25/20 (c) 0723  Placement Date/Time: 06/25/20 0607   Present Prior to Hospital Arrival?: No  Method of Intubation: Direct laryngoscopy  Inserted by: CRNA  Airway Device: Endotracheal Tube  Mask Ventilation: Easy  Intubated: Posti...   Secured at 23 cm   Measured At Lips   Secured Location Center   Secured by Commercial tube [place]   Vent Select   Conventional Vent Y   Ventilator Initiated Yes   $ Ventilator Initial 1   Charged w/in last 24h YES   Preset Conventional Ventilator Settings   Vent Type    Respiratory Evaluation   $ Care Plan Tech Time 15 min

## 2020-06-25 NOTE — CARE UPDATE
06/24/20 2225   Patient Assessment/Suction   Level of Consciousness (AVPU) alert   All Lung Fields Breath Sounds clear   PRE-TX-O2   O2 Device (Oxygen Therapy) High Flow nasal Cannula   $ Is the patient on Low Flow Oxygen? Yes   Flow (L/min) 3   SpO2 97 %   Pulse Oximetry Type Continuous   $ Pulse Oximetry - Multiple Charge Pulse Oximetry - Multiple   Pulse 60   Resp (!) 21   Preset CPAP/BiPAP Settings   Mode Of Delivery BiPAP S/T   $ Is patient using? No/refused   Reason patient is not wearing? Patient refused

## 2020-06-25 NOTE — PROGRESS NOTES
[facility] Medicine    Progress Note    Patient Name: Sincere Malave  MRN: [id number]  Patient Class: IP- Inpatient   Admission Date: 6/21/2020  6:02 PM  Length of Stay: 4  Attending Physician: ZAKI LEON MD  Primary Care Provider: Primary Doctor No  Face-to-Face encounter date: 06/25/2020  Chief Complaint: Chest Pain         Patient ID: Sincere Malave is a 70 y.o. male admitted for   Active Hospital Problems    Diagnosis  POA    *NSTEMI (non-ST elevated myocardial infarction) [I21.4]  Yes    Femoral artery hematoma complicating cardiac catheterization [I97.410]  No     Left  6/24/2020      ASCVD (arteriosclerotic cardiovascular disease) [I25.10]  Yes    Pulmonary edema, acute [J81.0]  Yes    Essential hypertension [I10]  Yes    Acute-on-chronic kidney injury [N17.9, N18.9]  Yes    Coronary artery disease involving native coronary artery of native heart [I25.10]  Yes    Type 2 diabetes mellitus with hyperglycemia, with long-term current use of insulin [E11.65, Z79.4]  Not Applicable    HLD (hyperlipidemia) [E78.5]  Yes    TERESO (obstructive sleep apnea) [G47.33]  Yes    Acute hypoxemic respiratory failure [J96.01]  Yes    Bell's palsy [G51.0]  Yes      Resolved Hospital Problems   No resolved problems to display.      HPI by Dr Herbert 06/21/2020: 70 y.o. male with a PMHx of CAD with remote PCI, CKD 3, IDDM, hypertension, hyperlipidemia, neuropathy, sleep apnea, anemia who presents to [facility] ED with chief complaint of chest pain for 2 hr.  He described chest pain as a substernal chest tightness and tells me that his lower jaw hurts.  Patient was placed in ICU on Cardene drip however subsequent cardiac enzymes up trended and Cardiology was involved.  Patient was also given Rocephin and azithromycin for presumed pneumonia.  He also received aspirin, statin, beta-blocker and weight based Lovenox.  Patient was transferred to [facility] for angiographic evaluation tomorrow  morning.  Upon my interview he reports mild chest discomfort and jaw pain and associated shortness of breath.  Otherwise denies any fever, chills, headache, dizziness, palpitations, nausea, vomiting, bladder or bowel symptoms.      Subjective:      Interval History: Patient seen and examined this afternoon, patient status post CABG x6.  Currently sedated and intubated  Family present at bedside.  No concerns/issues overnight reported by the patient or the nursing staff.    Review of Systems unable to perform today due to sedation intubation    Objective:     Physical Exam  Vitals:    06/25/20 1630   BP:    Pulse: 71   Resp: 19   Temp:      Wt Readings from Last 1 Encounters:   06/21/20 91.7 kg (202 lb 2.6 oz)      Body mass index is 31.64 kg/m².    Vitals reviewed.  Constitutional:  sedated and intubated  Obese  HENT: NC, large neck circumference, ET tube in place  Head: Atraumatic.   Cardiovascular: Normal rate, regular rhythm and normal heart sounds.  Mild pericardial rub  Pulmonary/Chest: Effort normal.  Clear anteriorly.  No wheezes.   Abdominal: Soft.  obese Bowel sounds are normal. No distension and no mass. No tenderness  : Pineda in place with blood tinged urine with sediments in tube  Neurological:  Sedated, intubated  Skin:  Left lower extremity wrapped in Ace bandage  Psych:  Sedated and intubated    Following labs were Reviewed   CBC:  Recent Labs   Lab 06/25/20  0311   WBC 10.80   HGB 11.3*   HCT 34.1*        CMP:  Recent Labs   Lab 06/25/20  0311   CALCIUM 9.1   ALBUMIN 3.6   PROT 7.1      K 4.6   CO2 26      BUN 42*   CREATININE 1.4   ALKPHOS 49*   ALT 48*   AST 25   BILITOT 1.2*     Last 72 hour POCT GLUCOSE  No results found for: POCTGLUCOSE     Microbiology Results (last 7 days)     Procedure Component Value Units Date/Time    MRSA Screen by PCR [[id number]] Collected: 06/25/20 0052    Order Status: Completed Updated: 06/25/20 0520     MRSA SCREEN BY PCR Negative    Narrative:       MRSA    Nasal culture [[id number]] Collected: 06/25/20 0036    Order Status: Canceled Specimen: Nasopharyngeal from Nose             US Extremity Non Vascular Limited Left   Final Result      US Carotid Bilateral   Final Result      X-Ray Chest AP Portable   Final Result            Scheduled Meds:   acyclovir  400 mg Oral 5x Daily    aspirin  81 mg Oral Daily    atorvastatin  40 mg Oral QHS    chlorhexidine  15 mL Mouth/Throat BID    dextran 70-hypromellose  1 drop Right Eye TID    DULoxetine  30 mg Oral QHS    normosol-R 300 mL with niCARdipine 5 mg, nitroGLYCERIN 5 mg, sodium bicarbonate 0.3 mEq   Irrigation Once    enalaprilat  1.25 mg Intravenous BID    fenofibrate  145 mg Oral Daily with breakfast    furosemide (LASIX) IV  20 mg Intravenous Daily    insulin aspart protamine-insulin aspart  40 Units Subcutaneous BIDWM    insulin (HUMAN R) infusion (adults)  1 Units/hr Intravenous Once    mupirocin   Nasal BID    NIFEdipine  30 mg Oral BID    pantoprazole  40 mg Intravenous Daily    potassium chloride  20 mEq Oral BID    cardioplegic solution   Intravenous Once    cardioplegic solution   Intravenous Once    sodium chloride 0.9% 1,000 mL with bacitracin 100,000 Units irrigation   Irrigation Once    sodium chloride 0.9% 1,000 mL with heparin (porcine) 6,000 Units irrigation   Irrigation Once    sodium chloride 0.9% 1,000 mL with heparin (porcine) 6,000 Units irrigation   Irrigation Once     Continuous Infusions:   nitroGLYCERIN Stopped (06/25/20 0601)     PRN Meds:.acetaminophen, calcium chloride IVPB, calcium chloride IVPB, calcium chloride IVPB, dextrose 50%, dextrose 50%, glucagon (human recombinant), glucose, glucose, custom IVPB builder **AND** custom IVPB builder, hydrALAZINE, HYDROcodone-acetaminophen, insulin regular, magnesium oxide, magnesium sulfate IVPB, magnesium sulfate IVPB, magnesium sulfate IVPB, magnesium sulfate IVPB, melatonin, morphine, morphine, mupirocin,  nitroGLYCERIN, ondansetron, potassium chloride in water, potassium chloride in water, potassium chloride in water, potassium chloride in water, potassium chloride, potassium chloride, potassium chloride, potassium chloride, senna-docusate 8.6-50 mg, sodium chloride 0.9%, sodium phosphate IVPB, sodium phosphate IVPB, sodium phosphate IVPB, sodium phosphate IVPB, sodium phosphate IVPB    6/22/20: Echo  · Concentric left ventricular remodeling.  · Mildly decreased left ventricular systolic function. The estimated ejection fraction is 40%.  · Grade I (mild) left ventricular diastolic dysfunction consistent with impaired relaxation.  · Local segmental wall motion abnormalities.  · Normal right ventricular systolic function.  · No pulmonary hypertension present.  · Mild tricuspid regurgitation.  · Normal central venous pressure (3 mmHg).  Evidence anterior apical scar is seen with slight expansion of the apex during systole  I had ejection fraction of left ventricle globally is approximately 40%  L evidence of elevation of left ventricular end-diastolic pressure is seen  No hemodynamically significant valvular regurgitation    06/22/2020: Carotid U/S b/l  1. Moderate bilateral carotid atheromatous plaque, with mildly elevated left ICA peak systolic velocities suggesting 50-69% left ICA stenosis.  2. No findings of hemodynamically significant right ICA stenosis.  3. Patent vertebral arteries with antegrade flow bilaterally.  Electronically Signed by Deandre CELAYA on 6/22/2020 4:06 PM    Assessment & Plan:     Multivessel CAD including L Main  Status post CABG x6 on 06/25/2020 with Dr. Cifuentes  Carotid artery disease  Postop day 0  Currently sedated and intubated  Left heart catheterization showed three-vessel coronary artery disease including left main.  Preserved LV ejection fraction.  Bilateral renal artery stenosis(50-60%)  Remains on aspirin, statin  Cannot tolerate beta-blocker due to bradycardia.    Not a  candidate for ACEI/ARB yet  Carotid ultrasound shows moderate bilateral carotid atheromatous plaque with mildly elevated left ICA peak systolic velocity suggesting 50-69% left ICA stenosis  Continue Lasix 20 mg IV daily  Pineda catheter in place, strict I/O    Hematoma L groin  Monitor closely  Dr Kaiser and Dr Larose on board     Bell's Palsy  Continue outpatient acyclovir, Prednisone completed     Diabetes, uncontrolled  A1C 7.7  Elevated blood sugars due to steroid use.   Increase ISS to high  Basal bolus insulin regimen, Accu-Checks       Discharge Planning:   S/P CABG 06/25/2020 with Dr. Larose    Above encounter included review of the medical records, interviewing and examining the patient face-to-face, discussion with family and other health care providers, ordering and interpreting lab/test results and formulating a plan of care.     Medical Decision Making:      [_] Low Complexity  [_] Moderate Complexity  [x] High Complexity      Manolo Alberto MD  Department of Hospital Medicine   [facility]

## 2020-06-25 NOTE — ANESTHESIA PROCEDURE NOTES
Central Line    Diagnosis: CAD  Patient location during procedure: done in OR  Procedure start time: 6/25/2020 6:37 AM  Timeout: 6/25/2020 6:35 AM  Procedure end time: 6/25/2020 6:57 AM    Staffing  Authorizing Provider: Dion Lee MD  Performing Provider: Dion Lee MD    Staffing  Anesthesiologist: Dion Lee MD  Performed: anesthesiologist   Anesthesiologist was present at the time of the procedure.  Preanesthetic Checklist  Completed: patient identified, surgical consent, pre-op evaluation, timeout performed, IV checked, risks and benefits discussed, monitors and equipment checked and anesthesia consent given  Indication   Indication: vascular access, hemodynamic monitoring, med administration     Anesthesia   general anesthesia    Central Line   Skin Prep: skin prepped with ChloraPrep, skin prep agent completely dried prior to procedure  maximum sterile barriers used during central venous catheter insertion  hand hygiene performed prior to central venous catheter insertion  Location: right, internal jugular.   Catheter type: triple lumen  Catheter Size: 7 Fr  Inserted Catheter Length: 15 cm  Ultrasound: vascular probe with ultrasound  Vessel Caliber: medium, patent, compressibility normal  Needle advanced into vessel with real time Ultrasound guidance.  Guidewire confirmed in vessel.  Manometry: none  Insertion Attempts: 1   Securement:line sutured, chlorhexidine patch, sterile dressing applied and blood return through all ports    Post-Procedure   Adverse Events:none    Guidewire Guidewire removed intact. Guidewire removed intact, verified with nurse.

## 2020-06-25 NOTE — PLAN OF CARE
"Spoke with wife on update status "still working, pt stable at this time" -Andrew Montero RN SURGERY.  "

## 2020-06-25 NOTE — ANESTHESIA PROCEDURE NOTES
Intubation  Performed by: Ramiro Tinsley CRNA  Authorized by: Ramiro Tinsley CRNA     Intubation:     Induction:  Intravenous    Intubated:  Postinduction    Mask Ventilation:  N/a    Attempts:  1    Attempted By:  CRNA    Method of Intubation:  Direct    Blade:  Lee 2    Laryngeal View Grade: Grade I - full view of chords      Difficult Airway Encountered?: No      Complications:  None    Airway Device:  Oral endotracheal tube    Airway Device Size:  8.5    Style/Cuff Inflation:  Other (see comments)    Inflation Amount (mL):  8    Tube secured:  23    Secured at:  The lips    Placement Verified By:  Capnometry    Complicating Factors:  Oropharyngeal edema or fat    Findings Post-Intubation:  BS equal bilateral

## 2020-06-26 LAB
ALLENS TEST: ABNORMAL
ANION GAP SERPL CALC-SCNC: 10 MMOL/L (ref 8–16)
ANION GAP SERPL CALC-SCNC: 9 MMOL/L (ref 8–16)
BASOPHILS # BLD AUTO: 0.02 K/UL (ref 0–0.2)
BASOPHILS NFR BLD: 0.2 % (ref 0–1.9)
BUN SERPL-MCNC: 33 MG/DL (ref 8–23)
BUN SERPL-MCNC: 34 MG/DL (ref 8–23)
CA-I BLDV-SCNC: 1.2 MMOL/L (ref 1.06–1.42)
CALCIUM SERPL-MCNC: 8.4 MG/DL (ref 8.7–10.5)
CALCIUM SERPL-MCNC: 8.6 MG/DL (ref 8.7–10.5)
CHLORIDE SERPL-SCNC: 110 MMOL/L (ref 95–110)
CHLORIDE SERPL-SCNC: 111 MMOL/L (ref 95–110)
CO2 SERPL-SCNC: 21 MMOL/L (ref 23–29)
CO2 SERPL-SCNC: 22 MMOL/L (ref 23–29)
CREAT SERPL-MCNC: 1.3 MG/DL (ref 0.5–1.4)
CREAT SERPL-MCNC: 1.4 MG/DL (ref 0.5–1.4)
DELSYS: ABNORMAL
DIFFERENTIAL METHOD: ABNORMAL
EOSINOPHIL # BLD AUTO: 0 K/UL (ref 0–0.5)
EOSINOPHIL NFR BLD: 0 % (ref 0–8)
ERYTHROCYTE [DISTWIDTH] IN BLOOD BY AUTOMATED COUNT: 15.1 % (ref 11.5–14.5)
ERYTHROCYTE [DISTWIDTH] IN BLOOD BY AUTOMATED COUNT: 15.2 % (ref 11.5–14.5)
ERYTHROCYTE [SEDIMENTATION RATE] IN BLOOD BY WESTERGREN METHOD: 12 MM/H
ERYTHROCYTE [SEDIMENTATION RATE] IN BLOOD BY WESTERGREN METHOD: 500 MM/H
ERYTHROCYTE [SEDIMENTATION RATE] IN BLOOD BY WESTERGREN METHOD: 8 MM/H
EST. GFR  (AFRICAN AMERICAN): 59.3 ML/MIN/1.73 M^2
EST. GFR  (AFRICAN AMERICAN): >60 ML/MIN/1.73 M^2
EST. GFR  (NON AFRICAN AMERICAN): 51.3 ML/MIN/1.73 M^2
EST. GFR  (NON AFRICAN AMERICAN): 56.1 ML/MIN/1.73 M^2
FIO2: 0.35
FIO2: 0.35
FIO2: 0.4
FIO2: 45
FIO2: 55
FIO2: 55
FIO2: 65
FLOW: 3
FLOW: 65
GLUCOSE SERPL-MCNC: 118 MG/DL (ref 70–110)
GLUCOSE SERPL-MCNC: 119 MG/DL (ref 70–110)
GLUCOSE SERPL-MCNC: 128 MG/DL (ref 70–110)
GLUCOSE SERPL-MCNC: 130 MG/DL (ref 70–110)
GLUCOSE SERPL-MCNC: 140 MG/DL (ref 70–110)
GLUCOSE SERPL-MCNC: 164 MG/DL (ref 70–110)
GLUCOSE SERPL-MCNC: 167 MG/DL (ref 70–110)
GLUCOSE SERPL-MCNC: 181 MG/DL (ref 70–110)
GLUCOSE SERPL-MCNC: 202 MG/DL (ref 70–110)
GLUCOSE SERPL-MCNC: 205 MG/DL (ref 70–110)
GLUCOSE SERPL-MCNC: 207 MG/DL (ref 70–110)
GLUCOSE SERPL-MCNC: 208 MG/DL (ref 70–110)
GLUCOSE SERPL-MCNC: 208 MG/DL (ref 70–110)
GLUCOSE SERPL-MCNC: 211 MG/DL (ref 70–110)
GLUCOSE SERPL-MCNC: 215 MG/DL (ref 70–110)
GLUCOSE SERPL-MCNC: 217 MG/DL (ref 70–110)
GLUCOSE SERPL-MCNC: 220 MG/DL (ref 70–110)
GLUCOSE SERPL-MCNC: 220 MG/DL (ref 70–110)
GLUCOSE SERPL-MCNC: 244 MG/DL (ref 70–110)
GLUCOSE SERPL-MCNC: 250 MG/DL (ref 70–110)
GLUCOSE SERPL-MCNC: 252 MG/DL (ref 70–110)
HCO3 UR-SCNC: 21.8 MMOL/L (ref 24–28)
HCO3 UR-SCNC: 21.9 MMOL/L (ref 24–28)
HCO3 UR-SCNC: 22.4 MMOL/L (ref 24–28)
HCO3 UR-SCNC: 22.5 MMOL/L (ref 24–28)
HCO3 UR-SCNC: 22.9 MMOL/L (ref 24–28)
HCO3 UR-SCNC: 23.1 MMOL/L (ref 24–28)
HCO3 UR-SCNC: 23.1 MMOL/L (ref 24–28)
HCO3 UR-SCNC: 23.2 MMOL/L (ref 24–28)
HCO3 UR-SCNC: 23.6 MMOL/L (ref 24–28)
HCO3 UR-SCNC: 23.7 MMOL/L (ref 24–28)
HCO3 UR-SCNC: 24 MMOL/L (ref 24–28)
HCT VFR BLD AUTO: 26.1 % (ref 40–54)
HCT VFR BLD AUTO: 27.9 % (ref 40–54)
HCT VFR BLD CALC: 27 %PCV (ref 36–54)
HCT VFR BLD CALC: 28 %PCV (ref 36–54)
HGB BLD-MCNC: 8.9 G/DL (ref 14–18)
HGB BLD-MCNC: 9.6 G/DL (ref 14–18)
IMM GRANULOCYTES # BLD AUTO: 0.24 K/UL (ref 0–0.04)
IMM GRANULOCYTES NFR BLD AUTO: 1.8 % (ref 0–0.5)
LYMPHOCYTES # BLD AUTO: 0.7 K/UL (ref 1–4.8)
LYMPHOCYTES NFR BLD: 5.4 % (ref 18–48)
MAGNESIUM SERPL-MCNC: 2.2 MG/DL (ref 1.6–2.6)
MCH RBC QN AUTO: 29.3 PG (ref 27–31)
MCH RBC QN AUTO: 29.8 PG (ref 27–31)
MCHC RBC AUTO-ENTMCNC: 34.1 G/DL (ref 32–36)
MCHC RBC AUTO-ENTMCNC: 34.4 G/DL (ref 32–36)
MCV RBC AUTO: 86 FL (ref 82–98)
MCV RBC AUTO: 87 FL (ref 82–98)
MODE: ABNORMAL
MONOCYTES # BLD AUTO: 1 K/UL (ref 0.3–1)
MONOCYTES NFR BLD: 7.3 % (ref 4–15)
NEUTROPHILS # BLD AUTO: 11.3 K/UL (ref 1.8–7.7)
NEUTROPHILS NFR BLD: 85.3 % (ref 38–73)
NRBC BLD-RTO: 0 /100 WBC
PCO2 BLDA: 35.2 MMHG (ref 35–45)
PCO2 BLDA: 37.9 MMHG (ref 35–45)
PCO2 BLDA: 41.2 MMHG (ref 35–45)
PCO2 BLDA: 42.9 MMHG (ref 35–45)
PCO2 BLDA: 43.4 MMHG (ref 35–45)
PCO2 BLDA: 44.1 MMHG (ref 35–45)
PCO2 BLDA: 44.5 MMHG (ref 35–45)
PCO2 BLDA: 46.1 MMHG (ref 35–45)
PCO2 BLDA: 47 MMHG (ref 35–45)
PCO2 BLDA: 47.4 MMHG (ref 35–45)
PCO2 BLDA: 48.6 MMHG (ref 35–45)
PEEP: 5
PH SMN: 7.28 [PH] (ref 7.35–7.45)
PH SMN: 7.29 [PH] (ref 7.35–7.45)
PH SMN: 7.3 [PH] (ref 7.35–7.45)
PH SMN: 7.31 [PH] (ref 7.35–7.45)
PH SMN: 7.32 [PH] (ref 7.35–7.45)
PH SMN: 7.33 [PH] (ref 7.35–7.45)
PH SMN: 7.34 [PH] (ref 7.35–7.45)
PH SMN: 7.34 [PH] (ref 7.35–7.45)
PH SMN: 7.35 [PH] (ref 7.35–7.45)
PH SMN: 7.37 [PH] (ref 7.35–7.45)
PH SMN: 7.41 [PH] (ref 7.35–7.45)
PIP: 16
PIP: 24
PIP: 26
PIP: 27
PLATELET # BLD AUTO: 113 K/UL (ref 150–350)
PLATELET # BLD AUTO: 139 K/UL (ref 150–350)
PMV BLD AUTO: 10.8 FL (ref 9.2–12.9)
PMV BLD AUTO: 11.3 FL (ref 9.2–12.9)
PO2 BLDA: 59 MMHG (ref 80–100)
PO2 BLDA: 69 MMHG (ref 80–100)
PO2 BLDA: 71 MMHG (ref 80–100)
PO2 BLDA: 72 MMHG (ref 80–100)
PO2 BLDA: 73 MMHG (ref 80–100)
PO2 BLDA: 75 MMHG (ref 80–100)
PO2 BLDA: 75 MMHG (ref 80–100)
PO2 BLDA: 79 MMHG (ref 80–100)
PO2 BLDA: 80 MMHG (ref 80–100)
PO2 BLDA: 80 MMHG (ref 80–100)
PO2 BLDA: 95 MMHG (ref 80–100)
POC BE: -2 MMOL/L
POC BE: -3 MMOL/L
POC BE: -4 MMOL/L
POC IONIZED CALCIUM: 1.26 MMOL/L (ref 1.06–1.42)
POC IONIZED CALCIUM: 1.26 MMOL/L (ref 1.06–1.42)
POC SATURATED O2: 89 % (ref 95–100)
POC SATURATED O2: 92 % (ref 95–100)
POC SATURATED O2: 93 % (ref 95–100)
POC SATURATED O2: 94 % (ref 95–100)
POC SATURATED O2: 94 % (ref 95–100)
POC SATURATED O2: 95 % (ref 95–100)
POC SATURATED O2: 95 % (ref 95–100)
POC SATURATED O2: 97 % (ref 95–100)
POC TCO2: 23 MMOL/L (ref 23–27)
POC TCO2: 23 MMOL/L (ref 23–27)
POC TCO2: 24 MMOL/L (ref 23–27)
POC TCO2: 25 MMOL/L (ref 23–27)
POTASSIUM BLD-SCNC: 4.6 MMOL/L (ref 3.5–5.1)
POTASSIUM BLD-SCNC: 4.6 MMOL/L (ref 3.5–5.1)
POTASSIUM SERPL-SCNC: 4.6 MMOL/L (ref 3.5–5.1)
POTASSIUM SERPL-SCNC: 4.7 MMOL/L (ref 3.5–5.1)
PS: 10
RBC # BLD AUTO: 3.04 M/UL (ref 4.6–6.2)
RBC # BLD AUTO: 3.22 M/UL (ref 4.6–6.2)
SAMPLE: ABNORMAL
SITE: ABNORMAL
SODIUM BLD-SCNC: 143 MMOL/L (ref 136–145)
SODIUM BLD-SCNC: 143 MMOL/L (ref 136–145)
SODIUM SERPL-SCNC: 141 MMOL/L (ref 136–145)
SODIUM SERPL-SCNC: 142 MMOL/L (ref 136–145)
SP02: 95
SP02: 96
SP02: 97
SP02: 98
SPONT RATE: 18
SPONT RATE: 18
SPONT RATE: 19
SPONT RATE: 19
SPONT RATE: 21
VT: 16
VT: 550
WBC # BLD AUTO: 13.21 K/UL (ref 3.9–12.7)
WBC # BLD AUTO: 9.06 K/UL (ref 3.9–12.7)

## 2020-06-26 PROCEDURE — 85025 COMPLETE CBC W/AUTO DIFF WBC: CPT

## 2020-06-26 PROCEDURE — 93005 ELECTROCARDIOGRAM TRACING: CPT | Performed by: INTERNAL MEDICINE

## 2020-06-26 PROCEDURE — 63600175 PHARM REV CODE 636 W HCPCS: Performed by: THORACIC SURGERY (CARDIOTHORACIC VASCULAR SURGERY)

## 2020-06-26 PROCEDURE — 84295 ASSAY OF SERUM SODIUM: CPT

## 2020-06-26 PROCEDURE — 82803 BLOOD GASES ANY COMBINATION: CPT

## 2020-06-26 PROCEDURE — 25000003 PHARM REV CODE 250: Performed by: THORACIC SURGERY (CARDIOTHORACIC VASCULAR SURGERY)

## 2020-06-26 PROCEDURE — 94761 N-INVAS EAR/PLS OXIMETRY MLT: CPT

## 2020-06-26 PROCEDURE — 85347 COAGULATION TIME ACTIVATED: CPT

## 2020-06-26 PROCEDURE — 84132 ASSAY OF SERUM POTASSIUM: CPT

## 2020-06-26 PROCEDURE — 99024 POSTOP FOLLOW-UP VISIT: CPT | Mod: ,,, | Performed by: THORACIC SURGERY (CARDIOTHORACIC VASCULAR SURGERY)

## 2020-06-26 PROCEDURE — C9248 INJ, CLEVIDIPINE BUTYRATE: HCPCS | Performed by: INTERNAL MEDICINE

## 2020-06-26 PROCEDURE — 82330 ASSAY OF CALCIUM: CPT

## 2020-06-26 PROCEDURE — 83735 ASSAY OF MAGNESIUM: CPT

## 2020-06-26 PROCEDURE — 94003 VENT MGMT INPAT SUBQ DAY: CPT

## 2020-06-26 PROCEDURE — 99900035 HC TECH TIME PER 15 MIN (STAT)

## 2020-06-26 PROCEDURE — 20000000 HC ICU ROOM

## 2020-06-26 PROCEDURE — 63600175 PHARM REV CODE 636 W HCPCS: Performed by: INTERNAL MEDICINE

## 2020-06-26 PROCEDURE — 37799 UNLISTED PX VASCULAR SURGERY: CPT

## 2020-06-26 PROCEDURE — 82962 GLUCOSE BLOOD TEST: CPT

## 2020-06-26 PROCEDURE — 27000221 HC OXYGEN, UP TO 24 HOURS

## 2020-06-26 PROCEDURE — 27000683 HC PILLOW THERAPEUTIC

## 2020-06-26 PROCEDURE — 94660 CPAP INITIATION&MGMT: CPT

## 2020-06-26 PROCEDURE — 99024 PR POST-OP FOLLOW-UP VISIT: ICD-10-PCS | Mod: ,,, | Performed by: THORACIC SURGERY (CARDIOTHORACIC VASCULAR SURGERY)

## 2020-06-26 PROCEDURE — 80048 BASIC METABOLIC PNL TOTAL CA: CPT

## 2020-06-26 PROCEDURE — 85027 COMPLETE CBC AUTOMATED: CPT

## 2020-06-26 PROCEDURE — 36415 COLL VENOUS BLD VENIPUNCTURE: CPT

## 2020-06-26 PROCEDURE — 85014 HEMATOCRIT: CPT

## 2020-06-26 RX ORDER — PANTOPRAZOLE SODIUM 40 MG/1
40 TABLET, DELAYED RELEASE ORAL DAILY
Status: DISCONTINUED | OUTPATIENT
Start: 2020-06-27 | End: 2020-07-02 | Stop reason: HOSPADM

## 2020-06-26 RX ADMIN — SODIUM CHLORIDE 4 UNITS/HR: 9 INJECTION, SOLUTION INTRAVENOUS at 03:06

## 2020-06-26 RX ADMIN — CLEVIPIDINE 18 MG/HR: 0.5 EMULSION INTRAVENOUS at 05:06

## 2020-06-26 RX ADMIN — CLEVIPIDINE 10 MG/HR: 0.5 EMULSION INTRAVENOUS at 07:06

## 2020-06-26 RX ADMIN — CLEVIPIDINE 18 MG/HR: 0.5 EMULSION INTRAVENOUS at 08:06

## 2020-06-26 RX ADMIN — CHLORHEXIDINE GLUCONATE 15 ML: 1.2 RINSE ORAL at 08:06

## 2020-06-26 RX ADMIN — CLEVIPIDINE 11 MG/HR: 0.5 EMULSION INTRAVENOUS at 12:06

## 2020-06-26 RX ADMIN — CLEVIPIDINE 10 MG/HR: 0.5 EMULSION INTRAVENOUS at 08:06

## 2020-06-26 RX ADMIN — CEFAZOLIN SODIUM 2 G: 2 SOLUTION INTRAVENOUS at 08:06

## 2020-06-26 RX ADMIN — SODIUM CHLORIDE 5 UNITS/HR: 9 INJECTION, SOLUTION INTRAVENOUS at 12:06

## 2020-06-26 RX ADMIN — CLEVIPIDINE 10 MG/HR: 0.5 EMULSION INTRAVENOUS at 04:06

## 2020-06-26 RX ADMIN — DULOXETINE 30 MG: 30 CAPSULE, DELAYED RELEASE ORAL at 08:06

## 2020-06-26 RX ADMIN — CLEVIPIDINE 10 MG/HR: 0.5 EMULSION INTRAVENOUS at 10:06

## 2020-06-26 RX ADMIN — CEFAZOLIN SODIUM 2 G: 2 SOLUTION INTRAVENOUS at 12:06

## 2020-06-26 RX ADMIN — CLEVIPIDINE 16 MG/HR: 0.5 EMULSION INTRAVENOUS at 04:06

## 2020-06-26 RX ADMIN — HUMAN INSULIN 9 UNITS: 100 INJECTION, SOLUTION SUBCUTANEOUS at 09:06

## 2020-06-26 RX ADMIN — ATORVASTATIN CALCIUM 40 MG: 40 TABLET, FILM COATED ORAL at 08:06

## 2020-06-26 RX ADMIN — MORPHINE SULFATE 2 MG: 2 INJECTION, SOLUTION INTRAMUSCULAR; INTRAVENOUS at 12:06

## 2020-06-26 RX ADMIN — ACYCLOVIR 400 MG: 200 CAPSULE ORAL at 09:06

## 2020-06-26 RX ADMIN — CLEVIPIDINE 8 MG/HR: 0.5 EMULSION INTRAVENOUS at 10:06

## 2020-06-26 RX ADMIN — CLEVIPIDINE 12 MG/HR: 0.5 EMULSION INTRAVENOUS at 02:06

## 2020-06-26 RX ADMIN — HYDROCODONE BITARTRATE AND ACETAMINOPHEN 1 TABLET: 5; 325 TABLET ORAL at 08:06

## 2020-06-26 RX ADMIN — CEFAZOLIN SODIUM 2 G: 2 SOLUTION INTRAVENOUS at 04:06

## 2020-06-26 RX ADMIN — CLEVIPIDINE 18 MG/HR: 0.5 EMULSION INTRAVENOUS at 06:06

## 2020-06-26 NOTE — NURSING
"Patient became very anxious and restless. Blood pressure 220/100. Patient nodded "yes" to pain and unable to calm. Gave morphine 2 mg.  "

## 2020-06-26 NOTE — NURSING
Patient resting quietly on the vent, respirations even and non labored. Will continue to monitor closely.

## 2020-06-26 NOTE — PROGRESS NOTES
[facility]  Adult Nutrition   Progress Note (Follow-Up)    SUMMARY     Recommendations  Recommendation/Intervention: 1. Patient has had no intake/ very inadequate intake > 5 days. If unable to eat PO and obtain nutrition within next 24 hours, recommend initiation of nutrition support. 2. Hyperglycemia, Recommend continued monitoring and management of blood glucose. 3. RD provided written diabetic diet education and RD contact info to patient and wife Monday 6/22/2020. RD to reviewed diabetic diet recommendations and CABG nutrition therapy with patient when more appropriate.  Goals: 1. Diet to be imitated and patient to tolerate oral diet. Otherwise nutrition support to be initiated. 2. Patient to meet at least 75% of estimated energy and protein needs.  3. Blood glucose to trend towards target range. 4. Patient to express understanding of diet recommendations and lifestyle changes. RD to answer diet related questions should they arise.  Nutrition Goal Status: new  Communication of RD Recs: reviewed with RN    Dietitian Rounds Brief  Patient s/p CABG yesterday. Diet initiated but patient not able to eat yet due to oxygen needs. Patient has been NPO/without adequate nutrition since left heart cath on Monday 6/22/2020, RD has been checking in with nursing daily regarding nutritional plans since then. If patient continued without nutrition within next 24 hours, recommend initiation of nutrition support.    Reason for Assessment  Reason For Assessment: RD follow-up  Diagnosis: cardiac disease, diabetes diagnosis/complications  Relevant Medical History: bell's palsy; anemia; CAD; DM; HLD; HTN; sleep apnea  Interdisciplinary Rounds: did not attend  General Information Comments: s/p left heart cath 6/22/2020, s/p CABG 6/25/2020    Nutrition Risk Screen  Nutrition Risk Screen: no indicators present             Nutrition/Diet History  Patient Reported Diet/Restrictions/Preferences: general  Typical Food/Fluid  "Intake: good per patient; doesn't really follow diet at home. Patient does check blood glucose regularly as recommended  Food Allergies: NKFA  Factors Affecting Nutritional Intake: NPO, other (see comments)(diet ordered but not eating yet)    Anthropometrics  Temp: 99.3 °F (37.4 °C)  Height Method: Stated  Height: 5' 7" (170.2 cm)  Height (inches): 67 in  Weight Method: Bed Scale  Weight: 91.7 kg (202 lb 2.6 oz)  Weight (lb): 202.16 lb  Ideal Body Weight (IBW), Male: 148 lb  % Ideal Body Weight, Male (lb): 136.59 %  BMI (Calculated): 31.7  BMI Grade: 30 - 34.9- obesity - grade I       Weight History:  Wt Readings from Last 10 Encounters:   06/21/20 91.7 kg (202 lb 2.6 oz)   06/22/20 91.6 kg (202 lb)   06/21/20 92.1 kg (203 lb 0.7 oz)       Lab/Procedures/Meds: Pertinent Labs Reviewed  Clinical Chemistry:  Recent Labs   Lab 06/24/20  0245  06/25/20  0311  06/25/20  1800  06/26/20  0349     --  138   < > 139   < > 142   K 3.4*   < > 4.6   < > 5.2*  5.2*   < > 4.6   CL 98  --  101   < > 110   < > 110   CO2 26  --  26   < > 21*   < > 22*   *  --  170*   < > 241*   < > 215*   BUN 38*  --  42*   < > 38*   < > 33*   CREATININE 1.3  --  1.4   < > 1.4   < > 1.4   CALCIUM 8.9  --  9.1   < > 8.4*   < > 8.6*   PROT 7.1  --  7.1  --   --   --   --    ALBUMIN 3.6  --  3.6  --   --   --   --    BILITOT 1.2*  --  1.2*  --   --   --   --    ALKPHOS 47*  --  49*  --   --   --   --    AST 20  --  25  --   --   --   --    ALT 34  --  48*  --   --   --   --    ANIONGAP 12  --  11   < > 8   < > 10   ESTGFRAFRICA >60.0  --  59.3*   < > 59.3*   < > 59.3*   EGFRNONAA 56.1*  --  51.3*   < > 51.3*   < > 51.3*   MG 1.9  --  2.1   < > 2.1  --  2.2   PHOS 3.8  --  4.1  --  3.1  --   --     < > = values in this interval not displayed.     CBC:   Recent Labs   Lab 06/26/20  0349 06/26/20  0352   WBC 13.21*  --    RBC 3.22*  --    HGB 9.6*  --    HCT 27.9* 28*   *  --    MCV 87  --    MCH 29.8  --    MCHC 34.4  --      Lipid " Panel:  Recent Labs   Lab 06/21/20  0456   CHOL 169   HDL 49   LDLCALC 91.4   TRIG 143   CHOLHDL 29.0     Cardiac Profile:  Recent Labs   Lab 06/20/20  1824  06/21/20  1657 06/22/20  0350 06/22/20 2021   BNP 29  --   --  661*  --    TROPONINI 0.061*   < > 3.639* 7.320* 6.456*    < > = values in this interval not displayed.     Diabetes:  Recent Labs   Lab 06/21/20  0456 06/21/20  0522 06/21/20  1133 06/21/20  1609   HGBA1C 7.7*  --   --   --    POCTGLUCOSE  --  298* 202* 257*     Thyroid & Parathyroid:  Recent Labs   Lab 06/21/20  0456   TSH 0.902       Medications: Pertinent Medications reviewed  Scheduled Meds:   acyclovir  400 mg Oral 5x Daily    aspirin  325 mg Oral Daily    atorvastatin  40 mg Oral QHS    ceFAZolin (ANCEF) IVPB  2 g Intravenous Q8H    chlorhexidine  15 mL Mouth/Throat BID    docusate sodium  100 mg Oral Daily    DULoxetine  30 mg Oral QHS    fenofibrate  145 mg Oral Daily with breakfast    [START ON 6/27/2020] pantoprazole  40 mg Oral Daily     Continuous Infusions:   sodium chloride 0.9% 25 mL/hr at 06/25/20 1515    clevidipine 10 mg/hr (06/26/20 1608)    epinephrine      insulin (HUMAN R) infusion (adults) 4 Units/hr (06/26/20 1548)     PRN Meds:.acetaminophen, calcium gluconate IVPB, calcium gluconate IVPB, calcium gluconate IVPB, dextrose 50%, dextrose 50%, HYDROcodone-acetaminophen, magnesium sulfate IVPB, magnesium sulfate IVPB, melatonin, morphine, potassium chloride in water, potassium chloride in water, potassium chloride in water    Estimated/Assessed Needs  Weight Used For Calorie Calculations: 87.9 kg (193 lb 12.6 oz)  Energy Calorie Requirements (kcal): 1758 - 2198 (20 - 25)  Energy Need Method: Kcal/kg  Protein Requirements: 106 - 132 (1.2 - 1.5 g/kg)  Weight Used For Protein Calculations: 87.9 kg (193 lb 12.6 oz)     Estimated Fluid Requirement Method: RDA Method  RDA Method (mL): 1758       Nutrition Prescription Ordered  Current Diet Order: NPO; 1800 calorie  diabetic diet ordered but patient not able to eat    Evaluation of Received Nutrient/Fluid Intake  Energy Calories Required: not meeting needs  Protein Required: not meeting needs  Fluid Required: not meeting needs  Comments: Patient s/p CABG yesterday. Diet initiated but patient not able to eat yet due to oxygen needs. Patient has been NPO/without adequate nutrition since left heart cath on Monday 6/22/2020, RD has been checking in with nursing daily regarding nutritional plans since then. If patient continued without nutrition within next 24 hours, recommend initiation of nutrition support.  Tolerance: other (see comments)(Not getting meals)     Intake/Output Summary (Last 24 hours) at 6/26/2020 1708  Last data filed at 6/26/2020 1200  Gross per 24 hour   Intake 642.75 ml   Output 2709 ml   Net -2066.25 ml      % Intake of Estimated Energy Needs: 0%  % Meal Intake: NPO    Nutrition Risk  Level of Risk/Frequency of Follow-up: high     Monitor and Evaluation  Food and Nutrient Intake: energy intake, food and beverage intake  Food and Nutrient Adminstration: diet order  Knowledge/Beliefs/Attitudes: beliefs and attitudes, food and nutrition knowledge/skill  Physical Activity and Function: nutrition-related ADLs and IADLs, factors affecting access to physical activity  Anthropometric Measurements: weight, weight change, body mass index  Biochemical Data, Medical Tests and Procedures: gastrointestinal profile, electrolyte and renal panel, inflammatory profile, lipid profile, glucose/endocrine profile  Nutrition-Focused Physical Findings: overall appearance     Nutrition Follow-Up  RD Follow-up?: Yes    Lissa Coburn RD 06/26/2020 5:08 PM

## 2020-06-26 NOTE — PLAN OF CARE
Problem: Oral Intake Inadequate  Goal: Improved Oral Intake  Outcome: Ongoing, Not Progressing  Intervention: Promote and Optimize Oral Intake  Flowsheets (Taken 6/26/2020 1710)  Oral Nutrition Promotion: (recommend nutrition support if unable to obtain oral intake within next 24 hours) other (see comments)

## 2020-06-26 NOTE — NURSING
Patient assessment completed at this time. Patient resting quietly on the vent, respirations even and non labored.. Will continue to monitor closely.

## 2020-06-26 NOTE — PROGRESS NOTES
Cardiac Rehab     Sincere Malave   [id number]   6/26/2020         Cardiac Rehab Phase Taught: Phase 1    Teaching Method: Verbal    Handouts: None    Educational Videos: None    Understanding:  Learning indicated by feedback and Verbalize understanding    Comments: pt in bed, on vent. Wife at bedside. Questions answered. Spoke with bedside RN updated on pt plan of care. Will follow    Total Time Spent:15mins            Shraddha Carpenter RN

## 2020-06-26 NOTE — NURSING
Family received update on patient status. Family voiced understanding and has no needs or complaints at this time.

## 2020-06-26 NOTE — NURSING
Patient is calm and resting quietly on vent, respirations even and non labored. /50. Will continue to monitor closely.

## 2020-06-26 NOTE — OP NOTE
[facility]  Cardiothoracic Surgery  Operative Note    SUMMARY     Date of Procedure: 6/25/2020     Procedure:   1.CORONARY ARTERY BYPASS GRAFT×6 (left internal thoracic artery to left anterior descending, reverse saphenous vein graft sequentially from aorta to ramus intermedius and obtuse marginal branch, reverse saphenous vein graft from aorta to 1st diagonal branch, reverse saphenous vein graft from aorta to right posterior descending/acute marginal, reverse saphenous vein graft from aorta to right posterior lateral ventricular branch).  2.Endoscopic harvest left greater saphenous vein    Surgeon(s):   * Rao Cifuentes MD - Primary    Assistants:Nanette Mcfarlane M.D., Cassandra VILLELA    Pre-Operative Diagnosis: NSTEMI (non-ST elevated myocardial infarction) [I21.4]  Coronary artery disease involving native coronary artery of native heart with unstable angina pectoris [I25.110]  Type 2 diabetes mellitus with hyperglycemia, with long-term current use of insulin [E11.65, Z79.4]    Post-Operative Diagnosis: Post-Op Diagnosis Codes:     * NSTEMI (non-ST elevated myocardial infarction) [I21.4]     * Coronary artery disease involving native coronary artery of native heart with unstable angina pectoris [I25.110]     * Type 2 diabetes mellitus with hyperglycemia, with long-term current use of insulin [E11.65, Z79.4]    Anesthesia: General    Preliminary Note: patient is a 70-year-old white male with history of a stent to the left anterior descending who presented with substernal chest pain. He was found to have an acute myocardial infarction. He underwent cardiac catheterization revealing multivessel coronary artery disease.    Procedure in Detail: after obtaining informed consent, the patient was taken to the operating room and placed in the supine position on the operating table. General endotracheal anesthesia was initiated. An appropriate timeout was performed. He was prepped and draped in the usual  sterile fashion. The left greater saphenous vein was harvested endoscopically and prepared for implantation. A median sternotomy was performed. The left internal thoracic artery was harvested as a pedicle. After heparinization, it was transected distally and infused with antispasmodic solution. It was 2.5 mm in diameter and had good flow. A 19 mm Galen drain was placed into the left pleural cavity through a separate incision.  The pericardium was incised and suspended circumferentially. An aortic cannula, two-stage venous cannula, antegrade cardioplegia cannula, and retrograde cardioplegia cannulas were placed. Cardiopulmonary bypass was initiated. The aortic cross-clamp was placed. Cardiac arrest was achieved with antegrade and retrograde cold blood cardioplegia. Throughout the remainder of the procedure, intermittent retrograde cardioplegia was administered. The epicardial vessels were inspected. A saphenous vein graft segment was anastomosed in an end-to-side fashion to a 1.8 mm 1st obtuse marginal branch.more proximally, the same saphenous vein graft was anastomosis in the transverse side to side fashion to a 2 mm ramus intermedius. A 3rd segment of saphenous vein graft was anastomosed end to side fashion to a 2 mm 1st diagonal branch. Flow and hemostasis was excellent. A 4th saphenous vein graft segment was anastomosed in an end-to-side fashion to a 1.5 mm right posterolateral ventricular branch. Flow and hemostasis correct. A 5th saphenous vein graft segment was anastomosed in an end to side fashion to the right posterior descending which was essentially supplied as an acute marginal branch. This vessel was 1.5 mm in diameter and slightly disease. Flow and hemostasis were excellent. The left internal thoracic artery was anastomosed in an end-to-side fashion to 2.5 mm normal left anterior descending coronary artery. Hemostasis was excellent. The pedicle was tacked to the epicardium to prevent torsion. Using a  single cross-clamp technique, 4 punch aortotomies were performed. The proximal ends of the saphenous vein grafts were anastomosed to the ascending aorta and marked with radiopaque markers. The patient was placed in Trendelenburg position. Air evacuation was performed. The aortic cross-clamp was removed. Sinus bradycardia arrhythmias. 2 epicardial right ventricular pacing leads were placed, and he was paced at a rate of 80. This allowed separation from cardiopulmonary bypass. Eventually, he regained normal sinus rhythm in the 60s, and pacing was discontinued. Heparin was reversed with protamine. Cannulas were removed. Hemostasis was assured. 2 mediastinal drains were placed. The sternal edges were treated with Ostene and reapproximated with stainless steel wires. The remaining chest wall layers were irrigated with antibiotic solution and closed with Vicryl sutures. Clean sterile dressings were placed. All instrument and sponge counts were correct at the end of the procedure.      Complications: No    Estimated Blood Loss (EBL): minimal           Implants:   Implant Name Type Inv. Item Serial No.  Lot No. LRB No. Used Action   PLEDGET 4.5X6 [id number] - [id number]  PLEDGET 4.5X6 [id number]    N/A 1 Implanted and Explanted       Specimens:   Specimen (12h ago, onward)    None                  Condition: Good    Disposition: ICU - intubated and hemodynamically stable.     CC: James Kaiser M.D.

## 2020-06-26 NOTE — PROGRESS NOTES
"Sincere Malave is a 70 y.o. male patient.    Chief Complaint   Patient presents with    Chest Pain       Active Hospital Problems    Diagnosis  POA    *NSTEMI (non-ST elevated myocardial infarction) [I21.4]  Yes    History of heart artery stent [Z95.5]  Not Applicable    Femoral artery hematoma complicating cardiac catheterization [I97.410]  No     Left  6/24/2020      ASCVD (arteriosclerotic cardiovascular disease) [I25.10]  Yes    Pulmonary edema, acute [J81.0]  Yes    Essential hypertension [I10]  Yes    Acute-on-chronic kidney injury [N17.9, N18.9]  Yes    Chest pain syndrome [R07.9]  Yes    Coronary artery disease involving native coronary artery of native heart [I25.10]  Yes    Type 2 diabetes mellitus with hyperglycemia, with long-term current use of insulin [E11.65, Z79.4]  Not Applicable    HLD (hyperlipidemia) [E78.5]  Yes    TERESO (obstructive sleep apnea) [G47.33]  Yes    Acute hypoxemic respiratory failure [J96.01]  Yes    Bell's palsy [G51.0]  Yes      Resolved Hospital Problems   No resolved problems to display.       Temp: 99.3 °F (37.4 °C) (06/26/20 0400)  Pulse: 65 (06/26/20 1530)  Resp: (!) 22 (06/26/20 1530)  BP: (!) 169/75 (06/26/20 1155)  SpO2: 95 % (06/26/20 1530)  Weight: 91.7 kg (202 lb 2.6 oz) (06/21/20 1815)  Height: 5' 7" (170.2 cm) (06/22/20 1740)    CBC:  Recent Labs   Lab 06/25/20  1442 06/25/20  1800 06/26/20  0040 06/26/20  0220 06/26/20  0349 06/26/20  0352   WBC 5.49 7.30 9.06  --  13.21*  --    HGB 8.4* 8.9* 8.9*  --  9.6*  --    HCT 25.0* 26.3* 26.1* 27* 27.9* 28*   PLT 94* 107* 113*  --  139*  --    MCV 86 87 86  --  87  --      BMP:  Recent Labs   Lab 06/24/20  0245  06/25/20  0311 06/25/20  1442 06/25/20  1800 06/26/20  0040 06/26/20  0349   *  --  170* 143* 241* 220* 215*     --  138 142 139 141 142   K 3.4*   < > 4.6 4.4 5.2*  5.2* 4.7 4.6   CL 98  --  101 112* 110 111* 110   CO2 26  --  26 23 21* 21* 22*   BUN 38*  --  42* 37* 38* 34* 33* " "  CREATININE 1.3  --  1.4 1.2 1.4 1.3 1.4   CALCIUM 8.9  --  9.1 7.2* 8.4* 8.4* 8.6*   MG 1.9  --  2.1 2.2 2.1  --  2.2   PHOS 3.8  --  4.1  --  3.1  --   --     < > = values in this interval not displayed.     LFTs:  Recent Labs   Lab 06/24/20  0245 06/25/20  0311   ALT 34 48*   AST 20 25   ALKPHOS 47* 49*   BILITOT 1.2* 1.2*   PROT 7.1 7.1   ALBUMIN 3.6 3.6     COAGS:  Recent Labs   Lab 06/24/20  0245 06/24/20  0950 06/24/20  1526 06/25/20  1442   INR  --   --   --  1.5   APTT 84.0* 62.5* 31.1 44.0*     CARDIAC MARKERS:  No results for input(s): CPK, CPKMB, TROPONINI in the last 72 hours.    Output (mL)  Urine: 175 mL        Subjective:  Symptoms:  Stable.  No shortness of breath, malaise or chest pain.    Diet:  Adequate intake.    Pain:  He complains of pain that is mild.  Pain is well controlled.        Objective:  General Appearance:  Comfortable.    Vital signs: (most recent): Blood pressure (!) 169/75, pulse 65, temperature 99.3 °F (37.4 °C), temperature source Core ([place]andra), resp. rate (!) 22, height 5' 7" (1.702 m), weight 91.7 kg (202 lb 2.6 oz), SpO2 95 %.  Vital signs are normal.    Lungs:  Normal effort and normal respiratory rate.    Heart: Normal rate.  Regular rhythm.    Chest: (Minimal output from drains as expected)  Neurological: (Intact throughout).        Assessment:    Condition: In stable condition.  Improving.   (Post CABG x6).     Plan:   Out of bed and up to chair.  Start/continue incentive spirometry.  Discontinue pulmonary artery catheter.  Medications per Cardiology..       Rao Larose MD  6/26/2020    "

## 2020-06-26 NOTE — ANESTHESIA POSTPROCEDURE EVALUATION
Anesthesia Post Evaluation    Patient: Sincere Malave    Procedure(s) Performed: Procedure(s) (LRB):  CORONARY ARTERY BYPASS GRAFT (CABG) (N/A)    Final Anesthesia Type: general    Patient location during evaluation: ICU  Patient participation: Yes- Able to Participate  Level of consciousness: sedated  Post-procedure vital signs: reviewed and stable  Pain management: adequate  Airway patency: patent    PONV status at discharge: No PONV  Anesthetic complications: no      Cardiovascular status: blood pressure returned to baseline and stable  Respiratory status: intubated  Hydration status: euvolemic  Follow-up not needed.  Comments: Patient is status post coronary artery bypass surgery.  Patient remains intubated, but when spoken to he can follow commands and answer simple questions.  His pain appears adequate without nausea or vomiting.  Vital signs are stable although his pressure has been high, and he is on Cleviprex.  Urine output is adequate.  Will follow patient again once extubated.  There appears to be no complications a generalized seizure for coronary artery bypass surgery.          Vitals Value Taken Time   /67 06/26/20 0700   Temp 37.4 °C (99.3 °F) 06/26/20 0400   Pulse 63 06/26/20 0724   Resp 16 06/26/20 0724   SpO2 94 % 06/26/20 0724   Vitals shown include unvalidated device data.      No case tracking events are documented in the log.      Pain/Bogdan Score: Pain Rating Prior to Med Admin: 8 (6/26/2020 12:59 AM)  Pain Rating Post Med Admin: 0 (6/26/2020  1:29 AM)

## 2020-06-26 NOTE — NURSING
Pt on cpap for 1 hour abg ph 7.28.  Pt wakes up easy follows commands calm. Pt indicates no pain or sob.  resp 18 tv 450.  Will repeat abg

## 2020-06-26 NOTE — NURSING
"Patient is alert and following commands, but still very sleepy. Patient nodded head "yes" when asked if it was hard to stay awake.  "

## 2020-06-26 NOTE — CONSULTS
Critical access hospital  Cardiology  Progress Note    Patient Name: Sincere Malave  MRN: [id number]  Admission Date: 6/21/2020  Hospital Length of Stay: 5 days  Code Status: Full Code   Attending Physician: Manolo Alberto MD   Primary Care Physician: Primary Doctor No  Expected Discharge Date:   Principal Problem:NSTEMI (non-ST elevated myocardial infarction)    Subjective:     Hospital Course: doingwell-   Trying to getoff vent     Interval History: Hes on cleviprex  Iv  Nsr,  RAP =4, paw+11 ( oncpap) *    ROS  Objective:     Vital Signs (Most Recent):  Temp: 99.3 °F (37.4 °C) (06/26/20 0400)  Pulse: 69 (06/26/20 1027)  Resp: (!) 25 (06/26/20 1027)  BP: (!) 147/67 (06/26/20 0700)  SpO2: 95 % (06/26/20 1027) Vital Signs (24h Range):  Temp:  [98 °F (36.7 °C)-99.4 °F (37.4 °C)] 99.3 °F (37.4 °C)  Pulse:  [] 69  Resp:  [16-28] 25  SpO2:  [93 %-100 %] 95 %  BP: (126-191)/(60-95) 147/67  Arterial Line BP: (107-207)/(43-94) 156/44     Weight: 91.7 kg (202 lb 2.6 oz)  Body mass index is 31.64 kg/m².    SpO2: 95 %  O2 Device (Oxygen Therapy): ventilator      Intake/Output Summary (Last 24 hours) at 6/26/2020 1056  Last data filed at 6/26/2020 0900  Gross per 24 hour   Intake 4728.75 ml   Output 3674 ml   Net 1054.75 ml       Lines/Drains/Airways     Central Venous Catheter Line            Percutaneous Central Line Insertion/Assessment - Triple Lumen  06/25/20 0637 1 day    Pulmonary Artery Catheter Assessment  06/25/20 0637 1 day          Drain                 Urethral Catheter 06/22/20 1030 4 days         Closed/Suction Drain 06/25/20 1030 Superior Chest Bulb 19 Fr. 1 day         Y Chest Tube 1 and 2 06/25/20 1029 Mediastinal 28 Fr. Mediastinal 28 Fr. 1 day         NG/OG Tube 06/25/20 1500 Josephine sump 18 Fr. Center mouth less than 1 day          Airway                 Airway - Non-Surgical 06/25/20 0639 Endotracheal Tube 1 day          Arterial Line                 Arterial Line 06/25/20 0615 Right Radial 1 day           Peripheral Intravenous Line                 Peripheral IV - Single Lumen 06/20/20 1824 20 G Left Antecubital 5 days         Peripheral IV - Single Lumen 06/20/20 2200 20 G Right Hand 5 days                Physical Exam  Maggie=ngs clear  Cor reg   Significant Labs:   CMP   Recent Labs   Lab 06/25/20  0311  06/25/20  1800 06/26/20  0040 06/26/20  0349      < > 139 141 142   K 4.6   < > 5.2*  5.2* 4.7 4.6      < > 110 111* 110   CO2 26   < > 21* 21* 22*   *   < > 241* 220* 215*   BUN 42*   < > 38* 34* 33*   CREATININE 1.4   < > 1.4 1.3 1.4   CALCIUM 9.1   < > 8.4* 8.4* 8.6*   PROT 7.1  --   --   --   --    ALBUMIN 3.6  --   --   --   --    BILITOT 1.2*  --   --   --   --    ALKPHOS 49*  --   --   --   --    AST 25  --   --   --   --    ALT 48*  --   --   --   --    ANIONGAP 11   < > 8 9 10   ESTGFRAFRICA 59.3*   < > 59.3* >60.0 59.3*   EGFRNONAA 51.3*   < > 51.3* 56.1* 51.3*    < > = values in this interval not displayed.    and CBC   Recent Labs   Lab 06/25/20  1800 06/26/20  0040  06/26/20  0349 06/26/20  0352   WBC 7.30 9.06  --  13.21*  --    HGB 8.9* 8.9*  --  9.6*  --    HCT 26.3* 26.1*   < > 27.9* 28*   * 113*  --  139*  --     < > = values in this interval not displayed.       Significant Imaging:   Right IJ central vascular catheter in the SVC. Pulmonary artery  catheter in the proximal right pulmonary artery, endotracheal tube in  the mid trachea and mediastinal drains are unchanged. NG tube has been  placed prior study. The tip is at least in the proximal stomach and  continues below the field-of-view. Cardiac mediastinal contours are  stable. Retrocardiac atelectasis has decreased. Mild right basilar  atelectasis is stable. There is no pneumothorax or pleural effusion.    Assessment and Plan:   1) stable cv status , continue as is   Brief HPI:     Active Diagnoses:    Diagnosis Date Noted POA    PRINCIPAL PROBLEM:  NSTEMI (non-ST elevated myocardial infarction) [I21.4] 06/21/2020  Yes    History of heart artery stent [Z95.5] 06/25/2020 Not Applicable    Femoral artery hematoma complicating cardiac catheterization [I97.410] 06/24/2020 No    ASCVD (arteriosclerotic cardiovascular disease) [I25.10]  Yes    Pulmonary edema, acute [J81.0] 06/21/2020 Yes    Essential hypertension [I10] 06/21/2020 Yes    Acute-on-chronic kidney injury [N17.9, N18.9] 06/21/2020 Yes    Chest pain syndrome [R07.9] 06/21/2020 Yes    Coronary artery disease involving native coronary artery of native heart [I25.10] 06/20/2020 Yes    Type 2 diabetes mellitus with hyperglycemia, with long-term current use of insulin [E11.65, Z79.4] 06/20/2020 Not Applicable    HLD (hyperlipidemia) [E78.5] 06/20/2020 Yes    TERESO (obstructive sleep apnea) [G47.33] 06/20/2020 Yes    Acute hypoxemic respiratory failure [J96.01] 06/20/2020 Yes    Bell's palsy [G51.0] 06/19/2020 Yes      Problems Resolved During this Admission:       VTE Risk Mitigation (From admission, onward)    None          James Kaiser MD  Cardiology  [facility]

## 2020-06-26 NOTE — PROGRESS NOTES
[facility] Medicine    Progress Note    Patient Name: Sincere Malave  MRN: [id number]  Patient Class: IP- Inpatient   Admission Date: 6/21/2020  6:02 PM  Length of Stay: 5  Attending Physician: ZAKI LEON MD  Primary Care Provider: Primary Doctor No  Face-to-Face encounter date: 06/26/2020  Chief Complaint: Chest Pain         Patient ID: Sincere Malave is a 70 y.o. male admitted for   Active Hospital Problems    Diagnosis  POA    *NSTEMI (non-ST elevated myocardial infarction) [I21.4]  Yes    History of heart artery stent [Z95.5]  Not Applicable    Femoral artery hematoma complicating cardiac catheterization [I97.410]  No     Left  6/24/2020      ASCVD (arteriosclerotic cardiovascular disease) [I25.10]  Yes    Pulmonary edema, acute [J81.0]  Yes    Essential hypertension [I10]  Yes    Acute-on-chronic kidney injury [N17.9, N18.9]  Yes    Chest pain syndrome [R07.9]  Yes    Coronary artery disease involving native coronary artery of native heart [I25.10]  Yes    Type 2 diabetes mellitus with hyperglycemia, with long-term current use of insulin [E11.65, Z79.4]  Not Applicable    HLD (hyperlipidemia) [E78.5]  Yes    TERESO (obstructive sleep apnea) [G47.33]  Yes    Acute hypoxemic respiratory failure [J96.01]  Yes    Bell's palsy [G51.0]  Yes      Resolved Hospital Problems   No resolved problems to display.      HPI by Dr Herbert 06/21/2020: 70 y.o. male with a PMHx of CAD with remote PCI, CKD 3, IDDM, hypertension, hyperlipidemia, neuropathy, sleep apnea, anemia who presents to [facility] ED with chief complaint of chest pain for 2 hr.  He described chest pain as a substernal chest tightness and tells me that his lower jaw hurts.  Patient was placed in ICU on Cardene drip however subsequent cardiac enzymes up trended and Cardiology was involved.  Patient was also given Rocephin and azithromycin for presumed pneumonia.  He also received aspirin, statin, beta-blocker and  weight based Lovenox.  Patient was transferred to [facility] for angiographic evaluation tomorrow morning.  Upon my interview he reports mild chest discomfort and jaw pain and associated shortness of breath.  Otherwise denies any fever, chills, headache, dizziness, palpitations, nausea, vomiting, bladder or bowel symptoms.      Subjective:      Interval History: Patient seen and examined this morning patient status post CABG x6, postop day 1.  Currently intubated but very mildly sedated and responds to question  Family present at bedside.  No concerns/issues overnight reported by the patient or the nursing staff.    Review of Systems unable to perform today due to sedation intubation    Objective:     Physical Exam  Vitals:    06/26/20 0500   BP: (!) 162/73   Pulse: 70   Resp: (!) 21   Temp:      Wt Readings from Last 1 Encounters:   06/21/20 91.7 kg (202 lb 2.6 oz)      Body mass index is 31.64 kg/m².    Vitals reviewed.  Constitutional:  currently intubated but not sedated.  Obese  HENT: NC, large neck circumference, ET tube in place  Head: Atraumatic.   Cardiovascular: Normal rate, regular rhythm and normal heart sounds.  Mild pericardial rub  Pulmonary/Chest: Effort normal.  Clear anteriorly.  No wheezes.  Chest tubes in place  Abdominal: Soft.  obese Bowel sounds are normal. No distension and no mass. No tenderness  : Pineda in place with clear urine  Neurological:  Awake but intubated  Skin:  Left lower extremity wrapped in Ace bandage  Psych:  Awake, intubated    Following labs were Reviewed   CBC:  Recent Labs   Lab 06/26/20  0349 06/26/20  0352   WBC 13.21*  --    HGB 9.6*  --    HCT 27.9* 28*   *  --      CMP:  Recent Labs   Lab 06/26/20  0349   CALCIUM 8.6*      K 4.6   CO2 22*      BUN 33*   CREATININE 1.4     Last 72 hour POCT GLUCOSE  No results found for: POCTGLUCOSE     Microbiology Results (last 7 days)     Procedure Component Value Units Date/Time    MRSA Screen by PCR [[id number]]  Collected: 06/25/20 0052    Order Status: Completed Updated: 06/25/20 0520     MRSA SCREEN BY PCR Negative    Narrative:      MRSA    Nasal culture [[id number]] Collected: 06/25/20 0036    Order Status: Canceled Specimen: Nasopharyngeal from Nose             X-Ray Chest AP Portable   Final Result      US Extremity Non Vascular Limited Left   Final Result      US Carotid Bilateral   Final Result      X-Ray Chest AP Portable   Final Result      X-Ray Chest AP Portable    (Results Pending)   X-Ray Chest AP Portable    (Results Pending)         Scheduled Meds:   acyclovir  400 mg Oral 5x Daily    aspirin  325 mg Oral Daily    atorvastatin  40 mg Oral QHS    ceFAZolin (ANCEF) IVPB  2 g Intravenous Q8H    chlorhexidine  15 mL Mouth/Throat BID    docusate sodium  100 mg Oral Daily    DULoxetine  30 mg Oral QHS    fenofibrate  145 mg Oral Daily with breakfast     Continuous Infusions:   sodium chloride 0.9% 25 mL/hr at 06/25/20 1515    clevidipine 18 mg/hr (06/26/20 0651)    epinephrine      insulin (HUMAN R) infusion (adults) 5 Units/hr (06/26/20 0020)     PRN Meds:.acetaminophen, calcium gluconate IVPB, calcium gluconate IVPB, calcium gluconate IVPB, dextrose 50%, dextrose 50%, HYDROcodone-acetaminophen, magnesium sulfate IVPB, magnesium sulfate IVPB, melatonin, midazolam, morphine, nitroGLYCERIN, potassium chloride in water, potassium chloride in water, potassium chloride in water    6/22/20: Echo  · Concentric left ventricular remodeling.  · Mildly decreased left ventricular systolic function. The estimated ejection fraction is 40%.  · Grade I (mild) left ventricular diastolic dysfunction consistent with impaired relaxation.  · Local segmental wall motion abnormalities.  · Normal right ventricular systolic function.  · No pulmonary hypertension present.  · Mild tricuspid regurgitation.  · Normal central venous pressure (3 mmHg).  Evidence anterior apical scar is seen with slight expansion of the apex during  systole  I had ejection fraction of left ventricle globally is approximately 40%  L evidence of elevation of left ventricular end-diastolic pressure is seen  No hemodynamically significant valvular regurgitation    06/22/2020: Carotid U/S b/l  1. Moderate bilateral carotid atheromatous plaque, with mildly elevated left ICA peak systolic velocities suggesting 50-69% left ICA stenosis.  2. No findings of hemodynamically significant right ICA stenosis.  3. Patent vertebral arteries with antegrade flow bilaterally.  Electronically Signed by Deandre CELAYA on 6/22/2020 4:06 PM    Assessment & Plan:     Multivessel CAD including L Main  Status post CABG x6 on 06/25/2020 with Dr. Larose  Carotid artery disease  Postop day 1  Currently intubated but not sedated  Recap: Left heart catheterization showed three-vessel coronary artery disease including left main.  Preserved LV ejection fraction.  Bilateral renal artery stenosis(50-60%)  Remains on aspirin, statin  Cannot tolerate beta-blocker due to bradycardia.    Not a candidate for ACEI/ARB yet  Carotid ultrasound shows moderate bilateral carotid atheromatous plaque with mildly elevated left ICA peak systolic velocity suggesting 50-69% left ICA stenosis  Continue Lasix 20 mg IV daily  Pineda catheter in place, strict I/O    Hematoma L groin  Monitor closely  Dr Kaiser and Dr Larose on board     Bell's Palsy  Continue outpatient acyclovir, Prednisone completed     Diabetes, uncontrolled  A1C 7.7  Elevated blood sugars due to steroid use.   Increase ISS to high  Basal bolus insulin regimen, Accu-Checks       Discharge Planning:   S/P CABG 06/25/2020 with Dr. Larose    Above encounter included review of the medical records, interviewing and examining the patient face-to-face, discussion with family and other health care providers, ordering and interpreting lab/test results and formulating a plan of care.     Medical Decision Making:      [_] Low Complexity  [_] Moderate  Complexity  [x] High Complexity      Manolo Alberto MD  Department of Hospital Medicine   [facility]

## 2020-06-26 NOTE — NURSING
Wife at bedside. Received update on patient status, voiced understanding and has no needs or complaints at this time.

## 2020-06-27 LAB
ANION GAP SERPL CALC-SCNC: 8 MMOL/L (ref 8–16)
BASOPHILS # BLD AUTO: 0.01 K/UL (ref 0–0.2)
BASOPHILS NFR BLD: 0.1 % (ref 0–1.9)
BLD PROD TYP BPU: NORMAL
BLOOD UNIT EXPIRATION DATE: NORMAL
BLOOD UNIT TYPE CODE: 5100
BLOOD UNIT TYPE: NORMAL
BUN SERPL-MCNC: 34 MG/DL (ref 8–23)
CALCIUM SERPL-MCNC: 8.4 MG/DL (ref 8.7–10.5)
CHLORIDE SERPL-SCNC: 109 MMOL/L (ref 95–110)
CO2 SERPL-SCNC: 26 MMOL/L (ref 23–29)
CODING SYSTEM: NORMAL
CREAT SERPL-MCNC: 1.1 MG/DL (ref 0.5–1.4)
DIFFERENTIAL METHOD: ABNORMAL
DISPENSE STATUS: NORMAL
EOSINOPHIL # BLD AUTO: 0 K/UL (ref 0–0.5)
EOSINOPHIL NFR BLD: 0.1 % (ref 0–8)
ERYTHROCYTE [DISTWIDTH] IN BLOOD BY AUTOMATED COUNT: 15.6 % (ref 11.5–14.5)
EST. GFR  (AFRICAN AMERICAN): >60 ML/MIN/1.73 M^2
EST. GFR  (NON AFRICAN AMERICAN): >60 ML/MIN/1.73 M^2
GLUCOSE SERPL-MCNC: 237 MG/DL (ref 70–110)
GLUCOSE SERPL-MCNC: 260 MG/DL (ref 70–110)
GLUCOSE SERPL-MCNC: 269 MG/DL (ref 70–110)
GLUCOSE SERPL-MCNC: 324 MG/DL (ref 70–110)
GLUCOSE SERPL-MCNC: 335 MG/DL (ref 70–110)
HCT VFR BLD AUTO: 24.7 % (ref 40–54)
HGB BLD-MCNC: 8.1 G/DL (ref 14–18)
IMM GRANULOCYTES # BLD AUTO: 0.36 K/UL (ref 0–0.04)
IMM GRANULOCYTES NFR BLD AUTO: 2.8 % (ref 0–0.5)
LYMPHOCYTES # BLD AUTO: 1.4 K/UL (ref 1–4.8)
LYMPHOCYTES NFR BLD: 11.1 % (ref 18–48)
MCH RBC QN AUTO: 29.7 PG (ref 27–31)
MCHC RBC AUTO-ENTMCNC: 32.8 G/DL (ref 32–36)
MCV RBC AUTO: 91 FL (ref 82–98)
MONOCYTES # BLD AUTO: 1.8 K/UL (ref 0.3–1)
MONOCYTES NFR BLD: 14.3 % (ref 4–15)
NEUTROPHILS # BLD AUTO: 9.2 K/UL (ref 1.8–7.7)
NEUTROPHILS NFR BLD: 71.6 % (ref 38–73)
NRBC BLD-RTO: 0 /100 WBC
NUM UNITS TRANS PACKED RBC: NORMAL
PLATELET # BLD AUTO: 137 K/UL (ref 150–350)
PMV BLD AUTO: 11.5 FL (ref 9.2–12.9)
POTASSIUM SERPL-SCNC: 5 MMOL/L (ref 3.5–5.1)
RBC # BLD AUTO: 2.73 M/UL (ref 4.6–6.2)
SODIUM SERPL-SCNC: 143 MMOL/L (ref 136–145)
WBC # BLD AUTO: 12.84 K/UL (ref 3.9–12.7)

## 2020-06-27 PROCEDURE — 25000003 PHARM REV CODE 250: Performed by: THORACIC SURGERY (CARDIOTHORACIC VASCULAR SURGERY)

## 2020-06-27 PROCEDURE — 63600175 PHARM REV CODE 636 W HCPCS: Performed by: PHYSICIAN ASSISTANT

## 2020-06-27 PROCEDURE — 63600175 PHARM REV CODE 636 W HCPCS: Performed by: INTERNAL MEDICINE

## 2020-06-27 PROCEDURE — 20000000 HC ICU ROOM

## 2020-06-27 PROCEDURE — 99024 PR POST-OP FOLLOW-UP VISIT: ICD-10-PCS | Mod: ,,, | Performed by: PHYSICIAN ASSISTANT

## 2020-06-27 PROCEDURE — 85025 COMPLETE CBC W/AUTO DIFF WBC: CPT

## 2020-06-27 PROCEDURE — 82962 GLUCOSE BLOOD TEST: CPT

## 2020-06-27 PROCEDURE — 80048 BASIC METABOLIC PNL TOTAL CA: CPT

## 2020-06-27 PROCEDURE — 94761 N-INVAS EAR/PLS OXIMETRY MLT: CPT

## 2020-06-27 PROCEDURE — 27000221 HC OXYGEN, UP TO 24 HOURS

## 2020-06-27 PROCEDURE — 99900035 HC TECH TIME PER 15 MIN (STAT)

## 2020-06-27 PROCEDURE — 93005 ELECTROCARDIOGRAM TRACING: CPT | Performed by: INTERNAL MEDICINE

## 2020-06-27 PROCEDURE — C9248 INJ, CLEVIDIPINE BUTYRATE: HCPCS | Performed by: INTERNAL MEDICINE

## 2020-06-27 PROCEDURE — 99024 POSTOP FOLLOW-UP VISIT: CPT | Mod: ,,, | Performed by: PHYSICIAN ASSISTANT

## 2020-06-27 PROCEDURE — 25000003 PHARM REV CODE 250: Performed by: INTERNAL MEDICINE

## 2020-06-27 PROCEDURE — 63600175 PHARM REV CODE 636 W HCPCS: Performed by: THORACIC SURGERY (CARDIOTHORACIC VASCULAR SURGERY)

## 2020-06-27 PROCEDURE — 94660 CPAP INITIATION&MGMT: CPT

## 2020-06-27 RX ORDER — CARVEDILOL 6.25 MG/1
6.25 TABLET ORAL 2 TIMES DAILY
Status: DISCONTINUED | OUTPATIENT
Start: 2020-06-27 | End: 2020-07-01

## 2020-06-27 RX ORDER — FUROSEMIDE 10 MG/ML
40 INJECTION INTRAMUSCULAR; INTRAVENOUS ONCE
Status: COMPLETED | OUTPATIENT
Start: 2020-06-27 | End: 2020-06-27

## 2020-06-27 RX ADMIN — CLEVIPIDINE 2 MG/HR: 0.5 EMULSION INTRAVENOUS at 03:06

## 2020-06-27 RX ADMIN — ACYCLOVIR 400 MG: 200 CAPSULE ORAL at 03:06

## 2020-06-27 RX ADMIN — FENOFIBRATE 145 MG: 145 TABLET, FILM COATED ORAL at 07:06

## 2020-06-27 RX ADMIN — CARVEDILOL 6.25 MG: 6.25 TABLET, FILM COATED ORAL at 09:06

## 2020-06-27 RX ADMIN — HYDROCODONE BITARTRATE AND ACETAMINOPHEN 1 TABLET: 5; 325 TABLET ORAL at 05:06

## 2020-06-27 RX ADMIN — CARVEDILOL 6.25 MG: 6.25 TABLET, FILM COATED ORAL at 08:06

## 2020-06-27 RX ADMIN — HUMAN INSULIN 12 UNITS: 100 INJECTION, SOLUTION SUBCUTANEOUS at 04:06

## 2020-06-27 RX ADMIN — DULOXETINE 30 MG: 30 CAPSULE, DELAYED RELEASE ORAL at 08:06

## 2020-06-27 RX ADMIN — CLEVIPIDINE 10 MG/HR: 0.5 EMULSION INTRAVENOUS at 08:06

## 2020-06-27 RX ADMIN — CLEVIPIDINE 8 MG/HR: 0.5 EMULSION INTRAVENOUS at 08:06

## 2020-06-27 RX ADMIN — ATORVASTATIN CALCIUM 40 MG: 40 TABLET, FILM COATED ORAL at 08:06

## 2020-06-27 RX ADMIN — CHLORHEXIDINE GLUCONATE 15 ML: 1.2 RINSE ORAL at 09:06

## 2020-06-27 RX ADMIN — CLEVIPIDINE 8 MG/HR: 0.5 EMULSION INTRAVENOUS at 11:06

## 2020-06-27 RX ADMIN — CLEVIPIDINE 10 MG/HR: 0.5 EMULSION INTRAVENOUS at 01:06

## 2020-06-27 RX ADMIN — ACYCLOVIR 400 MG: 200 CAPSULE ORAL at 06:06

## 2020-06-27 RX ADMIN — CLEVIPIDINE 2 MG/HR: 0.5 EMULSION INTRAVENOUS at 06:06

## 2020-06-27 RX ADMIN — DOCUSATE SODIUM 100 MG: 100 CAPSULE, LIQUID FILLED ORAL at 09:06

## 2020-06-27 RX ADMIN — CHLORHEXIDINE GLUCONATE 15 ML: 1.2 RINSE ORAL at 08:06

## 2020-06-27 RX ADMIN — CLEVIPIDINE 10 MG/HR: 0.5 EMULSION INTRAVENOUS at 04:06

## 2020-06-27 RX ADMIN — ACYCLOVIR 400 MG: 200 CAPSULE ORAL at 09:06

## 2020-06-27 RX ADMIN — CEFAZOLIN SODIUM 2 G: 2 SOLUTION INTRAVENOUS at 01:06

## 2020-06-27 RX ADMIN — CLEVIPIDINE 2 MG/HR: 0.5 EMULSION INTRAVENOUS at 11:06

## 2020-06-27 RX ADMIN — HYDROCODONE BITARTRATE AND ACETAMINOPHEN 1 TABLET: 5; 325 TABLET ORAL at 01:06

## 2020-06-27 RX ADMIN — ASPIRIN 325 MG ORAL TABLET 325 MG: 325 PILL ORAL at 09:06

## 2020-06-27 RX ADMIN — FUROSEMIDE 40 MG: 10 INJECTION, SOLUTION INTRAMUSCULAR; INTRAVENOUS at 03:06

## 2020-06-27 RX ADMIN — HUMAN INSULIN 9 UNITS: 100 INJECTION, SOLUTION SUBCUTANEOUS at 09:06

## 2020-06-27 RX ADMIN — HUMAN INSULIN 9 UNITS: 100 INJECTION, SOLUTION SUBCUTANEOUS at 10:06

## 2020-06-27 RX ADMIN — ACYCLOVIR 400 MG: 200 CAPSULE ORAL at 10:06

## 2020-06-27 RX ADMIN — PANTOPRAZOLE SODIUM 40 MG: 40 TABLET, DELAYED RELEASE ORAL at 06:06

## 2020-06-27 NOTE — PROGRESS NOTES
[facility] Medicine    Progress Note    Patient Name: Sincere Malave  MRN: [id number]  Patient Class: IP- Inpatient   Admission Date: 6/21/2020  6:02 PM  Length of Stay: 6  Attending Physician: ZAKI LEON MD  Primary Care Provider: Primary Doctor No  Face-to-Face encounter date: 06/27/2020  Chief Complaint: Chest Pain         Patient ID: Sincere Malave is a 70 y.o. male admitted for   Active Hospital Problems    Diagnosis  POA    *NSTEMI (non-ST elevated myocardial infarction) [I21.4]  Yes    History of heart artery stent [Z95.5]  Not Applicable    Femoral artery hematoma complicating cardiac catheterization [I97.410]  No     Left  6/24/2020      ASCVD (arteriosclerotic cardiovascular disease) [I25.10]  Yes    Pulmonary edema, acute [J81.0]  Yes    Essential hypertension [I10]  Yes    Acute-on-chronic kidney injury [N17.9, N18.9]  Yes    Chest pain syndrome [R07.9]  Yes    Coronary artery disease involving native coronary artery of native heart [I25.10]  Yes    Type 2 diabetes mellitus with hyperglycemia, with long-term current use of insulin [E11.65, Z79.4]  Not Applicable    HLD (hyperlipidemia) [E78.5]  Yes    TERESO (obstructive sleep apnea) [G47.33]  Yes    Acute hypoxemic respiratory failure [J96.01]  Yes    Bell's palsy [G51.0]  Yes      Resolved Hospital Problems   No resolved problems to display.      HPI by Dr Herbert 06/21/2020: 70 y.o. male with a PMHx of CAD with remote PCI, CKD 3, IDDM, hypertension, hyperlipidemia, neuropathy, sleep apnea, anemia who presents to [facility] ED with chief complaint of chest pain for 2 hr.  He described chest pain as a substernal chest tightness and tells me that his lower jaw hurts.  Patient was placed in ICU on Cardene drip however subsequent cardiac enzymes up trended and Cardiology was involved.  Patient was also given Rocephin and azithromycin for presumed pneumonia.  He also received aspirin, statin, beta-blocker and  weight based Lovenox.  Patient was transferred to [facility] for angiographic evaluation tomorrow morning.  Upon my interview he reports mild chest discomfort and jaw pain and associated shortness of breath.  Otherwise denies any fever, chills, headache, dizziness, palpitations, nausea, vomiting, bladder or bowel symptoms.      Subjective:      Interval History: Patient seen and examined this morning patient status post CABG x6, postop day 2.  Pt was successfully extubated yesterday 06/26/2020. Sitting in chair, reports pain is bearable   No concerns/issues overnight reported by the patient or the nursing staff.    Review of Systems unable to perform today due to sedation intubation    Objective:     Physical Exam  Vitals:    06/27/20 0100   BP: (!) 171/73   Pulse: 68   Resp: (!) 25   Temp:      Wt Readings from Last 1 Encounters:   06/21/20 91.7 kg (202 lb 2.6 oz)      Body mass index is 31.64 kg/m².    Vitals reviewed.  Constitutional:  Awake, alert and oriented  Obese  HENT: NC, large neck circumference  Head: Atraumatic.   Cardiovascular: Normal rate, regular rhythm and normal heart sounds.   Pulmonary/Chest: Decreased effort, decreased air entry due to poor effort.  No wheezes.  Chest tubes in place  Abdominal: Soft.  obese Bowel sounds are normal. No distension and no mass. No tenderness  : Pineda in place with clear urine  Neurological:  Awake, alert and oriented  Skin: KEZIA stockings B.L LE  Psych:  Appropriate mood and affect    Following labs were Reviewed   CBC:  Recent Labs   Lab 06/27/20  0400   WBC 12.84*   HGB 8.1*   HCT 24.7*   *     CMP:  Recent Labs   Lab 06/27/20  0400   CALCIUM 8.4*      K 5.0   CO2 26      BUN 34*   CREATININE 1.1     Last 72 hour POCT GLUCOSE  No results found for: POCTGLUCOSE     Microbiology Results (last 7 days)     Procedure Component Value Units Date/Time    MRSA Screen by PCR [[id number]] Collected: 06/25/20 0052    Order Status: Completed Updated: 06/25/20  0520     MRSA SCREEN BY PCR Negative    Narrative:      MRSA    Nasal culture [[id number]] Collected: 06/25/20 0036    Order Status: Canceled Specimen: Nasopharyngeal from Nose             X-Ray Chest AP Portable   Final Result      X-Ray Chest AP Portable   Final Result      X-Ray Chest AP Portable   Final Result      US Extremity Non Vascular Limited Left   Final Result      US Carotid Bilateral   Final Result      X-Ray Chest AP Portable   Final Result            Scheduled Meds:   acyclovir  400 mg Oral 5x Daily    aspirin  325 mg Oral Daily    atorvastatin  40 mg Oral QHS    carvediloL  6.25 mg Oral BID    chlorhexidine  15 mL Mouth/Throat BID    docusate sodium  100 mg Oral Daily    DULoxetine  30 mg Oral QHS    fenofibrate  145 mg Oral Daily with breakfast    pantoprazole  40 mg Oral Daily     Continuous Infusions:   sodium chloride 0.9% 25 mL/hr at 06/25/20 1515    clevidipine 10 mg/hr (06/27/20 0143)    epinephrine      insulin (HUMAN R) infusion (adults) 4 Units/hr (06/26/20 1548)     PRN Meds:.acetaminophen, calcium gluconate IVPB, calcium gluconate IVPB, calcium gluconate IVPB, dextrose 50%, dextrose 50%, HYDROcodone-acetaminophen, insulin regular, magnesium sulfate IVPB, magnesium sulfate IVPB, melatonin, morphine, potassium chloride in water, potassium chloride in water, potassium chloride in water    6/22/20: Echo  · Concentric left ventricular remodeling.  · Mildly decreased left ventricular systolic function. The estimated ejection fraction is 40%.  · Grade I (mild) left ventricular diastolic dysfunction consistent with impaired relaxation.  · Local segmental wall motion abnormalities.  · Normal right ventricular systolic function.  · No pulmonary hypertension present.  · Mild tricuspid regurgitation.  · Normal central venous pressure (3 mmHg).  Evidence anterior apical scar is seen with slight expansion of the apex during systole  I had ejection fraction of left ventricle globally is  approximately 40%  L evidence of elevation of left ventricular end-diastolic pressure is seen  No hemodynamically significant valvular regurgitation    06/22/2020: Carotid U/S b/l  1. Moderate bilateral carotid atheromatous plaque, with mildly elevated left ICA peak systolic velocities suggesting 50-69% left ICA stenosis.  2. No findings of hemodynamically significant right ICA stenosis.  3. Patent vertebral arteries with antegrade flow bilaterally.  Electronically Signed by Deandre CELAYA on 6/22/2020 4:06 PM    Assessment & Plan:     Multivessel CAD including L Main  Status post CABG x6 on 06/25/2020 with Dr. Larose  Carotid artery disease  Postop day 3  Successfully extubated 036/26/2020  Remains on aspirin, statin  Cannot tolerate beta-blocker due to bradycardia.    Not a candidate for ACEI/ARB yet  Carotid ultrasound shows moderate bilateral carotid atheromatous plaque with mildly elevated left ICA peak systolic velocity suggesting 50-69% left ICA stenosis  Howard catheter in place, strict I/O. Will remove howard later this morning   Ambulate today  Encouraged IS    Hypertension  Currently on Cleviprex since surgery  Coreg 6.25 mg BID     Hematoma L groin- improved   Monitor closely  Dr Kaiser and Dr Larose on board     Bell's Palsy  Continue outpatient acyclovir, Prednisone completed     Diabetes, uncontrolled  A1C 7.7  Elevated blood sugars due to steroid use.   Increase ISS to high  Basal bolus insulin regimen, Accu-Checks     Discussed with Juan Luis Ingram's nurse     Discharge Planning:   S/P CABG 06/25/2020 with Dr. Larose    Above encounter included review of the medical records, interviewing and examining the patient face-to-face, discussion with family and other health care providers, ordering and interpreting lab/test results and formulating a plan of care.     Medical Decision Making:      [_] Low Complexity  [_] Moderate Complexity  [x] High Complexity      Manolo Alberto MD  Department of Hospital  Medicine   [facility]

## 2020-06-27 NOTE — PROGRESS NOTES
Patient is POD #1 s/p CABG. He is now extubated, hemodynamically stable and off of pressors with no apparent anesthetic complications. He has been out of bed to chair.     Anesthesiology will sign off.    Lana Gibson

## 2020-06-27 NOTE — NURSING
Patient assessment completed at this time. No needs or complaints. Patient voiced understanding of room and call light.

## 2020-06-27 NOTE — PROGRESS NOTES
"CVT SURGERY PROGRESS NOTE  Sincere Malave  70 y.o.  1950    Patient's Chief Complaint:  NSTEMI (non-ST elevated myocardial infarction)    Subjective:  Symptoms:  Stable.  No chest pain or weakness.    Diet:  No nausea or vomiting.    Activity level: Impaired due to pain.    Pain:  He complains of pain that is moderate.  He reports pain is improving.  Pain is well controlled.            HPI:  70 year old male with hx of CAD with remote PCI, CKD III, IDDM, HTN. HLD, TERESO, neuropathy and anemia presented to outside ED with complaints of chest pain. He was noted to have elevated troponin, transferred here and subsequent LHC performed with findings of multivessel CAD. CVTS consulted for CABG eval. CABG x 6 (COLLIN-LAD, SVG-Ramus/OM, SVG-D1, SVG- RPDA, SVG-R posterior lateral branch) by Dr Cifuentes on 6/25/2020.    Last 24 hour Interval:  -Remains on cleviprex  -Poor effort on   -Has been up in chair and ambulated this am  -MS tube output 140/ (unclear due to Epic downtime), no air leak, to suction  -Pleural drain output 63? (unclear due to Epic downtime)  -Pt seen this afternoon and is resting on 4 L NC, his wife is present. Pt denies any complaints.                                                          Objective:  General Appearance:  In no acute distress and comfortable.    Vital signs: (most recent): Blood pressure 137/63, pulse 62, temperature 98.8 °F (37.1 °C), resp. rate 19, height 5' 7" (1.702 m), weight 91.6 kg (201 lb 15.1 oz), SpO2 (!) 91 %.    Output: Producing urine.    Lungs:  Normal effort and normal respiratory rate.  He is not in respiratory distress.  (Bilateral crackles)  Heart: Normal rate.  Regular rhythm.  No murmur.   Chest: Symmetric chest wall expansion.   Abdomen: Abdomen is soft and non-distended.  Bowel sounds are normal.   There is no abdominal tenderness.     Pulses: Distal pulses are intact.    Neurological: Patient is alert.  (Sleepy).    Skin:  Warm and dry.      Recent " "Vitals:  Vitals:    06/27/20 1244 06/27/20 1246 06/27/20 1315 06/27/20 1321   BP:   137/63    Pulse:   62    Resp:   (!) 30 19   Temp:       TempSrc:       SpO2:   (!) 91%    Weight: 91.6 kg (201 lb 15.1 oz)      Height: 5' 7" (1.702 m) 5' 7" (1.702 m)         INPATIENT MEDS   sodium chloride 0.9% 25 mL/hr at 06/25/20 1515    clevidipine 10 mg/hr (06/27/20 0855)    epinephrine      insulin (HUMAN R) infusion (adults) 4 Units/hr (06/26/20 1548)      acyclovir  400 mg Oral 5x Daily    aspirin  325 mg Oral Daily    atorvastatin  40 mg Oral QHS    carvediloL  6.25 mg Oral BID    chlorhexidine  15 mL Mouth/Throat BID    docusate sodium  100 mg Oral Daily    DULoxetine  30 mg Oral QHS    fenofibrate  145 mg Oral Daily with breakfast    pantoprazole  40 mg Oral Daily     acetaminophen, calcium gluconate IVPB, calcium gluconate IVPB, calcium gluconate IVPB, dextrose 50%, dextrose 50%, HYDROcodone-acetaminophen, insulin regular, magnesium sulfate IVPB, magnesium sulfate IVPB, melatonin, morphine, potassium chloride in water, potassium chloride in water, potassium chloride in water    HEMODYNAMICS  PAP: (39-41)/(6-10) 39/6  PAP (Mean):  [16 mmHg-18 mmHg] 16 mmHg  CO:  [5.9 L/min-6.7 L/min] 6.1 L/min     Recent O2 Therapy/Vent Settings: 4L NC    I/O last 24 hrs:  Intake/Output - Last 3 Shifts       06/25 0700 - 06/26 0659 06/26 0700 - 06/27 0659 06/27 0700 - 06/28 0659    P.O.       I.V. (mL/kg) 3872.8 (42.3) 127.4 (1.4)     Blood 336      IV Piggyback 370 320     Total Intake(mL/kg) 4578.8 (50) 447.4 (4.9)     Urine (mL/kg/hr) 3160 (1.4) 1125 (0.5)     Drains 170 20 55    Chest Tube 244 80     Total Output 3574 1225 55    Net +1004.8 -777.6 -55               Recent Cardiac Rhythm: SR    Recent Pain Assessment: 5    CBC  Recent Labs   Lab 06/26/20  0040 06/26/20  0349 06/27/20  0400   WBC 9.06 13.21* 12.84*   RBC 3.04* 3.22* 2.73*   HGB 8.9* 9.6* 8.1*   * 139* 137*   MCV 86 87 91   MCH 29.3 29.8 29.7   MCHC " 34.1 34.4 32.8     BMP  Recent Labs   Lab 06/26/20  0040 06/26/20  0349 06/27/20  0400   CO2 21* 22* 26   BUN 34* 33* 34*   CREATININE 1.3 1.4 1.1   CALCIUM 8.4* 8.6* 8.4*     CARDIAC ENZYMES  Recent Labs   Lab 06/21/20  1657 06/22/20  0350 06/22/20 2021   TROPONINI 3.639* 7.320* 6.456*     BNP  Recent Labs   Lab 06/20/20  1824 06/22/20  0350   BNP 29 661*     PT/INR  INR   Date Value Ref Range Status   06/25/2020 1.5  Final     Comment:     Coumadin Therapy:  INR: 2.0-3.0 conventional anticoagulation  INR: 2.5-3.5 intensive anticoagulation     06/22/2020 1.1  Final     Comment:     Coumadin Therapy:  INR: 2.0-3.0 conventional anticoagulation  INR: 2.5-3.5 intensive anticoagulation     06/19/2020 0.9 0.8 - 1.2 Final     Comment:     Coumadin Therapy:  2.0 - 3.0 for INR for all indicators except mechanical heart valves  and antiphospholipid syndromes which should use 2.5 - 3.5.       DIAGNOSTIC RESULTS:  CXR 6/27/2020 reviewed  Bibasilar opacities-?edema vs atelectasis    CURRENT CONSULTS:  IP CONSULT TO CARDIOLOGY  IP CONSULT TO REGISTERED DIETITIAN/NUTRITIONIST  IP CONSULT TO HOSPITALIST  IP CONSULT TO CARDIOTHORACIC SURGERY    ASSESSMENT/PLAN:  NSTEMI  S/P CABG x 6 on 6/25/2020 by Dr Cifuentes  -Remains on Cleviprex, start oral meds to wean Cleviprex  -Tolerating NC 4L  -Mediastinal tube to remain  -Pleural drain to remain  -Pacer wires to remain  -Continue ASA, BB, statin  -Leukocytosis trending down, likely reactive  -Afebrile  -Monitor H/H trending down, received 2 units during surgery  -Encourage IS  -Increase activity  -Due to epic downtime I/O's ?, appears volume overloaded--IV Lasix x 1, monitor    HTN  -Presently on Cleviprex, start oral meds to wean Cleviprex  -Cards managing    HLD  -Continue statin    CKDIII  -Renal function stable  -Good UOP    IDDM  -Accuchecks and SSI  -May need long acting insulin to assist with optimal control  -Defer to attending    TERESO  -CPAP qhs    Arbyrd Palsy  -Defer to  attending    Case and plan of care discussed with MD  GI Prophylaxis: PPI:proton pump inhibitor per orders  DVT Prophylaxis: NI at this time  Anticoagulants   Medication Route Frequency       Julieta Thomas PA-C  Cardiovascular Thoracic Surgery  6/27/2020  1:49 PM

## 2020-06-27 NOTE — PROGRESS NOTES
"[facility]  Adult Nutrition   Progress Note (Follow-Up)    SUMMARY     Recommendations/Interventions:    Recommendation/Intervention: 1. Cont with present diet order- pt up in chair.  He is tolerating his diet.  2. RD provided contact information is fuuurther education is desired or if questions arise.  Goals: 1. Pt to meet at least 75% of estimated nutritional needs via po intake  Nutrition Goal Status: progressing towards goal    Dietitian Rounds Brief:    Written information provided on a heart healthy diabetic diet.  Wife stated they were familiar with the diet guidelines.  RD asked if they had questions or wantted further education.  She stated not at this time.  RD contact information was provided.    Reason for Assessment  Reason For Assessment: RD follow-up  Diagnosis: cardiac disease, diabetes diagnosis/complications  Relevant Medical History: bell's palsy, anemia, CAD, DM, HLD, HTN, sleep apnea  Interdisciplinary Rounds: did not attend  General Information Comments: s/p left heart cath 6/22/20, s/p CAGB 6/25/20    Nutrition Risk Screen  Nutrition Risk Screen: no indicators present     Nutrition/Diet History  Patient Reported Diet/Restrictions/Preferences: general  Typical Food/Fluid Intake: good per patient; doesn't really follow diet at home. Patient does check blood glucose regularly as recommended  Food Allergies: NKFA  Factors Affecting Nutritional Intake: None identified at this time    Anthropometrics  Temp: 98.7 °F (37.1 °C)  Height Method: Stated  Height: 5' 7" (170.2 cm)  Height (inches): 67 in  Weight Method: Bed Scale  Weight: 91.6 kg (201 lb 15.1 oz)  Weight (lb): 201.94 lb  Ideal Body Weight (IBW), Male: 148 lb  % Ideal Body Weight, Male (lb): 136.45 %  BMI (Calculated): 31.6  BMI Grade: 30 - 34.9- obesity - grade I     Weight History:  Wt Readings from Last 10 Encounters:   06/27/20 91.6 kg (201 lb 15.1 oz)   06/22/20 91.6 kg (202 lb)   06/21/20 92.1 kg (203 lb 0.7 oz) "   }  Lab/Procedures/Meds: Pertinent Labs Reviewed  Clinical Chemistry:  Recent Labs   Lab 06/24/20  0245 06/25/20  0311 06/25/20  1800 06/26/20  0349 06/27/20  0400    138 139 142 143   K 3.4* 4.6 5.2*  5.2* 4.6 5.0   CL 98 101 110 110 109   CO2 26 26 21* 22* 26   * 170* 241* 215* 237*   BUN 38* 42* 38* 33* 34*   CREATININE 1.3 1.4 1.4 1.4 1.1   CALCIUM 8.9 9.1 8.4* 8.6* 8.4*   PROT 7.1 7.1  --   --   --    ALBUMIN 3.6 3.6  --   --   --    BILITOT 1.2* 1.2*  --   --   --    ALKPHOS 47* 49*  --   --   --    AST 20 25  --   --   --    ALT 34 48*  --   --   --    ANIONGAP 12 11 8 10 8   ESTGFRAFRICA >60.0 59.3* 59.3* 59.3* >60.0   EGFRNONAA 56.1* 51.3* 51.3* 51.3* >60.0   MG 1.9 2.1 2.1 2.2  --    PHOS 3.8 4.1 3.1  --   --     < > = values in this interval not displayed.     CBC:   Recent Labs   Lab 06/27/20  0400   WBC 12.84*   RBC 2.73*   HGB 8.1*   HCT 24.7*   *   MCV 91   MCH 29.7   MCHC 32.8     Lipid Panel:  Recent Labs   Lab 06/21/20  0456   CHOL 169   HDL 49   LDLCALC 91.4   TRIG 143   CHOLHDL 29.0     Cardiac Profile:  Recent Labs   Lab 06/20/20  1824 06/21/20  1657 06/22/20  0350 06/22/20 2021   BNP 29  --  661*  --    TROPONINI 0.061* 3.639* 7.320* 6.456*    < > = values in this interval not displayed.     Inflammatory Labs:  No results for input(s): CRP in the last 168 hours.  Diabetes:  Recent Labs   Lab 06/21/20  0456 06/21/20  0522 06/21/20  1133 06/21/20  1609   HGBA1C 7.7*  --   --   --    POCTGLUCOSE  --  298* 202* 257*     Thyroid & Parathyroid:  Recent Labs   Lab 06/21/20  0456   TSH 0.902       Medications: Pertinent Medications reviewed  Scheduled Meds:   acyclovir  400 mg Oral 5x Daily    aspirin  325 mg Oral Daily    atorvastatin  40 mg Oral QHS    carvediloL  6.25 mg Oral BID    chlorhexidine  15 mL Mouth/Throat BID    docusate sodium  100 mg Oral Daily    DULoxetine  30 mg Oral QHS    fenofibrate  145 mg Oral Daily with breakfast    pantoprazole  40 mg Oral Daily      Continuous Infusions:   sodium chloride 0.9% 25 mL/hr at 06/25/20 1515    clevidipine 10 mg/hr (06/27/20 0855)    epinephrine      insulin (HUMAN R) infusion (adults) 4 Units/hr (06/26/20 1548)     PRN Meds:.acetaminophen, calcium gluconate IVPB, calcium gluconate IVPB, calcium gluconate IVPB, dextrose 50%, dextrose 50%, HYDROcodone-acetaminophen, insulin regular, magnesium sulfate IVPB, magnesium sulfate IVPB, melatonin, morphine, potassium chloride in water, potassium chloride in water, potassium chloride in water    Estimated/Assessed Needs    Weight Used For Calorie Calculations: 87.9 kg (193 lb 12.6 oz)  Energy Calorie Requirements (kcal): 3910-6065 (20-25 calorie     Protein Requirements: 101-119 g (1.1-1.9 g/kg BW)  Weight Used For Protein Calculations: 91.6 kg (201 lb 15.1 oz)     Estimated Fluid Requirement Method: RDA Method    Nutrition Prescription Ordered    Current Diet Order: 1800 calories diabetic diet  Nutrition Order Comments: Written information provided on a heart healthy diabetic diet.  Wife stated they were familiar with the diet guidelines.  RD asked if they had questions or wantted further education.  She stated not at this time.  RD contact information was provided.    Evaluation of Received Nutrient/Fluid Intake    Energy Calories Required: meeting needs  Protein Required: meeting needs  Fluid Required: meeting needs  Tolerance: tolerating  % Intake of Estimated Energy Needs: 25 - 50 %   % Meal Intake: 25 - 50 %    Intake/Output Summary (Last 24 hours) at 6/27/2020 1306  Last data filed at 6/27/2020 0701  Gross per 24 hour   Intake 297.43 ml   Output 815 ml   Net -517.57 ml      Nutrition Risk    Level of Risk/Frequency of Follow-up: moderate - high   Monitor and Evaluation    Food and Nutrient Intake: energy intake, food and beverage intake  Food and Nutrient Adminstration: diet order  Knowledge/Beliefs/Attitudes: food and nutrition knowledge/skill  Physical Activity and Function:  nutrition-related ADLs and IADLs  Anthropometric Measurements: weight, weight change  Biochemical Data, Medical Tests and Procedures: lipid profile, electrolyte and renal panel, gastrointestinal profile, glucose/endocrine profile, inflammatory profile  Nutrition-Focused Physical Findings: overall appearance     Nutrition Follow-Up    RD Follow-up?: Yes    Zee Craig MS, RD, LD 6/27/20

## 2020-06-27 NOTE — PLAN OF CARE
06/27/20 0720   Patient Assessment/Suction   Level of Consciousness (AVPU) alert   Respiratory Effort Normal;Unlabored   Expansion/Accessory Muscles/Retractions no use of accessory muscles;no retractions   PRE-TX-O2   O2 Device (Oxygen Therapy) High Flow nasal Cannula   $ Is the patient on Low Flow Oxygen? Yes   Flow (L/min) 4   SpO2 95 %   Pulse Oximetry Type Continuous   $ Pulse Oximetry - Multiple Charge Pulse Oximetry - Multiple   Pulse 70   Resp (!) 23   Preset CPAP/BiPAP Settings   Mode Of Delivery CPAP;Standby   $ CPAP/BiPAP Daily Charge BiPAP/CPAP Daily   Equipment Type DeVilbiss   Respiratory Evaluation   $ Care Plan Tech Time 15 min   Evaluation For   (care plan)

## 2020-06-27 NOTE — PROGRESS NOTES
[facility]  Cardiology  Progress Note    Patient Name: Sincere Malave  MRN: [id number]  Admission Date: 6/21/2020  Hospital Length of Stay: 6 days  Code Status: Full Code   Attending Physician: Manolo Alberto MD   Primary Care Physician: Primary Doctor No  Expected Discharge Date:   Principal Problem:NSTEMI (non-ST elevated myocardial infarction)    Subjective:       Interval History: Pain is tolerable. Denies any shortness of breath.     ROS   Denies any abdominal pain.   Denies any focal weakness.   Objective:     Vital Signs (Most Recent):  Temp: 98.6 °F (37 °C) (06/27/20 0000)  Pulse: 68 (06/27/20 0100)  Resp: (!) 25 (06/27/20 0100)  BP: (!) 171/73 (06/27/20 0100)  SpO2: (!) 89 % (06/27/20 0100) Vital Signs (24h Range):  Temp:  [98.2 °F (36.8 °C)-98.8 °F (37.1 °C)] 98.6 °F (37 °C)  Pulse:  [63-78] 68  Resp:  [15-28] 25  SpO2:  [88 %-97 %] 89 %  BP: (154-178)/(68-79) 171/73  Arterial Line BP: (146-158)/(44-63) 150/44     Weight: 91.7 kg (202 lb 2.6 oz)  Body mass index is 31.64 kg/m².    SpO2: (!) 89 %  O2 Device (Oxygen Therapy): nasal cannula      Intake/Output Summary (Last 24 hours) at 6/27/2020 0746  Last data filed at 6/26/2020 1701  Gross per 24 hour   Intake 447.43 ml   Output 1225 ml   Net -777.57 ml       Lines/Drains/Airways     Central Venous Catheter Line            Percutaneous Central Line Insertion/Assessment - Triple Lumen  06/25/20 0637 2 days          Drain                 Urethral Catheter 06/22/20 1030 4 days         Closed/Suction Drain 06/25/20 1030 Superior Chest Bulb 19 Fr. 1 day         Y Chest Tube 1 and 2 06/25/20 1029 Mediastinal 28 Fr. Mediastinal 28 Fr. 1 day                Scheduled Meds:   acyclovir  400 mg Oral 5x Daily    aspirin  325 mg Oral Daily    atorvastatin  40 mg Oral QHS    chlorhexidine  15 mL Mouth/Throat BID    docusate sodium  100 mg Oral Daily    DULoxetine  30 mg Oral QHS    fenofibrate  145 mg Oral Daily with breakfast    pantoprazole  40 mg  Oral Daily     Continuous Infusions:   sodium chloride 0.9% 25 mL/hr at 06/25/20 1515    clevidipine 10 mg/hr (06/27/20 0143)    epinephrine      insulin (HUMAN R) infusion (adults) 4 Units/hr (06/26/20 1548)     PRN Meds:.acetaminophen, calcium gluconate IVPB, calcium gluconate IVPB, calcium gluconate IVPB, dextrose 50%, dextrose 50%, HYDROcodone-acetaminophen, insulin regular, magnesium sulfate IVPB, magnesium sulfate IVPB, melatonin, morphine, potassium chloride in water, potassium chloride in water, potassium chloride in water     Physical Exam  HEENT: Normocephalic, atraumatic, PERRL, Conjunctiva pink, no scleral icterus.   CVS: S1S2+, RRR, no murmurs, rubs or gallops.  LUNGS: Crackles+  ABDOMEN: Soft, NT, BS+  EXTREMITIES: No cyanosis, clubbing. +edema  NEURO: AAO X 3.   STERNOTOMY: C/D/I  Left groin: Hematoma appears stable per nurse.     Significant Labs:   BMP:   Recent Labs   Lab 06/25/20  1442 06/25/20  1800 06/26/20  0040 06/26/20  0349   * 241* 220* 215*    139 141 142   K 4.4 5.2*  5.2* 4.7 4.6   * 110 111* 110   CO2 23 21* 21* 22*   BUN 37* 38* 34* 33*   CREATININE 1.2 1.4 1.3 1.4   CALCIUM 7.2* 8.4* 8.4* 8.6*   MG 2.2 2.1  --  2.2   , CMP   Recent Labs   Lab 06/25/20  1800 06/26/20  0040 06/26/20  0349    141 142   K 5.2*  5.2* 4.7 4.6    111* 110   CO2 21* 21* 22*   * 220* 215*   BUN 38* 34* 33*   CREATININE 1.4 1.3 1.4   CALCIUM 8.4* 8.4* 8.6*   ANIONGAP 8 9 10   ESTGFRAFRICA 59.3* >60.0 59.3*   EGFRNONAA 51.3* 56.1* 51.3*   , CBC   Recent Labs   Lab 06/25/20  1800 06/26/20  0040  06/26/20  0349 06/26/20  0352   WBC 7.30 9.06  --  13.21*  --    HGB 8.9* 8.9*  --  9.6*  --    HCT 26.3* 26.1*   < > 27.9* 28*   * 113*  --  139*  --     < > = values in this interval not displayed.   , INR   Recent Labs   Lab 06/25/20  1442   INR 1.5   , Lipid Panel No results for input(s): CHOL, HDL, LDLCALC, TRIG, CHOLHDL in the last 48 hours. and Troponin No results  for input(s): TROPONINI in the last 48 hours.    Significant Imaging: Reviewed  Assessment and Plan:     IMPRESSION:  NSTEMI.  Severe CAD s/p CABG X6 on 6/25/2020.   Left groin hematoma. S/p LHC.  Stable  Diabetes.  Dyslipidemia.   Anemia. Post op. H&H acceptable.   Thrombocytopenia. Improving.     PLAN:  1.  Start Coreg 6.25 mg p.o. b.i.d..  2.  Continue current medical regimen otherwise.  3. Encouraged IS.     Jessica Trujillo MD  Cardiology  [facility]

## 2020-06-28 LAB
ANION GAP SERPL CALC-SCNC: 9 MMOL/L (ref 8–16)
BASOPHILS # BLD AUTO: 0.02 K/UL (ref 0–0.2)
BASOPHILS NFR BLD: 0.2 % (ref 0–1.9)
BUN SERPL-MCNC: 39 MG/DL (ref 8–23)
CALCIUM SERPL-MCNC: 8.1 MG/DL (ref 8.7–10.5)
CHLORIDE SERPL-SCNC: 104 MMOL/L (ref 95–110)
CO2 SERPL-SCNC: 28 MMOL/L (ref 23–29)
CREAT SERPL-MCNC: 1.2 MG/DL (ref 0.5–1.4)
DIFFERENTIAL METHOD: ABNORMAL
EOSINOPHIL # BLD AUTO: 0.2 K/UL (ref 0–0.5)
EOSINOPHIL NFR BLD: 2.1 % (ref 0–8)
ERYTHROCYTE [DISTWIDTH] IN BLOOD BY AUTOMATED COUNT: 15 % (ref 11.5–14.5)
EST. GFR  (AFRICAN AMERICAN): >60 ML/MIN/1.73 M^2
EST. GFR  (NON AFRICAN AMERICAN): >60 ML/MIN/1.73 M^2
GLUCOSE SERPL-MCNC: 214 MG/DL (ref 70–110)
GLUCOSE SERPL-MCNC: 224 MG/DL (ref 70–110)
GLUCOSE SERPL-MCNC: 262 MG/DL (ref 70–110)
GLUCOSE SERPL-MCNC: 277 MG/DL (ref 70–110)
GLUCOSE SERPL-MCNC: 296 MG/DL (ref 70–110)
HCT VFR BLD AUTO: 22.1 % (ref 40–54)
HGB BLD-MCNC: 7.4 G/DL (ref 14–18)
IMM GRANULOCYTES # BLD AUTO: 0.47 K/UL (ref 0–0.04)
IMM GRANULOCYTES NFR BLD AUTO: 4.6 % (ref 0–0.5)
LYMPHOCYTES # BLD AUTO: 2 K/UL (ref 1–4.8)
LYMPHOCYTES NFR BLD: 19.6 % (ref 18–48)
MCH RBC QN AUTO: 29.6 PG (ref 27–31)
MCHC RBC AUTO-ENTMCNC: 33.5 G/DL (ref 32–36)
MCV RBC AUTO: 88 FL (ref 82–98)
MONOCYTES # BLD AUTO: 1.2 K/UL (ref 0.3–1)
MONOCYTES NFR BLD: 11.4 % (ref 4–15)
NEUTROPHILS # BLD AUTO: 6.4 K/UL (ref 1.8–7.7)
NEUTROPHILS NFR BLD: 62.1 % (ref 38–73)
NRBC BLD-RTO: 1 /100 WBC
PLATELET # BLD AUTO: 137 K/UL (ref 150–350)
PMV BLD AUTO: 10.7 FL (ref 9.2–12.9)
POTASSIUM SERPL-SCNC: 3.8 MMOL/L (ref 3.5–5.1)
RBC # BLD AUTO: 2.5 M/UL (ref 4.6–6.2)
SODIUM SERPL-SCNC: 141 MMOL/L (ref 136–145)
WBC # BLD AUTO: 10.27 K/UL (ref 3.9–12.7)

## 2020-06-28 PROCEDURE — 94761 N-INVAS EAR/PLS OXIMETRY MLT: CPT

## 2020-06-28 PROCEDURE — C9248 INJ, CLEVIDIPINE BUTYRATE: HCPCS | Performed by: INTERNAL MEDICINE

## 2020-06-28 PROCEDURE — 25000003 PHARM REV CODE 250: Performed by: INTERNAL MEDICINE

## 2020-06-28 PROCEDURE — 25000003 PHARM REV CODE 250: Performed by: THORACIC SURGERY (CARDIOTHORACIC VASCULAR SURGERY)

## 2020-06-28 PROCEDURE — 27000221 HC OXYGEN, UP TO 24 HOURS

## 2020-06-28 PROCEDURE — 85025 COMPLETE CBC W/AUTO DIFF WBC: CPT

## 2020-06-28 PROCEDURE — 80048 BASIC METABOLIC PNL TOTAL CA: CPT

## 2020-06-28 PROCEDURE — 63600175 PHARM REV CODE 636 W HCPCS: Performed by: INTERNAL MEDICINE

## 2020-06-28 PROCEDURE — 99024 PR POST-OP FOLLOW-UP VISIT: ICD-10-PCS | Mod: ,,, | Performed by: THORACIC SURGERY (CARDIOTHORACIC VASCULAR SURGERY)

## 2020-06-28 PROCEDURE — 94660 CPAP INITIATION&MGMT: CPT

## 2020-06-28 PROCEDURE — 99900035 HC TECH TIME PER 15 MIN (STAT)

## 2020-06-28 PROCEDURE — 99024 POSTOP FOLLOW-UP VISIT: CPT | Mod: ,,, | Performed by: THORACIC SURGERY (CARDIOTHORACIC VASCULAR SURGERY)

## 2020-06-28 PROCEDURE — 20000000 HC ICU ROOM

## 2020-06-28 RX ORDER — AMLODIPINE BESYLATE 5 MG/1
10 TABLET ORAL DAILY
Status: DISCONTINUED | OUTPATIENT
Start: 2020-06-28 | End: 2020-07-02 | Stop reason: HOSPADM

## 2020-06-28 RX ORDER — AMLODIPINE BESYLATE 5 MG/1
5 TABLET ORAL DAILY
Status: DISCONTINUED | OUTPATIENT
Start: 2020-06-28 | End: 2020-06-28

## 2020-06-28 RX ADMIN — ACYCLOVIR 400 MG: 200 CAPSULE ORAL at 10:06

## 2020-06-28 RX ADMIN — ACYCLOVIR 400 MG: 200 CAPSULE ORAL at 02:06

## 2020-06-28 RX ADMIN — ACYCLOVIR 400 MG: 200 CAPSULE ORAL at 05:06

## 2020-06-28 RX ADMIN — HYDROCODONE BITARTRATE AND ACETAMINOPHEN 1 TABLET: 5; 325 TABLET ORAL at 02:06

## 2020-06-28 RX ADMIN — CLEVIPIDINE 2 MG/HR: 0.5 EMULSION INTRAVENOUS at 12:06

## 2020-06-28 RX ADMIN — CLEVIPIDINE 8 MG/HR: 0.5 EMULSION INTRAVENOUS at 02:06

## 2020-06-28 RX ADMIN — PANTOPRAZOLE SODIUM 40 MG: 40 TABLET, DELAYED RELEASE ORAL at 05:06

## 2020-06-28 RX ADMIN — CARVEDILOL 6.25 MG: 6.25 TABLET, FILM COATED ORAL at 08:06

## 2020-06-28 RX ADMIN — ASPIRIN 325 MG ORAL TABLET 325 MG: 325 PILL ORAL at 10:06

## 2020-06-28 RX ADMIN — CLEVIPIDINE 2 MG/HR: 0.5 EMULSION INTRAVENOUS at 06:06

## 2020-06-28 RX ADMIN — CLEVIPIDINE 8 MG/HR: 0.5 EMULSION INTRAVENOUS at 08:06

## 2020-06-28 RX ADMIN — ATORVASTATIN CALCIUM 40 MG: 40 TABLET, FILM COATED ORAL at 08:06

## 2020-06-28 RX ADMIN — CLEVIPIDINE 9 MG/HR: 0.5 EMULSION INTRAVENOUS at 11:06

## 2020-06-28 RX ADMIN — CLEVIPIDINE 2 MG/HR: 0.5 EMULSION INTRAVENOUS at 08:06

## 2020-06-28 RX ADMIN — HUMAN INSULIN 6 UNITS: 100 INJECTION, SOLUTION SUBCUTANEOUS at 07:06

## 2020-06-28 RX ADMIN — HUMAN INSULIN 9 UNITS: 100 INJECTION, SOLUTION SUBCUTANEOUS at 09:06

## 2020-06-28 RX ADMIN — FENOFIBRATE 145 MG: 145 TABLET, FILM COATED ORAL at 10:06

## 2020-06-28 RX ADMIN — CARVEDILOL 6.25 MG: 6.25 TABLET, FILM COATED ORAL at 10:06

## 2020-06-28 RX ADMIN — ACYCLOVIR 400 MG: 200 CAPSULE ORAL at 09:06

## 2020-06-28 RX ADMIN — AMLODIPINE BESYLATE 10 MG: 5 TABLET ORAL at 10:06

## 2020-06-28 RX ADMIN — DOCUSATE SODIUM 100 MG: 100 CAPSULE, LIQUID FILLED ORAL at 10:06

## 2020-06-28 RX ADMIN — CLEVIPIDINE 8 MG/HR: 0.5 EMULSION INTRAVENOUS at 05:06

## 2020-06-28 RX ADMIN — DULOXETINE 30 MG: 30 CAPSULE, DELAYED RELEASE ORAL at 08:06

## 2020-06-28 RX ADMIN — HUMAN INSULIN 9 UNITS: 100 INJECTION, SOLUTION SUBCUTANEOUS at 05:06

## 2020-06-28 NOTE — PROGRESS NOTES
[facility] Medicine    Progress Note    Patient Name: Sincere Malave  MRN: [id number]  Patient Class: IP- Inpatient   Admission Date: 6/21/2020  6:02 PM  Length of Stay: 7  Attending Physician: ZAKI LEON MD  Primary Care Provider: Primary Doctor No  Face-to-Face encounter date: 06/28/2020  Chief Complaint: Chest Pain         Patient ID: Sincere Malave is a 70 y.o. male admitted for   Active Hospital Problems    Diagnosis  POA    *NSTEMI (non-ST elevated myocardial infarction) [I21.4]  Yes    S/P CABG x 6 [Z95.1]  Not Applicable    History of heart artery stent [Z95.5]  Not Applicable    Femoral artery hematoma complicating cardiac catheterization [I97.410]  No     Left  6/24/2020      ASCVD (arteriosclerotic cardiovascular disease) [I25.10]  Yes    Pulmonary edema, acute [J81.0]  Yes    Essential hypertension [I10]  Yes    Acute-on-chronic kidney injury [N17.9, N18.9]  Yes    Chest pain syndrome [R07.9]  Yes    Coronary artery disease involving native coronary artery of native heart [I25.10]  Yes    Type 2 diabetes mellitus with hyperglycemia, with long-term current use of insulin [E11.65, Z79.4]  Not Applicable    HLD (hyperlipidemia) [E78.5]  Yes    TERESO (obstructive sleep apnea) [G47.33]  Yes    Acute hypoxemic respiratory failure [J96.01]  Yes    Bell's palsy [G51.0]  Yes      Resolved Hospital Problems   No resolved problems to display.      HPI by Dr Herbert 06/21/2020: 70 y.o. male with a PMHx of CAD with remote PCI, CKD 3, IDDM, hypertension, hyperlipidemia, neuropathy, sleep apnea, anemia who presents to [facility] ED with chief complaint of chest pain for 2 hr.  He described chest pain as a substernal chest tightness and tells me that his lower jaw hurts.  Patient was placed in ICU on Cardene drip however subsequent cardiac enzymes up trended and Cardiology was involved.  Patient was also given Rocephin and azithromycin for presumed pneumonia.  He also  received aspirin, statin, beta-blocker and weight based Lovenox.  Patient was transferred to [facility] for angiographic evaluation tomorrow morning.  Upon my interview he reports mild chest discomfort and jaw pain and associated shortness of breath.  Otherwise denies any fever, chills, headache, dizziness, palpitations, nausea, vomiting, bladder or bowel symptoms.      Subjective:      Interval History: Patient seen and examined this morning patient status post CABG x6, Postop day3.  Pt was successfully extubated yesterday 06/26/2020.  Sitting in chair, reports pain is bearable   Has been ambulating with the nurse in ICU  No concerns/issues overnight reported by the patient or the nursing staff.    Review of Systems unable to perform today due to sedation intubation    Objective:     Physical Exam  Vitals:    06/28/20 0600   BP: (!) 179/75   Pulse: (!) 59   Resp: (!) 24   Temp:      Wt Readings from Last 1 Encounters:   06/27/20 91.6 kg (201 lb 15.1 oz)      Body mass index is 31.63 kg/m².    Vitals reviewed.  Constitutional:  Awake, alert and oriented  Obese  HENT: NC, large neck circumference  Head: Atraumatic.   Cardiovascular: Normal rate, regular rhythm and normal heart sounds.   Pulmonary/Chest: Improved effort,  improved air entry..  No wheezes.  Chest tubes removed  Abdominal: Soft.  obese Bowel sounds are normal. No distension and no mass. No tenderness  : no howard  Neurological:  Awake, alert and oriented  Skin: KEZIA stockestelita B.L HENRY  Psych:  Appropriate mood and affect    Following labs were Reviewed   CBC:  Recent Labs   Lab 06/28/20  0400   WBC 10.27   HGB 7.4*   HCT 22.1*     CMP:  Recent Labs   Lab 06/28/20  0400   CALCIUM 8.1*      K 3.8   CO2 28      BUN 39*   CREATININE 1.2     Last 72 hour POCT GLUCOSE  No results found for: POCTGLUCOSE     Microbiology Results (last 7 days)     Procedure Component Value Units Date/Time    MRSA Screen by PCR [[id number]] Collected: 06/25/20 0052    Order  Status: Completed Updated: 06/25/20 0520     MRSA SCREEN BY PCR Negative    Narrative:      MRSA    Nasal culture [[id number]] Collected: 06/25/20 0036    Order Status: Canceled Specimen: Nasopharyngeal from Nose             X-Ray Chest AP Portable   Final Result      X-Ray Chest AP Portable   Final Result      X-Ray Chest AP Portable   Final Result      US Extremity Non Vascular Limited Left   Final Result      US Carotid Bilateral   Final Result      X-Ray Chest AP Portable   Final Result            Scheduled Meds:   acyclovir  400 mg Oral 5x Daily    aspirin  325 mg Oral Daily    atorvastatin  40 mg Oral QHS    carvediloL  6.25 mg Oral BID    chlorhexidine  15 mL Mouth/Throat BID    docusate sodium  100 mg Oral Daily    DULoxetine  30 mg Oral QHS    fenofibrate  145 mg Oral Daily with breakfast    pantoprazole  40 mg Oral Daily     Continuous Infusions:   sodium chloride 0.9% 25 mL/hr at 06/25/20 1515    clevidipine 8 mg/hr (06/28/20 0537)    epinephrine      insulin (HUMAN R) infusion (adults) 4 Units/hr (06/26/20 1548)     PRN Meds:.acetaminophen, calcium gluconate IVPB, calcium gluconate IVPB, calcium gluconate IVPB, dextrose 50%, dextrose 50%, HYDROcodone-acetaminophen, insulin regular, magnesium sulfate IVPB, magnesium sulfate IVPB, melatonin, morphine, potassium chloride in water, potassium chloride in water, potassium chloride in water    6/22/20: Echo  · Concentric left ventricular remodeling.  · Mildly decreased left ventricular systolic function. The estimated ejection fraction is 40%.  · Grade I (mild) left ventricular diastolic dysfunction consistent with impaired relaxation.  · Local segmental wall motion abnormalities.  · Normal right ventricular systolic function.  · No pulmonary hypertension present.  · Mild tricuspid regurgitation.  · Normal central venous pressure (3 mmHg).  Evidence anterior apical scar is seen with slight expansion of the apex during systole  I had ejection  fraction of left ventricle globally is approximately 40%  L evidence of elevation of left ventricular end-diastolic pressure is seen  No hemodynamically significant valvular regurgitation    06/22/2020: Carotid U/S B.L  1. Moderate bilateral carotid atheromatous plaque, with mildly elevated left ICA peak systolic velocities suggesting 50-69% left ICA stenosis.  2. No findings of hemodynamically significant right ICA stenosis.  3. Patent vertebral arteries with antegrade flow bilaterally.    Electronically Signed by Deandre CELAYA on 6/22/2020 4:06 PM    Assessment & Plan:     Multivessel CAD including L Main  Status post CABG x6 on 06/25/2020 with Dr. Larose  Carotid artery disease  Postop day 3  Successfully extubated 036/26/2020  Remains on aspirin, statin  Started on low dose beta-blocker for better HTN control  Carotid ultrasound shows moderate bilateral carotid atheromatous plaque with mildly elevated left ICA peak systolic velocity suggesting 50-69% left ICA stenosis  Ambulate today  Encouraged IS  Consulted PT/OT     Hypertension  Currently on Cleviprex since surgery  Coreg 6.25 mg BID   Monitor closely    Hematoma L groin- improved   Monitor closely  Dr Kaiser and Dr Larose on board     Bell's Palsy  Continue outpatient acyclovir, Prednisone completed     Diabetes, uncontrolled  A1C 7.7  Elevated blood sugars due to steroid use.   Increase ISS to high  Basal bolus insulin regimen, Accu-Checks     Discussed with Nataly Pt's nurse     Discharge Planning:   S/P CABG 06/25/2020 with Dr. Larose  Will keep in ICU as pt on Cleviprex drip    Above encounter included review of the medical records, interviewing and examining the patient face-to-face, discussion with family and other health care providers, ordering and interpreting lab/test results and formulating a plan of care.     Medical Decision Making:      [_] Low Complexity  [_] Moderate Complexity  [x] High Complexity      Manolo Alberto MD  Department of  [facility] Medicine   [facility]

## 2020-06-28 NOTE — PLAN OF CARE
06/28/20 0758   Patient Assessment/Suction   Level of Consciousness (AVPU) alert   Respiratory Effort Normal;Unlabored   Expansion/Accessory Muscles/Retractions no use of accessory muscles;no retractions   PRE-TX-O2   O2 Device (Oxygen Therapy) High Flow nasal Cannula   $ Is the patient on Low Flow Oxygen? Yes   Flow (L/min) 4   SpO2 (!) 93 %   Pulse Oximetry Type Continuous   $ Pulse Oximetry - Multiple Charge Pulse Oximetry - Multiple   Pulse 66   Resp (!) 26   BP (!) 158/70   Preset CPAP/BiPAP Settings   Mode Of Delivery CPAP;Standby   $ CPAP/BiPAP Daily Charge BiPAP/CPAP Daily   Equipment Type DeVilbiss   Respiratory Evaluation   $ Care Plan Tech Time 15 min   Evaluation For   (CARE PLAN)

## 2020-06-28 NOTE — PROGRESS NOTES
"Sincere Malave is a 70 y.o. male patient.    Chief Complaint   Patient presents with    Chest Pain       Active Hospital Problems    Diagnosis  POA    *NSTEMI (non-ST elevated myocardial infarction) [I21.4]  Yes    S/P CABG x 6 [Z95.1]  Not Applicable    History of heart artery stent [Z95.5]  Not Applicable    Femoral artery hematoma complicating cardiac catheterization [I97.410]  No     Left  6/24/2020      ASCVD (arteriosclerotic cardiovascular disease) [I25.10]  Yes    Pulmonary edema, acute [J81.0]  Yes    Essential hypertension [I10]  Yes    Acute-on-chronic kidney injury [N17.9, N18.9]  Yes    Chest pain syndrome [R07.9]  Yes    Coronary artery disease involving native coronary artery of native heart [I25.10]  Yes    Type 2 diabetes mellitus with hyperglycemia, with long-term current use of insulin [E11.65, Z79.4]  Not Applicable    HLD (hyperlipidemia) [E78.5]  Yes    TERESO (obstructive sleep apnea) [G47.33]  Yes    Acute hypoxemic respiratory failure [J96.01]  Yes    Bell's palsy [G51.0]  Yes      Resolved Hospital Problems   No resolved problems to display.       Temp: 98.7 °F (37.1 °C) (06/28/20 0300)  Pulse: 66 (06/28/20 0758)  Resp: (!) 26 (06/28/20 0758)  BP: (!) 158/70 (06/28/20 0758)  SpO2: (!) 93 % (06/28/20 0758)  Weight: 91.6 kg (201 lb 15.1 oz) (06/27/20 1244)  Height: 5' 7" (170.2 cm) (06/27/20 1246)    CBC:  Recent Labs   Lab 06/25/20  1800 06/26/20  0040 06/26/20  0220 06/26/20  0349 06/26/20  0352 06/27/20  0400   WBC 7.30 9.06  --  13.21*  --  12.84*   HGB 8.9* 8.9*  --  9.6*  --  8.1*   HCT 26.3* 26.1* 27* 27.9* 28* 24.7*   * 113*  --  139*  --  137*   MCV 87 86  --  87  --  91     BMP:  Recent Labs   Lab 06/25/20  1442 06/25/20  1800 06/26/20  0040 06/26/20  0349 06/27/20  0400   * 241* 220* 215* 237*    139 141 142 143   K 4.4 5.2*  5.2* 4.7 4.6 5.0   * 110 111* 110 109   CO2 23 21* 21* 22* 26   BUN 37* 38* 34* 33* 34*   CREATININE 1.2 1.4 1.3 " "1.4 1.1   CALCIUM 7.2* 8.4* 8.4* 8.6* 8.4*   MG 2.2 2.1  --  2.2  --    PHOS  --  3.1  --   --   --      LFTs:  No results for input(s): ALT, AST, ALKPHOS, BILITOT, PROT, ALBUMIN in the last 72 hours.  COAGS:  Recent Labs   Lab 06/25/20  1442   INR 1.5   APTT 44.0*     CARDIAC MARKERS:  No results for input(s): CPK, CPKMB, TROPONINI in the last 72 hours.    Output (mL)  Urine: 200 mL        Subjective:  Symptoms:  Stable.    Diet:  Adequate intake.    Activity level: Impaired due to weakness.    Pain:  He complains of pain that is mild.  Pain is well controlled.        Objective:  General Appearance:  Comfortable (Sitting up in bedside chair).    Vital signs: (most recent): Blood pressure (!) 158/70, pulse 66, temperature 98.7 °F (37.1 °C), temperature source Oral, resp. rate (!) 26, height 5' 7" (1.702 m), weight 91.6 kg (201 lb 15.1 oz), SpO2 (!) 93 %.  Vital signs are normal.    Output: Producing urine.    Lungs:  Breath sounds clear to auscultation.    Heart: Normal rate.  Regular rhythm.  (66)  Chest: (Minimal output from drains.  Chest x-ray looks good)  Neurological: (Intact throughout).        Assessment:    Condition: In stable condition.  Improving.   (Post CABG).     Plan:   Transfer Plan: Clear from surgical standpoint for transfer from intensive care.  Encourage ambulation.  Start/continue incentive spirometry.  Mediastinal drains removed.  Pleural drain removed.  Since he has had bradycardia recently and was started yesterday on Coreg, I would like to keep his epicardial ventricular pacemaker wires for a couple more days for security..       Rao Larose MD  6/28/2020    "

## 2020-06-28 NOTE — PLAN OF CARE
Improving-chest tubes out, ambulated completely around unit. Good appetite, voiding well, no arrhythmias. Still requiring Cleviprex for BP control but seems to be improving. PO meds added today per Dr. Caballero. Poor IS - still achieving only around 500-750 but uses without having to be told. Wife here all day, pleasant and very supportive.

## 2020-06-29 LAB
ABO + RH BLD: NORMAL
ANION GAP SERPL CALC-SCNC: 10 MMOL/L (ref 8–16)
BASOPHILS # BLD AUTO: 0.03 K/UL (ref 0–0.2)
BASOPHILS NFR BLD: 0.3 % (ref 0–1.9)
BLD GP AB SCN CELLS X3 SERPL QL: NORMAL
BLD PROD TYP BPU: NORMAL
BLOOD UNIT EXPIRATION DATE: NORMAL
BLOOD UNIT TYPE CODE: 5100
BLOOD UNIT TYPE: NORMAL
BUN SERPL-MCNC: 34 MG/DL (ref 8–23)
CALCIUM SERPL-MCNC: 8.4 MG/DL (ref 8.7–10.5)
CHLORIDE SERPL-SCNC: 101 MMOL/L (ref 95–110)
CO2 SERPL-SCNC: 28 MMOL/L (ref 23–29)
CODING SYSTEM: NORMAL
CREAT SERPL-MCNC: 1.1 MG/DL (ref 0.5–1.4)
DIFFERENTIAL METHOD: ABNORMAL
DISPENSE STATUS: NORMAL
EOSINOPHIL # BLD AUTO: 0.3 K/UL (ref 0–0.5)
EOSINOPHIL NFR BLD: 3.3 % (ref 0–8)
ERYTHROCYTE [DISTWIDTH] IN BLOOD BY AUTOMATED COUNT: 14.7 % (ref 11.5–14.5)
EST. GFR  (AFRICAN AMERICAN): >60 ML/MIN/1.73 M^2
EST. GFR  (NON AFRICAN AMERICAN): >60 ML/MIN/1.73 M^2
GLUCOSE SERPL-MCNC: 154 MG/DL (ref 70–110)
GLUCOSE SERPL-MCNC: 214 MG/DL (ref 70–110)
GLUCOSE SERPL-MCNC: 227 MG/DL (ref 70–110)
GLUCOSE SERPL-MCNC: 243 MG/DL (ref 70–110)
GLUCOSE SERPL-MCNC: 264 MG/DL (ref 70–110)
HCT VFR BLD AUTO: 21.9 % (ref 40–54)
HGB BLD-MCNC: 7.2 G/DL (ref 14–18)
IMM GRANULOCYTES # BLD AUTO: 0.46 K/UL (ref 0–0.04)
IMM GRANULOCYTES NFR BLD AUTO: 4.8 % (ref 0–0.5)
LYMPHOCYTES # BLD AUTO: 1.9 K/UL (ref 1–4.8)
LYMPHOCYTES NFR BLD: 19.4 % (ref 18–48)
MCH RBC QN AUTO: 29 PG (ref 27–31)
MCHC RBC AUTO-ENTMCNC: 32.9 G/DL (ref 32–36)
MCV RBC AUTO: 88 FL (ref 82–98)
MONOCYTES # BLD AUTO: 1 K/UL (ref 0.3–1)
MONOCYTES NFR BLD: 10.3 % (ref 4–15)
NEUTROPHILS # BLD AUTO: 6 K/UL (ref 1.8–7.7)
NEUTROPHILS NFR BLD: 61.9 % (ref 38–73)
NRBC BLD-RTO: 0 /100 WBC
NUM UNITS TRANS PACKED RBC: NORMAL
PLATELET # BLD AUTO: 176 K/UL (ref 150–350)
PMV BLD AUTO: 10.9 FL (ref 9.2–12.9)
POTASSIUM SERPL-SCNC: 3.9 MMOL/L (ref 3.5–5.1)
RBC # BLD AUTO: 2.48 M/UL (ref 4.6–6.2)
SODIUM SERPL-SCNC: 139 MMOL/L (ref 136–145)
WBC # BLD AUTO: 9.62 K/UL (ref 3.9–12.7)

## 2020-06-29 PROCEDURE — 86901 BLOOD TYPING SEROLOGIC RH(D): CPT

## 2020-06-29 PROCEDURE — 25000003 PHARM REV CODE 250: Performed by: INTERNAL MEDICINE

## 2020-06-29 PROCEDURE — 99900035 HC TECH TIME PER 15 MIN (STAT)

## 2020-06-29 PROCEDURE — 85025 COMPLETE CBC W/AUTO DIFF WBC: CPT

## 2020-06-29 PROCEDURE — C9248 INJ, CLEVIDIPINE BUTYRATE: HCPCS | Performed by: INTERNAL MEDICINE

## 2020-06-29 PROCEDURE — 94660 CPAP INITIATION&MGMT: CPT

## 2020-06-29 PROCEDURE — 63600175 PHARM REV CODE 636 W HCPCS: Performed by: INTERNAL MEDICINE

## 2020-06-29 PROCEDURE — 99024 PR POST-OP FOLLOW-UP VISIT: ICD-10-PCS | Mod: ,,, | Performed by: PHYSICIAN ASSISTANT

## 2020-06-29 PROCEDURE — 80048 BASIC METABOLIC PNL TOTAL CA: CPT

## 2020-06-29 PROCEDURE — 25000003 PHARM REV CODE 250: Performed by: THORACIC SURGERY (CARDIOTHORACIC VASCULAR SURGERY)

## 2020-06-29 PROCEDURE — C9399 UNCLASSIFIED DRUGS OR BIOLOG: HCPCS | Performed by: INTERNAL MEDICINE

## 2020-06-29 PROCEDURE — 86920 COMPATIBILITY TEST SPIN: CPT

## 2020-06-29 PROCEDURE — P9016 RBC LEUKOCYTES REDUCED: HCPCS

## 2020-06-29 PROCEDURE — 99024 POSTOP FOLLOW-UP VISIT: CPT | Mod: ,,, | Performed by: PHYSICIAN ASSISTANT

## 2020-06-29 PROCEDURE — 36430 TRANSFUSION BLD/BLD COMPNT: CPT

## 2020-06-29 PROCEDURE — 25000003 PHARM REV CODE 250: Performed by: PHYSICIAN ASSISTANT

## 2020-06-29 PROCEDURE — 27000221 HC OXYGEN, UP TO 24 HOURS

## 2020-06-29 PROCEDURE — 20000000 HC ICU ROOM

## 2020-06-29 PROCEDURE — 94761 N-INVAS EAR/PLS OXIMETRY MLT: CPT

## 2020-06-29 PROCEDURE — 63600175 PHARM REV CODE 636 W HCPCS: Performed by: PHYSICIAN ASSISTANT

## 2020-06-29 PROCEDURE — 25000003 PHARM REV CODE 250: Performed by: SPECIALIST

## 2020-06-29 PROCEDURE — 82962 GLUCOSE BLOOD TEST: CPT

## 2020-06-29 RX ORDER — HYDROCODONE BITARTRATE AND ACETAMINOPHEN 500; 5 MG/1; MG/1
TABLET ORAL
Status: DISCONTINUED | OUTPATIENT
Start: 2020-06-29 | End: 2020-07-02 | Stop reason: HOSPADM

## 2020-06-29 RX ORDER — HYDRALAZINE HYDROCHLORIDE 20 MG/ML
10 INJECTION INTRAMUSCULAR; INTRAVENOUS EVERY 6 HOURS PRN
Status: DISCONTINUED | OUTPATIENT
Start: 2020-06-29 | End: 2020-07-02 | Stop reason: HOSPADM

## 2020-06-29 RX ORDER — FUROSEMIDE 10 MG/ML
40 INJECTION INTRAMUSCULAR; INTRAVENOUS ONCE
Status: DISCONTINUED | OUTPATIENT
Start: 2020-06-29 | End: 2020-06-29

## 2020-06-29 RX ORDER — FUROSEMIDE 10 MG/ML
40 INJECTION INTRAMUSCULAR; INTRAVENOUS ONCE
Status: COMPLETED | OUTPATIENT
Start: 2020-06-29 | End: 2020-06-29

## 2020-06-29 RX ORDER — LISINOPRIL 10 MG/1
10 TABLET ORAL DAILY
Status: DISCONTINUED | OUTPATIENT
Start: 2020-06-29 | End: 2020-07-01

## 2020-06-29 RX ORDER — POLYETHYLENE GLYCOL 3350 17 G/17G
17 POWDER, FOR SOLUTION ORAL DAILY
Status: DISCONTINUED | OUTPATIENT
Start: 2020-06-29 | End: 2020-07-02 | Stop reason: HOSPADM

## 2020-06-29 RX ORDER — HYDROCHLOROTHIAZIDE 12.5 MG/1
12.5 TABLET ORAL DAILY
Status: DISCONTINUED | OUTPATIENT
Start: 2020-06-29 | End: 2020-07-02 | Stop reason: HOSPADM

## 2020-06-29 RX ORDER — FERROUS SULFATE 325(65) MG
325 TABLET ORAL DAILY
Status: DISCONTINUED | OUTPATIENT
Start: 2020-06-29 | End: 2020-07-02 | Stop reason: HOSPADM

## 2020-06-29 RX ADMIN — DULOXETINE 30 MG: 30 CAPSULE, DELAYED RELEASE ORAL at 09:06

## 2020-06-29 RX ADMIN — CLEVIPIDINE 8 MG/HR: 0.5 EMULSION INTRAVENOUS at 09:06

## 2020-06-29 RX ADMIN — HUMAN INSULIN 3 UNITS: 100 INJECTION, SOLUTION SUBCUTANEOUS at 09:06

## 2020-06-29 RX ADMIN — CLEVIPIDINE 7 MG/HR: 0.5 EMULSION INTRAVENOUS at 08:06

## 2020-06-29 RX ADMIN — ACYCLOVIR 400 MG: 200 CAPSULE ORAL at 07:06

## 2020-06-29 RX ADMIN — ACYCLOVIR 400 MG: 200 CAPSULE ORAL at 02:06

## 2020-06-29 RX ADMIN — HUMAN INSULIN 6 UNITS: 100 INJECTION, SOLUTION SUBCUTANEOUS at 12:06

## 2020-06-29 RX ADMIN — AMLODIPINE BESYLATE 10 MG: 5 TABLET ORAL at 08:06

## 2020-06-29 RX ADMIN — HUMAN INSULIN 9 UNITS: 100 INJECTION, SOLUTION SUBCUTANEOUS at 04:06

## 2020-06-29 RX ADMIN — ACYCLOVIR 400 MG: 200 CAPSULE ORAL at 06:06

## 2020-06-29 RX ADMIN — ATORVASTATIN CALCIUM 40 MG: 40 TABLET, FILM COATED ORAL at 09:06

## 2020-06-29 RX ADMIN — CARVEDILOL 6.25 MG: 6.25 TABLET, FILM COATED ORAL at 09:06

## 2020-06-29 RX ADMIN — HUMAN INSULIN 6 UNITS: 100 INJECTION, SOLUTION SUBCUTANEOUS at 07:06

## 2020-06-29 RX ADMIN — CARVEDILOL 6.25 MG: 6.25 TABLET, FILM COATED ORAL at 08:06

## 2020-06-29 RX ADMIN — HYDRALAZINE HYDROCHLORIDE 10 MG: 20 INJECTION INTRAMUSCULAR; INTRAVENOUS at 03:06

## 2020-06-29 RX ADMIN — PANTOPRAZOLE SODIUM 40 MG: 40 TABLET, DELAYED RELEASE ORAL at 06:06

## 2020-06-29 RX ADMIN — CLEVIPIDINE 6 MG/HR: 0.5 EMULSION INTRAVENOUS at 04:06

## 2020-06-29 RX ADMIN — CLEVIPIDINE 8 MG/HR: 0.5 EMULSION INTRAVENOUS at 06:06

## 2020-06-29 RX ADMIN — FERROUS SULFATE TAB 325 MG (65 MG ELEMENTAL FE) 325 MG: 325 (65 FE) TAB at 02:06

## 2020-06-29 RX ADMIN — CLEVIPIDINE 8 MG/HR: 0.5 EMULSION INTRAVENOUS at 02:06

## 2020-06-29 RX ADMIN — DOCUSATE SODIUM 100 MG: 100 CAPSULE, LIQUID FILLED ORAL at 08:06

## 2020-06-29 RX ADMIN — LISINOPRIL 10 MG: 10 TABLET ORAL at 12:06

## 2020-06-29 RX ADMIN — FENOFIBRATE 145 MG: 145 TABLET, FILM COATED ORAL at 07:06

## 2020-06-29 RX ADMIN — CLEVIPIDINE 8 MG/HR: 0.5 EMULSION INTRAVENOUS at 01:06

## 2020-06-29 RX ADMIN — FUROSEMIDE 40 MG: 10 INJECTION, SOLUTION INTRAMUSCULAR; INTRAVENOUS at 02:06

## 2020-06-29 RX ADMIN — ASPIRIN 325 MG ORAL TABLET 325 MG: 325 PILL ORAL at 08:06

## 2020-06-29 RX ADMIN — INSULIN DETEMIR 20 UNITS: 100 INJECTION, SOLUTION SUBCUTANEOUS at 09:06

## 2020-06-29 RX ADMIN — ACYCLOVIR 400 MG: 200 CAPSULE ORAL at 09:06

## 2020-06-29 RX ADMIN — POLYETHYLENE GLYCOL 3350 17 G: 17 POWDER, FOR SOLUTION ORAL at 03:06

## 2020-06-29 RX ADMIN — HYDROCHLOROTHIAZIDE 12.5 MG: 12.5 TABLET ORAL at 12:06

## 2020-06-29 NOTE — PROGRESS NOTES
Count includes the [facility] Medicine    Progress Note    Patient Name: Sincere Malave  MRN: [id number]  Patient Class: IP- Inpatient   Admission Date: 6/21/2020  6:02 PM  Length of Stay: 8  Attending Physician: ZAKI LEON MD  Primary Care Provider: Primary Doctor No  Face-to-Face encounter date: 06/29/2020  Chief Complaint: Chest Pain         Patient ID: Sincere Malave is a 70 y.o. male admitted for   Active Hospital Problems    Diagnosis  POA    *NSTEMI (non-ST elevated myocardial infarction) [I21.4]  Yes    S/P CABG x 6 [Z95.1]  Not Applicable    History of heart artery stent [Z95.5]  Not Applicable    Femoral artery hematoma complicating cardiac catheterization [I97.410]  No     Left  6/24/2020      ASCVD (arteriosclerotic cardiovascular disease) [I25.10]  Yes    Pulmonary edema, acute [J81.0]  Yes    Essential hypertension [I10]  Yes    Acute-on-chronic kidney injury [N17.9, N18.9]  Yes    Chest pain syndrome [R07.9]  Yes    Coronary artery disease involving native coronary artery of native heart [I25.10]  Yes    Type 2 diabetes mellitus with hyperglycemia, with long-term current use of insulin [E11.65, Z79.4]  Not Applicable    HLD (hyperlipidemia) [E78.5]  Yes    TERESO (obstructive sleep apnea) [G47.33]  Yes    Acute hypoxemic respiratory failure [J96.01]  Yes    Bell's palsy [G51.0]  Yes      Resolved Hospital Problems   No resolved problems to display.      HPI by Dr Herbert 06/21/2020: 70 y.o. male with a PMHx of CAD with remote PCI, CKD 3, IDDM, hypertension, hyperlipidemia, neuropathy, sleep apnea, anemia who presents to [facility] ED with chief complaint of chest pain for 2 hr.  He described chest pain as a substernal chest tightness and tells me that his lower jaw hurts.  Patient was placed in ICU on Cardene drip however subsequent cardiac enzymes up trended and Cardiology was involved.  Patient was also given Rocephin and azithromycin for presumed pneumonia.  He also  received aspirin, statin, beta-blocker and weight based Lovenox.  Patient was transferred to [facility] for angiographic evaluation tomorrow morning.  Upon my interview he reports mild chest discomfort and jaw pain and associated shortness of breath.  Otherwise denies any fever, chills, headache, dizziness, palpitations, nausea, vomiting, bladder or bowel symptoms.      Subjective:      Interval History: Patient seen and examined this morning. Patient is status post CABG x6,   Postop day 4.  Sitting in chair, reports pain is mimimum  Has been ambulating with the nurse in ICU  Still on Clviprex  No concerns/issues overnight reported by the patient or the nursing staff.    Review of Systems unable to perform today due to sedation intubation    Objective:     Physical Exam  Vitals:    06/29/20 0600   BP: (!) 152/89   Pulse: (!) 55   Resp: (!) 23   Temp:      Wt Readings from Last 1 Encounters:   06/27/20 91.6 kg (201 lb 15.1 oz)      Body mass index is 31.63 kg/m².    Vitals reviewed.  Constitutional:  Awake, alert and oriented  Obese  HENT: NC, large neck circumference  Head: Atraumatic.   Cardiovascular: Normal rate, regular rhythm and normal heart sounds.   Pulmonary/Chest: Improved effort,  improved air entry..  No wheezes.  Chest tubes removed  Abdominal: Soft.  obese Bowel sounds are normal. No distension and no mass. No tenderness  : howard removed  Neurological:  Awake, alert and oriented  Skin: KEZIA stockings B.L LE  Psych:  Appropriate mood and affect    Following labs were Reviewed   CBC:  Recent Labs   Lab 06/29/20  0401   WBC 9.62   HGB 7.2*   HCT 21.9*        CMP:  Recent Labs   Lab 06/29/20  0401   CALCIUM 8.4*      K 3.9   CO2 28      BUN 34*   CREATININE 1.1     Last 72 hour POCT GLUCOSE  No results found for: POCTGLUCOSE     Microbiology Results (last 7 days)     Procedure Component Value Units Date/Time    MRSA Screen by PCR [[id number]] Collected: 06/25/20 0052    Order Status: Completed  Updated: 06/25/20 0520     MRSA SCREEN BY PCR Negative    Narrative:      MRSA    Nasal culture [[id number]] Collected: 06/25/20 0036    Order Status: Canceled Specimen: Nasopharyngeal from Nose             X-Ray Chest AP Portable   Final Result      X-Ray Chest AP Portable   Final Result      X-Ray Chest AP Portable   Final Result      US Extremity Non Vascular Limited Left   Final Result      US Carotid Bilateral   Final Result      X-Ray Chest AP Portable   Final Result            Scheduled Meds:   acyclovir  400 mg Oral 5x Daily    amLODIPine  10 mg Oral Daily    aspirin  325 mg Oral Daily    atorvastatin  40 mg Oral QHS    carvediloL  6.25 mg Oral BID    docusate sodium  100 mg Oral Daily    DULoxetine  30 mg Oral QHS    fenofibrate  145 mg Oral Daily with breakfast    pantoprazole  40 mg Oral Daily     Continuous Infusions:   sodium chloride 0.9% 25 mL/hr at 06/25/20 1515    clevidipine 8 mg/hr (06/29/20 0613)     PRN Meds:.acetaminophen, dextrose 50%, dextrose 50%, HYDROcodone-acetaminophen, insulin regular, melatonin, morphine    6/22/20: Echo  · Concentric left ventricular remodeling.  · Mildly decreased left ventricular systolic function. The estimated ejection fraction is 40%.  · Grade I (mild) left ventricular diastolic dysfunction consistent with impaired relaxation.  · Local segmental wall motion abnormalities.  · Normal right ventricular systolic function.  · No pulmonary hypertension present.  · Mild tricuspid regurgitation.  · Normal central venous pressure (3 mmHg).  Evidence anterior apical scar is seen with slight expansion of the apex during systole  I had ejection fraction of left ventricle globally is approximately 40%  L evidence of elevation of left ventricular end-diastolic pressure is seen  No hemodynamically significant valvular regurgitation    06/22/2020: Carotid U/S B.L  1. Moderate bilateral carotid atheromatous plaque, with mildly elevated left ICA peak systolic velocities  suggesting 50-69% left ICA stenosis.  2. No findings of hemodynamically significant right ICA stenosis.  3. Patent vertebral arteries with antegrade flow bilaterally.    Electronically Signed by Deander CELAYA on 6/22/2020 4:06 PM    Assessment & Plan:     Multivessel CAD including L Main  Status post CABG x6 on 06/25/2020 with Dr. Cifuentes  Carotid artery disease  Postop day 4  Successfully extubated 036/26/2020  Remains on aspirin, statin  Started on Coreg 6.25 mg BID for better HTN control  Amlodipine increased to 10 mg daily yesterday  Carotid ultrasound shows moderate bilateral carotid atheromatous plaque with mildly elevated left ICA peak systolic velocity suggesting 50-69% left ICA stenosis  Ambulates with nurse around the unit when his Blood pressure is better controlled   Encouraged IS  Consulted PT/OT     Hypertension  Currently on Cleviprex since surgery  Coreg 6.25 mg BID   Amlodipine 10 mg daily  Lisinopril 10 mg daily added 06/29/2020  Monitor closely     Bell's Palsy  Continue outpatient acyclovir, Prednisone completed     Diabetes, uncontrolled  A1C 7.7  Elevated blood sugars due to steroid use.   Increase ISS to high  Basal bolus insulin regimen, Accu-Checks     Discussed with Juan Luis Begum's nurse     Discharge Planning:   S/P CABG 06/25/2020 with Dr. Cifuentes  Will keep in ICU as pt on Cleviprex drip    Above encounter included review of the medical records, interviewing and examining the patient face-to-face, discussion with family and other health care providers, ordering and interpreting lab/test results and formulating a plan of care.     Medical Decision Making:      [_] Low Complexity  [_] Moderate Complexity  [x] High Complexity      Manolo Alberto MD  Department of Hospital Medicine   [facility]

## 2020-06-29 NOTE — CONSULTS
[facility]  Cardiology  Progress Note    Patient Name: Sincere Malave  MRN: [id number]  Admission Date: 6/21/2020  Hospital Length of Stay: 8 days  Code Status: Full Code   Attending Physician: Manolo Alberto MD   Primary Care Physician: Primary Doctor No  Expected Discharge Date:   Principal Problem:NSTEMI (non-ST elevated myocardial infarction)    Subjective:     Hospital Course: * of patient feels okay, blood pressure elevated on Cleviprex and hemoglobin is less than 8    Interval History: 1 to get blood transfusions today  He is still on acyclovir for for Bell's palsy which is improving    ROS  Objective:     Vital Signs (Most Recent):  Temp: 98.3 °F (36.8 °C) (06/29/20 0701)  Pulse: (!) 53 (06/29/20 1053)  Resp: 16 (06/29/20 1053)  BP: (!) 169/75 (06/29/20 0801)  SpO2: (!) 94 % (06/29/20 1053) Vital Signs (24h Range):  Temp:  [97.8 °F (36.6 °C)-99.2 °F (37.3 °C)] 98.3 °F (36.8 °C)  Pulse:  [52-68] 53  Resp:  [16-30] 16  SpO2:  [89 %-99 %] 94 %  BP: (123-187)/() 169/75     Weight: 91.6 kg (201 lb 15.1 oz)  Body mass index is 31.63 kg/m².    SpO2: (!) 94 %  O2 Device (Oxygen Therapy): High Flow nasal Cannula      Intake/Output Summary (Last 24 hours) at 6/29/2020 1158  Last data filed at 6/29/2020 0934  Gross per 24 hour   Intake 1104 ml   Output 1175 ml   Net -71 ml       Lines/Drains/Airways     Central Venous Catheter Line            Percutaneous Central Line Insertion/Assessment - Triple Lumen  06/25/20 0637 4 days                Physical Exam blood pressure 140/84 pulse is 65  Lungs reveal slightly diminished breath sounds at the bases posteriorly  Cardiac sounds are normal  Legs no edema    Significant Labs:   CMP   Recent Labs   Lab 06/28/20  0400 06/29/20  0401    139   K 3.8 3.9    101   CO2 28 28   * 214*   BUN 39* 34*   CREATININE 1.2 1.1   CALCIUM 8.1* 8.4*   ANIONGAP 9 10   ESTGFRAFRICA >60.0 >60.0   EGFRNONAA >60.0 >60.0    and CBC   Recent Labs   Lab  06/28/20  0400 06/29/20  0401   WBC 10.27 9.62   HGB 7.4* 7.2*   HCT 22.1* 21.9*   * 176       Significant Imaging:  Assessment and Plan:   1.  Status post CABG and LV dysfunction stable  2.  He was on lisinopril HydroDIURIL pre-surgery and I will reinstitute that for control of blood pressure specially in light of his LV dysfunction  3.  Continue acyclovor for for Bell's palsy  4.  He will receive a unit of blood today  Brief HPI:    Active Diagnoses:    Diagnosis Date Noted POA    PRINCIPAL PROBLEM:  NSTEMI (non-ST elevated myocardial infarction) [I21.4] 06/21/2020 Yes    S/P CABG x 6 [Z95.1]  Not Applicable    History of heart artery stent [Z95.5] 06/25/2020 Not Applicable    Femoral artery hematoma complicating cardiac catheterization [I97.410] 06/24/2020 No    ASCVD (arteriosclerotic cardiovascular disease) [I25.10]  Yes    Pulmonary edema, acute [J81.0] 06/21/2020 Yes    Essential hypertension [I10] 06/21/2020 Yes    Acute-on-chronic kidney injury [N17.9, N18.9] 06/21/2020 Yes    Chest pain syndrome [R07.9] 06/21/2020 Yes    Coronary artery disease involving native coronary artery of native heart [I25.10] 06/20/2020 Yes    Type 2 diabetes mellitus with hyperglycemia, with long-term current use of insulin [E11.65, Z79.4] 06/20/2020 Not Applicable    HLD (hyperlipidemia) [E78.5] 06/20/2020 Yes    TERESO (obstructive sleep apnea) [G47.33] 06/20/2020 Yes    Acute hypoxemic respiratory failure [J96.01] 06/20/2020 Yes    Bell's palsy [G51.0] 06/19/2020 Yes      Problems Resolved During this Admission:       VTE Risk Mitigation (From admission, onward)    None          James Kaiser MD  Cardiology  [facility]

## 2020-06-29 NOTE — PT/OT/SLP PROGRESS
Physical Therapy      Patient Name:  Sincere Malave   MRN:  [id number]    Patient not seen today secondary to Other (Comment)(Per RN, pt on hold due to needing blood transfusion and high BP.). Will follow-up 06/30/20.    Zonia Hernandez, PT

## 2020-06-29 NOTE — PLAN OF CARE
This note also relates to the following rows which could not be included:  SpO2 - Cannot attach notes to unvalidated device data  Pulse - Cannot attach notes to unvalidated device data       06/29/20 1053   PRE-TX-O2   O2 Device (Oxygen Therapy) High Flow nasal Cannula   $ Is the patient on Low Flow Oxygen? Yes   Flow (L/min) 5   Pulse Oximetry Type Continuous   $ Pulse Oximetry - Multiple Charge Pulse Oximetry - Multiple   Resp 16   Preset CPAP/BiPAP Settings   $ CPAP/BiPAP Daily Charge BiPAP/CPAP Daily   Respiratory Evaluation   $ Care Plan Tech Time 15 min

## 2020-06-29 NOTE — PROGRESS NOTES
Cardiac Rehab     Sincere Malave   [id number]   6/29/2020         Cardiac Rehab Phase Taught: Phase 1    Teaching Method: Verbal, Audio/Visual    Handouts: None    Educational Videos: Recovery - Your First Few Days    Understanding:  Knowledge indicated by feedback, Learning indicated by feedback and Verbalize understanding    Comments: pt sitting up in chair, wife at bedside. Will follow    Total Time Spent:15mins            Shraddha Carpenter RN

## 2020-06-29 NOTE — PROGRESS NOTES
"CVT SURGERY PROGRESS NOTE  Sincere Malave  70 y.o.  1950    Patient's Chief Complaint:  NSTEMI (non-ST elevated myocardial infarction)    Subjective:  Symptoms:  Stable.  No shortness of breath or chest pain.    Diet:  No nausea or vomiting.    Activity level: Activity impairment: Improving slowly.    Pain:  He complains of pain that is moderate.  He reports pain is improving.  Pain is well controlled.            HPI:  70 year old male with hx of CAD with remote PCI, CKD III, IDDM, HTN. HLD, TERESO, neuropathy and anemia presented to outside ED with complaints of chest pain. He was noted to have elevated troponin, transferred here and subsequent LHC performed with findings of multivessel CAD. CVTS consulted for CABG eval. CABG x 6 (COLLIN-LAD, SVG-Ramus/OM, SVG-D1, SVG- RPDA, SVG-R posterior lateral branch) by Dr Cifuentes on 6/25/2020.    Last 24 hour Interval:  -MS and pleural tubes removed 6/28/2020  -Pacer wires remain, bradycardia noted, lowest HR noted 50  -PRBC transfusion today  -Will add IV Lasix  -HFNC 5L  -Elevated BP--cards adjusting meds, remains on Cleviprex, add PRN meds  -IS 1000  -Has been up in chair and ambulated  -Pt seen this afternoon and is resting on 5 L NC, his wife is present. Pt reports his hands feel swollen.    -Discussed lasix, removal of surgical dressings and long acting insulin with bedside nurse-she will discuss elevated glucose levels with attending                                                         Objective:  General Appearance:  In no acute distress and comfortable.    Vital signs: (most recent): Blood pressure (!) 163/68, pulse (!) 50, temperature 98.7 °F (37.1 °C), resp. rate (!) 29, height 5' 7" (1.702 m), weight 91.6 kg (201 lb 15.1 oz), SpO2 (!) 89 %.    Output: Producing urine.    Lungs:  Normal effort and normal respiratory rate.  He is not in respiratory distress.  (Decreased BS)  Heart: Bradycardia.  Regular rhythm.  No murmur.   Chest: Symmetric chest wall " expansion. (Pacer wires present, sternal incision and chest tube site incisions clean and dry.)  Abdomen: Abdomen is soft and non-distended.  Bowel sounds are normal.   There is no abdominal tenderness.     Pulses: Distal pulses are intact.    Neurological: Patient is alert and oriented to person, place and time.    Skin:  Warm and dry.      Recent Vitals:  Vitals:    06/29/20 0801 06/29/20 1053 06/29/20 1239 06/29/20 1245   BP: (!) 169/75  (!) 183/73 (!) 163/68   Pulse: 61 (!) 53 (!) 52 (!) 50   Resp: (!) 23 16 (!) 25 (!) 29   Temp:   98.7 °F (37.1 °C) 98.7 °F (37.1 °C)   TempSrc:       SpO2: (!) 92% (!) 94% (!) 89% (!) 89%   Weight:       Height:           INPATIENT MEDS   sodium chloride 0.9% 25 mL/hr at 06/25/20 1515    clevidipine 8 mg/hr (06/29/20 0934)      acyclovir  400 mg Oral 5x Daily    amLODIPine  10 mg Oral Daily    aspirin  325 mg Oral Daily    atorvastatin  40 mg Oral QHS    carvediloL  6.25 mg Oral BID    docusate sodium  100 mg Oral Daily    DULoxetine  30 mg Oral QHS    fenofibrate  145 mg Oral Daily with breakfast    hydroCHLOROthiazide  12.5 mg Oral Daily    lisinopriL  10 mg Oral Daily    pantoprazole  40 mg Oral Daily     sodium chloride, acetaminophen, dextrose 50%, dextrose 50%, HYDROcodone-acetaminophen, insulin regular, melatonin, morphine    HEMODYNAMICS      Recent O2 Therapy/Vent Settings: 5L NC    I/O last 24 hrs:  Intake/Output - Last 3 Shifts       06/27 0700 - 06/28 0659 06/28 0700 - 06/29 0659 06/29 0700 - 06/30 0659    P.O.  1080     I.V. (mL/kg)       IV Piggyback 240 384     Total Intake(mL/kg) 240 (2.6) 1464 (16)     Urine (mL/kg/hr) 1850 (0.8) 1475 (0.7) 200 (0.4)    Drains 145      Chest Tube 20      Total Output 2015 1475 200    Net -1775 -11 -200               Recent Cardiac Rhythm: SB    Recent Pain Assessment: 5    CBC  Recent Labs   Lab 06/27/20  0400 06/28/20  0400 06/29/20  0401   WBC 12.84* 10.27 9.62   RBC 2.73* 2.50* 2.48*   HGB 8.1* 7.4* 7.2*   PLT  137* 137* 176   MCV 91 88 88   MCH 29.7 29.6 29.0   MCHC 32.8 33.5 32.9     BMP  Recent Labs   Lab 06/27/20  0400 06/28/20  0400 06/29/20  0401   CO2 26 28 28   BUN 34* 39* 34*   CREATININE 1.1 1.2 1.1   CALCIUM 8.4* 8.1* 8.4*     CARDIAC ENZYMES  Recent Labs   Lab 06/22/20 2021   TROPONINI 6.456*     PT/INR  INR   Date Value Ref Range Status   06/25/2020 1.5  Final     Comment:     Coumadin Therapy:  INR: 2.0-3.0 conventional anticoagulation  INR: 2.5-3.5 intensive anticoagulation     06/22/2020 1.1  Final     Comment:     Coumadin Therapy:  INR: 2.0-3.0 conventional anticoagulation  INR: 2.5-3.5 intensive anticoagulation     06/19/2020 0.9 0.8 - 1.2 Final     Comment:     Coumadin Therapy:  2.0 - 3.0 for INR for all indicators except mechanical heart valves  and antiphospholipid syndromes which should use 2.5 - 3.5.       CURRENT CONSULTS:  IP CONSULT TO CARDIOLOGY  IP CONSULT TO REGISTERED DIETITIAN/NUTRITIONIST  IP CONSULT TO HOSPITALIST  IP CONSULT TO CARDIOTHORACIC SURGERY    ASSESSMENT/PLAN:  NSTEMI  S/P CABG x 6 on 6/25/2020 by Dr Cifuentes  -Remains on Cleviprex, cards adjusting oral meds, add PRN meds  -Requiring NC 5L, IV Lasix, CXR in am  -MS and pleural tubes have been removed  -Pacer wires to remain due to bradycardia noted  -Continue ASA, BB, statin  -Leukocytosis resolved  -Afebrile  -H/H continued to trend down--PRBC ordered, add iron supplement  -Encourage IS  -Increase activity    HTN  -Remains on Cleviprex  -Cards adjusting oral meds  -Add PRN meds    HLD  -Continue statin    CKDIII  -Renal function stable  -Good UOP  -Lasix IV    IDDM  -Glucose remains elevated  -Accuchecks and SSI  -May need long acting insulin to assist with optimal control  -Defer to attending    TERESO  -CPAP qhs    Ogden Palsy  -Defer to attending    Case and plan of care discussed with MD  GI Prophylaxis: PPI:proton pump inhibitor per orders  DVT Prophylaxis: NI at this time  Anticoagulants   Medication Route Frequency        Julieta Thomas PA-C  Cardiovascular Thoracic Surgery  6/29/2020  1:49 PM

## 2020-06-29 NOTE — PT/OT/SLP PROGRESS
Occupational Therapy      Patient Name:  Sincere Malave   MRN:  [id number]    Patient not seen today secondary to Other (Comment)(RN hold-pt with high BP and needs blood.). Will follow-up next service date.    Lyubov Gannon OT  6/29/2020

## 2020-06-29 NOTE — PLAN OF CARE
06/29/20 1100   Discharge Reassessment   Assessment Type Discharge Planning Reassessment   ICU Multidisciplinary Team rounds done with ICU director Dr. Bae, RN, Respiratory Therapy, Physical Therapy, Nutritionist, ICU Charge Nurse, Director of ICU [facility] and Case Management,

## 2020-06-30 LAB
ALBUMIN SERPL BCP-MCNC: 2.7 G/DL (ref 3.5–5.2)
ALP SERPL-CCNC: 45 U/L (ref 55–135)
ALT SERPL W/O P-5'-P-CCNC: 56 U/L (ref 10–44)
ANION GAP SERPL CALC-SCNC: 10 MMOL/L (ref 8–16)
AST SERPL-CCNC: 40 U/L (ref 10–40)
BILIRUB SERPL-MCNC: 1.3 MG/DL (ref 0.1–1)
BUN SERPL-MCNC: 32 MG/DL (ref 8–23)
CALCIUM SERPL-MCNC: 8.4 MG/DL (ref 8.7–10.5)
CHLORIDE SERPL-SCNC: 101 MMOL/L (ref 95–110)
CO2 SERPL-SCNC: 28 MMOL/L (ref 23–29)
CREAT SERPL-MCNC: 1.2 MG/DL (ref 0.5–1.4)
ERYTHROCYTE [DISTWIDTH] IN BLOOD BY AUTOMATED COUNT: 14.6 % (ref 11.5–14.5)
EST. GFR  (AFRICAN AMERICAN): >60 ML/MIN/1.73 M^2
EST. GFR  (NON AFRICAN AMERICAN): >60 ML/MIN/1.73 M^2
GLUCOSE SERPL-MCNC: 136 MG/DL (ref 70–110)
GLUCOSE SERPL-MCNC: 145 MG/DL (ref 70–110)
GLUCOSE SERPL-MCNC: 202 MG/DL (ref 70–110)
GLUCOSE SERPL-MCNC: 267 MG/DL (ref 70–110)
HCT VFR BLD AUTO: 25.3 % (ref 40–54)
HGB BLD-MCNC: 8.4 G/DL (ref 14–18)
MAGNESIUM SERPL-MCNC: 2 MG/DL (ref 1.6–2.6)
MCH RBC QN AUTO: 29.1 PG (ref 27–31)
MCHC RBC AUTO-ENTMCNC: 33.2 G/DL (ref 32–36)
MCV RBC AUTO: 88 FL (ref 82–98)
PLATELET # BLD AUTO: 210 K/UL (ref 150–350)
PMV BLD AUTO: 10.5 FL (ref 9.2–12.9)
POTASSIUM SERPL-SCNC: 3.8 MMOL/L (ref 3.5–5.1)
PROT SERPL-MCNC: 5.8 G/DL (ref 6–8.4)
RBC # BLD AUTO: 2.89 M/UL (ref 4.6–6.2)
SODIUM SERPL-SCNC: 139 MMOL/L (ref 136–145)
WBC # BLD AUTO: 10.53 K/UL (ref 3.9–12.7)

## 2020-06-30 PROCEDURE — 94761 N-INVAS EAR/PLS OXIMETRY MLT: CPT

## 2020-06-30 PROCEDURE — 97530 THERAPEUTIC ACTIVITIES: CPT

## 2020-06-30 PROCEDURE — 99900035 HC TECH TIME PER 15 MIN (STAT)

## 2020-06-30 PROCEDURE — C9248 INJ, CLEVIDIPINE BUTYRATE: HCPCS | Performed by: INTERNAL MEDICINE

## 2020-06-30 PROCEDURE — 80053 COMPREHEN METABOLIC PANEL: CPT

## 2020-06-30 PROCEDURE — 97166 OT EVAL MOD COMPLEX 45 MIN: CPT

## 2020-06-30 PROCEDURE — 94660 CPAP INITIATION&MGMT: CPT

## 2020-06-30 PROCEDURE — 85027 COMPLETE CBC AUTOMATED: CPT

## 2020-06-30 PROCEDURE — 63600175 PHARM REV CODE 636 W HCPCS: Performed by: PHYSICIAN ASSISTANT

## 2020-06-30 PROCEDURE — 25000003 PHARM REV CODE 250: Performed by: SPECIALIST

## 2020-06-30 PROCEDURE — 97116 GAIT TRAINING THERAPY: CPT

## 2020-06-30 PROCEDURE — 25000003 PHARM REV CODE 250: Performed by: THORACIC SURGERY (CARDIOTHORACIC VASCULAR SURGERY)

## 2020-06-30 PROCEDURE — 25000003 PHARM REV CODE 250: Performed by: PHYSICIAN ASSISTANT

## 2020-06-30 PROCEDURE — 97535 SELF CARE MNGMENT TRAINING: CPT

## 2020-06-30 PROCEDURE — 25000003 PHARM REV CODE 250: Performed by: INTERNAL MEDICINE

## 2020-06-30 PROCEDURE — 82962 GLUCOSE BLOOD TEST: CPT

## 2020-06-30 PROCEDURE — 97162 PT EVAL MOD COMPLEX 30 MIN: CPT

## 2020-06-30 PROCEDURE — 27000221 HC OXYGEN, UP TO 24 HOURS

## 2020-06-30 PROCEDURE — 83735 ASSAY OF MAGNESIUM: CPT

## 2020-06-30 PROCEDURE — 63600175 PHARM REV CODE 636 W HCPCS: Performed by: INTERNAL MEDICINE

## 2020-06-30 PROCEDURE — 21000000 HC CCU ICU ROOM CHARGE

## 2020-06-30 RX ADMIN — CARVEDILOL 6.25 MG: 6.25 TABLET, FILM COATED ORAL at 08:06

## 2020-06-30 RX ADMIN — HYDROCODONE BITARTRATE AND ACETAMINOPHEN 1 TABLET: 5; 325 TABLET ORAL at 04:06

## 2020-06-30 RX ADMIN — AMLODIPINE BESYLATE 10 MG: 5 TABLET ORAL at 08:06

## 2020-06-30 RX ADMIN — LISINOPRIL 10 MG: 10 TABLET ORAL at 08:06

## 2020-06-30 RX ADMIN — HYDRALAZINE HYDROCHLORIDE 10 MG: 20 INJECTION INTRAMUSCULAR; INTRAVENOUS at 10:06

## 2020-06-30 RX ADMIN — ASPIRIN 325 MG ORAL TABLET 325 MG: 325 PILL ORAL at 08:06

## 2020-06-30 RX ADMIN — HYDROCODONE BITARTRATE AND ACETAMINOPHEN 1 TABLET: 5; 325 TABLET ORAL at 02:06

## 2020-06-30 RX ADMIN — HUMAN INSULIN 6 UNITS: 100 INJECTION, SOLUTION SUBCUTANEOUS at 11:06

## 2020-06-30 RX ADMIN — FENOFIBRATE 145 MG: 145 TABLET, FILM COATED ORAL at 08:06

## 2020-06-30 RX ADMIN — ATORVASTATIN CALCIUM 40 MG: 40 TABLET, FILM COATED ORAL at 08:06

## 2020-06-30 RX ADMIN — DOCUSATE SODIUM 100 MG: 100 CAPSULE, LIQUID FILLED ORAL at 08:06

## 2020-06-30 RX ADMIN — POLYETHYLENE GLYCOL 3350 17 G: 17 POWDER, FOR SOLUTION ORAL at 08:06

## 2020-06-30 RX ADMIN — PANTOPRAZOLE SODIUM 40 MG: 40 TABLET, DELAYED RELEASE ORAL at 07:06

## 2020-06-30 RX ADMIN — FERROUS SULFATE TAB 325 MG (65 MG ELEMENTAL FE) 325 MG: 325 (65 FE) TAB at 08:06

## 2020-06-30 RX ADMIN — HYDROCHLOROTHIAZIDE 12.5 MG: 12.5 TABLET ORAL at 08:06

## 2020-06-30 RX ADMIN — CLEVIPIDINE 4 MG/HR: 0.5 EMULSION INTRAVENOUS at 01:06

## 2020-06-30 RX ADMIN — INSULIN DETEMIR 20 UNITS: 100 INJECTION, SOLUTION SUBCUTANEOUS at 08:06

## 2020-06-30 RX ADMIN — DULOXETINE 30 MG: 30 CAPSULE, DELAYED RELEASE ORAL at 08:06

## 2020-06-30 NOTE — PROGRESS NOTES
Cardiac Rehab     Sincere Malave   [id number]   6/30/2020         Cardiac Rehab Phase Taught: Phase 1    Teaching Method: Verbal, Written    Handouts: None    Educational Videos: None    Understanding:  Learning indicated by feedback and Verbalize understanding    Comments: pt sitting up in chair, wife at bedside. Discharge CABG packet given for review. Will follow for further education    Total Time Spent:15mins            Shraddha Carpenter RN

## 2020-06-30 NOTE — PT/OT/SLP EVAL
Physical Therapy Evaluation    Patient Name:  Sincere Malave   MRN:  [id number]    Recommendations:     Discharge Recommendations:  home   Discharge Equipment Recommendations: (pt may need RW upon dc. will cont to assess)   Barriers to discharge: None    Assessment:     Sincere Malave is a 70 y.o. male admitted with a medical diagnosis of NSTEMI (non-ST elevated myocardial infarction).  He presents with the following impairments/functional limitations:  weakness, impaired self care skills, impaired functional mobilty, gait instability, impaired balance, pain . Pt sitting in chair, agrees to PT. Wife present, review sternal precautions. Pt has great family support.     Rehab Prognosis: Good; patient would benefit from acute skilled PT services to address these deficits and reach maximum level of function.    Recent Surgery: Procedure(s) (LRB):  CORONARY ARTERY BYPASS GRAFT (CABG) (N/A)  SURGICAL PROCUREMENT, VEIN, ENDOSCOPIC 5 Days Post-Op    Plan:     During this hospitalization, patient to be seen 6 x/week to address the identified rehab impairments via therapeutic exercises, therapeutic activities, gait training and progress toward the following goals:    · Plan of Care Expires:  07/30/20    Subjective     Chief Complaint: none  Patient/Family Comments/goals: to go home   Pain/Comfort:  · Pain Rating 1: (pt c/o right knee soreness. Did not give number)  · Location - Side 1: Right  · Location 1: knee  · Pain Addressed 1: Reposition    Patients cultural, spiritual, Moravian conflicts given the current situation:      Living Environment:  Pt lives with wife, retired, drives, no steps to enter.   Prior to admission, patients level of function was independent.  Equipment used at home: none.  DME owned (not currently used): none.  Upon discharge, patient will have assistance from family.    Objective:     Communicated with rn prior to session.  Patient found up in chair with blood pressure cuff, pulse ox  (continuous), telemetry  upon PT entry to room.    General Precautions: Standard, fall, sternal   Orthopedic Precautions:N/A   Braces: N/A     Exams:  · Cognitive Exam:  Patient is oriented to Person, Place, Time and Situation  · Gross Motor Coordination:  WFL  · RUE ROM: within sternal precautions.   · LUE ROM: within sternal precautions.   · RLE ROM: WFL  · RLE Strength: WFL  · LLE ROM: WFL  · LLE Strength: WFL    Functional Mobility:  · Transfers:     · Sit to Stand:  contact guard assistance with rolling walker  · Gait: pt able to ambulate 285 ft RW cga and needed 4 standing rest breaks. Pt reports knee soreness at tibial tubercle on right that did not increase with ambulation. Pt able to  bathroom to urinate, no lob. No dizziness noted, O2 93-96% room air during ambulation. Good vital signs.     Therapeutic Activities and Exercises:   education, fall prevention, poc, dc planning, sternal precautions, activity at home     AM-PAC 6 CLICK MOBILITY  Total Score:18     Patient left up in chair with all lines intact, call button in reach, rn notified and wife present.    GOALS:   Multidisciplinary Problems     Physical Therapy Goals        Problem: Physical Therapy Goal    Goal Priority Disciplines Outcome Goal Variances Interventions   Physical Therapy Goal     PT, PT/OT      Description: Goals to be met by: discharge     Patient will increase functional independence with mobility by performin. Supine to sit with Stand-by Assistance  2. Sit to stand transfer with Supervision  3. Bed to chair transfer with Supervision using Rolling Walker  4. Gait  x 300 feet with Supervision using Rolling Walker or no assistive device.                      History:     Past Medical History:   Diagnosis Date    Anemia     Bell's palsy 2020    CAD (coronary artery disease)     Diabetes mellitus     Diabetic polyneuropathy     History of heart artery stent 2020    HLD (hyperlipidemia)     Hypertension      Sleep apnea        Past Surgical History:   Procedure Laterality Date    ANGIOGRAPHY OF ABDOMEN Left 6/22/2020    Procedure: Angiogram, Abdomen;  Surgeon: Sergio Poe MD;  Location: [facility] CATH/EP LAB;  Service: Cardiology;  Laterality: Left;    CORONARY ARTERY BYPASS GRAFT (CABG) N/A 6/25/2020    Procedure: CORONARY ARTERY BYPASS GRAFT (CABG);  Surgeon: Rao Larose MD;  Location: [facility] OR;  Service: Cardiovascular;  Laterality: N/A;    ENDOSCOPIC HARVEST OF VEIN  6/25/2020    Procedure: SURGICAL PROCUREMENT, VEIN, ENDOSCOPIC;  Surgeon: Rao Larose MD;  Location: [facility] OR;  Service: Cardiovascular;;    LEFT HEART CATHETERIZATION Left 6/22/2020    Procedure: Left heart cath;  Surgeon: Sergio Poe MD;  Location: [facility] CATH/EP LAB;  Service: Cardiology;  Laterality: Left;       Time Tracking:     PT Received On: 06/30/20  PT Start Time: 1054     PT Stop Time: 1122  PT Total Time (min): 28 min     Billable Minutes: Evaluation 5, Gait Training 13 and Therapeutic Activity 10      Zonia Hernandez, PT  06/30/2020

## 2020-06-30 NOTE — CARE UPDATE
06/29/20 2048   Patient Assessment/Suction   Level of Consciousness (AVPU) alert   PRE-TX-O2   O2 Device (Oxygen Therapy) High Flow nasal Cannula   $ Is the patient on Low Flow Oxygen? Yes   Flow (L/min) 3  (Placed on 2L.)   SpO2 (!) 94 %   Pulse Oximetry Type Continuous   $ Pulse Oximetry - Multiple Charge Pulse Oximetry - Multiple   Pulse (!) 59   Resp (!) 28   Preset CPAP/BiPAP Settings   $ Is patient using? No/refused

## 2020-06-30 NOTE — PT/OT/SLP EVAL
Occupational Therapy   Evaluation    Name: Sincere Malave  MRN: [id number]  Admitting Diagnosis:  NSTEMI (non-ST elevated myocardial infarction) 5 Days Post-Op    Recommendations:     Discharge Recommendations: home health OT  Discharge Equipment Recommendations:  none  Barriers to discharge:  None    Assessment:     Sincere Malave is a 70 y.o. male with a medical diagnosis of NSTEMI (non-ST elevated myocardial infarction).  He presents with improving medical acuity and functional mobility s/p CABG. Performance deficits affecting function:  .      Rehab Prognosis: Fair; patient would benefit from acute skilled OT services to address these deficits and reach maximum level of function.       Plan:     Patient to be seen 5 x/week to address the above listed problems via self-care/home management, therapeutic activities, therapeutic exercises  · Plan of Care Expires: 07/30/20  · Plan of Care Reviewed with: spouse, patient    Subjective     Chief Complaint: None  Patient/Family Comments/goals: To go home.    Occupational Profile:  Living Environment: lives with spouse in a 1 story home.  Previous level of function: independent with all ADLs, IADLs and driving.  Roles and Routines: primary homemaker  Equipment Used at Home:  none  Assistance upon Discharge: Spouse    Pain/Comfort:  · Pain Rating 1: 0/10  · Pain Rating Post-Intervention 1: 0/10    Patients cultural, spiritual, Buddhist conflicts given the current situation: no    Objective:     Communicated with: nurse prior to session.  Patient found up in chair with telemetry, peripheral IV upon OT entry to room.    General Precautions: Standard, fall   Orthopedic Precautions:N/A   Braces: N/A     Occupational Performance:    Functional Mobility/Transfers:  · Patient completed Toilet Transfer Step Transfer technique with minimum assistance with  hand-held assist  · Functional Mobility: ambulated 20 feet in the hospital room and bathroom with contact guard  assistance and no AD.    Activities of Daily Living:  · Grooming: minimum assistance to wash hands standing at sink.  · Lower Body Dressing: contact guard assistance to don/doff socks sitting EOB.    Cognitive/Visual Perceptual:  Cognitive/Psychosocial Skills:     -       Oriented to: Person, Place, Time and Situation   -       Follows Commands/attention:Follows multistep  commands  -       Communication: clear/fluent  -       Memory: No Deficits noted  -       Safety awareness/insight to disability: intact   -       Mood/Affect/Coping skills/emotional control: Cooperative and Pleasant  Visual/Perceptual:      -Intact Acuity    Physical Exam:  Balance:    -       Sitting/Standing: Contact Guard  Upper Extremity Range of Motion:     -       Right Upper Extremity: WFL  -       Left Upper Extremity: WFL  Upper Extremity Strength:    -       Right Upper Extremity: 4/5  -       Left Upper Extremity: 4/5   Strength:    -       Right Upper Extremity: WFL  -       Left Upper Extremity: WFL  Fine Motor Coordination:    -       Intact    AMPAC 6 Click ADL:  AMPAC Total Score: 19    Treatment & Education:  Patient and spouse educated on sternal precautions while perform ADL activity.   Education:    Patient left up in chair with all lines intact, call button in reach and spouse present    GOALS:   Multidisciplinary Problems     Occupational Therapy Goals        Problem: Occupational Therapy Goal    Goal Priority Disciplines Outcome Interventions   Occupational Therapy Goal     OT, PT/OT     Description: Goals to be met by: discharge     Patient will increase functional independence with ADLs by performing:    UE Dressing with Supervision while maintaining sternal precautions.  LE Dressing with Supervision.  Grooming while standing at sink with Stand-by Assistance.  Toileting from toilet with Stand-by Assistance for hygiene and clothing management while maintaining sternal precautions.   Toilet transfer to toilet with  Stand-by Assistance while maintaining sternal precautions.                     History:     Past Medical History:   Diagnosis Date    Anemia     Bell's palsy 06/20/2020    CAD (coronary artery disease)     Diabetes mellitus     Diabetic polyneuropathy     History of heart artery stent 6/25/2020    HLD (hyperlipidemia)     Hypertension     Sleep apnea        Past Surgical History:   Procedure Laterality Date    ANGIOGRAPHY OF ABDOMEN Left 6/22/2020    Procedure: Angiogram, Abdomen;  Surgeon: Sergio Poe MD;  Location: [facility] CATH/EP LAB;  Service: Cardiology;  Laterality: Left;    CORONARY ARTERY BYPASS GRAFT (CABG) N/A 6/25/2020    Procedure: CORONARY ARTERY BYPASS GRAFT (CABG);  Surgeon: Rao Larose MD;  Location: [facility] OR;  Service: Cardiovascular;  Laterality: N/A;    ENDOSCOPIC HARVEST OF VEIN  6/25/2020    Procedure: SURGICAL PROCUREMENT, VEIN, ENDOSCOPIC;  Surgeon: Rao Larose MD;  Location: [facility] OR;  Service: Cardiovascular;;    LEFT HEART CATHETERIZATION Left 6/22/2020    Procedure: Left heart cath;  Surgeon: Sergio Poe MD;  Location: [facility] CATH/EP LAB;  Service: Cardiology;  Laterality: Left;       Time Tracking:     OT Date of Treatment:    OT Start Time: 1001  OT Stop Time: 1021  OT Total Time (min): 20 min    Billable Minutes:Evaluation 10  Self Care/Home Management 10    Harish Moon OT  6/30/2020

## 2020-06-30 NOTE — PROGRESS NOTES
[facility] Medicine  Progress Note    Patient Name: Sincere Malave  MRN: [id number]  Patient Class: IP- Inpatient   Admission Date: 6/21/2020  Length of Stay: 9 days  Attending Physician: Bryan Smith MD  Primary Care Provider: Primary Doctor No        Subjective:     Principal Problem:NSTEMI (non-ST elevated myocardial infarction)    Interval History:    Patient was on early in the morning  Was on low-dose claviprex drip and blood pressure stable    Review of Systems  Objective:     Vital Signs (Most Recent):  Temp: 98.6 °F (37 °C) (06/30/20 1400)  Pulse: (!) 51 (06/30/20 1500)  Resp: (!) 23 (06/30/20 1618)  BP: (!) 158/70 (06/30/20 1500)  SpO2: 97 % (06/30/20 1400) Vital Signs (24h Range):  Temp:  [97.6 °F (36.4 °C)-98.8 °F (37.1 °C)] 98.6 °F (37 °C)  Pulse:  [48-62] 51  Resp:  [19-31] 23  SpO2:  [94 %-100 %] 97 %  BP: (123-173)/() 158/70     Weight: 91.6 kg (201 lb 15.1 oz)  Body mass index is 31.63 kg/m².    Intake/Output Summary (Last 24 hours) at 6/30/2020 1638  Last data filed at 6/30/2020 1500  Gross per 24 hour   Intake 998 ml   Output 1200 ml   Net -202 ml      Physical Exam      Overview/Hospital Course:    Significant Labs:   CBC:   Recent Labs   Lab 06/29/20  0401 06/30/20  0412   WBC 9.62 10.53   HGB 7.2* 8.4*   HCT 21.9* 25.3*    210     CMP:   Recent Labs   Lab 06/29/20  0401 06/30/20  0412    139   K 3.9 3.8    101   CO2 28 28   * 145*   BUN 34* 32*   CREATININE 1.1 1.2   CALCIUM 8.4* 8.4*   PROT  --  5.8*   ALBUMIN  --  2.7*   BILITOT  --  1.3*   ALKPHOS  --  45*   AST  --  40   ALT  --  56*   ANIONGAP 10 10   EGFRNONAA >60.0 >60.0       Significant Imaging: I have reviewed all pertinent imaging results/findings within the past 24 hours.    Assessment/Plan:      Active Diagnoses:    Diagnosis Date Noted POA    PRINCIPAL PROBLEM:  NSTEMI (non-ST elevated myocardial infarction) [I21.4] 06/21/2020 Yes    S/P CABG x 6 [Z95.1]  Not  Applicable    History of heart artery stent [Z95.5] 06/25/2020 Not Applicable    Femoral artery hematoma complicating cardiac catheterization [I97.410] 06/24/2020 No    ASCVD (arteriosclerotic cardiovascular disease) [I25.10]  Yes    Pulmonary edema, acute [J81.0] 06/21/2020 Yes    Essential hypertension [I10] 06/21/2020 Yes    Acute-on-chronic kidney injury [N17.9, N18.9] 06/21/2020 Yes    Chest pain syndrome [R07.9] 06/21/2020 Yes    Coronary artery disease involving native coronary artery of native heart [I25.10] 06/20/2020 Yes    Type 2 diabetes mellitus with hyperglycemia, with long-term current use of insulin [E11.65, Z79.4] 06/20/2020 Not Applicable    HLD (hyperlipidemia) [E78.5] 06/20/2020 Yes    TERESO (obstructive sleep apnea) [G47.33] 06/20/2020 Yes    Acute hypoxemic respiratory failure [J96.01] 06/20/2020 Yes    Bell's palsy [G51.0] 06/19/2020 Yes      Problems Resolved During this Admission:     ASSESSMENT     Multivessel CAD including L Main S/P  CABG x6 on 06/25/2020   Carotid artery disease left ICA stenosis 69%  Postop day 4  Hypertension better controlled   Bell's Palsy: resolved   Diabetes, uncontrolled  Acute post operative  anemia status post blood transfusion    PLAN   Wean cleviprix  drip and transfer out of the ICU today  Continue acyclovir for Bell'spalsy  Possible discharge soon          VTE Risk Mitigation (From admission, onward)    None             Bryan Smith MD  Department of Hospital Medicine   [facility]

## 2020-06-30 NOTE — PROGRESS NOTES
Cardiac Rehab     Sincere Malave   [id number]   6/30/2020         Cardiac Rehab Phase Taught: Phase 1    Teaching Method: Verbal    Handouts: None    Educational Videos: None    Understanding:  Knowledge indicated by feedback, Learning indicated by feedback and Verbalize understanding    Comments: pt sitting up in chair, review of sternal precautions, use of IS, daily activity, pain meds. Pt voiced understanding. Spoke with bedside RN. Will follow for further education    Total Time Spent:15mins            Shraddha Carpenter RN

## 2020-06-30 NOTE — CONSULTS
[facility]  Cardiology  Progress Note    Patient Name: Sincere Malave  MRN: [id number]  Admission Date: 6/21/2020  Hospital Length of Stay: 9 days  Code Status: Full Code   Attending Physician: Bryan Smith MD   Primary Care Physician: Primary Doctor No  Expected Discharge Date:   Principal Problem:NSTEMI (non-ST elevated myocardial infarction)    Subjective:     Hospital Course:  Patient is doing well **    Interval History:  Blood pressure is under control  Of no shortness of breath    ROS  Objective:     Vital Signs (Most Recent):  Temp: 97.6 °F (36.4 °C) (06/30/20 0800)  Pulse: (!) 55 (06/30/20 0800)  Resp: (!) 22 (06/30/20 0800)  BP: 137/64 (06/30/20 0800)  SpO2: 100 % (06/30/20 0800) Vital Signs (24h Range):  Temp:  [97.6 °F (36.4 °C)-98.8 °F (37.1 °C)] 97.6 °F (36.4 °C)  Pulse:  [48-62] 55  Resp:  [16-35] 22  SpO2:  [89 %-100 %] 100 %  BP: (137-183)/(63-89) 137/64     Weight: 91.6 kg (201 lb 15.1 oz)  Body mass index is 31.63 kg/m².    SpO2: 100 %  O2 Device (Oxygen Therapy): High Flow nasal Cannula      Intake/Output Summary (Last 24 hours) at 6/30/2020 1022  Last data filed at 6/30/2020 0501  Gross per 24 hour   Intake 1045 ml   Output 1400 ml   Net -355 ml       Lines/Drains/Airways     Central Venous Catheter Line            Percutaneous Central Line Insertion/Assessment - Triple Lumen  06/25/20 0637 5 days                Physical Exam 120/80  Lungs sided mesh breath sounds at the bases  Cardiac regular    Significant Labs:   CMP   Recent Labs   Lab 06/29/20  0401 06/30/20  0412    139   K 3.9 3.8    101   CO2 28 28   * 145*   BUN 34* 32*   CREATININE 1.1 1.2   CALCIUM 8.4* 8.4*   PROT  --  5.8*   ALBUMIN  --  2.7*   BILITOT  --  1.3*   ALKPHOS  --  45*   AST  --  40   ALT  --  56*   ANIONGAP 10 10   ESTGFRAFRICA >60.0 >60.0   EGFRNONAA >60.0 >60.0    and CBC   Recent Labs   Lab 06/29/20  0401 06/30/20  0412   WBC 9.62 10.53   HGB 7.2* 8.4*   HCT 21.9* 25.3*     210       Significant Imaging:   Assessment and Plan:   Stable cardiovascular status  Recommending removing lines and cardiac rehab  And should be able to go home by Thursday  Continue lisinopril HydroDIURIL  Brief HPI:     Active Diagnoses:    Diagnosis Date Noted POA    PRINCIPAL PROBLEM:  NSTEMI (non-ST elevated myocardial infarction) [I21.4] 06/21/2020 Yes    S/P CABG x 6 [Z95.1]  Not Applicable    History of heart artery stent [Z95.5] 06/25/2020 Not Applicable    Femoral artery hematoma complicating cardiac catheterization [I97.410] 06/24/2020 No    ASCVD (arteriosclerotic cardiovascular disease) [I25.10]  Yes    Pulmonary edema, acute [J81.0] 06/21/2020 Yes    Essential hypertension [I10] 06/21/2020 Yes    Acute-on-chronic kidney injury [N17.9, N18.9] 06/21/2020 Yes    Chest pain syndrome [R07.9] 06/21/2020 Yes    Coronary artery disease involving native coronary artery of native heart [I25.10] 06/20/2020 Yes    Type 2 diabetes mellitus with hyperglycemia, with long-term current use of insulin [E11.65, Z79.4] 06/20/2020 Not Applicable    HLD (hyperlipidemia) [E78.5] 06/20/2020 Yes    TERESO (obstructive sleep apnea) [G47.33] 06/20/2020 Yes    Acute hypoxemic respiratory failure [J96.01] 06/20/2020 Yes    Bell's palsy [G51.0] 06/19/2020 Yes      Problems Resolved During this Admission:       VTE Risk Mitigation (From admission, onward)    None          James Kaiser MD  Cardiology  Critical access hospital

## 2020-06-30 NOTE — PROGRESS NOTES
"[facility]  Adult Nutrition   Progress Note (Follow-Up)    SUMMARY      * Note: Assessment completed remotely by review of EMR and with assistance by registered dietitians on premises as needed.*    Recommendations  Recommendation/Intervention: 1. Continue 1800 calorie diabetic diet. 2. RD changed ONS frequency. Glucerna TID (to provide 660 kcal/day and 30 g/yanelis protein). 3. Continue to monitor and encourage PO intake of meals and supplements.  Goals: 1. Patient to meet at least 75% of estimated energy and protein needs via PO intake of meals and supplements.  Nutrition Goal Status: new  Communication of RD Recs: reviewed with RN    Dietitian Rounds Brief  PO intake poor/fair per RN documentation 25-50% of meals consumed. Recommend continuing to encourage PO intake of meals and supplements. RD changed oral nutrition supplement to TID.    Reason for Assessment  Reason For Assessment: RD follow-up  Diagnosis: cardiac disease, diabetes diagnosis/complications  Relevant Medical History: bell's palsy, anemia, CAD, DM, HLD, HTN, sleep apnea  Interdisciplinary Rounds: did not attend  General Information Comments: s/p left heart cath 6/22/20, s/p CAGB 6/25/20    Nutrition Risk Screen  Nutrition Risk Screen: no indicators present             Nutrition/Diet History  Patient Reported Diet/Restrictions/Preferences: general  Typical Food/Fluid Intake: good per patient; doesn't really follow diet at home. Patient does check blood glucose regularly as recommended  Spiritual, Cultural Beliefs, Yazdanism Practices, Values that Affect Care: no  Food Allergies: NKFA  Factors Affecting Nutritional Intake: None identified at this time    Anthropometrics  Temp: 98.6 °F (37 °C)  Height Method: Stated  Height: 5' 7" (170.2 cm)  Height (inches): 67 in  Weight Method: Bed Scale  Weight: 91.6 kg (201 lb 15.1 oz)  Weight (lb): 201.94 lb  Ideal Body Weight (IBW), Male: 148 lb  % Ideal Body Weight, Male (lb): 136.45 %  BMI " (Calculated): 31.6  BMI Grade: 30 - 34.9- obesity - grade I       Weight History:  Wt Readings from Last 10 Encounters:   06/27/20 91.6 kg (201 lb 15.1 oz)   06/22/20 91.6 kg (202 lb)   06/21/20 92.1 kg (203 lb 0.7 oz)       Lab/Procedures/Meds: Pertinent Labs Reviewed  Clinical Chemistry:  Recent Labs   Lab 06/24/20  0245  06/25/20  0311  06/25/20  1800  06/30/20  0412     --  138   < > 139   < > 139   K 3.4*   < > 4.6   < > 5.2*  5.2*   < > 3.8   CL 98  --  101   < > 110   < > 101   CO2 26  --  26   < > 21*   < > 28   *  --  170*   < > 241*   < > 145*   BUN 38*  --  42*   < > 38*   < > 32*   CREATININE 1.3  --  1.4   < > 1.4   < > 1.2   CALCIUM 8.9  --  9.1   < > 8.4*   < > 8.4*   PROT 7.1  --  7.1  --   --   --  5.8*   ALBUMIN 3.6  --  3.6  --   --   --  2.7*   BILITOT 1.2*  --  1.2*  --   --   --  1.3*   ALKPHOS 47*  --  49*  --   --   --  45*   AST 20  --  25  --   --   --  40   ALT 34  --  48*  --   --   --  56*   ANIONGAP 12  --  11   < > 8   < > 10   ESTGFRAFRICA >60.0  --  59.3*   < > 59.3*   < > >60.0   EGFRNONAA 56.1*  --  51.3*   < > 51.3*   < > >60.0   MG 1.9  --  2.1   < > 2.1   < > 2.0   PHOS 3.8  --  4.1  --  3.1  --   --     < > = values in this interval not displayed.     CBC:   Recent Labs   Lab 06/30/20 0412   WBC 10.53   RBC 2.89*   HGB 8.4*   HCT 25.3*      MCV 88   MCH 29.1   MCHC 33.2     Medications: Pertinent Medications reviewed  Scheduled Meds:   amLODIPine  10 mg Oral Daily    aspirin  325 mg Oral Daily    atorvastatin  40 mg Oral QHS    carvediloL  6.25 mg Oral BID    docusate sodium  100 mg Oral Daily    DULoxetine  30 mg Oral QHS    fenofibrate  145 mg Oral Daily with breakfast    ferrous sulfate  325 mg Oral Daily    hydroCHLOROthiazide  12.5 mg Oral Daily    insulin detemir U-100  20 Units Subcutaneous QHS    lisinopriL  10 mg Oral Daily    pantoprazole  40 mg Oral Daily    polyethylene glycol  17 g Oral Daily     Continuous Infusions:  PRN  Meds:.sodium chloride, acetaminophen, dextrose 50%, dextrose 50%, hydrALAZINE, HYDROcodone-acetaminophen, insulin regular, melatonin, morphine    Estimated/Assessed Needs  Weight Used For Calorie Calculations: 87.9 kg (193 lb 12.6 oz)  Energy Calorie Requirements (kcal): 6321-2789 (20-25 calorie  Energy Need Method: Kcal/kg  Protein Requirements: 101-119 g (1.1-1.9 g/kg BW)  Weight Used For Protein Calculations: 91.6 kg (201 lb 15.1 oz)     Estimated Fluid Requirement Method: RDA Method  RDA Method (mL): 1758       Nutrition Prescription Ordered  Current Diet Order: 1800 calorie diabetic diet  Nutrition Order Comments: -  Oral Nutrition Supplement: Glucerna BID    Evaluation of Received Nutrient/Fluid Intake  Energy Calories Required: not meeting needs  Protein Required: not meeting needs  Fluid Required: not meeting needs  Tolerance: other (see comments)(Not getting meals)     Intake/Output Summary (Last 24 hours) at 6/30/2020 1720  Last data filed at 6/30/2020 1500  Gross per 24 hour   Intake 998 ml   Output 1200 ml   Net -202 ml      % Intake of Estimated Energy Needs: 25 - 50 %  % Meal Intake: 25 - 50 %    Nutrition Risk  Level of Risk/Frequency of Follow-up: high     Monitor and Evaluation  Food and Nutrient Intake: energy intake  Food and Nutrient Adminstration: diet order  Knowledge/Beliefs/Attitudes: beliefs and attitudes, food and nutrition knowledge/skill  Physical Activity and Function: nutrition-related ADLs and IADLs, factors affecting access to physical activity  Anthropometric Measurements: weight change, weight, body mass index  Biochemical Data, Medical Tests and Procedures: electrolyte and renal panel, inflammatory profile, lipid profile, gastrointestinal profile, glucose/endocrine profile  Nutrition-Focused Physical Findings: overall appearance, extremities, muscles and bones, head and eyes, skin     Nutrition Follow-Up  RD Follow-up?: Yes     Lissa Coburn RD 06/30/2020 5:21 PM

## 2020-06-30 NOTE — PLAN OF CARE
Problem: Oral Intake Inadequate  Goal: Improved Oral Intake  Outcome: Ongoing, Progressing  Intervention: Promote and Optimize Oral Intake  Flowsheets (Taken 6/30/2020 1718)  Oral Nutrition Promotion: calorie dense liquids provided

## 2020-07-01 LAB
ALBUMIN SERPL BCP-MCNC: 2.7 G/DL (ref 3.5–5.2)
ALP SERPL-CCNC: 45 U/L (ref 55–135)
ALT SERPL W/O P-5'-P-CCNC: 62 U/L (ref 10–44)
ANION GAP SERPL CALC-SCNC: 9 MMOL/L (ref 8–16)
AST SERPL-CCNC: 33 U/L (ref 10–40)
BILIRUB SERPL-MCNC: 1.3 MG/DL (ref 0.1–1)
BUN SERPL-MCNC: 30 MG/DL (ref 8–23)
CALCIUM SERPL-MCNC: 8.6 MG/DL (ref 8.7–10.5)
CHLORIDE SERPL-SCNC: 104 MMOL/L (ref 95–110)
CO2 SERPL-SCNC: 26 MMOL/L (ref 23–29)
CREAT SERPL-MCNC: 0.9 MG/DL (ref 0.5–1.4)
ERYTHROCYTE [DISTWIDTH] IN BLOOD BY AUTOMATED COUNT: 14.7 % (ref 11.5–14.5)
EST. GFR  (AFRICAN AMERICAN): >60 ML/MIN/1.73 M^2
EST. GFR  (NON AFRICAN AMERICAN): >60 ML/MIN/1.73 M^2
GLUCOSE SERPL-MCNC: 138 MG/DL (ref 70–110)
GLUCOSE SERPL-MCNC: 149 MG/DL (ref 70–110)
GLUCOSE SERPL-MCNC: 169 MG/DL (ref 70–110)
GLUCOSE SERPL-MCNC: 208 MG/DL (ref 70–110)
HCT VFR BLD AUTO: 26.6 % (ref 40–54)
HGB BLD-MCNC: 8.8 G/DL (ref 14–18)
MAGNESIUM SERPL-MCNC: 2 MG/DL (ref 1.6–2.6)
MCH RBC QN AUTO: 29.1 PG (ref 27–31)
MCHC RBC AUTO-ENTMCNC: 33.1 G/DL (ref 32–36)
MCV RBC AUTO: 88 FL (ref 82–98)
PLATELET # BLD AUTO: 251 K/UL (ref 150–350)
PMV BLD AUTO: 10.7 FL (ref 9.2–12.9)
POTASSIUM SERPL-SCNC: 3.9 MMOL/L (ref 3.5–5.1)
PROT SERPL-MCNC: 6 G/DL (ref 6–8.4)
RBC # BLD AUTO: 3.02 M/UL (ref 4.6–6.2)
SODIUM SERPL-SCNC: 139 MMOL/L (ref 136–145)
WBC # BLD AUTO: 10.46 K/UL (ref 3.9–12.7)

## 2020-07-01 PROCEDURE — 25000003 PHARM REV CODE 250: Performed by: THORACIC SURGERY (CARDIOTHORACIC VASCULAR SURGERY)

## 2020-07-01 PROCEDURE — 25000003 PHARM REV CODE 250: Performed by: SPECIALIST

## 2020-07-01 PROCEDURE — 99024 POSTOP FOLLOW-UP VISIT: CPT | Mod: ,,, | Performed by: NURSE PRACTITIONER

## 2020-07-01 PROCEDURE — 85027 COMPLETE CBC AUTOMATED: CPT

## 2020-07-01 PROCEDURE — 97535 SELF CARE MNGMENT TRAINING: CPT

## 2020-07-01 PROCEDURE — 25000003 PHARM REV CODE 250: Performed by: PHYSICIAN ASSISTANT

## 2020-07-01 PROCEDURE — 94660 CPAP INITIATION&MGMT: CPT

## 2020-07-01 PROCEDURE — 97530 THERAPEUTIC ACTIVITIES: CPT

## 2020-07-01 PROCEDURE — 97116 GAIT TRAINING THERAPY: CPT

## 2020-07-01 PROCEDURE — 99900035 HC TECH TIME PER 15 MIN (STAT)

## 2020-07-01 PROCEDURE — 99024 PR POST-OP FOLLOW-UP VISIT: ICD-10-PCS | Mod: ,,, | Performed by: NURSE PRACTITIONER

## 2020-07-01 PROCEDURE — 25000003 PHARM REV CODE 250: Performed by: INTERNAL MEDICINE

## 2020-07-01 PROCEDURE — 63600175 PHARM REV CODE 636 W HCPCS: Performed by: INTERNAL MEDICINE

## 2020-07-01 PROCEDURE — 80053 COMPREHEN METABOLIC PANEL: CPT

## 2020-07-01 PROCEDURE — 63600175 PHARM REV CODE 636 W HCPCS: Performed by: NURSE PRACTITIONER

## 2020-07-01 PROCEDURE — 25000003 PHARM REV CODE 250: Performed by: NURSE PRACTITIONER

## 2020-07-01 PROCEDURE — 83735 ASSAY OF MAGNESIUM: CPT

## 2020-07-01 PROCEDURE — 94761 N-INVAS EAR/PLS OXIMETRY MLT: CPT

## 2020-07-01 PROCEDURE — 63600175 PHARM REV CODE 636 W HCPCS: Performed by: PHYSICIAN ASSISTANT

## 2020-07-01 PROCEDURE — 21000000 HC CCU ICU ROOM CHARGE

## 2020-07-01 PROCEDURE — 27000221 HC OXYGEN, UP TO 24 HOURS

## 2020-07-01 RX ORDER — CARVEDILOL 3.12 MG/1
3.12 TABLET ORAL 2 TIMES DAILY
Status: DISCONTINUED | OUTPATIENT
Start: 2020-07-01 | End: 2020-07-02 | Stop reason: HOSPADM

## 2020-07-01 RX ORDER — ATORVASTATIN CALCIUM 40 MG/1
40 TABLET, FILM COATED ORAL NIGHTLY
Qty: 30 TABLET | Refills: 2 | Status: SHIPPED | OUTPATIENT
Start: 2020-07-01 | End: 2022-08-16

## 2020-07-01 RX ORDER — HYDROCHLOROTHIAZIDE 12.5 MG/1
12.5 TABLET ORAL DAILY
Qty: 30 TABLET | Refills: 2 | Status: SHIPPED | OUTPATIENT
Start: 2020-07-02 | End: 2022-09-21

## 2020-07-01 RX ORDER — FUROSEMIDE 10 MG/ML
20 INJECTION INTRAMUSCULAR; INTRAVENOUS
Status: DISCONTINUED | OUTPATIENT
Start: 2020-07-01 | End: 2020-07-01

## 2020-07-01 RX ORDER — HYDROCODONE BITARTRATE AND ACETAMINOPHEN 5; 325 MG/1; MG/1
1 TABLET ORAL EVERY 6 HOURS PRN
Qty: 12 TABLET | Refills: 0 | Status: SHIPPED | OUTPATIENT
Start: 2020-07-01 | End: 2022-09-21

## 2020-07-01 RX ORDER — DULOXETIN HYDROCHLORIDE 30 MG/1
30 CAPSULE, DELAYED RELEASE ORAL DAILY
Qty: 30 CAPSULE | Refills: 2
Start: 2020-07-01 | End: 2020-07-02 | Stop reason: HOSPADM

## 2020-07-01 RX ORDER — LISINOPRIL 10 MG/1
10 TABLET ORAL ONCE
Status: DISCONTINUED | OUTPATIENT
Start: 2020-07-01 | End: 2020-07-02

## 2020-07-01 RX ORDER — LISINOPRIL 20 MG/1
20 TABLET ORAL DAILY
Status: DISCONTINUED | OUTPATIENT
Start: 2020-07-01 | End: 2020-07-01

## 2020-07-01 RX ORDER — LISINOPRIL 10 MG/1
30 TABLET ORAL DAILY
Qty: 90 TABLET | Refills: 2 | Status: SHIPPED | OUTPATIENT
Start: 2020-07-02 | End: 2022-09-21

## 2020-07-01 RX ORDER — POTASSIUM CHLORIDE 20 MEQ/1
40 TABLET, EXTENDED RELEASE ORAL ONCE
Status: COMPLETED | OUTPATIENT
Start: 2020-07-01 | End: 2020-07-01

## 2020-07-01 RX ORDER — ENOXAPARIN SODIUM 100 MG/ML
40 INJECTION SUBCUTANEOUS
Status: DISCONTINUED | OUTPATIENT
Start: 2020-07-02 | End: 2020-07-02 | Stop reason: HOSPADM

## 2020-07-01 RX ORDER — LISINOPRIL 20 MG/1
20 TABLET ORAL DAILY
Status: DISCONTINUED | OUTPATIENT
Start: 2020-07-02 | End: 2020-07-02

## 2020-07-01 RX ORDER — CARVEDILOL 6.25 MG/1
6.25 TABLET ORAL 2 TIMES DAILY
Qty: 60 TABLET | Refills: 2 | Status: SHIPPED | OUTPATIENT
Start: 2020-07-01 | End: 2020-07-02 | Stop reason: HOSPADM

## 2020-07-01 RX ORDER — FUROSEMIDE 20 MG/1
20 TABLET ORAL
Status: DISCONTINUED | OUTPATIENT
Start: 2020-07-02 | End: 2020-07-01

## 2020-07-01 RX ORDER — FUROSEMIDE 20 MG/1
20 TABLET ORAL 2 TIMES DAILY
Qty: 30 TABLET | Refills: 2 | Status: SHIPPED | OUTPATIENT
Start: 2020-07-02 | End: 2022-09-21

## 2020-07-01 RX ADMIN — DOCUSATE SODIUM 100 MG: 100 CAPSULE, LIQUID FILLED ORAL at 09:07

## 2020-07-01 RX ADMIN — FERROUS SULFATE TAB 325 MG (65 MG ELEMENTAL FE) 325 MG: 325 (65 FE) TAB at 09:07

## 2020-07-01 RX ADMIN — CARVEDILOL 6.25 MG: 6.25 TABLET, FILM COATED ORAL at 09:07

## 2020-07-01 RX ADMIN — HUMAN INSULIN 6 UNITS: 100 INJECTION, SOLUTION SUBCUTANEOUS at 11:07

## 2020-07-01 RX ADMIN — FUROSEMIDE 20 MG: 10 INJECTION, SOLUTION INTRAMUSCULAR; INTRAVENOUS at 09:07

## 2020-07-01 RX ADMIN — FENOFIBRATE 145 MG: 145 TABLET, FILM COATED ORAL at 09:07

## 2020-07-01 RX ADMIN — HYDRALAZINE HYDROCHLORIDE 10 MG: 20 INJECTION INTRAMUSCULAR; INTRAVENOUS at 04:07

## 2020-07-01 RX ADMIN — POTASSIUM CHLORIDE 40 MEQ: 20 TABLET, EXTENDED RELEASE ORAL at 09:07

## 2020-07-01 RX ADMIN — ATORVASTATIN CALCIUM 40 MG: 40 TABLET, FILM COATED ORAL at 08:07

## 2020-07-01 RX ADMIN — INSULIN DETEMIR 20 UNITS: 100 INJECTION, SOLUTION SUBCUTANEOUS at 08:07

## 2020-07-01 RX ADMIN — AMLODIPINE BESYLATE 10 MG: 5 TABLET ORAL at 09:07

## 2020-07-01 RX ADMIN — PANTOPRAZOLE SODIUM 40 MG: 40 TABLET, DELAYED RELEASE ORAL at 06:07

## 2020-07-01 RX ADMIN — DULOXETINE 30 MG: 30 CAPSULE, DELAYED RELEASE ORAL at 08:07

## 2020-07-01 RX ADMIN — HYDROCHLOROTHIAZIDE 12.5 MG: 12.5 TABLET ORAL at 09:07

## 2020-07-01 RX ADMIN — LISINOPRIL 20 MG: 20 TABLET ORAL at 09:07

## 2020-07-01 RX ADMIN — HYDROCODONE BITARTRATE AND ACETAMINOPHEN 1 TABLET: 5; 325 TABLET ORAL at 08:07

## 2020-07-01 RX ADMIN — ASPIRIN 325 MG ORAL TABLET 325 MG: 325 PILL ORAL at 09:07

## 2020-07-01 NOTE — PROGRESS NOTES
[facility]  Cardiology  Progress Note    Patient Name: Sincere Malave  MRN: [id number]  Admission Date: 6/21/2020  Hospital Length of Stay: 10 days  Code Status: Full Code   Attending Physician: Bryan Smith MD   Primary Care Physician: Primary Doctor No  Expected Discharge Date:   Principal Problem:NSTEMI (non-ST elevated myocardial infarction)    Subjective:     Hospital Course:   No notes on file    Interval History:  He is feeling better today.  Blood pressure remains a little bit elevated.  He had Bell's palsy and was given steroids.  This might have triggered an increase in his blood pressure    Review of Systems   Constitution: Negative. Negative for fever and weight gain.   Cardiovascular: Negative.  Negative for chest pain and leg swelling.   Respiratory: Negative.  Negative for cough and shortness of breath.    Skin: Negative.  Negative for flushing and rash.   Musculoskeletal: Negative.  Negative for back pain and muscle cramps.   Gastrointestinal: Negative.  Negative for abdominal pain and constipation.   Neurological: Positive for weakness. Negative for dizziness.   Psychiatric/Behavioral: Negative.  Negative for altered mental status and hallucinations.     Objective:     Vital Signs (Most Recent):  Temp: 97.7 °F (36.5 °C) (07/01/20 0800)  Pulse: 65 (07/01/20 1000)  Resp: (!) 22 (07/01/20 0900)  BP: (!) 155/67 (07/01/20 1000)  SpO2: 100 % (07/01/20 1000) Vital Signs (24h Range):  Temp:  [97.7 °F (36.5 °C)-98.6 °F (37 °C)] 97.7 °F (36.5 °C)  Pulse:  [50-65] 65  Resp:  [12-27] 22  SpO2:  [93 %-100 %] 100 %  BP: (108-188)/() 155/67     Weight: 93.5 kg (206 lb 2.1 oz)  Body mass index is 32.28 kg/m².     SpO2: 100 %  O2 Device (Oxygen Therapy): room air      Intake/Output Summary (Last 24 hours) at 7/1/2020 1203  Last data filed at 7/1/2020 1000  Gross per 24 hour   Intake --   Output 1740 ml   Net -1740 ml       Lines/Drains/Airways     Central Venous Catheter Line             Percutaneous Central Line Insertion/Assessment - Triple Lumen  06/25/20 0637 6 days                Physical Exam   Constitutional: He is oriented to person, place, and time. He appears well-developed and well-nourished.   HENT:   Head: Normocephalic and atraumatic.   Eyes: Conjunctivae and EOM are normal.   Cardiovascular: Normal rate, regular rhythm and normal heart sounds.   Pulmonary/Chest: Effort normal and breath sounds normal.   Abdominal: Soft. Bowel sounds are normal.   Musculoskeletal: Normal range of motion.         General: No edema.   Neurological: He is alert and oriented to person, place, and time.   Skin: Skin is warm and dry.   Psychiatric: He has a normal mood and affect. His behavior is normal. Judgment and thought content normal.   Nursing note and vitals reviewed.      Significant Labs:   BMP:   Recent Labs   Lab 06/30/20 0412 07/01/20  0346   * 149*    139   K 3.8 3.9    104   CO2 28 26   BUN 32* 30*   CREATININE 1.2 0.9   CALCIUM 8.4* 8.6*   MG 2.0 2.0   , CMP   Recent Labs   Lab 06/30/20 0412 07/01/20  0346    139   K 3.8 3.9    104   CO2 28 26   * 149*   BUN 32* 30*   CREATININE 1.2 0.9   CALCIUM 8.4* 8.6*   PROT 5.8* 6.0   ALBUMIN 2.7* 2.7*   BILITOT 1.3* 1.3*   ALKPHOS 45* 45*   AST 40 33   ALT 56* 62*   ANIONGAP 10 9   ESTGFRAFRICA >60.0 >60.0   EGFRNONAA >60.0 >60.0    and CBC   Recent Labs   Lab 06/30/20 0412 07/01/20  0346   WBC 10.53 10.46   HGB 8.4* 8.8*   HCT 25.3* 26.6*    251       Significant Imaging: X-Ray: CXR: X-Ray Chest 1 View (CXR): No results found for this visit on 06/21/20.    Assessment and Plan:         Acute-on-chronic kidney injury  Needs follow-up with higher doses of lisinopril.  The patient and the wife both are aware.    Essential hypertension  Blood pressure remains elevated.  Will increase the doses of lisinopril since he is bradycardic at present and maximum doses of amlodipine    Coronary artery disease involving  native coronary artery of native heart  Status post coronary artery bypass    Bell's palsy  Improved although the patient received doses of oral steroids        VTE Risk Mitigation (From admission, onward)         Ordered     enoxaparin injection 40 mg  Every 24 hours (non-standard times)      07/01/20 0906                Derrick Nuñez MD  Cardiology  [facility]

## 2020-07-01 NOTE — PT/OT/SLP PROGRESS
Physical Therapy Treatment    Patient Name:  Sincere Malave   MRN:  [id number]    Recommendations:     Discharge Recommendations:  home   Discharge Equipment Recommendations: walker, rolling, bedside commode   Barriers to discharge: None    Assessment:     Sincere Malave is a 70 y.o. male admitted with a medical diagnosis of NSTEMI (non-ST elevated myocardial infarction).  He presents with the following impairments/functional limitations:  weakness, impaired self care skills, impaired functional mobilty, gait instability, impaired balance, pain . Pt sitting in chair, agrees to PT. No complaints    Rehab Prognosis: Good; patient would benefit from acute skilled PT services to address these deficits and reach maximum level of function.    Recent Surgery: Procedure(s) (LRB):  CORONARY ARTERY BYPASS GRAFT (CABG) (N/A)  SURGICAL PROCUREMENT, VEIN, ENDOSCOPIC 6 Days Post-Op    Plan:     During this hospitalization, patient to be seen 6 x/week to address the identified rehab impairments via gait training, therapeutic activities, therapeutic exercises and progress toward the following goals:    · Plan of Care Expires:  07/30/20    Subjective     Chief Complaint: none  Patient/Family Comments/goals: to go home   Pain/Comfort:  ·        Objective:     Communicated with rn prior to session.  Patient found up in chair with blood pressure cuff, pulse ox (continuous), telemetry upon PT entry to room.     General Precautions: Standard, sternal, fall   Orthopedic Precautions:N/A   Braces:       Functional Mobility:  · Transfers:     · Sit to Stand:  contact guard assistance with rolling walker  · Gait: pt able to ambulate 285 ft RW cga for safety, 3 standing rest breaks needed, no dzziness, no lob, no sob. O2 98% HR 60. Education for RW and sternal precautions, and use of pillow for sit to stand and coughing.  Good vital signs.       AM-PAC 6 CLICK MOBILITY          Therapeutic Activities and Exercises:   education, fall  prevention, poc, dc planning.     Patient left up in chair with all lines intact, call button in reach, rn notified and wife present..    GOALS:   Multidisciplinary Problems     Physical Therapy Goals        Problem: Physical Therapy Goal    Goal Priority Disciplines Outcome Goal Variances Interventions   Physical Therapy Goal     PT, PT/OT      Description: Goals to be met by: discharge     Patient will increase functional independence with mobility by performin. Supine to sit with Stand-by Assistance  2. Sit to stand transfer with Supervision  3. Bed to chair transfer with Supervision using Rolling Walker  4. Gait  x 300 feet with Supervision using Rolling Walker or no assistive device.                      Time Tracking:     PT Received On: 20  PT Start Time: 942     PT Stop Time: 1010  PT Total Time (min): 28 min     Billable Minutes: Gait Training 15 and Therapeutic Activity 13    Treatment Type: Treatment  PT/PTA: PT     PTA Visit Number: 0     Zonia Hernandez, PT  2020

## 2020-07-01 NOTE — PROGRESS NOTES
Cardiac Rehab     Sincere Malave   [id number]   7/1/2020         Cardiac Rehab Phase Taught: Phase 1    Teaching Method: Verbal, Written    Handouts: After Heart Surgery Booklet, Cardiac Diet Pack, Cardiac Rehab Tear Sheet, Discharge Instructions First Two Weeks Post-Op, Home Activity Sheet, Home Walking Program Sheet and Post-op CABG Booklet    Educational Videos: Preparing for Discharge    Understanding:  Knowledge indicated by feedback, Learning indicated by feedback and Verbalize understanding    Comments: pt sitting up in chair, wife at bedside. CABG discharge education including wound care, s/s to report, meds, diet, sternal precautions, diet. Questions answered. Pt to follow with cardiac rehab back home. Pt/wife voice understanding. Will follow as outpt    Total Time Spent:30mins            Shraddha Carpenter RN

## 2020-07-01 NOTE — PLAN OF CARE
07/01/20 0700   PRE-TX-O2   O2 Device (Oxygen Therapy) High Flow nasal Cannula   $ Is the patient on Low Flow Oxygen? Yes   Flow (L/min) 2   Oxygen Concentration (%) 28   SpO2 96 %   Pulse Oximetry Type Continuous   $ Pulse Oximetry - Multiple Charge Pulse Oximetry - Multiple   Pulse (!) 56   Resp (!) 26   BP (!) 169/85   Wound Care   $ Wound Care Tech Time 15 min   Area of Concern Bridge of nose   Skin Color/Characteristics without discoloration   Skin Temperature warm   Ready to Wean/Extubation Screen   FIO2<=50 (chart decimal) 0.28   Preset CPAP/BiPAP Settings   Mode Of Delivery Standby;CPAP   $ CPAP/BiPAP Daily Charge BiPAP/CPAP Daily   $ Initial CPAP/BiPAP Setup? Yes   $ Is patient using? No/refused   Equipment Type DeVilbiss   CPAP (cm H2O) 5   CPAP AT NIGHT AVAILABLE

## 2020-07-01 NOTE — PROGRESS NOTES
Cardiac Rehab     Sincere Malave   [id number]   7/1/2020         Cardiac Rehab Phase Taught: Phase 1    Teaching Method: Verbal    Handouts: None    Educational Videos: None    Understanding:  Learning indicated by feedback and Verbalize understanding    Comments: pt sitting up in chair. Review of sternal precautions, pain meds, IS and daily activity. Pt voiced understanding. Pt to walk with P.T. today. Will follow for further education    Total Time Spent:15mins            Shraddha Carpenter RN

## 2020-07-01 NOTE — PT/OT/SLP PROGRESS
Occupational Therapy   Treatment    Name: Sincere Malave  MRN: [id number]  Admitting Diagnosis:  NSTEMI (non-ST elevated myocardial infarction)  6 Days Post-Op    Recommendations:     Discharge Recommendations: home  Discharge Equipment Recommendations:  bedside commode, walker, rolling, shower chair  Barriers to discharge:  None    Assessment:     Sincere Malave is a 70 y.o. male with a medical diagnosis of NSTEMI (non-ST elevated myocardial infarction).  Pt agreeable to OT therapy session this AM. Performance deficits affecting function are weakness, impaired endurance, impaired self care skills, impaired functional mobilty, gait instability, impaired balance.     Rehab Prognosis:  Good; patient would benefit from acute skilled OT services to address these deficits and reach maximum level of function.       Plan:     Patient to be seen 5 x/week to address the above listed problems via self-care/home management, therapeutic activities, therapeutic exercises  · Plan of Care Expires: 07/30/20  · Plan of Care Reviewed with: patient    Subjective     Pain/Comfort:  · Pain Rating 1: 0/10    Objective:     Communicated with: nursing prior to session.  Patient found up in chair with blood pressure cuff, pulse ox (continuous), telemetry upon OT entry to room.    General Precautions: Standard, fall, sternal   Orthopedic Precautions:N/A   Braces: N/A     Occupational Performance:     Functional Mobility/Transfers:  · Patient completed Sit <> Stand Transfer with contact guard assistance  with  rolling walker   · Functional Mobility: pt amb around room and to bathroom with CGA with RW with no LOB/SOB    Activities of Daily Living:  · Grooming: contact guard assistance standing at sink to wash hands  · Toileting: contact guard assistance standing at toilet     Treatment & Education:  Pt educated on sternal precautions, safety during ADLs, transfers, and functional mobility; d/c planning: pt will need at , [facility], and  showbartr at d/c.     Patient left up in chair with all lines intact, call button in reach, RN and MD notified and wife presentEducation:      GOALS:   Multidisciplinary Problems     Occupational Therapy Goals        Problem: Occupational Therapy Goal    Goal Priority Disciplines Outcome Interventions   Occupational Therapy Goal     OT, PT/OT Ongoing, Progressing    Description: Goals to be met by: discharge     Patient will increase functional independence with ADLs by performing:    UE Dressing with Supervision while maintaining sternal precautions.  LE Dressing with Supervision.  Grooming while standing at sink with Stand-by Assistance.  Toileting from toilet with Stand-by Assistance for hygiene and clothing management while maintaining sternal precautions.   Toilet transfer to toilet with Stand-by Assistance while maintaining sternal precautions.                     Time Tracking:     OT Date of Treatment: 07/01/20  OT Start Time: 1012  OT Stop Time: 1035  OT Total Time (min): 23 min    Billable Minutes:Self Care/Home Management 13  Therapeutic Activity 10    Lyubov Gannon OT  7/1/2020

## 2020-07-01 NOTE — PLAN OF CARE
Problem: Occupational Therapy Goal  Goal: Occupational Therapy Goal  Description: Goals to be met by: discharge     Patient will increase functional independence with ADLs by performing:    UE Dressing with Supervision while maintaining sternal precautions.  LE Dressing with Supervision.  Grooming while standing at sink with Stand-by Assistance.  Toileting from toilet with Stand-by Assistance for hygiene and clothing management while maintaining sternal precautions.   Toilet transfer to toilet with Stand-by Assistance while maintaining sternal precautions.    Outcome: Ongoing, Progressing

## 2020-07-01 NOTE — NURSING
Pt sleeping comfortably in bed. Opens eyes spontaneously. Chest rise and fall visible. Will continue to monitor

## 2020-07-01 NOTE — PROGRESS NOTES
"CVT SURGERY PROGRESS NOTE  Sincere Malave  70 y.o.  1950    Patient's Chief Complaint:  NSTEMI (non-ST elevated myocardial infarction)    HPI: 70 year old male with hx of CAD with remote PCI, CKD III, IDDM, HTN. HLD, TERESO, neuropathy and anemia presented to outside ED with complaints of chest pain. He was noted to have elevated troponin, transferred here and subsequent LHC performed with findings of multivessel CAD. CVTS consulted for CABG eval. CABG x 6 (COLLIN-LAD, SVG-Ramus/OM, SVG-D1, SVG- RPDA, SVG-R posterior lateral branch) by Dr Cifuentes on 6/25/2020.    Last 24 hour Interval:  - vital signs stable.  No apparent distress on room air.  - remains in normal sinus rhythm.  pacer wires removed.  - 2+ pitting edema to bilateral lower extremities.      Subjective:  Symptoms:  Stable.  He reports shortness of breath.  No chest pain or chest pressure.    Diet:  Adequate intake.  Nausea: On exertion.    Activity level: Normal.    Pain:  He complains of pain that is mild.  Pain is well controlled.                                                                 Objective:  General Appearance:  Comfortable and in no acute distress.    Vital signs: (most recent): Blood pressure 108/71, pulse 64, temperature 97.9 °F (36.6 °C), resp. rate (!) 21, height 5' 7" (1.702 m), weight 93.5 kg (206 lb 2.1 oz), SpO2 95 %.  Vital signs are normal.  No fever.    Output: Producing urine and producing stool.    Lungs:  Normal effort and normal respiratory rate.  There are rales and decreased breath sounds.    Heart: Normal rate.  Regular rhythm.  No murmur.   Abdomen: Abdomen is soft and non-distended.  There is no abdominal tenderness.     Extremities: There is dependent edema (  To bilateral lower extremities.).    Pulses: Distal pulses are intact.    Neurological: Patient is alert and oriented to person, place and time.    Skin:  Warm and dry.  (Surgical incisions without signs of infection.)    Recent Vitals:  Vitals:    07/01/20 " 0400 07/01/20 0500 07/01/20 0600 07/01/20 0700   BP: (!) 177/78 (!) 174/74 (!) 160/118 (!) 169/85   Pulse: (!) 55 (!) 56 (!) 58 (!) 56   Resp: (!) 25 (!) 26 (!) 24 (!) 26   Temp:       TempSrc:       SpO2: 96% 95% 95% 96%   Weight:       Height:           INPATIENT MEDS     amLODIPine  10 mg Oral Daily    aspirin  325 mg Oral Daily    atorvastatin  40 mg Oral QHS    carvediloL  6.25 mg Oral BID    docusate sodium  100 mg Oral Daily    DULoxetine  30 mg Oral QHS    fenofibrate  145 mg Oral Daily with breakfast    ferrous sulfate  325 mg Oral Daily    hydroCHLOROthiazide  12.5 mg Oral Daily    insulin detemir U-100  20 Units Subcutaneous QHS    lisinopriL  20 mg Oral Daily    pantoprazole  40 mg Oral Daily    polyethylene glycol  17 g Oral Daily     sodium chloride, acetaminophen, dextrose 50%, dextrose 50%, hydrALAZINE, HYDROcodone-acetaminophen, insulin regular, melatonin, morphine  HEMODYNAMICS      Recent O2 Therapy/Vent Settings:  Nasal cannula  I/O last 24 hrs:  Intake/Output - Last 3 Shifts       06/29 0700 - 06/30 0659 06/30 0700 - 07/01 0659 07/01 0700 - 07/02 0659    P.O. 840 480     Blood 287      IV Piggyback 158      Total Intake(mL/kg) 1285 (14) 480 (5.1)     Urine (mL/kg/hr) 1600 (0.7) 1640 (0.7)     Total Output 1600 1640     Net -315 -1160                Recent Cardiac Rhythm:  Current normal sinus rhythm    Recent Pain Assessment:  Denies any ACS symptoms.  Mild pain that is well-controlled.    CBC  Recent Labs   Lab 06/29/20  0401 06/30/20  0412 07/01/20  0346   WBC 9.62 10.53 10.46   RBC 2.48* 2.89* 3.02*   HGB 7.2* 8.4* 8.8*    210 251   MCV 88 88 88   MCH 29.0 29.1 29.1   MCHC 32.9 33.2 33.1     BMP  Recent Labs   Lab 06/29/20  0401 06/30/20  0412 07/01/20  0346   CO2 28 28 26   BUN 34* 32* 30*   CREATININE 1.1 1.2 0.9   CALCIUM 8.4* 8.4* 8.6*     CARDIAC ENZYMES  No results for input(s): TROPONINI, CPK, CKMB in the last 168 hours.  BNP  No results for input(s): BNP in the  last 168 hours.  PT/INR  INR   Date Value Ref Range Status   06/25/2020 1.5  Final     Comment:     Coumadin Therapy:  INR: 2.0-3.0 conventional anticoagulation  INR: 2.5-3.5 intensive anticoagulation     06/22/2020 1.1  Final     Comment:     Coumadin Therapy:  INR: 2.0-3.0 conventional anticoagulation  INR: 2.5-3.5 intensive anticoagulation     06/19/2020 0.9 0.8 - 1.2 Final     Comment:     Coumadin Therapy:  2.0 - 3.0 for INR for all indicators except mechanical heart valves  and antiphospholipid syndromes which should use 2.5 - 3.5.       DRUG LEVEL  No results found for: DIGOXIN    DIAGNOSTIC RESULTS:      @LASTIMG{IMG34@    CURRENT CONSULTS:  IP CONSULT TO CARDIOLOGY  IP CONSULT TO REGISTERED DIETITIAN/NUTRITIONIST  IP CONSULT TO HOSPITALIST  IP CONSULT TO CARDIOTHORACIC SURGERY    ASSESSMENT/PLAN:    NSTEMI  S/P CABG x 6 on 6/25/2020 by Dr Cifuentes  - Requiring NC 5L, IV Lasix, CXR in am  - MS and pleural tubes and pacer wires have been removed  - Continue ASA, BB, statin  - Leukocytosis resolved, Afebrile  - H/H stable post transfusion  - Encourage IS  - Increase activity  - OK to transfer out of ICU if remains off IV BP drips     HTN  - Cards adjusting oral meds  - Add PRN meds     HLD  - Continue statin     CKDIII  - Renal function stable  - Good UOP  - Lasix IV and PO starting tomorrow for 2 days.     IDDM  - Glucose remains elevated  - Accuchecks and SSI  - May need long acting insulin to assist with optimal control  - Defer to attending     TERESO  - CPAP qhs     Wilber Palsy  - Defer to attending      GI Prophylaxis: proton pump inhibitor per orders  DVT Prophylaxis:   Anticoagulants   Medication Route Frequency       Case and plan of care discussed with MD Vincent Dominguez, AGACNP-BC  7/1/2020  8:52 AM

## 2020-07-01 NOTE — PLAN OF CARE
Pt weaned off of cleviprex. BP monitoring continued. Continuing to wait for bed assignment to transfer pt to Cardio.

## 2020-07-01 NOTE — ASSESSMENT & PLAN NOTE
Blood pressure remains elevated.  Will increase the doses of lisinopril since he is bradycardic at present and maximum doses of amlodipine

## 2020-07-01 NOTE — PLAN OF CARE
07/01/20 1402   Medicare Message   Important Message from Medicare regarding Discharge Appeal Rights Given to patient/caregiver;Explained to patient/caregiver;Signed/date by patient/caregiver   Date IMM was signed 07/01/20   Time IMM was signed 0437

## 2020-07-01 NOTE — PROGRESS NOTES
[facility] Medicine  Progress Note    Patient Name: Sincere Malave  MRN: [id number]  Patient Class: IP- Inpatient   Admission Date: 6/21/2020  Length of Stay: 10 days  Attending Physician: Bryan Smith MD  Primary Care Provider: Primary Doctor No        Subjective:     Principal Problem:NSTEMI (non-ST elevated myocardial infarction)    Interval History:    Patient was sitting in the chair  Blood pressure is better controlled  He walked in ICU this morning      Review of Systems  Objective:     Vital Signs (Most Recent):  Temp: 98.3 °F (36.8 °C) (07/01/20 1441)  Pulse: 97 (07/01/20 1441)  Resp: 18 (07/01/20 1441)  BP: (!) 147/59 (07/01/20 1441)  SpO2: (!) 47 % (07/01/20 1441) Vital Signs (24h Range):  Temp:  [97.7 °F (36.5 °C)-98.3 °F (36.8 °C)] 98.3 °F (36.8 °C)  Pulse:  [49-97] 97  Resp:  [12-27] 18  SpO2:  [47 %-100 %] 47 %  BP: (108-188)/() 147/59     Weight: 93.5 kg (206 lb 2.1 oz)  Body mass index is 32.28 kg/m².    Intake/Output Summary (Last 24 hours) at 7/1/2020 1457  Last data filed at 7/1/2020 1000  Gross per 24 hour   Intake --   Output 1740 ml   Net -1740 ml      Physical Exam    Physical Exam:  General- Patient alert and oriented x3 in NAD  HEENT- PERRLA, EOMI, OP clear, MMM  Neck- No JVD, Lymphadenopathy  CV- Regular rate and rhythm  Resp- , No increased WOB  GI- Non tender/non-distended  Extrem- No cyanosis, clubbing, edema.  Neuro- Strength 5/5 flexors/extensors  Skin-  No masses, rashes or lesions noted on cursory skin exam.  Overview/Hospital Course:    Significant Labs:   CBC:   Recent Labs   Lab 06/30/20  0412 07/01/20  0346   WBC 10.53 10.46   HGB 8.4* 8.8*   HCT 25.3* 26.6*    251     CMP:   Recent Labs   Lab 06/30/20  0412 07/01/20  0346    139   K 3.8 3.9    104   CO2 28 26   * 149*   BUN 32* 30*   CREATININE 1.2 0.9   CALCIUM 8.4* 8.6*   PROT 5.8* 6.0   ALBUMIN 2.7* 2.7*   BILITOT 1.3* 1.3*   ALKPHOS 45* 45*   AST 40 33   ALT  56* 62*   ANIONGAP 10 9   EGFRNONAA >60.0 >60.0       Significant Imaging: I have reviewed all pertinent imaging results/findings within the past 24 hours.    Assessment/Plan:      Active Diagnoses:    Diagnosis Date Noted POA    PRINCIPAL PROBLEM:  NSTEMI (non-ST elevated myocardial infarction) [I21.4] 06/21/2020 Yes    S/P CABG x 6 [Z95.1]  Not Applicable    History of heart artery stent [Z95.5] 06/25/2020 Not Applicable    Femoral artery hematoma complicating cardiac catheterization [I97.410] 06/24/2020 No    ASCVD (arteriosclerotic cardiovascular disease) [I25.10]  Yes    Pulmonary edema, acute [J81.0] 06/21/2020 Yes    Essential hypertension [I10] 06/21/2020 Yes    Acute-on-chronic kidney injury [N17.9, N18.9] 06/21/2020 Yes    Chest pain syndrome [R07.9] 06/21/2020 Yes    Coronary artery disease involving native coronary artery of native heart [I25.10] 06/20/2020 Yes    Type 2 diabetes mellitus with hyperglycemia, with long-term current use of insulin [E11.65, Z79.4] 06/20/2020 Not Applicable    HLD (hyperlipidemia) [E78.5] 06/20/2020 Yes    TERESO (obstructive sleep apnea) [G47.33] 06/20/2020 Yes    Acute hypoxemic respiratory failure [J96.01] 06/20/2020 Yes    Bell's palsy [G51.0] 06/19/2020 Yes      Problems Resolved During this Admission:     ASSESSMENT     Multivessel CAD including L Main S/P  CABG x6 on 06/25/2020 :stable   Carotid artery disease left ICA stenosis 69%  Postop day 4  Hypertension better controlled   Bell's Palsy: resolved   Diabetes, uncontrolled  Acute post operative  anemia status post blood transfusion    PLAN   Lisinopril dose was increased  Downgrade  Possible discharge soon  Continue diuretics, aspirin, beta-blocker          VTE Risk Mitigation (From admission, onward)         Ordered     enoxaparin injection 40 mg  Every 24 hours (non-standard times)      07/01/20 0906                   Bryan Smith MD  Department of Hospital Medicine   [facility]

## 2020-07-01 NOTE — PLAN OF CARE
07/01/20 1549   Discharge Reassessment   Assessment Type Discharge Planning Reassessment   Anticipated Discharge Disposition Home   Provided patient/caregiver education on the expected discharge date and the discharge plan Yes   Do you have any problems affording any of your prescribed medications? No   Post-Acute Status   Post-Acute Authorization HME    ordered Rolling Walker and 3 in 1 commode chair. Cm contacted [employer] and was given approval to get both out of [facility] closet.     This cm brought DME to pt's van [facility] she received Rolling walker and commode chair. CM left in [facility] closet for [employer] to .    Pt would like to have his medications to go to [facility] so they can go straight home from hospital.

## 2020-07-01 NOTE — SUBJECTIVE & OBJECTIVE
Interval History:  He is feeling better today.  Blood pressure remains a little bit elevated.  He had Bell's palsy and was given steroids.  This might have triggered an increase in his blood pressure    Review of Systems   Constitution: Negative. Negative for fever and weight gain.   Cardiovascular: Negative.  Negative for chest pain and leg swelling.   Respiratory: Negative.  Negative for cough and shortness of breath.    Skin: Negative.  Negative for flushing and rash.   Musculoskeletal: Negative.  Negative for back pain and muscle cramps.   Gastrointestinal: Negative.  Negative for abdominal pain and constipation.   Neurological: Positive for weakness. Negative for dizziness.   Psychiatric/Behavioral: Negative.  Negative for altered mental status and hallucinations.     Objective:     Vital Signs (Most Recent):  Temp: 97.7 °F (36.5 °C) (07/01/20 0800)  Pulse: 65 (07/01/20 1000)  Resp: (!) 22 (07/01/20 0900)  BP: (!) 155/67 (07/01/20 1000)  SpO2: 100 % (07/01/20 1000) Vital Signs (24h Range):  Temp:  [97.7 °F (36.5 °C)-98.6 °F (37 °C)] 97.7 °F (36.5 °C)  Pulse:  [50-65] 65  Resp:  [12-27] 22  SpO2:  [93 %-100 %] 100 %  BP: (108-188)/() 155/67     Weight: 93.5 kg (206 lb 2.1 oz)  Body mass index is 32.28 kg/m².     SpO2: 100 %  O2 Device (Oxygen Therapy): room air      Intake/Output Summary (Last 24 hours) at 7/1/2020 1203  Last data filed at 7/1/2020 1000  Gross per 24 hour   Intake --   Output 1740 ml   Net -1740 ml       Lines/Drains/Airways     Central Venous Catheter Line            Percutaneous Central Line Insertion/Assessment - Triple Lumen  06/25/20 0637 6 days                Physical Exam   Constitutional: He is oriented to person, place, and time. He appears well-developed and well-nourished.   HENT:   Head: Normocephalic and atraumatic.   Eyes: Conjunctivae and EOM are normal.   Cardiovascular: Normal rate, regular rhythm and normal heart sounds.   Pulmonary/Chest: Effort normal and breath sounds  normal.   Abdominal: Soft. Bowel sounds are normal.   Musculoskeletal: Normal range of motion.         General: No edema.   Neurological: He is alert and oriented to person, place, and time.   Skin: Skin is warm and dry.   Psychiatric: He has a normal mood and affect. His behavior is normal. Judgment and thought content normal.   Nursing note and vitals reviewed.      Significant Labs:   BMP:   Recent Labs   Lab 06/30/20 0412 07/01/20  0346   * 149*    139   K 3.8 3.9    104   CO2 28 26   BUN 32* 30*   CREATININE 1.2 0.9   CALCIUM 8.4* 8.6*   MG 2.0 2.0   , CMP   Recent Labs   Lab 06/30/20 0412 07/01/20  0346    139   K 3.8 3.9    104   CO2 28 26   * 149*   BUN 32* 30*   CREATININE 1.2 0.9   CALCIUM 8.4* 8.6*   PROT 5.8* 6.0   ALBUMIN 2.7* 2.7*   BILITOT 1.3* 1.3*   ALKPHOS 45* 45*   AST 40 33   ALT 56* 62*   ANIONGAP 10 9   ESTGFRAFRICA >60.0 >60.0   EGFRNONAA >60.0 >60.0    and CBC   Recent Labs   Lab 06/30/20 0412 07/01/20  0346   WBC 10.53 10.46   HGB 8.4* 8.8*   HCT 25.3* 26.6*    251       Significant Imaging: X-Ray: CXR: X-Ray Chest 1 View (CXR): No results found for this visit on 06/21/20.

## 2020-07-01 NOTE — NURSING TRANSFER
Nursing Transfer Note      7/1/2020     Transfer from 2215 to 2530    Transfer via wheelchair    Transfer with cardiac monitor, personal belongings    Transported by Farrah JIMÉNEZ    Medicines sent: Insulin pen x1    Chart send with patient: yes    Notified: all doctors, wife at bedside    Patient reassessed at: arrival to floor    Upon arrival to floor: pt settled comfortably in bed

## 2020-07-02 VITALS
WEIGHT: 206.13 LBS | OXYGEN SATURATION: 99 % | DIASTOLIC BLOOD PRESSURE: 89 MMHG | RESPIRATION RATE: 16 BRPM | HEIGHT: 67 IN | SYSTOLIC BLOOD PRESSURE: 156 MMHG | HEART RATE: 65 BPM | TEMPERATURE: 98 F | BODY MASS INDEX: 32.35 KG/M2

## 2020-07-02 LAB
ALBUMIN SERPL BCP-MCNC: 2.9 G/DL (ref 3.5–5.2)
ALP SERPL-CCNC: 49 U/L (ref 55–135)
ALT SERPL W/O P-5'-P-CCNC: 58 U/L (ref 10–44)
ANION GAP SERPL CALC-SCNC: 10 MMOL/L (ref 8–16)
AST SERPL-CCNC: 31 U/L (ref 10–40)
BILIRUB SERPL-MCNC: 1.8 MG/DL (ref 0.1–1)
BUN SERPL-MCNC: 29 MG/DL (ref 8–23)
CALCIUM SERPL-MCNC: 8.8 MG/DL (ref 8.7–10.5)
CHLORIDE SERPL-SCNC: 102 MMOL/L (ref 95–110)
CO2 SERPL-SCNC: 25 MMOL/L (ref 23–29)
CREAT SERPL-MCNC: 1.2 MG/DL (ref 0.5–1.4)
ERYTHROCYTE [DISTWIDTH] IN BLOOD BY AUTOMATED COUNT: 14.8 % (ref 11.5–14.5)
EST. GFR  (AFRICAN AMERICAN): >60 ML/MIN/1.73 M^2
EST. GFR  (NON AFRICAN AMERICAN): >60 ML/MIN/1.73 M^2
GLUCOSE SERPL-MCNC: 134 MG/DL (ref 70–110)
GLUCOSE SERPL-MCNC: 148 MG/DL (ref 70–110)
GLUCOSE SERPL-MCNC: 204 MG/DL (ref 70–110)
HCT VFR BLD AUTO: 28.1 % (ref 40–54)
HGB BLD-MCNC: 9.1 G/DL (ref 14–18)
MAGNESIUM SERPL-MCNC: 1.9 MG/DL (ref 1.6–2.6)
MCH RBC QN AUTO: 29.2 PG (ref 27–31)
MCHC RBC AUTO-ENTMCNC: 32.4 G/DL (ref 32–36)
MCV RBC AUTO: 90 FL (ref 82–98)
PLATELET # BLD AUTO: 321 K/UL (ref 150–350)
PMV BLD AUTO: 11 FL (ref 9.2–12.9)
POTASSIUM SERPL-SCNC: 4 MMOL/L (ref 3.5–5.1)
PROT SERPL-MCNC: 6.1 G/DL (ref 6–8.4)
RBC # BLD AUTO: 3.12 M/UL (ref 4.6–6.2)
SODIUM SERPL-SCNC: 137 MMOL/L (ref 136–145)
WBC # BLD AUTO: 9.85 K/UL (ref 3.9–12.7)

## 2020-07-02 PROCEDURE — 63600175 PHARM REV CODE 636 W HCPCS: Performed by: PHYSICIAN ASSISTANT

## 2020-07-02 PROCEDURE — 85027 COMPLETE CBC AUTOMATED: CPT

## 2020-07-02 PROCEDURE — 94660 CPAP INITIATION&MGMT: CPT

## 2020-07-02 PROCEDURE — 63600175 PHARM REV CODE 636 W HCPCS: Performed by: NURSE PRACTITIONER

## 2020-07-02 PROCEDURE — 25000003 PHARM REV CODE 250: Performed by: INTERNAL MEDICINE

## 2020-07-02 PROCEDURE — 25000003 PHARM REV CODE 250: Performed by: PHYSICIAN ASSISTANT

## 2020-07-02 PROCEDURE — 99900035 HC TECH TIME PER 15 MIN (STAT)

## 2020-07-02 PROCEDURE — 83735 ASSAY OF MAGNESIUM: CPT

## 2020-07-02 PROCEDURE — 94761 N-INVAS EAR/PLS OXIMETRY MLT: CPT

## 2020-07-02 PROCEDURE — 25000003 PHARM REV CODE 250: Performed by: THORACIC SURGERY (CARDIOTHORACIC VASCULAR SURGERY)

## 2020-07-02 PROCEDURE — 80053 COMPREHEN METABOLIC PANEL: CPT

## 2020-07-02 PROCEDURE — 25000003 PHARM REV CODE 250: Performed by: SPECIALIST

## 2020-07-02 PROCEDURE — 97535 SELF CARE MNGMENT TRAINING: CPT

## 2020-07-02 RX ORDER — DULOXETIN HYDROCHLORIDE 30 MG/1
30 CAPSULE, DELAYED RELEASE ORAL NIGHTLY
Qty: 30 CAPSULE | Refills: 2 | Status: SHIPPED | OUTPATIENT
Start: 2020-07-02 | End: 2022-05-21

## 2020-07-02 RX ORDER — CARVEDILOL 3.12 MG/1
3.12 TABLET ORAL 2 TIMES DAILY
Qty: 30 TABLET | Refills: 2 | Status: SHIPPED | OUTPATIENT
Start: 2020-07-02 | End: 2020-07-02

## 2020-07-02 RX ORDER — CARVEDILOL 3.12 MG/1
3.12 TABLET ORAL 2 TIMES DAILY
Qty: 60 TABLET | Refills: 2 | Status: SHIPPED | OUTPATIENT
Start: 2020-07-02 | End: 2022-09-21

## 2020-07-02 RX ORDER — CLONIDINE HYDROCHLORIDE 0.1 MG/1
0.1 TABLET ORAL 3 TIMES DAILY
Status: DISCONTINUED | OUTPATIENT
Start: 2020-07-02 | End: 2020-07-02

## 2020-07-02 RX ADMIN — DOCUSATE SODIUM 100 MG: 100 CAPSULE, LIQUID FILLED ORAL at 10:07

## 2020-07-02 RX ADMIN — POLYETHYLENE GLYCOL 3350 17 G: 17 POWDER, FOR SOLUTION ORAL at 10:07

## 2020-07-02 RX ADMIN — HYDRALAZINE HYDROCHLORIDE 10 MG: 20 INJECTION INTRAMUSCULAR; INTRAVENOUS at 03:07

## 2020-07-02 RX ADMIN — FENOFIBRATE 145 MG: 145 TABLET, FILM COATED ORAL at 10:07

## 2020-07-02 RX ADMIN — ASPIRIN 325 MG ORAL TABLET 325 MG: 325 PILL ORAL at 10:07

## 2020-07-02 RX ADMIN — FERROUS SULFATE TAB 325 MG (65 MG ELEMENTAL FE) 325 MG: 325 (65 FE) TAB at 10:07

## 2020-07-02 RX ADMIN — HYDROCHLOROTHIAZIDE 12.5 MG: 12.5 TABLET ORAL at 10:07

## 2020-07-02 RX ADMIN — PANTOPRAZOLE SODIUM 40 MG: 40 TABLET, DELAYED RELEASE ORAL at 06:07

## 2020-07-02 RX ADMIN — ENOXAPARIN SODIUM 40 MG: 100 INJECTION SUBCUTANEOUS at 10:07

## 2020-07-02 RX ADMIN — LISINOPRIL 30 MG: 10 TABLET ORAL at 10:07

## 2020-07-02 RX ADMIN — AMLODIPINE BESYLATE 10 MG: 5 TABLET ORAL at 10:07

## 2020-07-02 RX ADMIN — HYDROCODONE BITARTRATE AND ACETAMINOPHEN 1 TABLET: 5; 325 TABLET ORAL at 10:07

## 2020-07-02 NOTE — NURSING
Assisted pt back to bed with standby assist, RN instructed on going from standing to sitting and vise versa,mobillity in bed and readjusting self in a chair,as well IS, TCDB,and nutrition, pt verbalized understanding with return demonstration .

## 2020-07-02 NOTE — PROGRESS NOTES
Cardiac Rehab     Sincere Malave   [id number]   7/2/2020         Cardiac Rehab Phase Taught: Phase 1    Teaching Method: Verbal    Handouts: None    Educational Videos: None    Understanding:  History of Previous Information Given, Knowledge indicated by feedback, Learning indicated by feedback and Verbalize understanding    Comments: pt sitting up in chair, review of discharge cabg education. Questions answered. Will follow as oupt    Total Time Spent:15mins            Shraddha Carpenter RN

## 2020-07-02 NOTE — NURSING
Cardiologist here pt rounds, poc discussed with pt and RN new orders discussed with RN to be implemented.

## 2020-07-02 NOTE — ASSESSMENT & PLAN NOTE
Needs follow-up with higher doses of lisinopril.  The patient and the wife both are aware.  Stable at present

## 2020-07-02 NOTE — DISCHARGE SUMMARY
[facility] Medicine  Discharge Summary      Patient Name: Sincere Malave  MRN: [id number]  Admission Date: 6/21/2020  Hospital Length of Stay: 11 days  Discharge Date and Time:  07/02/2020 6:05 PM  Attending Physician: Gabi att. providers found   Discharging Provider: Bryan Smith MD  Primary Care Provider: Primary Doctor Gabi      HPI:   70 y.o. male with a PMHx of CAD with remote PCI, CKD 3, IDDM, hypertension, hyperlipidemia, neuropathy, sleep apnea, anemia who presents to [facility] ED with chief complaint of chest pain for 2 hr.  He described chest pain as a substernal chest tightness and tells me that his lower jaw hurts.  Patient was placed in ICU on Cardene drip however subsequent cardiac enzymes up trended and Cardiology was involved.  Patient was also given Rocephin and azithromycin for presumed pneumonia.  He also received aspirin, statin, beta-blocker and weight based Lovenox.  Patient was transferred to [facility] for angiographic evaluation tomorrow morning.  Upon my interview he reports mild chest discomfort and jaw pain and associated shortness of breath.  Otherwise denies any fever, chills, headache, dizziness, palpitations, nausea, vomiting, bladder or bowel symptoms.    Procedure(s) (LRB):  CORONARY ARTERY BYPASS GRAFT (CABG) (N/A)  SURGICAL PROCUREMENT, VEIN, ENDOSCOPIC      Hospital Course:   Patient was transferred to [facility] with non STEMI.  Angiogram was done with multiple vessel coronary artery disease including left main and CABG was done.  It was uneventful and patient tolerated well to procedure and later discharged in stable condition.  There was some difficulty in controlling his blood pressure post op  and his lisinopril dose was increased and also continued very low-dose despite patient was having slight bradycardia.  He completed the steroid and acyclovir for Bell's palsy.     Consults:   Consults (From admission, onward)        Status Ordering  Provider     Inpatient consult to Cardiology  Once     Provider:  James Kaiser MD    Acknowledged RAO WORRELL     Inpatient consult to Cardiothoracic Surgery  Once     Provider:  Rao Worrell MD    Completed JAMES KAISER     Inpatient consult to Hospitalist  Once     Provider:  Shasta Hennessy MD    Acknowledged RAO WORRELL     Inpatient consult to Registered Dietitian/Nutritionist  Once     Provider:  (Not yet assigned)    RICHARD Villanueva          No new Assessment & Plan notes have been filed under this hospital service since the last note was generated.  Service: Hospital Medicine    Final Active Diagnoses:    Diagnosis Date Noted POA    PRINCIPAL PROBLEM:  NSTEMI (non-ST elevated myocardial infarction) [I21.4] 06/21/2020 Yes    S/P CABG x 6 [Z95.1]  Not Applicable    History of heart artery stent [Z95.5] 06/25/2020 Not Applicable    Femoral artery hematoma complicating cardiac catheterization [I97.410] 06/24/2020 No    ASCVD (arteriosclerotic cardiovascular disease) [I25.10]  Yes    Pulmonary edema, acute [J81.0] 06/21/2020 Yes    Essential hypertension [I10] 06/21/2020 Yes    Acute-on-chronic kidney injury [N17.9, N18.9] 06/21/2020 Yes    Chest pain syndrome [R07.9] 06/21/2020 Yes    Coronary artery disease involving native coronary artery of native heart [I25.10] 06/20/2020 Yes    Type 2 diabetes mellitus with hyperglycemia, with long-term current use of insulin [E11.65, Z79.4] 06/20/2020 Not Applicable    HLD (hyperlipidemia) [E78.5] 06/20/2020 Yes    TERESO (obstructive sleep apnea) [G47.33] 06/20/2020 Yes    Acute hypoxemic respiratory failure [J96.01] 06/20/2020 Yes    Bell's palsy [G51.0] 06/19/2020 Yes      Problems Resolved During this Admission:       Discharged Condition: good    Disposition: Home or Self Care    Follow Up:  Follow-up Information     Derrick Nuñez MD In 1 week.    Specialties: Cardiovascular Disease, Cardiology  Contact  "information:  [address]  [address]  [place] [place]  [place] [address]  [phone]             Rao Larose MD In 1 week.    Specialties: Cardiothoracic Surgery, Cardiology  Contact information:  [address] [facility] [address]  [phone]             Derrick Nuñez MD In 1 week.    Specialties: Cardiovascular Disease, Cardiology  Contact information:  [address]  [address]  [place] [facility] [address]  [phone]                 Patient Instructions:      WALKER FOR HOME USE     Order Specific Question Answer Comments   Type of Walker: Adult (5'4"-6'6") rolling   With wheels? Yes    Height: 5' 7" (1.702 m)    Weight: 93.5 kg (206 lb 2.1 oz)    Length of need (1-99 months): 99    Does patient have medical equipment at home? none    Please check all that apply: Walker will be used for gait training.    Please check all that apply: Patient's condition impairs ambulation.      COMMODE FOR HOME USE     Order Specific Question Answer Comments   Type: Standard    Height: 5' 7" (1.702 m)    Weight: 93.5 kg (206 lb 2.1 oz)    Does patient have medical equipment at home? none    Length of need (1-99 months): 99      Diet Cardiac     Diet Cardiac     Notify your health care provider if you experience any of the following:  severe uncontrolled pain     Notify your health care provider if you experience any of the following:  severe uncontrolled pain     Activity as tolerated     Activity as tolerated       Significant Diagnostic Studies: Labs:   CMP   Recent Labs   Lab 07/01/20  0346 07/02/20  0344    137   K 3.9 4.0    102   CO2 26 25   * 134*   BUN 30* 29*   CREATININE 0.9 1.2   CALCIUM 8.6* 8.8   PROT 6.0 6.1   ALBUMIN 2.7* 2.9*   BILITOT 1.3* 1.8*   ALKPHOS 45* 49*   AST 33 31   ALT 62* 58*   ANIONGAP 9 10   ESTGFRAFRICA >60.0 >60.0   EGFRNONAA >60.0 >60.0    and CBC   Recent Labs   Lab 07/01/20  0346 07/02/20  0344   WBC 10.46 9.85   HGB 8.8* 9.1*   HCT " 26.6* 28.1*    321       Pending Diagnostic Studies:     Procedure Component Value Units Date/Time    Troponin I [[id number]] Collected: 06/23/20 0400    Order Status: Sent Lab Status: In process Updated: 06/23/20 0429    Specimen: Blood          Medications:  Reconciled Home Medications:      Medication List      START taking these medications    atorvastatin 40 MG tablet  Commonly known as: LIPITOR  Take 1 tablet (40 mg total) by mouth every evening.     carvediloL 3.125 MG tablet  Commonly known as: COREG  Take 1 tablet (3.125 mg total) by mouth 2 (two) times daily.     furosemide 20 MG tablet  Commonly known as: LASIX  Take 1 tablet (20 mg total) by mouth 2 (two) times daily.     hydroCHLOROthiazide 12.5 MG Tab  Commonly known as: HYDRODIURIL  Take 1 tablet (12.5 mg total) by mouth once daily.     HYDROcodone-acetaminophen 5-325 mg per tablet  Commonly known as: NORCO  Take 1 tablet by mouth every 6 (six) hours as needed.     lisinopriL 10 MG tablet  Take 3 tablets (30 mg total) by mouth once daily.        CHANGE how you take these medications    DULoxetine 30 MG capsule  Commonly known as: CYMBALTA  Take 1 capsule (30 mg total) by mouth every evening.  What changed: when to take this        CONTINUE taking these medications    amLODIPine 10 MG tablet  Commonly known as: NORVASC  Take 10 mg by mouth once daily.     ASPIR-81 ORAL  Take 81 mg by mouth once daily.     fenofibrate micronized 134 MG Cap  Commonly known as: LOFIBRA  Take 134 mg by mouth daily with breakfast.     insulin NPH-insulin regular (70/30) 100 unit/mL (70-30) injection  Commonly known as: NOVOLIN 70/30  Inject into the skin 2 (two) times daily before meals. 40 units Q a.m. and 60 units q.p.m.     INV LISINOPRIL-HCTZ 20-12.5  Take 1 tablet by mouth once daily. FOR INVESTIGATIONAL USE ONLY     metFORMIN 1000 MG tablet  Commonly known as: GLUCOPHAGE  Take 1,000 mg by mouth 2 (two) times daily with meals.        STOP taking these medications     acyclovir 400 MG tablet  Commonly known as: ZOVIRAX     pravastatin 40 MG tablet  Commonly known as: PRAVACHOL     predniSONE 20 MG tablet  Commonly known as: DELTASONE            Indwelling Lines/Drains at time of discharge:   Lines/Drains/Airways     None               Physical Exam:  General- Patient alert and oriented x3 in NAD  HEENT- PERRLA, EOMI, OP clear, MMM  Neck- No JVD, Lymphadenopathy, Thyromegaly  CV- Regular rate and rhythm, No Murmur/jose g/rubs  Resp- Lungs CTA Bilaterally, No increased WOB  GI- Non tender/non-distended,   Extrem- No cyanosis, clubbing,  Neuro- Strength 5/5 flexors/extensors,   Skin-  No masses, rashes or lesions noted on cursory skin exam.  Time spent on the discharge of patient: 34 minutes  Patient was seen and examined on the date of discharge and determined to be suitable for discharge.         Bryan Smith MD  Department of Hospital Medicine  [facility]

## 2020-07-02 NOTE — PLAN OF CARE
This note also relates to the following rows which could not be included:  SpO2 - Cannot attach notes to unvalidated device data  Pulse - Cannot attach notes to unvalidated device data  Resp - Cannot attach notes to unvalidated device data       07/01/20 2100   Patient Assessment/Suction   Level of Consciousness (AVPU) alert   Respiratory Effort Unlabored;Normal   Expansion/Accessory Muscles/Retractions no use of accessory muscles   All Lung Fields Breath Sounds equal bilaterally;clear   PRE-TX-O2   O2 Device (Oxygen Therapy) room air   Pulse Oximetry Type Continuous   $ Pulse Oximetry - Multiple Charge Pulse Oximetry - Multiple   Positioning   Body Position positioned/repositioned independently   Preset CPAP/BiPAP Settings   Mode Of Delivery Standby   $ Is patient using? No/refused   Respiratory Evaluation   $ Care Plan Tech Time 15 min   Evaluation For Transfer

## 2020-07-02 NOTE — PT/OT/SLP PROGRESS
Occupational Therapy   Treatment    Name: Sincere Malave  MRN: [id number]  Admitting Diagnosis:  NSTEMI (non-ST elevated myocardial infarction)  7 Days Post-Op    Recommendations:     Discharge Recommendations: home  Discharge Equipment Recommendations:  bedside commode, walker, rolling, shower chair  Barriers to discharge:  None    Assessment:     Sincere Malave is a 70 y.o. male with a medical diagnosis of NSTEMI (non-ST elevated myocardial infarction).  Pt agreeable to OT therapy session this AM. Performance deficits affecting function are weakness, impaired endurance, impaired self care skills, impaired functional mobilty, orthopedic precautions, gait instability.     Rehab Prognosis:  Fair; patient would benefit from acute skilled OT services to address these deficits and reach maximum level of function.       Plan:     Patient to be seen 5 x/week to address the above listed problems via self-care/home management, therapeutic activities, therapeutic exercises  · Plan of Care Expires: 07/30/20  · Plan of Care Reviewed with: patient, spouse    Subjective     Pain/Comfort:  · Pain Rating 1: 0/10    Objective:     Communicated with: nursing prior to session.  Patient found up in chair with blood pressure cuff, pulse ox (continuous), telemetry upon OT entry to room.    General Precautions: Standard, fall, sternal   Orthopedic Precautions:N/A   Braces:       Occupational Performance:     Functional Mobility/Transfers:  · Patient completed Sit <> Stand Transfer with stand by assistance and with use of red heart pillow adhering to sternal precautions  with  no assistive device   · Functional Mobility: pt amb around room with SBA with RW with no LOB/SOB    Activities of Daily Living:  · Grooming: stand by assistance standing at sink to brush teeth and to wash face    Treatment & Education:  Review of sternal precautions and safety during ADLs, transfers, and functional mobility.     Patient left up in chair with all  lines intact, call button in reach and wife presentEducation:      GOALS:   Multidisciplinary Problems     Occupational Therapy Goals        Problem: Occupational Therapy Goal    Goal Priority Disciplines Outcome Interventions   Occupational Therapy Goal     OT, PT/OT Ongoing, Progressing    Description: Goals to be met by: discharge     Patient will increase functional independence with ADLs by performing:    UE Dressing with Supervision while maintaining sternal precautions.  LE Dressing with Supervision.  Grooming while standing at sink with Stand-by Assistance. (goal met 7/2/2020)  Toileting from toilet with Stand-by Assistance for hygiene and clothing management while maintaining sternal precautions.   Toilet transfer to toilet with Stand-by Assistance while maintaining sternal precautions.                     Time Tracking:     OT Date of Treatment: 07/02/20  OT Start Time: 1023  OT Stop Time: 1034  OT Total Time (min): 11 min    Billable Minutes:Self Care/Home Management 11    Lyubov Gannon OT  7/2/2020

## 2020-07-02 NOTE — NURSING
AAox4, denies pain at this time, SB @50, pt transferred to bed side chair, with proper tech, tolerated well, breakfast tray set up, IS with in reach, call light with in reach as well as personal belongings no request at this time.

## 2020-07-02 NOTE — PT/OT/SLP PROGRESS
Physical Therapy      Patient Name:  Sincere Malave   MRN:  [id number]    Patient not seen today secondary to (Pt has DC orders).     Stefani Mendieta, PT

## 2020-07-02 NOTE — PROGRESS NOTES
Pt wheeled off unit to personal vehicle. IV and tele removed. Patien educated about new medications and s/p CABG teaching. Patient and family verbalized understanding without any questions.

## 2020-07-02 NOTE — ASSESSMENT & PLAN NOTE
Blood pressure remains elevated.  Will increase the doses of lisinopril since he is bradycardic at present and maximum doses of amlodipine.  Due to his bradycardia will hold the clonidine and increase lisinopril

## 2020-07-02 NOTE — PLAN OF CARE
07/02/20 0954   Discharge Reassessment   Assessment Type Discharge Planning Assessment   Anticipated Discharge Disposition Home     Placed call to [facility] to notify that patient is requesting to have scripts filled prior to discharge. Per KIM Dumas DME orders filled and equipment sent with family.

## 2020-07-02 NOTE — HOSPITAL COURSE
Patient was transferred to [facility] with non STEMI.  Angiogram was done with multiple vessel coronary artery disease including left main and CABG was done.  It was uneventful and patient tolerated well to procedure and later discharged in stable condition.  There was some difficulty in controlling his blood pressure post op  and his lisinopril dose was increased and also continued very low-dose despite patient was having slight bradycardia.  He completed the steroid and acyclovir for Bell's palsy.

## 2020-07-02 NOTE — PLAN OF CARE
Problem: Occupational Therapy Goal  Goal: Occupational Therapy Goal  Description: Goals to be met by: discharge     Patient will increase functional independence with ADLs by performing:    UE Dressing with Supervision while maintaining sternal precautions.  LE Dressing with Supervision.  Grooming while standing at sink with Stand-by Assistance. (goal met 7/2/2020)  Toileting from toilet with Stand-by Assistance for hygiene and clothing management while maintaining sternal precautions.   Toilet transfer to toilet with Stand-by Assistance while maintaining sternal precautions.    7/2/2020 1046 by Lyubov Gannon OT  Outcome: Ongoing, Progressing

## 2020-07-03 NOTE — PT/OT/SLP DISCHARGE
Occupational Therapy Discharge Summary    Sincere Malave  MRN: [id number]   Principal Problem: NSTEMI (non-ST elevated myocardial infarction)      Patient Discharged from acute Occupational Therapy on 7/2/2020.  Please refer to prior OT note dated 7/2/2020 for functional status.    Assessment:      Patient has not met goals.    Objective:     GOALS:   Multidisciplinary Problems     Occupational Therapy Goals     Not on file          Multidisciplinary Problems (Resolved)        Problem: Occupational Therapy Goal    Goal Priority Disciplines Outcome Interventions   Occupational Therapy Goal   (Resolved)     OT, PT/OT Met    Description: Goals to be met by: discharge     Patient will increase functional independence with ADLs by performing:    UE Dressing with Supervision while maintaining sternal precautions.  LE Dressing with Supervision.  Grooming while standing at sink with Stand-by Assistance. (goal met 7/2/2020)  Toileting from toilet with Stand-by Assistance for hygiene and clothing management while maintaining sternal precautions.   Toilet transfer to toilet with Stand-by Assistance while maintaining sternal precautions.                     Reasons for Discontinuation of Therapy Services  Patient d/c home.      Plan:     Patient Discharged to: Home no OT services needed    Harish Moon OT  7/3/2020

## 2020-07-03 NOTE — PLAN OF CARE
07/02/20 0753   Patient Assessment/Suction   Level of Consciousness (AVPU) alert   Respiratory Effort Normal;Unlabored   PRE-TX-O2   O2 Device (Oxygen Therapy) room air   SpO2 95 %   Pulse Oximetry Type Continuous   $ Pulse Oximetry - Multiple Charge Pulse Oximetry - Multiple   Pulse 60   Resp (!) 26   Preset CPAP/BiPAP Settings   Mode Of Delivery Standby   $ CPAP/BiPAP Daily Charge BiPAP/CPAP Daily   Equipment Type DeVilbiss   Respiratory Evaluation   $ Care Plan Tech Time 15 min

## 2020-07-06 ENCOUNTER — TELEPHONE (OUTPATIENT)
Dept: VASCULAR SURGERY | Facility: CLINIC | Age: 70
End: 2020-07-06

## 2020-07-06 ENCOUNTER — TELEPHONE (OUTPATIENT)
Dept: CARDIAC REHAB | Facility: HOSPITAL | Age: 70
End: 2020-07-06

## 2020-07-06 NOTE — TELEPHONE ENCOUNTER
----- Message from Anitha Rzaa sent at 7/6/2020  8:36 AM CDT -----  Regarding: Post op appointment  Contact: Patient/[phone] (home)  Type:  Sooner Apoointment Request    Caller is requesting a sooner appointment.  Caller declined first available appointment listed below.  Caller will not accept being placed on the waitlist and is requesting a message be sent to doctor.    Name of Caller:  Patient/[phone] (home)     When is the first available appointment?  7/28/20  Symptoms:  Post op    Additional Information:  Was supposed to have a post op 1 week from 6/27/20. Please call to schedule.

## 2020-07-06 NOTE — TELEPHONE ENCOUNTER
Sincere Malave   [id number]   7/6/2020         Spoke with: pt wife    Received Medications?:yes    Follow Up Appt?:yes    Cardio Pulmonary Rehab Appt?:no    Comments: pt doing well, will follow up with cardiac rehab in [facility]    Shraddha Carpenter RN

## 2020-07-09 ENCOUNTER — HISTORICAL (OUTPATIENT)
Dept: LAB | Facility: HOSPITAL | Age: 70
End: 2020-07-09

## 2020-07-13 ENCOUNTER — TELEPHONE (OUTPATIENT)
Dept: VASCULAR SURGERY | Facility: CLINIC | Age: 70
End: 2020-07-13

## 2020-07-13 NOTE — TELEPHONE ENCOUNTER
----- Message from Himanshu Sutton sent at 7/13/2020 12:05 PM CDT -----  Regarding: pt wife negin  Type:  Patient Returning Call    Who Called:  pt  Who Left Message for Patient:  Viridiana  Does the patient know what this is regarding?:  made arrangement with [facility] to remove stitches. Will not be going for appt on this Tuesday, 7/14/2020. Please call back and let know appt is canceled.  Best Call Back Number:  [phone]  Additional Information:  is ok to leave message that jaydon is canceled.

## 2020-07-13 NOTE — TELEPHONE ENCOUNTER
His nephew is a PCP in [place] and will remove sutures because they do not want to travel from [place]. They will call if they need us in the future

## 2020-07-13 NOTE — TELEPHONE ENCOUNTER
----- Message from Eduin Patino sent at 7/13/2020 11:27 AM CDT -----  Contact: p wife [place]  Pt wife [place] called and would like to have Viridiana call her back    [place] can be reached at [phone]

## 2020-07-13 NOTE — TELEPHONE ENCOUNTER
----- Message from Richar Knox sent at 7/13/2020  9:18 AM CDT -----  Regarding: Kalinajayashree Malave/wife   called in to speak with someone at the office regarding the patient upcoming appointment. She would like a call back from the office and can be reached at    [phone]

## 2020-08-12 ENCOUNTER — HISTORICAL (OUTPATIENT)
Dept: LAB | Facility: HOSPITAL | Age: 70
End: 2020-08-12

## 2020-09-03 ENCOUNTER — HISTORICAL (OUTPATIENT)
Dept: LAB | Facility: HOSPITAL | Age: 70
End: 2020-09-03

## 2020-09-21 PROBLEM — J81.0 PULMONARY EDEMA, ACUTE: Status: RESOLVED | Noted: 2020-06-21 | Resolved: 2020-09-21

## 2020-10-22 ENCOUNTER — HISTORICAL (OUTPATIENT)
Dept: ADMINISTRATIVE | Facility: HOSPITAL | Age: 70
End: 2020-10-22

## 2020-11-13 NOTE — PROGRESS NOTES
[facility]  Cardiology  Progress Note    Patient Name: Sincere Malave  MRN: [id number]  Admission Date: 6/21/2020  Hospital Length of Stay: 11 days  Code Status: Full Code   Attending Physician: Bryan Smith MD   Primary Care Physician: Primary Doctor No  Expected Discharge Date: 7/1/2020  Principal Problem:NSTEMI (non-ST elevated myocardial infarction)    Subjective:     Hospital Course:   No notes on file    Interval History:  He is feeling better today.  He has been ambulating.  Blood pressure is mildly elevated.  His pulse remained low in the 50s.  The doses of carvedilol were decreased to 3.125.    Review of Systems   Constitution: Negative. Negative for fever and weight gain.   Cardiovascular: Negative.  Negative for chest pain and leg swelling.   Respiratory: Negative.  Negative for cough and shortness of breath.    Skin: Negative.  Negative for flushing and rash.   Musculoskeletal: Negative.  Negative for back pain and muscle cramps.   Gastrointestinal: Negative.  Negative for abdominal pain and constipation.   Neurological: Negative.  Negative for dizziness and weakness.   Psychiatric/Behavioral: Negative.  Negative for altered mental status and hallucinations.     Objective:     Vital Signs (Most Recent):  Temp: 98.5 °F (36.9 °C) (07/02/20 0738)  Pulse: (!) 56 (07/02/20 0738)  Resp: 15 (07/02/20 0738)  BP: (!) 183/79 (07/02/20 0738)  SpO2: 95 % (07/02/20 0738) Vital Signs (24h Range):  Temp:  [97.7 °F (36.5 °C)-98.5 °F (36.9 °C)] 98.5 °F (36.9 °C)  Pulse:  [47-97] 56  Resp:  [15-27] 15  SpO2:  [95 %-100 %] 95 %  BP: (108-197)/(57-81) 183/79     Weight: 93.5 kg (206 lb 2.1 oz)  Body mass index is 32.28 kg/m².     SpO2: 95 %  O2 Device (Oxygen Therapy): room air      Intake/Output Summary (Last 24 hours) at 7/2/2020 0900  Last data filed at 7/2/2020 0700  Gross per 24 hour   Intake --   Output 1575 ml   Net -1575 ml       Lines/Drains/Airways     Central Venous Catheter Line             Percutaneous Central Line Insertion/Assessment - Triple Lumen  06/25/20 0637 7 days                Physical Exam   Constitutional: He is oriented to person, place, and time. He appears well-developed and well-nourished.   HENT:   Head: Normocephalic and atraumatic.   Eyes: Conjunctivae and EOM are normal.   Cardiovascular: Normal rate, regular rhythm and normal heart sounds.   Pulmonary/Chest: Effort normal and breath sounds normal.   Abdominal: Soft. Bowel sounds are normal.   Musculoskeletal: Normal range of motion.         General: No edema.   Neurological: He is alert and oriented to person, place, and time.   Skin: Skin is warm and dry.   Psychiatric: He has a normal mood and affect. His behavior is normal. Judgment and thought content normal.   Nursing note and vitals reviewed.      Significant Labs:   BMP:   Recent Labs   Lab 07/01/20 0346 07/02/20  0344   * 134*    137   K 3.9 4.0    102   CO2 26 25   BUN 30* 29*   CREATININE 0.9 1.2   CALCIUM 8.6* 8.8   MG 2.0 1.9   , CMP   Recent Labs   Lab 07/01/20 0346 07/02/20  0344    137   K 3.9 4.0    102   CO2 26 25   * 134*   BUN 30* 29*   CREATININE 0.9 1.2   CALCIUM 8.6* 8.8   PROT 6.0 6.1   ALBUMIN 2.7* 2.9*   BILITOT 1.3* 1.8*   ALKPHOS 45* 49*   AST 33 31   ALT 62* 58*   ANIONGAP 9 10   ESTGFRAFRICA >60.0 >60.0   EGFRNONAA >60.0 >60.0    and CBC   Recent Labs   Lab 07/01/20 0346 07/02/20  0344   WBC 10.46 9.85   HGB 8.8* 9.1*   HCT 26.6* 28.1*    321       Significant Imaging: X-Ray: CXR: X-Ray Chest 1 View (CXR): No results found for this visit on 06/21/20.    Assessment and Plan:     BAcute-on-chronic kidney injury  Needs follow-up with higher doses of lisinopril.  The patient and the wife both are aware.  Stable at present    Essential hypertension  Blood pressure remains elevated.  Will increase the doses of lisinopril since he is bradycardic at present and maximum doses of amlodipine.  Due to his bradycardia  will hold the clonidine and increase lisinopril    Coronary artery disease involving native coronary artery of native heart  Status post coronary artery bypass    Bell's palsy  Improved although the patient received doses of oral steroids        VTE Risk Mitigation (From admission, onward)         Ordered     enoxaparin injection 40 mg  Every 24 hours (non-standard times)      07/01/20 0906                Derrick Nuñez MD  Cardiology  [facility]   No

## 2021-01-18 ENCOUNTER — HISTORICAL (OUTPATIENT)
Dept: LAB | Facility: HOSPITAL | Age: 71
End: 2021-01-18

## 2021-01-18 LAB
ABS NEUT (OLG): 5.15 X10(3)/MCL (ref 2.1–9.2)
ALBUMIN SERPL-MCNC: 4 GM/DL (ref 3.4–4.8)
ALBUMIN/GLOB SERPL: 1.4 RATIO (ref 1.1–2)
ALP SERPL-CCNC: 71 UNIT/L (ref 40–150)
ALT SERPL-CCNC: 31 UNIT/L (ref 0–55)
APPEARANCE, UA: CLEAR
AST SERPL-CCNC: 22 UNIT/L (ref 5–34)
BACTERIA SPEC CULT: NORMAL
BASOPHILS # BLD AUTO: 0 X10(3)/MCL (ref 0–0.2)
BASOPHILS NFR BLD AUTO: 0 %
BILIRUB SERPL-MCNC: 1.1 MG/DL
BILIRUB UR QL STRIP: NEGATIVE
BILIRUBIN DIRECT+TOT PNL SERPL-MCNC: 0.4 MG/DL (ref 0–0.5)
BILIRUBIN DIRECT+TOT PNL SERPL-MCNC: 0.7 MG/DL (ref 0–0.8)
BUN SERPL-MCNC: 23.5 MG/DL (ref 8.4–25.7)
CALCIUM SERPL-MCNC: 9.8 MG/DL (ref 8.8–10)
CHLORIDE SERPL-SCNC: 104 MMOL/L (ref 98–107)
CHOLEST SERPL-MCNC: 109 MG/DL
CHOLEST/HDLC SERPL: 3 {RATIO} (ref 0–5)
CO2 SERPL-SCNC: 27 MMOL/L (ref 23–31)
COLOR UR: YELLOW
CREAT SERPL-MCNC: 1.07 MG/DL (ref 0.73–1.18)
CREAT UR-MCNC: 146.6 MG/DL (ref 58–161)
EOSINOPHIL # BLD AUTO: 0.8 X10(3)/MCL (ref 0–0.9)
EOSINOPHIL NFR BLD AUTO: 8 %
ERYTHROCYTE [DISTWIDTH] IN BLOOD BY AUTOMATED COUNT: 13.9 % (ref 11.5–17)
EST. AVERAGE GLUCOSE BLD GHB EST-MCNC: 174.3 MG/DL
GLOBULIN SER-MCNC: 2.9 GM/DL (ref 2.4–3.5)
GLUCOSE (UA): NEGATIVE
GLUCOSE SERPL-MCNC: 137 MG/DL (ref 82–115)
HBA1C MFR BLD: 7.7 %
HCT VFR BLD AUTO: 43.5 % (ref 42–52)
HDLC SERPL-MCNC: 42 MG/DL (ref 35–60)
HGB BLD-MCNC: 14.6 GM/DL (ref 14–18)
HGB UR QL STRIP: NEGATIVE
IMM GRANULOCYTES # BLD AUTO: 0.08 % (ref 0–0.02)
IMM GRANULOCYTES NFR BLD AUTO: 0.9 % (ref 0–0.43)
KETONES UR QL STRIP: NEGATIVE
LDLC SERPL CALC-MCNC: 39 MG/DL (ref 50–140)
LEUKOCYTE ESTERASE UR QL STRIP: NEGATIVE
LYMPHOCYTES # BLD AUTO: 2.6 X10(3)/MCL (ref 0.6–4.6)
LYMPHOCYTES NFR BLD AUTO: 28 %
MCH RBC QN AUTO: 29.7 PG (ref 27–31)
MCHC RBC AUTO-ENTMCNC: 33.6 GM/DL (ref 33–36)
MCV RBC AUTO: 88.4 FL (ref 80–94)
MICROALBUMIN UR-MCNC: 30.7 UG/ML
MICROALBUMIN/CREAT RATIO PNL UR: 20.9 MG/GM CR (ref 0–30)
MONOCYTES # BLD AUTO: 0.7 X10(3)/MCL (ref 0.1–1.3)
MONOCYTES NFR BLD AUTO: 7 %
NEUTROPHILS # BLD AUTO: 5.15 X10(3)/MCL (ref 1.4–7.9)
NEUTROPHILS NFR BLD AUTO: 55 %
NITRITE UR QL STRIP: NEGATIVE
PH UR STRIP: 5.5 [PH] (ref 5–9)
PLATELET # BLD AUTO: 262 X10(3)/MCL (ref 130–400)
PMV BLD AUTO: 9.3 FL (ref 9.4–12.4)
POTASSIUM SERPL-SCNC: 4.8 MMOL/L (ref 3.5–5.1)
PROT SERPL-MCNC: 6.9 GM/DL (ref 5.8–7.6)
PROT UR QL STRIP: NEGATIVE
PSA SERPL-MCNC: 1.58 NG/ML
RBC # BLD AUTO: 4.92 X10(6)/MCL (ref 4.7–6.1)
RBC #/AREA URNS HPF: NORMAL /HPF
SODIUM SERPL-SCNC: 142 MMOL/L (ref 136–145)
SP GR UR STRIP: 1.02 (ref 1–1.03)
SQUAMOUS EPITHELIAL, UA: NORMAL
TRIGL SERPL-MCNC: 138 MG/DL (ref 34–140)
TSH SERPL-ACNC: 0.96 UIU/ML (ref 0.35–4.94)
UROBILINOGEN UR STRIP-ACNC: 0.2
VLDLC SERPL CALC-MCNC: 28 MG/DL
WBC # SPEC AUTO: 9.3 X10(3)/MCL (ref 4.5–11.5)
WBC #/AREA URNS HPF: NORMAL /[HPF]

## 2021-08-24 ENCOUNTER — HISTORICAL (OUTPATIENT)
Dept: ADMINISTRATIVE | Facility: HOSPITAL | Age: 71
End: 2021-08-24

## 2021-09-03 NOTE — NURSING
Pt c/o Right Groin pain and pressure.   Newly developed hematoma developed.  Pressure held.  Contacted Dr Kaiser.      Orders received to hold Heparin gtt.  -- done.    Order for U/S to Right groin.    VSS   Body Location Override (Optional - Billing Will Still Be Based On Selected Body Map Location If Applicable): right nasal tip

## 2021-09-09 ENCOUNTER — HISTORICAL (OUTPATIENT)
Dept: LAB | Facility: HOSPITAL | Age: 71
End: 2021-09-09

## 2021-09-09 LAB
ABS NEUT (OLG): 4.82 X10(3)/MCL (ref 2.1–9.2)
ALBUMIN SERPL-MCNC: 4 GM/DL (ref 3.4–4.8)
ALBUMIN/GLOB SERPL: 1.2 RATIO (ref 1.1–2)
ALP SERPL-CCNC: 70 UNIT/L (ref 40–150)
ALT SERPL-CCNC: 36 UNIT/L (ref 0–55)
APPEARANCE, UA: CLEAR
AST SERPL-CCNC: 21 UNIT/L (ref 5–34)
BACTERIA SPEC CULT: NORMAL
BASOPHILS # BLD AUTO: 0.1 X10(3)/MCL (ref 0–0.2)
BASOPHILS NFR BLD AUTO: 1 %
BILIRUB SERPL-MCNC: 1.1 MG/DL
BILIRUB UR QL STRIP: NEGATIVE
BILIRUBIN DIRECT+TOT PNL SERPL-MCNC: 0.4 MG/DL (ref 0–0.5)
BILIRUBIN DIRECT+TOT PNL SERPL-MCNC: 0.7 MG/DL (ref 0–0.8)
BUN SERPL-MCNC: 26.3 MG/DL (ref 8.4–25.7)
CALCIUM SERPL-MCNC: 9.3 MG/DL (ref 8.8–10)
CHLORIDE SERPL-SCNC: 105 MMOL/L (ref 98–107)
CHOLEST SERPL-MCNC: 112 MG/DL
CHOLEST/HDLC SERPL: 3 {RATIO} (ref 0–5)
CO2 SERPL-SCNC: 26 MMOL/L (ref 23–31)
COLOR UR: YELLOW
CREAT SERPL-MCNC: 1.39 MG/DL (ref 0.73–1.18)
CREAT UR-MCNC: 153.1 MG/DL (ref 58–161)
EOSINOPHIL # BLD AUTO: 0.4 X10(3)/MCL (ref 0–0.9)
EOSINOPHIL NFR BLD AUTO: 4 %
ERYTHROCYTE [DISTWIDTH] IN BLOOD BY AUTOMATED COUNT: 13.2 % (ref 11.5–17)
EST. AVERAGE GLUCOSE BLD GHB EST-MCNC: 171.4 MG/DL
GLOBULIN SER-MCNC: 3.2 GM/DL (ref 2.4–3.5)
GLUCOSE (UA): NEGATIVE
GLUCOSE SERPL-MCNC: 140 MG/DL (ref 82–115)
HBA1C MFR BLD: 7.6 %
HCT VFR BLD AUTO: 40.5 % (ref 42–52)
HDLC SERPL-MCNC: 40 MG/DL (ref 35–60)
HGB BLD-MCNC: 13.6 GM/DL (ref 14–18)
HGB UR QL STRIP: NEGATIVE
IMM GRANULOCYTES # BLD AUTO: 0.05 % (ref 0–0.02)
IMM GRANULOCYTES NFR BLD AUTO: 0.6 % (ref 0–0.43)
KETONES UR QL STRIP: NEGATIVE
LDLC SERPL CALC-MCNC: 35 MG/DL (ref 50–140)
LEUKOCYTE ESTERASE UR QL STRIP: NEGATIVE
LYMPHOCYTES # BLD AUTO: 2.6 X10(3)/MCL (ref 0.6–4.6)
LYMPHOCYTES NFR BLD AUTO: 30 %
MCH RBC QN AUTO: 30 PG (ref 27–31)
MCHC RBC AUTO-ENTMCNC: 33.6 GM/DL (ref 33–36)
MCV RBC AUTO: 89.2 FL (ref 80–94)
MICROALBUMIN UR-MCNC: 9.3 UG/ML
MICROALBUMIN/CREAT RATIO PNL UR: 6.1 MG/GM CR (ref 0–30)
MONOCYTES # BLD AUTO: 0.8 X10(3)/MCL (ref 0.1–1.3)
MONOCYTES NFR BLD AUTO: 9 %
NEUTROPHILS # BLD AUTO: 4.82 X10(3)/MCL (ref 1.4–7.9)
NEUTROPHILS NFR BLD AUTO: 56 %
NITRITE UR QL STRIP: NEGATIVE
PH UR STRIP: 5.5 [PH] (ref 5–9)
PLATELET # BLD AUTO: 286 X10(3)/MCL (ref 130–400)
PMV BLD AUTO: 9.8 FL (ref 9.4–12.4)
POTASSIUM SERPL-SCNC: 5.2 MMOL/L (ref 3.5–5.1)
PROT SERPL-MCNC: 7.2 GM/DL (ref 5.8–7.6)
PROT UR QL STRIP: NEGATIVE
RBC # BLD AUTO: 4.54 X10(6)/MCL (ref 4.7–6.1)
RBC #/AREA URNS HPF: NORMAL /[HPF]
SODIUM SERPL-SCNC: 142 MMOL/L (ref 136–145)
SP GR UR STRIP: >=1.03 (ref 1–1.03)
SQUAMOUS EPITHELIAL, UA: NORMAL
TRIGL SERPL-MCNC: 186 MG/DL (ref 34–140)
TSH SERPL-ACNC: 1.31 UIU/ML (ref 0.35–4.94)
UROBILINOGEN UR STRIP-ACNC: 1
VLDLC SERPL CALC-MCNC: 37 MG/DL
WBC # SPEC AUTO: 8.7 X10(3)/MCL (ref 4.5–11.5)
WBC #/AREA URNS HPF: NORMAL /[HPF]

## 2021-12-15 ENCOUNTER — HISTORICAL (OUTPATIENT)
Dept: LAB | Facility: HOSPITAL | Age: 71
End: 2021-12-15

## 2021-12-22 LAB
LEFT EYE DM RETINOPATHY: NEGATIVE
RIGHT EYE DM RETINOPATHY: NEGATIVE

## 2022-03-14 ENCOUNTER — HISTORICAL (OUTPATIENT)
Dept: LAB | Facility: HOSPITAL | Age: 72
End: 2022-03-14

## 2022-03-14 LAB
ABS NEUT (OLG): 4.86 (ref 2.1–9.2)
ALBUMIN SERPL-MCNC: 4.1 G/DL (ref 3.4–4.8)
ALBUMIN/GLOB SERPL: 1.4 {RATIO} (ref 1.1–2)
ALP SERPL-CCNC: 62 U/L (ref 40–150)
ALT SERPL-CCNC: 29 U/L (ref 0–55)
AST SERPL-CCNC: 19 U/L (ref 5–34)
BASOPHILS # BLD AUTO: 0 10*3/UL (ref 0–0.2)
BASOPHILS NFR BLD AUTO: 1 %
BILIRUB SERPL-MCNC: 1 MG/DL
BILIRUBIN DIRECT+TOT PNL SERPL-MCNC: 0.4 (ref 0–0.5)
BILIRUBIN DIRECT+TOT PNL SERPL-MCNC: 0.6 (ref 0–0.8)
BUN SERPL-MCNC: 24.4 MG/DL (ref 8.4–25.7)
CALCIUM SERPL-MCNC: 9.3 MG/DL (ref 8.7–10.5)
CHLORIDE SERPL-SCNC: 104 MMOL/L (ref 98–107)
CHOLEST SERPL-MCNC: 114 MG/DL
CHOLEST/HDLC SERPL: 4 {RATIO} (ref 0–5)
CO2 SERPL-SCNC: 27 MMOL/L (ref 23–31)
CREAT SERPL-MCNC: 1.17 MG/DL (ref 0.73–1.18)
EOSINOPHIL # BLD AUTO: 0.8 10*3/UL (ref 0–0.9)
EOSINOPHIL NFR BLD AUTO: 9 %
ERYTHROCYTE [DISTWIDTH] IN BLOOD BY AUTOMATED COUNT: 12.9 % (ref 11.5–17)
EST. AVERAGE GLUCOSE BLD GHB EST-MCNC: 157.1 MG/DL
GLOBULIN SER-MCNC: 3 G/DL (ref 2.4–3.5)
GLUCOSE SERPL-MCNC: 97 MG/DL (ref 82–115)
HBA1C MFR BLD: 7.1 %
HCT VFR BLD AUTO: 41.9 % (ref 42–52)
HDLC SERPL-MCNC: 30 MG/DL (ref 35–60)
HEMOLYSIS INTERF INDEX SERPL-ACNC: 1
HGB BLD-MCNC: 13.7 G/DL (ref 14–18)
ICTERIC INTERF INDEX SERPL-ACNC: 1
IMM GRANULOCYTES # BLD AUTO: 0.09 10*3/UL (ref 0–0.02)
IMM GRANULOCYTES NFR BLD AUTO: 1 % (ref 0–0.43)
LDLC SERPL CALC-MCNC: 55 MG/DL (ref 50–140)
LIPEMIC INTERF INDEX SERPL-ACNC: 0
LYMPHOCYTES # BLD AUTO: 2.6 10*3/UL (ref 0.6–4.6)
LYMPHOCYTES NFR BLD AUTO: 28 %
MANUAL DIFF? (OHS): NO
MCH RBC QN AUTO: 28.8 PG (ref 27–31)
MCHC RBC AUTO-ENTMCNC: 32.7 G/DL (ref 33–36)
MCV RBC AUTO: 88 FL (ref 80–94)
MONOCYTES # BLD AUTO: 0.7 10*3/UL (ref 0.1–1.3)
MONOCYTES NFR BLD AUTO: 8 %
NEUTROPHILS # BLD AUTO: 4.86 10*3/UL (ref 1.4–7.9)
NEUTROPHILS NFR BLD AUTO: 53 %
PLATELET # BLD AUTO: 288 10*3/UL (ref 130–400)
PMV BLD AUTO: 9 FL (ref 9.4–12.4)
POTASSIUM SERPL-SCNC: 4.6 MMOL/L (ref 3.5–5.1)
PROT SERPL-MCNC: 7.1 G/DL (ref 5.8–7.6)
PSA SERPL-MCNC: 2.58 NG/ML
RBC # BLD AUTO: 4.76 10*6/UL (ref 4.7–6.1)
SODIUM SERPL-SCNC: 141 MMOL/L (ref 136–145)
TRIGL SERPL-MCNC: 145 MG/DL (ref 34–140)
TSH SERPL-ACNC: 1.34 M[IU]/L (ref 0.35–4.94)
VIT B12 SERPL-MCNC: 498 PG/ML (ref 213–816)
VLDLC SERPL CALC-MCNC: 29 MG/DL
WBC # SPEC AUTO: 9.1 10*3/UL (ref 4.5–11.5)

## 2022-04-07 ENCOUNTER — HISTORICAL (OUTPATIENT)
Dept: ADMINISTRATIVE | Facility: HOSPITAL | Age: 72
End: 2022-04-07
Payer: MEDICARE

## 2022-04-24 VITALS
WEIGHT: 195 LBS | HEIGHT: 67 IN | OXYGEN SATURATION: 98 % | DIASTOLIC BLOOD PRESSURE: 56 MMHG | BODY MASS INDEX: 30.61 KG/M2 | SYSTOLIC BLOOD PRESSURE: 120 MMHG

## 2022-04-28 NOTE — OP NOTE
DATE OF SURGERY:        SURGEON:  Sincere Fallon MD    PREOPERATIVE DIAGNOSIS:  Tenosynovitis right ring finger.    POSTOP DIAGNOSIS:  Tenosynovitis right ring finger.    PROCEDURE:  Trigger release right ring finger.    ANESTHESIA:  Axillary block.    ESTIMATED BLOOD LOSS:  Minimal.    PROCEDURE IN DETAIL:  In the operating suite under axillary block anesthetic, the right arm was prepped and draped in a sterile fashion.  The arm was exsanguinated, tourniquet inflated to 250 mmHg.  A transverse incision was placed at the base of the ring finger in the distal palm.  Skin and subcutaneous tissue were incised for about a cm and a half.  The flexor tendon sheath was identified.  Neurovascular bundles were retracted.  The A1 and A2 pulley areas were then transected with a 69 Venetie blade.  Traction on the tendon revealed no further triggering.  At this point the tourniquet was released.  Final hemostasis was obtained.  The skin incision was closed with vertical mattress sutures of 5-0 and 6-0 Prolene.  Sterile soft dressing was applied.  Procedure terminated.        ______________________________  Sincere Fallon MD    [id number]/[id number]  DD:  02/06/2019  Time:  11:03AM  DT:  02/06/2019  Time:  12:17PM  Job #:  [id number]    cc: Dr. Downs __________

## 2022-05-03 RX ORDER — AZITHROMYCIN 250 MG/1
TABLET, FILM COATED ORAL
Qty: 6 TABLET | Refills: 0 | Status: SHIPPED | OUTPATIENT
Start: 2022-05-03 | End: 2022-05-08

## 2022-05-03 NOTE — TELEPHONE ENCOUNTER
----- Message from Himanshu Huggins II, MD sent at 5/3/2022 12:59 PM CDT -----  Regarding: RE: med  Z-pack, Tylenol 500mg TID  ----- Message -----  From: Jo Ann Ashton LPN  Sent: 5/3/2022  12:40 PM CDT  To: Himanshu Huggins II, MD  Subject: FW: med                                            ----- Message -----  From: Sandy Smith  Sent: 5/3/2022  12:39 PM CDT  To: Jo Ann Ashton LPN  Subject: med                                              Pt is req med for sinus congestion/pressure/allergies, no fever that he can take due to cardiac med.  [facility] [address]

## 2022-06-07 ENCOUNTER — HOSPITAL ENCOUNTER (EMERGENCY)
Facility: HOSPITAL | Age: 72
Discharge: HOME | End: 2022-06-07
Attending: EMERGENCY MEDICINE
Payer: COMMERCIAL

## 2022-06-07 VITALS
HEIGHT: 67 IN | TEMPERATURE: 98 F | RESPIRATION RATE: 18 BRPM | BODY MASS INDEX: 30.76 KG/M2 | SYSTOLIC BLOOD PRESSURE: 151 MMHG | HEART RATE: 61 BPM | OXYGEN SATURATION: 98 % | DIASTOLIC BLOOD PRESSURE: 70 MMHG | WEIGHT: 196 LBS

## 2022-06-07 DIAGNOSIS — H60.311 ACUTE DIFFUSE OTITIS EXTERNA OF RIGHT EAR: Primary | ICD-10-CM

## 2022-06-07 PROCEDURE — 99283 EMERGENCY DEPT VISIT LOW MDM: CPT

## 2022-06-07 RX ORDER — CIPROFLOXACIN AND DEXAMETHASONE 3; 1 MG/ML; MG/ML
4 SUSPENSION/ DROPS AURICULAR (OTIC) 2 TIMES DAILY
Qty: 5 ML | Refills: 0 | Status: SHIPPED | OUTPATIENT
Start: 2022-06-07 | End: 2022-09-21

## 2022-06-07 NOTE — ED PROVIDER NOTES
Encounter Date: 6/7/2022       History     Chief Complaint   Patient presents with    Otalgia      Complains of right ear pain since last week     72-year-old male presents to ER complaining of pain to the right ear for approximately 1 week.  He denies any bloody drainage, fever, sore throat, runny nose or cough.    The history is provided by the patient. No  was used.     Review of patient's allergies indicates:  No Known Allergies  Past Medical History:   Diagnosis Date    Anemia     Bell's palsy 06/20/2020    CAD (coronary artery disease)     Diabetes mellitus     Diabetic polyneuropathy     History of heart artery stent 6/25/2020    HLD (hyperlipidemia)     Hypertension     Sleep apnea      Past Surgical History:   Procedure Laterality Date    ANGIOGRAPHY OF ABDOMEN Left 6/22/2020    Procedure: Angiogram, Abdomen;  Surgeon: Sergio Poe MD;  Location: [facility] CATH/EP LAB;  Service: Cardiology;  Laterality: Left;    CORONARY ARTERY BYPASS GRAFT (CABG) N/A 6/25/2020    Procedure: CORONARY ARTERY BYPASS GRAFT (CABG);  Surgeon: Rao Larose MD;  Location: [facility] OR;  Service: Cardiovascular;  Laterality: N/A;    ENDOSCOPIC HARVEST OF VEIN  6/25/2020    Procedure: SURGICAL PROCUREMENT, VEIN, ENDOSCOPIC;  Surgeon: Rao Larose MD;  Location: [facility] OR;  Service: Cardiovascular;;    LEFT HEART CATHETERIZATION Left 6/22/2020    Procedure: Left heart cath;  Surgeon: Sergio Poe MD;  Location: [facility] CATH/EP LAB;  Service: Cardiology;  Laterality: Left;     Family History   Problem Relation Age of Onset    No Known Problems Mother     Heart disease Father     Heart attack Father     Heart attack Sister      Social History     Tobacco Use    Smoking status: Never Smoker    Smokeless tobacco: Never Used   Substance Use Topics    Alcohol use: Not Currently     Alcohol/week: 1.0 standard drink     Types: 1 Cans of beer per week     Comment: couple of times a year     Drug use: Never     Review of Systems   Constitutional: Negative for chills and fever.   HENT: Positive for ear discharge and ear pain. Negative for postnasal drip, rhinorrhea and sore throat.    Respiratory: Negative for cough.    Gastrointestinal: Negative for vomiting.   Musculoskeletal: Negative for myalgias.   Neurological: Negative for dizziness.       Physical Exam     Initial Vitals [06/07/22 1331]   BP Pulse Resp Temp SpO2   (!) 151/70 61 18 98.4 °F (36.9 °C) 98 %      MAP       --         Physical Exam    Constitutional: He appears well-developed and well-nourished.   HENT:   Right Ear: Tympanic membrane normal. There is drainage, swelling and tenderness.   Moderate swelling and purulent drainage of right with canal with erythema.  TM intact   Eyes: EOM are normal.   Neck: Neck supple.   Normal range of motion.  Cardiovascular: Normal rate, regular rhythm and normal heart sounds.   Pulmonary/Chest: Breath sounds normal. No respiratory distress.   Musculoskeletal:         General: Normal range of motion.      Cervical back: Normal range of motion and neck supple.     Neurological: He is alert and oriented to person, place, and time. GCS score is 15. GCS eye subscore is 4. GCS verbal subscore is 5. GCS motor subscore is 6.   Skin: Skin is warm and dry.   Psychiatric: He has a normal mood and affect.         ED Course   Procedures  Labs Reviewed - No data to display       Imaging Results    None          Medications - No data to display  Medical Decision Making:   Differential Diagnosis:   OM, OE, TM perforation  ED Management:  Patient with a right otitis externa.  Will treat with Ciprodex drops.  Patient neurologically intact                      Clinical Impression:   Final diagnoses:  [H60.311] Acute diffuse otitis externa of right ear (Primary)          ED Disposition Condition    Discharge Stable        ED Prescriptions     Medication Sig Dispense Start Date End Date Auth. Provider     ciprofloxacin-dexamethasone 0.3-0.1% (CIPRODEX) 0.3-0.1 % DrpS Place 4 drops into both ears 2 (two) times daily. 5 mL 6/7/2022  YANELY Alvarez        Follow-up Information    None          YANELY Alvarez  06/07/22 [id number]

## 2022-07-01 ENCOUNTER — DOCUMENTATION ONLY (OUTPATIENT)
Dept: ADMINISTRATIVE | Facility: HOSPITAL | Age: 72
End: 2022-07-01
Payer: MEDICARE

## 2022-09-20 ENCOUNTER — APPOINTMENT (OUTPATIENT)
Dept: LAB | Facility: HOSPITAL | Age: 72
End: 2022-09-20
Attending: INTERNAL MEDICINE
Payer: MEDICARE

## 2022-09-21 ENCOUNTER — OFFICE VISIT (OUTPATIENT)
Dept: INTERNAL MEDICINE | Facility: CLINIC | Age: 72
End: 2022-09-21
Payer: MEDICARE

## 2022-09-21 VITALS
SYSTOLIC BLOOD PRESSURE: 118 MMHG | TEMPERATURE: 98 F | HEART RATE: 58 BPM | RESPIRATION RATE: 14 BRPM | DIASTOLIC BLOOD PRESSURE: 60 MMHG | BODY MASS INDEX: 30.13 KG/M2 | OXYGEN SATURATION: 98 % | WEIGHT: 192 LBS | HEIGHT: 67 IN

## 2022-09-21 DIAGNOSIS — Z00.00 WELLNESS EXAMINATION: ICD-10-CM

## 2022-09-21 DIAGNOSIS — I73.9 PERIPHERAL VASCULAR DISEASE: ICD-10-CM

## 2022-09-21 DIAGNOSIS — E11.42 DIABETIC POLYNEUROPATHY ASSOCIATED WITH TYPE 2 DIABETES MELLITUS: ICD-10-CM

## 2022-09-21 DIAGNOSIS — G47.33 OSA (OBSTRUCTIVE SLEEP APNEA): ICD-10-CM

## 2022-09-21 DIAGNOSIS — I10 ESSENTIAL HYPERTENSION: ICD-10-CM

## 2022-09-21 DIAGNOSIS — Z79.4 TYPE 2 DIABETES MELLITUS WITH HYPERGLYCEMIA, WITH LONG-TERM CURRENT USE OF INSULIN: Primary | ICD-10-CM

## 2022-09-21 DIAGNOSIS — E11.65 TYPE 2 DIABETES MELLITUS WITH HYPERGLYCEMIA, WITH LONG-TERM CURRENT USE OF INSULIN: Primary | ICD-10-CM

## 2022-09-21 DIAGNOSIS — Z12.5 PROSTATE CANCER SCREENING: ICD-10-CM

## 2022-09-21 DIAGNOSIS — E78.5 HYPERLIPIDEMIA, UNSPECIFIED HYPERLIPIDEMIA TYPE: ICD-10-CM

## 2022-09-21 PROCEDURE — G0439 PR MEDICARE ANNUAL WELLNESS SUBSEQUENT VISIT: ICD-10-PCS | Mod: ,,, | Performed by: INTERNAL MEDICINE

## 2022-09-21 PROCEDURE — 3288F PR FALLS RISK ASSESSMENT DOCUMENTED: ICD-10-PCS | Mod: CPTII,,, | Performed by: INTERNAL MEDICINE

## 2022-09-21 PROCEDURE — 1160F RVW MEDS BY RX/DR IN RCRD: CPT | Mod: CPTII,,, | Performed by: INTERNAL MEDICINE

## 2022-09-21 PROCEDURE — 1159F PR MEDICATION LIST DOCUMENTED IN MEDICAL RECORD: ICD-10-PCS | Mod: CPTII,,, | Performed by: INTERNAL MEDICINE

## 2022-09-21 PROCEDURE — 1160F PR REVIEW ALL MEDS BY PRESCRIBER/CLIN PHARMACIST DOCUMENTED: ICD-10-PCS | Mod: CPTII,,, | Performed by: INTERNAL MEDICINE

## 2022-09-21 PROCEDURE — 3066F NEPHROPATHY DOC TX: CPT | Mod: CPTII,,, | Performed by: INTERNAL MEDICINE

## 2022-09-21 PROCEDURE — 1101F PR PT FALLS ASSESS DOC 0-1 FALLS W/OUT INJ PAST YR: ICD-10-PCS | Mod: CPTII,,, | Performed by: INTERNAL MEDICINE

## 2022-09-21 PROCEDURE — 3008F BODY MASS INDEX DOCD: CPT | Mod: CPTII,,, | Performed by: INTERNAL MEDICINE

## 2022-09-21 PROCEDURE — 3074F PR MOST RECENT SYSTOLIC BLOOD PRESSURE < 130 MM HG: ICD-10-PCS | Mod: CPTII,,, | Performed by: INTERNAL MEDICINE

## 2022-09-21 PROCEDURE — 3078F PR MOST RECENT DIASTOLIC BLOOD PRESSURE < 80 MM HG: ICD-10-PCS | Mod: CPTII,,, | Performed by: INTERNAL MEDICINE

## 2022-09-21 PROCEDURE — 1101F PT FALLS ASSESS-DOCD LE1/YR: CPT | Mod: CPTII,,, | Performed by: INTERNAL MEDICINE

## 2022-09-21 PROCEDURE — 3288F FALL RISK ASSESSMENT DOCD: CPT | Mod: CPTII,,, | Performed by: INTERNAL MEDICINE

## 2022-09-21 PROCEDURE — 3078F DIAST BP <80 MM HG: CPT | Mod: CPTII,,, | Performed by: INTERNAL MEDICINE

## 2022-09-21 PROCEDURE — 1159F MED LIST DOCD IN RCRD: CPT | Mod: CPTII,,, | Performed by: INTERNAL MEDICINE

## 2022-09-21 PROCEDURE — G0439 PPPS, SUBSEQ VISIT: HCPCS | Mod: ,,, | Performed by: INTERNAL MEDICINE

## 2022-09-21 PROCEDURE — 4010F ACE/ARB THERAPY RXD/TAKEN: CPT | Mod: CPTII,,, | Performed by: INTERNAL MEDICINE

## 2022-09-21 PROCEDURE — 3066F PR DOCUMENTATION OF TREATMENT FOR NEPHROPATHY: ICD-10-PCS | Mod: CPTII,,, | Performed by: INTERNAL MEDICINE

## 2022-09-21 PROCEDURE — 3008F PR BODY MASS INDEX (BMI) DOCUMENTED: ICD-10-PCS | Mod: CPTII,,, | Performed by: INTERNAL MEDICINE

## 2022-09-21 PROCEDURE — 3060F POS MICROALBUMINURIA REV: CPT | Mod: CPTII,,, | Performed by: INTERNAL MEDICINE

## 2022-09-21 PROCEDURE — 3060F PR POS MICROALBUMINURIA RESULT DOCUMENTED/REVIEW: ICD-10-PCS | Mod: CPTII,,, | Performed by: INTERNAL MEDICINE

## 2022-09-21 PROCEDURE — 3074F SYST BP LT 130 MM HG: CPT | Mod: CPTII,,, | Performed by: INTERNAL MEDICINE

## 2022-09-21 PROCEDURE — 4010F PR ACE/ARB THEARPY RXD/TAKEN: ICD-10-PCS | Mod: CPTII,,, | Performed by: INTERNAL MEDICINE

## 2022-09-21 RX ORDER — LISINOPRIL AND HYDROCHLOROTHIAZIDE 12.5; 2 MG/1; MG/1
TABLET ORAL
COMMUNITY
Start: 2022-05-23 | End: 2023-03-28

## 2022-09-21 RX ORDER — SYRINGE AND NEEDLE,INSULIN,1ML 30 G X1/2"
SYRINGE, EMPTY DISPOSABLE MISCELLANEOUS
COMMUNITY
Start: 2022-06-23 | End: 2023-01-05

## 2022-09-21 RX ORDER — LANCETS
EACH MISCELLANEOUS
COMMUNITY
Start: 2022-04-07

## 2022-09-21 RX ORDER — BLOOD SUGAR DIAGNOSTIC
STRIP MISCELLANEOUS
COMMUNITY
Start: 2022-04-07 | End: 2023-06-29

## 2022-09-21 NOTE — PROGRESS NOTES
Subjective:       Patient ID: Sincere Malave is a 72 y.o. male.      Patient Care Team:  Himanshu Huggins II, MD as PCP - General (Internal Medicine)    Chief Complaint: Medicare AWV Follow Up, Diabetes, Coronary Artery Disease, Hypertension, Leg Pain, and Hyperlipidemia    72-year-old male seen today for followup of insulin-dependent diabetes, coronary artery disease, diabetic neuropathy, and hyperlipidemia among other conditions.      Review of Systems   Constitutional:  Negative for fever.   HENT:  Negative for nosebleeds.    Eyes:  Negative for visual disturbance.   Respiratory:  Negative for shortness of breath.    Cardiovascular:  Negative for chest pain.   Gastrointestinal:  Negative for abdominal pain.   Genitourinary:  Negative for dysuria.   Musculoskeletal:  Negative for gait problem.   Neurological:  Negative for headaches.         Patient Reported Health Risk Assessment  What [age]  Are you male or female?: Male  During the past four weeks, how much have you been bothered by emotional problems such as feeling anxious, depressed, irritable, sad, or downhearted and blue?: Not at all  During the past five weeks, has your physical and/or emotional health limited your social activities with family, friends, neighbors, or groups?: Not at all  During the past four weeks, how much bodily pain have you generally had?: Mild pain  During the past four weeks, was someone available to help if you needed and wanted help?: Yes, as much as I wanted  During the past four weeks, what was the hardest physical activity you could do for at least two minutes?: Moderate  Can you get to places out of walking distance without help?  (For example, can you travel alone on buses or taxis, or drive your own car?): Yes  Can you go shopping for groceries or clothes without someone's help?: Yes  Can you prepare your own meals?: Yes  Can you do your own housework without help?: Yes  Because of any health problems, do  you need the help of another person with your personal care needs such as eating, bathing, dressing, or getting around the house?: No  Can you handle your own money without help?: Yes  During the past four weeks, how would you rate your health in general?: Good  How have things been going for you during the past four weeks?: Pretty well  Are you having difficulties driving your car?: No  Do you always fasten your seat belt when you are in a car?: Yes, usually  How often in the past four weeks have you been bothered by falling or dizzy when standing up?: Never  How often in the past four weeks have you been bothered by sexual problems?: Never  How often in the past four weeks have you been bothered by trouble eating well?: Never  How often in the past four weeks have you been bothered by teeth or denture problems?: Never  How often in the past four weeks have you been bothered with problems using the telephone?: Never  How often in the past four weeks have you been bothered by tiredness or fatigue?: Never  Have you fallen two or more times in the past year?: No  Are you afraid of falling?: No  Are you a smoker?: No  During the past four weeks, how many drinks of wine, beer, or other alcoholic beverages did you have?: No alcohol at all  Do you exercise for about 20 minutes three or more days a week?: Yes, most of the time  Have you been given any information to help you with hazards in your house that might hurt you?: No  Have you been given any information to help you with keeping track of your medications?: No  How often do you have trouble taking medicines the way you've been told to take them?: I always take them as prescribed  How confident are you that you can control and manage most of your health problems?: Somewhat confident  What is your race? (Check all that apply.):       Objective:      Physical Exam  HENT:      Head: Normocephalic.      Mouth/Throat:      Pharynx: Oropharynx is clear.   Eyes:     "  Extraocular Movements: Extraocular movements intact.   Cardiovascular:      Rate and Rhythm: Normal rate and regular rhythm.   Pulmonary:      Breath sounds: Normal breath sounds.   Abdominal:      Palpations: Abdomen is soft.   Musculoskeletal:         General: No swelling.   Skin:     General: Skin is warm.   Neurological:      General: No focal deficit present.      Mental Status: He is alert and oriented to person, place, and time.   Psychiatric:         Mood and Affect: Mood normal.       Vitals:    09/21/22 1020   BP: 118/60   Pulse: (!) 58   Resp: 14   Temp: 97.6 °F (36.4 °C)   SpO2: 98%   Weight: 87.1 kg (192 lb)   Height: 5' 7" (1.702 m)            No flowsheet data found.  Fall Risk Assessment - Outpatient 9/21/2022   Mobility Status Ambulatory   Number of falls 0   Identified as fall risk 0           Depression Screening  Over the past two weeks, has the patient felt down, depressed, or hopeless?: No  Over the past two weeks, has the patient felt little interest or pleasure in doing things?: No  Functional Ability/Safety Screening  Was the patient's timed Up & Go test unsteady or longer than 30 seconds?: No  Does the patient need help with phone, transportation, shopping, preparing meals, housework, laundry, meds, or managing money?: No  Does the patient's home have rugs in the hallway, lack grab bars in the bathroom, lack handrails on the stairs or have poor lighting?: No  Have you noticed any hearing difficulties?: No  Cognitive Function (Assessed through direct observation with due consideration of information obtained by way of patient reports and/or concerns raised by family, friends, caretakers, or others)    Does the patient repeat questions/statements in the same day?: No  Does the patient have trouble remembering the date, year, and time?: No  Does the patient have difficulty managing finances?: No  Does the patient have a decreased sense of direction?: No      Assessment:       Problem List " Items Addressed This Visit          Neuro    Diabetic polyneuropathy    Relevant Orders    CBC Auto Differential    Comprehensive Metabolic Panel    Lipid Panel    Microalbumin/Creatinine Ratio, Urine    PSA, Screening    Urinalysis, Reflex to Urine Culture Urine, Clean Catch    TSH    Hemoglobin A1C       Cardiac/Vascular    HLD (hyperlipidemia)    Relevant Orders    CBC Auto Differential    Comprehensive Metabolic Panel    Lipid Panel    Microalbumin/Creatinine Ratio, Urine    PSA, Screening    Urinalysis, Reflex to Urine Culture Urine, Clean Catch    TSH    Hemoglobin A1C    Essential hypertension    Relevant Orders    CBC Auto Differential    Comprehensive Metabolic Panel    Lipid Panel    Microalbumin/Creatinine Ratio, Urine    PSA, Screening    Urinalysis, Reflex to Urine Culture Urine, Clean Catch    TSH    Hemoglobin A1C    Peripheral vascular disease    Relevant Orders    CBC Auto Differential    Comprehensive Metabolic Panel    Lipid Panel    Microalbumin/Creatinine Ratio, Urine    PSA, Screening    Urinalysis, Reflex to Urine Culture Urine, Clean Catch    TSH    Hemoglobin A1C       Renal/    Prostate cancer screening    Relevant Orders    CBC Auto Differential    Comprehensive Metabolic Panel    Lipid Panel    Microalbumin/Creatinine Ratio, Urine    PSA, Screening    Urinalysis, Reflex to Urine Culture Urine, Clean Catch    TSH    Hemoglobin A1C       Endocrine    Type 2 diabetes mellitus with hyperglycemia, with long-term current use of insulin - Primary    Relevant Orders    CBC Auto Differential    Comprehensive Metabolic Panel    Lipid Panel    Microalbumin/Creatinine Ratio, Urine    PSA, Screening    Urinalysis, Reflex to Urine Culture Urine, Clean Catch    TSH    Hemoglobin A1C       Other    TERESO (obstructive sleep apnea)    Relevant Orders    CBC Auto Differential    Comprehensive Metabolic Panel    Lipid Panel    Microalbumin/Creatinine Ratio, Urine    PSA, Screening    Urinalysis, Reflex to  "Urine Culture Urine, Clean Catch    TSH    Hemoglobin A1C    Wellness examination    Relevant Orders    CBC Auto Differential    Comprehensive Metabolic Panel    Lipid Panel    Microalbumin/Creatinine Ratio, Urine    PSA, Screening    Urinalysis, Reflex to Urine Culture Urine, Clean Catch    TSH    Hemoglobin A1C       Medication List with Changes/Refills   Current Medications    ACCU-CHEK STEVE PLUS TEST STRP STRP        ACCU-CHEK FASTCLIX LANCET DRUM MISC        ALLOPURINOL (ZYLOPRIM) 100 MG TABLET    TAKE 1 TABLET EVERY DAY    AMLODIPINE (NORVASC) 10 MG TABLET    Take 10 mg by mouth once daily.    ASPIRIN (ASPIR-81 ORAL)    Take 81 mg by mouth once daily.    ATORVASTATIN (LIPITOR) 40 MG TABLET    TAKE 1 TABLET EVERY DAY    DROPLET INSULIN SYRINGE 1 ML 30 GAUGE X 1/2" SYRG        DULOXETINE (CYMBALTA) 30 MG CAPSULE    TAKE 1 CAPSULE EVERY DAY    FENOFIBRATE MICRONIZED (LOFIBRA) 134 MG CAP    Take 134 mg by mouth daily with breakfast.    INSULIN NPH-INSULIN REGULAR, 70/30, (NOVOLIN 70/30 U-100 INSULIN) 100 UNIT/ML (70-30) INJECTION    INJECT 40 UNITS SUBCUTANEOUSLY EVERY MORNING AND 30 UNITS AT BEDTIME AS DIRECTED    LISINOPRIL-HYDROCHLOROTHIAZIDE (PRINZIDE,ZESTORETIC) 20-12.5 MG PER TABLET        METFORMIN (GLUCOPHAGE) 1000 MG TABLET    Take 1,000 mg by mouth 2 (two) times daily with meals.   Discontinued Medications    CARVEDILOL (COREG) 3.125 MG TABLET    Take 1 tablet (3.125 mg total) by mouth 2 (two) times daily.    CIPROFLOXACIN-DEXAMETHASONE 0.3-0.1% (CIPRODEX) 0.3-0.1 % DRPS    Place 4 drops into both ears 2 (two) times daily.    FUROSEMIDE (LASIX) 20 MG TABLET    Take 1 tablet (20 mg total) by mouth 2 (two) times daily.    HYDROCHLOROTHIAZIDE (HYDRODIURIL) 12.5 MG TAB    Take 1 tablet (12.5 mg total) by mouth once daily.    HYDROCODONE-ACETAMINOPHEN (NORCO) 5-325 MG PER TABLET    Take 1 tablet by mouth every 6 (six) hours as needed.    INV LISINOPRIL-HCTZ 20-12.5    Take 1 tablet by mouth once daily. FOR " INVESTIGATIONAL USE ONLY    LISINOPRIL 10 MG TABLET    Take 3 tablets (30 mg total) by mouth once daily.        Plan:       1. Insulin-dependent diabetes: A1C now 7.1. On 70/30 insulin 30 units in AM and 40 units in PM. Dietary changes.  Continue Trulicity    2. Hypogonadism: He was on testosterone injections in the past, but no longer    3. Coronary artery disease: He had a stent placed many years ago. He had coronary bypass surgery in 2020. He is followed by Dr. Campbell. Because heart rate is 55-60, we have held off on beta blocker    4. Diabetic peripheral neuropathy: He stopped gabapentin because of fatigue.    5. Major depression in remission: Stable on Cymbalta    6. Hyperlipidemia: LDL at goal    7. History of elevated prostate-specific antigen (PSA): Was seen by urology and PSA normal in 2019. PSA normal again    8. Urinary frequency: Started Flomax in 2018, but not taking    9. Sleep apnea: He was diagnosed with moderate sleep apnea in 2019. He is no longer using device    10. Hypertension: Continue lisinopril/hydrochlorothiazide and amlodipine. Spironolactone was discontinued in 2019 because of hyperkalemia    11. Peripheral vascular disease: He was told that he has blockage in his carotid vessels. Followed by Dr. Campbell    12. Chronic kidney disease stage III, secondary to diabetes: Significant improvement in creatinine, 1.20    13.  Hyperkalemia:  He will adjust his diet    14. Wellness: C-scope normal 6/2018, no polyps. Pneumovax 1/2021      Medicare Annual Wellness and Personalized Prevention Plan:   Fall Risk + Home Safety + Hearing Impairment + Depression Screen + Cognitive Impairment Screen + Health Risk Assessment all reviewed    Health Maintenance Topics with due status: Not Due       Topic Last Completion Date    Colorectal Cancer Screening 06/27/2018    Eye Exam 12/22/2021    Lipid Panel 09/20/2022    Hemoglobin A1c 09/20/2022      The patient's Health Maintenance was reviewed and the following  appears to be due at this time:   Health Maintenance Due   Topic Date Due    Hepatitis C Screening  Never done    Foot Exam  Never done    TETANUS VACCINE  Never done    Shingles Vaccine (1 of 2) Never done    COVID-19 Vaccine (3 - Booster for Pfizer series) 08/10/2021    Influenza Vaccine (1) 09/01/2022       Advance Care Planning   I attest to discussing Advance Care Planning with patient and/or family member.  Education was provided including the importance of the Health Care Power of , Advance Directives, and/or [place] documentation.  The patient expressed understanding to the importance of this information and discussion.  Length of ACP conversation in minutes: 2       Opioid Screening: Patient medication list reviewed, patient is not taking prescription opioids. Patient is not using additional opioids than prescribed. Patient is at low risk of substance abuse based on this opioid use history.     No follow-ups on file. In addition to their scheduled follow up, the patient has also been instructed to follow up on as needed basis.

## 2022-10-18 ENCOUNTER — TELEPHONE (OUTPATIENT)
Dept: INTERNAL MEDICINE | Facility: CLINIC | Age: 72
End: 2022-10-18
Payer: MEDICARE

## 2022-10-18 NOTE — TELEPHONE ENCOUNTER
----- Message from Rossy Greco sent at 10/18/2022  3:46 PM CDT -----  [phone] asked if you can call him, he wants to discuss his eye appointment with you

## 2022-12-08 ENCOUNTER — TELEPHONE (OUTPATIENT)
Dept: INTERNAL MEDICINE | Facility: CLINIC | Age: 72
End: 2022-12-08
Payer: MEDICARE

## 2022-12-08 DIAGNOSIS — E11.65 TYPE 2 DIABETES MELLITUS WITH HYPERGLYCEMIA, WITH LONG-TERM CURRENT USE OF INSULIN: Primary | ICD-10-CM

## 2022-12-08 DIAGNOSIS — Z79.4 TYPE 2 DIABETES MELLITUS WITH HYPERGLYCEMIA, WITH LONG-TERM CURRENT USE OF INSULIN: Primary | ICD-10-CM

## 2022-12-08 RX ORDER — DULAGLUTIDE 0.75 MG/.5ML
0.75 INJECTION, SOLUTION SUBCUTANEOUS
Qty: 12 PEN | Refills: 3 | Status: SHIPPED | OUTPATIENT
Start: 2022-12-08 | End: 2023-03-08

## 2022-12-26 PROBLEM — Z00.00 WELLNESS EXAMINATION: Status: RESOLVED | Noted: 2022-09-21 | Resolved: 2022-12-26

## 2023-01-12 LAB
LEFT EYE DM RETINOPATHY: NEGATIVE
RIGHT EYE DM RETINOPATHY: NEGATIVE

## 2023-02-09 ENCOUNTER — TELEPHONE (OUTPATIENT)
Dept: INTERNAL MEDICINE | Facility: CLINIC | Age: 73
End: 2023-02-09
Payer: MEDICARE

## 2023-02-09 NOTE — LETTER
AUTHORIZATION FOR RELEASE OF   CONFIDENTIAL INFORMATION    Dear Dr. Sneed,    We are seeing Sincere Malave, date of birth 1950, in the clinic at [address]. Himanshu Huggins II, MD is the patient's PCP. Sincere Malave has an outstanding lab/procedure at the time we reviewed his chart. In order to help keep his health information updated, he has authorized us to request the following medical record(s):        (  )  MAMMOGRAM                                      (  )  COLONOSCOPY      (  )  PAP SMEAR                                          (  )  OUTSIDE LAB RESULTS     (  )  DEXA SCAN                                          ( X )  EYE EXAM            (  )  FOOT EXAM                                          (  )  ENTIRE RECORD     (  )  OUTSIDE IMMUNIZATIONS                 (  )  _______________         Please fax records to [facility], Thomas L Voitier II, MD,[phone]     If you have any questions, please contact [phone]        Patient Name: Sincere Malave  : 1950  Patient Phone #: [phone]

## 2023-03-23 ENCOUNTER — LAB VISIT (OUTPATIENT)
Dept: LAB | Facility: HOSPITAL | Age: 73
End: 2023-03-23
Attending: INTERNAL MEDICINE
Payer: MEDICARE

## 2023-03-23 DIAGNOSIS — E11.42 DIABETIC POLYNEUROPATHY ASSOCIATED WITH TYPE 2 DIABETES MELLITUS: ICD-10-CM

## 2023-03-23 DIAGNOSIS — E11.65 TYPE 2 DIABETES MELLITUS WITH HYPERGLYCEMIA, WITH LONG-TERM CURRENT USE OF INSULIN: ICD-10-CM

## 2023-03-23 DIAGNOSIS — I73.9 PERIPHERAL VASCULAR DISEASE: ICD-10-CM

## 2023-03-23 DIAGNOSIS — Z00.00 WELLNESS EXAMINATION: ICD-10-CM

## 2023-03-23 DIAGNOSIS — E78.5 HYPERLIPIDEMIA, UNSPECIFIED HYPERLIPIDEMIA TYPE: ICD-10-CM

## 2023-03-23 DIAGNOSIS — I10 ESSENTIAL HYPERTENSION: ICD-10-CM

## 2023-03-23 DIAGNOSIS — Z79.4 TYPE 2 DIABETES MELLITUS WITH HYPERGLYCEMIA, WITH LONG-TERM CURRENT USE OF INSULIN: ICD-10-CM

## 2023-03-23 DIAGNOSIS — Z12.5 PROSTATE CANCER SCREENING: ICD-10-CM

## 2023-03-23 DIAGNOSIS — G47.33 OSA (OBSTRUCTIVE SLEEP APNEA): ICD-10-CM

## 2023-03-23 LAB
ALBUMIN SERPL-MCNC: 4.1 G/DL (ref 3.4–4.8)
ALBUMIN/GLOB SERPL: 1.2 RATIO (ref 1.1–2)
ALP SERPL-CCNC: 63 UNIT/L (ref 40–150)
ALT SERPL-CCNC: 32 UNIT/L (ref 0–55)
APPEARANCE UR: CLEAR
AST SERPL-CCNC: 19 UNIT/L (ref 5–34)
BACTERIA #/AREA URNS AUTO: NORMAL /HPF
BASOPHILS # BLD AUTO: 0.04 X10(3)/MCL (ref 0–0.2)
BASOPHILS NFR BLD AUTO: 0.4 %
BILIRUB UR QL STRIP.AUTO: NEGATIVE MG/DL
BILIRUBIN DIRECT+TOT PNL SERPL-MCNC: 1.1 MG/DL
BUN SERPL-MCNC: 19.6 MG/DL (ref 8.4–25.7)
CALCIUM SERPL-MCNC: 10 MG/DL (ref 8.8–10)
CHLORIDE SERPL-SCNC: 106 MMOL/L (ref 98–107)
CHOLEST SERPL-MCNC: 115 MG/DL
CHOLEST/HDLC SERPL: 3 {RATIO} (ref 0–5)
CO2 SERPL-SCNC: 26 MMOL/L (ref 23–31)
COLOR UR AUTO: YELLOW
CREAT SERPL-MCNC: 1.22 MG/DL (ref 0.73–1.18)
CREAT UR-MCNC: 116.7 MG/DL (ref 63–166)
EOSINOPHIL # BLD AUTO: 0.4 X10(3)/MCL (ref 0–0.9)
EOSINOPHIL NFR BLD AUTO: 4.3 %
ERYTHROCYTE [DISTWIDTH] IN BLOOD BY AUTOMATED COUNT: 13.9 % (ref 11.5–17)
EST. AVERAGE GLUCOSE BLD GHB EST-MCNC: 142.7 MG/DL
GFR SERPLBLD CREATININE-BSD FMLA CKD-EPI: >60 MLS/MIN/1.73/M2
GLOBULIN SER-MCNC: 3.4 GM/DL (ref 2.4–3.5)
GLUCOSE SERPL-MCNC: 107 MG/DL (ref 82–115)
GLUCOSE UR QL STRIP.AUTO: NEGATIVE MG/DL
HBA1C MFR BLD: 6.6 %
HCT VFR BLD AUTO: 41.6 % (ref 42–52)
HDLC SERPL-MCNC: 38 MG/DL (ref 35–60)
HGB BLD-MCNC: 13.7 G/DL (ref 14–18)
IMM GRANULOCYTES # BLD AUTO: 0.05 X10(3)/MCL (ref 0–0.04)
IMM GRANULOCYTES NFR BLD AUTO: 0.5 %
KETONES UR QL STRIP.AUTO: NEGATIVE MG/DL
LDLC SERPL CALC-MCNC: 46 MG/DL (ref 50–140)
LEUKOCYTE ESTERASE UR QL STRIP.AUTO: NEGATIVE UNIT/L
LYMPHOCYTES # BLD AUTO: 2.84 X10(3)/MCL (ref 0.6–4.6)
LYMPHOCYTES NFR BLD AUTO: 30.8 %
MCH RBC QN AUTO: 28.5 PG
MCHC RBC AUTO-ENTMCNC: 32.9 G/DL (ref 33–36)
MCV RBC AUTO: 86.7 FL (ref 80–94)
MICROALBUMIN UR-MCNC: 45.3 UG/ML
MICROALBUMIN/CREAT RATIO PNL UR: 38.8 MG/GM CR (ref 0–30)
MONOCYTES # BLD AUTO: 0.68 X10(3)/MCL (ref 0.1–1.3)
MONOCYTES NFR BLD AUTO: 7.4 %
NEUTROPHILS # BLD AUTO: 5.22 X10(3)/MCL (ref 2.1–9.2)
NEUTROPHILS NFR BLD AUTO: 56.6 %
NITRITE UR QL STRIP.AUTO: NEGATIVE
PH UR STRIP.AUTO: 5 [PH]
PLATELET # BLD AUTO: 268 X10(3)/MCL (ref 130–400)
PMV BLD AUTO: 9.1 FL (ref 7.4–10.4)
POTASSIUM SERPL-SCNC: 4.7 MMOL/L (ref 3.5–5.1)
PROT SERPL-MCNC: 7.5 GM/DL (ref 5.8–7.6)
PROT UR QL STRIP.AUTO: NEGATIVE MG/DL
PSA SERPL-MCNC: 1.57 NG/ML
RBC # BLD AUTO: 4.8 X10(6)/MCL (ref 4.7–6.1)
RBC #/AREA URNS AUTO: NORMAL /HPF
RBC UR QL AUTO: NEGATIVE UNIT/L
SODIUM SERPL-SCNC: 143 MMOL/L (ref 136–145)
SP GR UR STRIP.AUTO: >=1.03
SQUAMOUS #/AREA URNS AUTO: NORMAL /HPF
TRIGL SERPL-MCNC: 153 MG/DL (ref 34–140)
TSH SERPL-ACNC: 0.94 UIU/ML (ref 0.35–4.94)
UROBILINOGEN UR STRIP-ACNC: 0.2 MG/DL
VLDLC SERPL CALC-MCNC: 31 MG/DL
WBC # SPEC AUTO: 9.2 X10(3)/MCL (ref 4.5–11.5)
WBC #/AREA URNS AUTO: NORMAL /HPF

## 2023-03-23 PROCEDURE — 36415 COLL VENOUS BLD VENIPUNCTURE: CPT

## 2023-03-23 PROCEDURE — 83036 HEMOGLOBIN GLYCOSYLATED A1C: CPT

## 2023-03-23 PROCEDURE — 82043 UR ALBUMIN QUANTITATIVE: CPT

## 2023-03-23 PROCEDURE — 80061 LIPID PANEL: CPT

## 2023-03-23 PROCEDURE — 81001 URINALYSIS AUTO W/SCOPE: CPT

## 2023-03-23 PROCEDURE — 80053 COMPREHEN METABOLIC PANEL: CPT

## 2023-03-23 PROCEDURE — 84153 ASSAY OF PSA TOTAL: CPT

## 2023-03-23 PROCEDURE — 85025 COMPLETE CBC W/AUTO DIFF WBC: CPT

## 2023-03-23 PROCEDURE — 84443 ASSAY THYROID STIM HORMONE: CPT

## 2023-03-28 ENCOUNTER — OFFICE VISIT (OUTPATIENT)
Dept: INTERNAL MEDICINE | Facility: CLINIC | Age: 73
End: 2023-03-28
Payer: MEDICARE

## 2023-03-28 VITALS
HEIGHT: 67 IN | DIASTOLIC BLOOD PRESSURE: 62 MMHG | HEART RATE: 60 BPM | RESPIRATION RATE: 16 BRPM | TEMPERATURE: 98 F | SYSTOLIC BLOOD PRESSURE: 128 MMHG | WEIGHT: 195 LBS | OXYGEN SATURATION: 98 % | BODY MASS INDEX: 30.61 KG/M2

## 2023-03-28 DIAGNOSIS — G47.30 SLEEP APNEA, UNSPECIFIED TYPE: ICD-10-CM

## 2023-03-28 DIAGNOSIS — E78.5 DYSLIPIDEMIA: ICD-10-CM

## 2023-03-28 DIAGNOSIS — I10 ESSENTIAL HYPERTENSION: ICD-10-CM

## 2023-03-28 DIAGNOSIS — E11.42 DIABETIC POLYNEUROPATHY ASSOCIATED WITH TYPE 2 DIABETES MELLITUS: Primary | ICD-10-CM

## 2023-03-28 DIAGNOSIS — Z00.00 WELLNESS EXAMINATION: ICD-10-CM

## 2023-03-28 DIAGNOSIS — I25.110 CORONARY ARTERY DISEASE INVOLVING NATIVE CORONARY ARTERY OF NATIVE HEART WITH UNSTABLE ANGINA PECTORIS: ICD-10-CM

## 2023-03-28 DIAGNOSIS — Z79.4 TYPE 2 DIABETES MELLITUS WITH HYPERGLYCEMIA, WITH LONG-TERM CURRENT USE OF INSULIN: ICD-10-CM

## 2023-03-28 DIAGNOSIS — E11.65 TYPE 2 DIABETES MELLITUS WITH HYPERGLYCEMIA, WITH LONG-TERM CURRENT USE OF INSULIN: ICD-10-CM

## 2023-03-28 DIAGNOSIS — I73.9 PERIPHERAL VASCULAR DISEASE: ICD-10-CM

## 2023-03-28 PROCEDURE — 3066F PR DOCUMENTATION OF TREATMENT FOR NEPHROPATHY: ICD-10-PCS | Mod: CPTII,,, | Performed by: INTERNAL MEDICINE

## 2023-03-28 PROCEDURE — 3288F PR FALLS RISK ASSESSMENT DOCUMENTED: ICD-10-PCS | Mod: CPTII,,, | Performed by: INTERNAL MEDICINE

## 2023-03-28 PROCEDURE — 1159F MED LIST DOCD IN RCRD: CPT | Mod: CPTII,,, | Performed by: INTERNAL MEDICINE

## 2023-03-28 PROCEDURE — 3288F FALL RISK ASSESSMENT DOCD: CPT | Mod: CPTII,,, | Performed by: INTERNAL MEDICINE

## 2023-03-28 PROCEDURE — 4010F ACE/ARB THERAPY RXD/TAKEN: CPT | Mod: CPTII,,, | Performed by: INTERNAL MEDICINE

## 2023-03-28 PROCEDURE — 3060F PR POS MICROALBUMINURIA RESULT DOCUMENTED/REVIEW: ICD-10-PCS | Mod: CPTII,,, | Performed by: INTERNAL MEDICINE

## 2023-03-28 PROCEDURE — 3074F SYST BP LT 130 MM HG: CPT | Mod: CPTII,,, | Performed by: INTERNAL MEDICINE

## 2023-03-28 PROCEDURE — 99214 OFFICE O/P EST MOD 30 MIN: CPT | Mod: ,,, | Performed by: INTERNAL MEDICINE

## 2023-03-28 PROCEDURE — 3078F DIAST BP <80 MM HG: CPT | Mod: CPTII,,, | Performed by: INTERNAL MEDICINE

## 2023-03-28 PROCEDURE — 4010F PR ACE/ARB THEARPY RXD/TAKEN: ICD-10-PCS | Mod: CPTII,,, | Performed by: INTERNAL MEDICINE

## 2023-03-28 PROCEDURE — 1160F PR REVIEW ALL MEDS BY PRESCRIBER/CLIN PHARMACIST DOCUMENTED: ICD-10-PCS | Mod: CPTII,,, | Performed by: INTERNAL MEDICINE

## 2023-03-28 PROCEDURE — 1159F PR MEDICATION LIST DOCUMENTED IN MEDICAL RECORD: ICD-10-PCS | Mod: CPTII,,, | Performed by: INTERNAL MEDICINE

## 2023-03-28 PROCEDURE — 3008F PR BODY MASS INDEX (BMI) DOCUMENTED: ICD-10-PCS | Mod: CPTII,,, | Performed by: INTERNAL MEDICINE

## 2023-03-28 PROCEDURE — 3074F PR MOST RECENT SYSTOLIC BLOOD PRESSURE < 130 MM HG: ICD-10-PCS | Mod: CPTII,,, | Performed by: INTERNAL MEDICINE

## 2023-03-28 PROCEDURE — 99214 PR OFFICE/OUTPT VISIT, EST, LEVL IV, 30-39 MIN: ICD-10-PCS | Mod: ,,, | Performed by: INTERNAL MEDICINE

## 2023-03-28 PROCEDURE — 1101F PT FALLS ASSESS-DOCD LE1/YR: CPT | Mod: CPTII,,, | Performed by: INTERNAL MEDICINE

## 2023-03-28 PROCEDURE — 1160F RVW MEDS BY RX/DR IN RCRD: CPT | Mod: CPTII,,, | Performed by: INTERNAL MEDICINE

## 2023-03-28 PROCEDURE — 3008F BODY MASS INDEX DOCD: CPT | Mod: CPTII,,, | Performed by: INTERNAL MEDICINE

## 2023-03-28 PROCEDURE — 3078F PR MOST RECENT DIASTOLIC BLOOD PRESSURE < 80 MM HG: ICD-10-PCS | Mod: CPTII,,, | Performed by: INTERNAL MEDICINE

## 2023-03-28 PROCEDURE — 3060F POS MICROALBUMINURIA REV: CPT | Mod: CPTII,,, | Performed by: INTERNAL MEDICINE

## 2023-03-28 PROCEDURE — 3066F NEPHROPATHY DOC TX: CPT | Mod: CPTII,,, | Performed by: INTERNAL MEDICINE

## 2023-03-28 PROCEDURE — 1101F PR PT FALLS ASSESS DOC 0-1 FALLS W/OUT INJ PAST YR: ICD-10-PCS | Mod: CPTII,,, | Performed by: INTERNAL MEDICINE

## 2023-03-28 RX ORDER — LISINOPRIL 40 MG/1
40 TABLET ORAL DAILY
COMMUNITY
Start: 2023-03-09 | End: 2023-04-17 | Stop reason: SDUPTHER

## 2023-03-28 RX ORDER — HYDRALAZINE HYDROCHLORIDE 25 MG/1
25 TABLET, FILM COATED ORAL 2 TIMES DAILY
COMMUNITY
Start: 2023-03-24 | End: 2023-04-17 | Stop reason: SDUPTHER

## 2023-03-28 RX ORDER — FLUOCINOLONE ACETONIDE 0.11 MG/ML
5 OIL AURICULAR (OTIC) 2 TIMES DAILY
Qty: 20 ML | Refills: 0 | Status: SHIPPED | OUTPATIENT
Start: 2023-03-28 | End: 2023-04-04

## 2023-03-28 NOTE — PROGRESS NOTES
"Subjective:       Patient ID: Sincere Malave is a 73 y.o. male.      Patient Care Team:  Himanshu Huggins II, MD as PCP - General (Internal Medicine)    Chief Complaint: Diabetes, Hypertension, Coronary Artery Disease, Peripheral Vascular Disease, Hyperlipidemia, Anemia, and Sleep Apnea      73-year-old male seen today for followup of insulin-dependent diabetes, coronary artery disease, diabetic neuropathy, and hyperlipidemia among other conditions.      Review of Systems   Constitutional:  Negative for fever.   HENT:  Negative for nosebleeds.    Eyes:  Negative for visual disturbance.   Respiratory:  Negative for shortness of breath.    Cardiovascular:  Negative for chest pain.   Gastrointestinal:  Negative for abdominal pain.   Genitourinary:  Negative for dysuria.   Musculoskeletal:  Negative for gait problem.   Neurological:  Negative for headaches.         Patient Reported Health Risk Assessment         Objective:      Physical Exam  HENT:      Head: Normocephalic.      Mouth/Throat:      Pharynx: Oropharynx is clear.   Eyes:      Extraocular Movements: Extraocular movements intact.   Cardiovascular:      Rate and Rhythm: Normal rate and regular rhythm.   Pulmonary:      Breath sounds: Normal breath sounds.   Abdominal:      Palpations: Abdomen is soft.   Musculoskeletal:         General: No swelling.   Skin:     General: Skin is warm.   Neurological:      General: No focal deficit present.      Mental Status: He is alert and oriented to person, place, and time.   Psychiatric:         Mood and Affect: Mood normal.       Vitals:    03/28/23 0839   BP: 128/62   Pulse: 60   Resp: 16   Temp: 98.2 °F (36.8 °C)   SpO2: 98%   Weight: 88.5 kg (195 lb)   Height: 5' 7" (1.702 m)            No flowsheet data found.  Fall Risk Assessment - Outpatient 3/28/2023 9/21/2022   Mobility Status Ambulatory Ambulatory   Number of falls 0 0   Identified as fall risk 0 0                  Assessment:       Problem List Items " Addressed This Visit          Neuro    Diabetic neuropathy associated with type 2 diabetes mellitus - Primary    Relevant Orders    CBC Auto Differential    Comprehensive Metabolic Panel    Lipid Panel    Microalbumin/Creatinine Ratio, Urine    Urinalysis, Reflex to Urine Culture    TSH    Hemoglobin A1C       Cardiac/Vascular    Coronary artery disease involving native coronary artery of native heart    Relevant Orders    CBC Auto Differential    Comprehensive Metabolic Panel    Lipid Panel    Microalbumin/Creatinine Ratio, Urine    Urinalysis, Reflex to Urine Culture    TSH    Hemoglobin A1C    Essential hypertension    Relevant Orders    CBC Auto Differential    Comprehensive Metabolic Panel    Lipid Panel    Microalbumin/Creatinine Ratio, Urine    Urinalysis, Reflex to Urine Culture    TSH    Hemoglobin A1C    Peripheral vascular disease    Relevant Orders    CBC Auto Differential    Comprehensive Metabolic Panel    Lipid Panel    Microalbumin/Creatinine Ratio, Urine    Urinalysis, Reflex to Urine Culture    TSH    Hemoglobin A1C    Dyslipidemia    Relevant Orders    CBC Auto Differential    Comprehensive Metabolic Panel    Lipid Panel    Microalbumin/Creatinine Ratio, Urine    Urinalysis, Reflex to Urine Culture    TSH    Hemoglobin A1C       Endocrine    Type 2 diabetes mellitus with hyperglycemia, with long-term current use of insulin    Relevant Orders    CBC Auto Differential    Comprehensive Metabolic Panel    Lipid Panel    Microalbumin/Creatinine Ratio, Urine    Urinalysis, Reflex to Urine Culture    TSH    Hemoglobin A1C       Other    Sleep apnea    Relevant Orders    CBC Auto Differential    Comprehensive Metabolic Panel    Lipid Panel    Microalbumin/Creatinine Ratio, Urine    Urinalysis, Reflex to Urine Culture    TSH    Hemoglobin A1C    RESOLVED: Wellness examination    Relevant Orders    CBC Auto Differential    Comprehensive Metabolic Panel    Lipid Panel    Microalbumin/Creatinine Ratio, Urine  "   Urinalysis, Reflex to Urine Culture    TSH    Hemoglobin A1C       Medication List with Changes/Refills   New Medications    FLUOCINOLONE ACETONIDE OIL (DERMOTIC OIL) 0.01 % DROP    Place 5 drops in ear(s) 2 (two) times a day. for 7 days   Current Medications    ACCU-CHEK STEVE PLUS TEST STRP STRP        ACCU-CHEK FASTCLIX LANCET DRUM MISC        ALLOPURINOL (ZYLOPRIM) 100 MG TABLET    TAKE 1 TABLET EVERY DAY    AMLODIPINE (NORVASC) 10 MG TABLET    TAKE 1 TABLET EVERY DAY    ASPIRIN (ASPIR-81 ORAL)    Take 81 mg by mouth once daily.    ATORVASTATIN (LIPITOR) 40 MG TABLET    TAKE 1 TABLET EVERY DAY    DULOXETINE (CYMBALTA) 30 MG CAPSULE    TAKE 1 CAPSULE EVERY DAY    HYDRALAZINE (APRESOLINE) 25 MG TABLET    Take 25 mg by mouth 2 (two) times daily.    INSULIN NPH-INSULIN REGULAR, 70/30, (NOVOLIN 70/30 U-100 INSULIN) 100 UNIT/ML (70-30) INJECTION    INJECT 40 UNITS SUBCUTANEOUSLY EVERY MORNING AND 30 UNITS AT BEDTIME AS DIRECTED    INSULIN SYRINGE-NEEDLE U-100 (DROPLET INSULIN SYRINGE) 1 ML 30 GAUGE X 1/2" SYRG    USE  TO INJECT  INSULIN TWICE DAILY    LISINOPRIL (PRINIVIL,ZESTRIL) 40 MG TABLET    Take 40 mg by mouth Daily.    METFORMIN (GLUCOPHAGE) 1000 MG TABLET    TAKE 1 TABLET TWICE DAILY   Discontinued Medications    FENOFIBRATE MICRONIZED (LOFIBRA) 134 MG CAP    Take 134 mg by mouth daily with breakfast.    LISINOPRIL-HYDROCHLOROTHIAZIDE (PRINZIDE,ZESTORETIC) 20-12.5 MG PER TABLET            Plan:       1. Insulin-dependent diabetes: A1C now 6.6. On 70/30 insulin 30 units in AM and 40 units in PM. Dietary changes.  Continue Trulicity    2. Hypogonadism: He was on testosterone injections in the past, but no longer    3. Coronary artery disease: He had a stent placed many years ago. He had coronary bypass surgery in 2020. He is followed by Dr. Campbell. Because heart rate is 55-60, we have avoided beta blocker    4. Diabetic peripheral neuropathy: He stopped gabapentin because of fatigue.    5. Major depression in " remission: Stable on Cymbalta    6. Hyperlipidemia: LDL at goal    7. History of elevated prostate-specific antigen (PSA): Was seen by urology and PSA normal in 2019. PSA normal again    8. Urinary frequency: Started Flomax in 2018, but not taking    9. Sleep apnea: He was diagnosed with moderate sleep apnea in 2019. He is no longer using device    10. Hypertension: Continue lisinopril, amlodipine, and hydralazine.  Cardiology stopped hydrochlorothiazide recently. Spironolactone was discontinued in 2019 because of hyperkalemia    11. Peripheral vascular disease: He was told that he has blockage in his carotid vessels. Followed by Dr. Campbell    12. Chronic kidney disease stage III, secondary to diabetes: Significant improvement in creatinine, 1.22    13.  Hyperkalemia:  He will adjust his diet    14. Wellness: C-scope normal 6/2018, no polyps. Pneumovax 1/2021    Start derm otic for itchy ears      Medicare Annual Wellness and Personalized Prevention Plan:   Fall Risk + Home Safety + Hearing Impairment + Depression Screen + Cognitive Impairment Screen + Health Risk Assessment all reviewed    Health Maintenance Topics with due status: Not Due       Topic Last Completion Date    Colorectal Cancer Screening 06/27/2018    Lipid Panel 03/23/2023    Hemoglobin A1c 03/23/2023      The patient's Health Maintenance was reviewed and the following appears to be due at this time:   Health Maintenance Due   Topic Date Due    Hepatitis C Screening  Never done    Foot Exam  Never done    TETANUS VACCINE  Never done    Shingles Vaccine (1 of 2) Never done    Abdominal Aortic Aneurysm Screening  Never done    COVID-19 Vaccine (3 - Booster for Pfizer series) 05/05/2021    Eye Exam  12/22/2022       Advance Care Planning   I attest to discussing Advance Care Planning with patient and/or family member.  Education was provided including the importance of the Health Care Power of , Advance Directives, and/or [place]  documentation.  The patient expressed understanding to the importance of this information and discussion.  Length of ACP conversation in minutes: 0       Opioid Screening: Patient medication list reviewed, patient is not taking prescription opioids. Patient is not using additional opioids than prescribed. Patient is at low risk of substance abuse based on this opioid use history.     No follow-ups on file. In addition to their scheduled follow up, the patient has also been instructed to follow up on as needed basis.

## 2023-04-17 ENCOUNTER — TELEPHONE (OUTPATIENT)
Dept: INTERNAL MEDICINE | Facility: CLINIC | Age: 73
End: 2023-04-17
Payer: MEDICARE

## 2023-04-17 DIAGNOSIS — I10 ESSENTIAL HYPERTENSION: Primary | ICD-10-CM

## 2023-04-17 RX ORDER — LISINOPRIL 40 MG/1
40 TABLET ORAL DAILY
Qty: 90 TABLET | Refills: 3 | Status: SHIPPED | OUTPATIENT
Start: 2023-04-17

## 2023-04-17 RX ORDER — HYDRALAZINE HYDROCHLORIDE 25 MG/1
25 TABLET, FILM COATED ORAL 2 TIMES DAILY
Qty: 180 TABLET | Refills: 3 | Status: SHIPPED | OUTPATIENT
Start: 2023-04-17

## 2023-04-17 NOTE — TELEPHONE ENCOUNTER
Sent at this time    ----- Message from Maddie Carranza sent at 4/17/2023 11:02 AM CDT -----  Regarding: refill  Type:  RX Refill Request    Who Called: pt's wife terri  Refill or New Rx:refill  RX Name and Strength:lisinopriL (PRINIVIL,ZESTRIL) 40 MG tablet  How is the patient currently taking it? (ex. 1XDay):1 day  Is this a 30 day or 90 day RX:90  Refill or New Rx:refill  RX Name and Strength:hydrALAZINE (APRESOLINE) 25 MG tablet  How is the patient currently taking it? (ex. 1XDay):2 x day  Is this a 30 day or 90 day RX:90  Preferred Pharm[facility] with phone number:[facility]  Local or Mail Order:mail order  Ordering Provider:danny vidal  Would the patient rather a call back or a response via [facility]? C/b  Best Call Back Number:[phone]  Additional Information: pt now uses [facility]

## 2023-05-15 ENCOUNTER — TELEPHONE (OUTPATIENT)
Dept: INTERNAL MEDICINE | Facility: CLINIC | Age: 73
End: 2023-05-15
Payer: MEDICARE

## 2023-05-15 ENCOUNTER — DOCUMENTATION ONLY (OUTPATIENT)
Dept: INTERNAL MEDICINE | Facility: CLINIC | Age: 73
End: 2023-05-15
Payer: MEDICARE

## 2023-06-21 DIAGNOSIS — I10 ESSENTIAL HYPERTENSION: Primary | ICD-10-CM

## 2023-06-22 RX ORDER — DULOXETIN HYDROCHLORIDE 30 MG/1
CAPSULE, DELAYED RELEASE ORAL
Qty: 90 CAPSULE | Refills: 3 | Status: SHIPPED | OUTPATIENT
Start: 2023-06-22

## 2023-06-28 ENCOUNTER — TELEPHONE (OUTPATIENT)
Dept: INTERNAL MEDICINE | Facility: CLINIC | Age: 73
End: 2023-06-28
Payer: MEDICARE

## 2023-06-28 DIAGNOSIS — E11.65 TYPE 2 DIABETES MELLITUS WITH HYPERGLYCEMIA, WITH LONG-TERM CURRENT USE OF INSULIN: Primary | ICD-10-CM

## 2023-06-28 DIAGNOSIS — Z79.4 TYPE 2 DIABETES MELLITUS WITH HYPERGLYCEMIA, WITH LONG-TERM CURRENT USE OF INSULIN: Primary | ICD-10-CM

## 2023-06-28 RX ORDER — LANCETS
1 EACH MISCELLANEOUS 3 TIMES DAILY
Qty: 300 EACH | Refills: 3 | Status: SHIPPED | OUTPATIENT
Start: 2023-06-28

## 2023-06-29 DIAGNOSIS — E11.65 TYPE 2 DIABETES MELLITUS WITH HYPERGLYCEMIA, WITH LONG-TERM CURRENT USE OF INSULIN: Primary | ICD-10-CM

## 2023-06-29 DIAGNOSIS — Z79.4 TYPE 2 DIABETES MELLITUS WITH HYPERGLYCEMIA, WITH LONG-TERM CURRENT USE OF INSULIN: Primary | ICD-10-CM

## 2023-07-31 ENCOUNTER — TELEPHONE (OUTPATIENT)
Dept: INTERNAL MEDICINE | Facility: CLINIC | Age: 73
End: 2023-07-31
Payer: MEDICARE

## 2023-07-31 NOTE — TELEPHONE ENCOUNTER
----- Message from Maddie Carranza sent at 7/31/2023 11:15 AM CDT -----  Regarding: appt request  Type:  Needs Medical Advice    Who Called: pt's wife terri    Would the patient rather a call back or a response via [facility]? C/b  Best Call Back Number: [phone]  Additional Information: pt's  is pcp uncle and would like to get wife in to see pcp   Appt does not have to be immediate.  Dr Huggins,  please call your uncle back with good news

## 2023-09-25 ENCOUNTER — LAB VISIT (OUTPATIENT)
Dept: LAB | Facility: HOSPITAL | Age: 73
End: 2023-09-25
Attending: INTERNAL MEDICINE
Payer: MEDICARE

## 2023-09-25 DIAGNOSIS — Z00.00 WELLNESS EXAMINATION: ICD-10-CM

## 2023-09-25 DIAGNOSIS — Z79.4 TYPE 2 DIABETES MELLITUS WITH HYPERGLYCEMIA, WITH LONG-TERM CURRENT USE OF INSULIN: ICD-10-CM

## 2023-09-25 DIAGNOSIS — G47.30 SLEEP APNEA, UNSPECIFIED TYPE: ICD-10-CM

## 2023-09-25 DIAGNOSIS — E11.65 TYPE 2 DIABETES MELLITUS WITH HYPERGLYCEMIA, WITH LONG-TERM CURRENT USE OF INSULIN: ICD-10-CM

## 2023-09-25 DIAGNOSIS — I73.9 PERIPHERAL VASCULAR DISEASE: ICD-10-CM

## 2023-09-25 DIAGNOSIS — E11.42 DIABETIC POLYNEUROPATHY ASSOCIATED WITH TYPE 2 DIABETES MELLITUS: ICD-10-CM

## 2023-09-25 DIAGNOSIS — I25.110 CORONARY ARTERY DISEASE INVOLVING NATIVE CORONARY ARTERY OF NATIVE HEART WITH UNSTABLE ANGINA PECTORIS: ICD-10-CM

## 2023-09-25 DIAGNOSIS — I10 ESSENTIAL HYPERTENSION: ICD-10-CM

## 2023-09-25 DIAGNOSIS — E78.5 DYSLIPIDEMIA: ICD-10-CM

## 2023-09-25 LAB
ALBUMIN SERPL-MCNC: 4.2 G/DL (ref 3.4–4.8)
ALBUMIN/GLOB SERPL: 1.4 RATIO (ref 1.1–2)
ALP SERPL-CCNC: 72 UNIT/L (ref 40–150)
ALT SERPL-CCNC: 25 UNIT/L (ref 0–55)
APPEARANCE UR: CLEAR
AST SERPL-CCNC: 17 UNIT/L (ref 5–34)
BACTERIA #/AREA URNS AUTO: NORMAL /HPF
BASOPHILS # BLD AUTO: 0.04 X10(3)/MCL
BASOPHILS NFR BLD AUTO: 0.5 %
BILIRUB SERPL-MCNC: 0.9 MG/DL
BILIRUB UR QL STRIP.AUTO: NEGATIVE
BUN SERPL-MCNC: 34.1 MG/DL (ref 8.4–25.7)
CALCIUM SERPL-MCNC: 9.6 MG/DL (ref 8.8–10)
CHLORIDE SERPL-SCNC: 108 MMOL/L (ref 98–107)
CHOLEST SERPL-MCNC: 110 MG/DL
CHOLEST/HDLC SERPL: 4 {RATIO} (ref 0–5)
CO2 SERPL-SCNC: 25 MMOL/L (ref 23–31)
COLOR UR AUTO: YELLOW
CREAT SERPL-MCNC: 1.36 MG/DL (ref 0.73–1.18)
CREAT UR-MCNC: 99.6 MG/DL (ref 63–166)
EOSINOPHIL # BLD AUTO: 0.44 X10(3)/MCL (ref 0–0.9)
EOSINOPHIL NFR BLD AUTO: 5.2 %
ERYTHROCYTE [DISTWIDTH] IN BLOOD BY AUTOMATED COUNT: 13.5 % (ref 11.5–17)
EST. AVERAGE GLUCOSE BLD GHB EST-MCNC: 145.6 MG/DL
GFR SERPLBLD CREATININE-BSD FMLA CKD-EPI: 56 MLS/MIN/1.73/M2
GLOBULIN SER-MCNC: 3.1 GM/DL (ref 2.4–3.5)
GLUCOSE SERPL-MCNC: 129 MG/DL (ref 82–115)
GLUCOSE UR QL STRIP.AUTO: NEGATIVE
HBA1C MFR BLD: 6.7 %
HCT VFR BLD AUTO: 44.7 % (ref 42–52)
HDLC SERPL-MCNC: 31 MG/DL (ref 35–60)
HGB BLD-MCNC: 14 G/DL (ref 14–18)
IMM GRANULOCYTES # BLD AUTO: 0.06 X10(3)/MCL (ref 0–0.04)
IMM GRANULOCYTES NFR BLD AUTO: 0.7 %
KETONES UR QL STRIP.AUTO: NEGATIVE
LDLC SERPL CALC-MCNC: 55 MG/DL (ref 50–140)
LEUKOCYTE ESTERASE UR QL STRIP.AUTO: NEGATIVE
LYMPHOCYTES # BLD AUTO: 2.53 X10(3)/MCL (ref 0.6–4.6)
LYMPHOCYTES NFR BLD AUTO: 30 %
MCH RBC QN AUTO: 28.4 PG (ref 27–31)
MCHC RBC AUTO-ENTMCNC: 31.3 G/DL (ref 33–36)
MCV RBC AUTO: 90.7 FL (ref 80–94)
MICROALBUMIN UR-MCNC: 35.7 UG/ML
MICROALBUMIN/CREAT RATIO PNL UR: 35.8 MG/GM CR (ref 0–30)
MONOCYTES # BLD AUTO: 0.67 X10(3)/MCL (ref 0.1–1.3)
MONOCYTES NFR BLD AUTO: 7.9 %
NEUTROPHILS # BLD AUTO: 4.7 X10(3)/MCL (ref 2.1–9.2)
NEUTROPHILS NFR BLD AUTO: 55.7 %
NITRITE UR QL STRIP.AUTO: NEGATIVE
PH UR STRIP.AUTO: 5 [PH]
PLATELET # BLD AUTO: 302 X10(3)/MCL (ref 130–400)
PMV BLD AUTO: 9.5 FL (ref 7.4–10.4)
POTASSIUM SERPL-SCNC: 5.8 MMOL/L (ref 3.5–5.1)
PROT SERPL-MCNC: 7.3 GM/DL (ref 5.8–7.6)
PROT UR QL STRIP.AUTO: NEGATIVE
RBC # BLD AUTO: 4.93 X10(6)/MCL (ref 4.7–6.1)
RBC #/AREA URNS AUTO: NORMAL /HPF
RBC UR QL AUTO: NEGATIVE
SODIUM SERPL-SCNC: 139 MMOL/L (ref 136–145)
SP GR UR STRIP.AUTO: 1.02 (ref 1–1.03)
SQUAMOUS #/AREA URNS AUTO: NORMAL /HPF
TRIGL SERPL-MCNC: 119 MG/DL (ref 34–140)
TSH SERPL-ACNC: 1.18 UIU/ML (ref 0.35–4.94)
UROBILINOGEN UR STRIP-ACNC: 0.2
VLDLC SERPL CALC-MCNC: 24 MG/DL
WBC # SPEC AUTO: 8.44 X10(3)/MCL (ref 4.5–11.5)
WBC #/AREA URNS AUTO: NORMAL /HPF

## 2023-09-25 PROCEDURE — 84443 ASSAY THYROID STIM HORMONE: CPT

## 2023-09-25 PROCEDURE — 82043 UR ALBUMIN QUANTITATIVE: CPT

## 2023-09-25 PROCEDURE — 80061 LIPID PANEL: CPT

## 2023-09-25 PROCEDURE — 85025 COMPLETE CBC W/AUTO DIFF WBC: CPT

## 2023-09-25 PROCEDURE — 83036 HEMOGLOBIN GLYCOSYLATED A1C: CPT

## 2023-09-25 PROCEDURE — 80053 COMPREHEN METABOLIC PANEL: CPT

## 2023-09-25 PROCEDURE — 36415 COLL VENOUS BLD VENIPUNCTURE: CPT

## 2023-09-25 PROCEDURE — 81001 URINALYSIS AUTO W/SCOPE: CPT

## 2023-09-26 PROBLEM — N18.31 STAGE 3A CHRONIC KIDNEY DISEASE: Status: ACTIVE | Noted: 2023-09-26

## 2023-09-26 NOTE — PROGRESS NOTES
Subjective:       Patient ID: Sincere Malave is a 73 y.o. male.      Patient Care Team:  Himanshu Huggins II, MD as PCP - General (Internal Medicine)    Chief Complaint: Medicare AWV Follow Up, Diabetes, Peripheral Neuropathy, Coronary Artery Disease, Peripheral Vascular Disease, Chronic Kidney Disease, Hyperlipidemia, Hypertension, and Sleep Apnea      73-year-old male seen today for followup of insulin-dependent diabetes, coronary artery disease, diabetic neuropathy, and hyperlipidemia among other conditions.        Review of Systems   Constitutional:  Negative for fever.   HENT:  Negative for nosebleeds.    Eyes:  Negative for visual disturbance.   Respiratory:  Negative for shortness of breath.    Cardiovascular:  Negative for chest pain.   Gastrointestinal:  Negative for abdominal pain.   Genitourinary:  Negative for dysuria.   Musculoskeletal:  Negative for gait problem.   Neurological:  Negative for headaches.           Patient Reported Health Risk Assessment  [age] 70-79  Are you male or female?: Male  During the past four weeks, how much have you been bothered by emotional problems such as feeling anxious, depressed, irritable, sad, or downhearted and blue?: Not at all  During the past five weeks, has your physical and/or emotional health limited your social activities with family, friends, neighbors, or groups?: Not at all  During the past four weeks, how much bodily pain have you generally had?: No pain  During the past four weeks, was someone available to help if you needed and wanted help?: Yes, as much as I wanted  During the past four weeks, what was the hardest physical activity you could do for at least two minutes?: Moderate  Can you get to places out of walking distance without help?  (For example, can you travel alone on buses or taxis, or drive your own car?): Yes  Can you go shopping for groceries or clothes without someone's help?: Yes  Can you prepare your own meals?:  Yes  Can you do your own housework without help?: Yes  Because of any health problems, do you need the help of another person with your personal care needs such as eating, bathing, dressing, or getting around the house?: No  Can you handle your own money without help?: Yes  During the past four weeks, how would you rate your health in general?: Very good  How have things been going for you during the past four weeks?: Very well  Are you having difficulties driving your car?: No  Do you always fasten your seat belt when you are in a car?: Yes, usually  How often in the past four weeks have you been bothered by falling or dizzy when standing up?: Seldom  How often in the past four weeks have you been bothered by sexual problems?: Never  How often in the past four weeks have you been bothered by trouble eating well?: Never  How often in the past four weeks have you been bothered by teeth or denture problems?: Never  How often in the past four weeks have you been bothered with problems using the telephone?: Never  How often in the past four weeks have you been bothered by tiredness or fatigue?: Sometimes  Have you fallen two or more times in the past year?: No  Are you afraid of falling?: No  Are you a smoker?: No  During the past four weeks, how many drinks of wine, beer, or other alcoholic beverages did you have?: No alcohol at all  Do you exercise for about 20 minutes three or more days a week?: No, I usually do not exercise this much  Have you been given any information to help you with hazards in your house that might hurt you?: No  Have you been given any information to help you with keeping track of your medications?: No  How often do you have trouble taking medicines the way you've been told to take them?: I always take them as prescribed  How confident are you that you can control and manage most of your health problems?: Somewhat confident  What is your race? (Check all that apply.):       Objective:  "     Physical Exam  HENT:      Head: Normocephalic.      Mouth/Throat:      Pharynx: Oropharynx is clear.   Eyes:      Extraocular Movements: Extraocular movements intact.   Cardiovascular:      Rate and Rhythm: Normal rate and regular rhythm.   Pulmonary:      Breath sounds: Normal breath sounds.   Abdominal:      Palpations: Abdomen is soft.   Musculoskeletal:         General: No swelling.   Skin:     General: Skin is warm.   Neurological:      General: No focal deficit present.      Mental Status: He is alert and oriented to person, place, and time.   Psychiatric:         Mood and Affect: Mood normal.         Vitals:    09/27/23 0942   BP: 122/64   Pulse: 68   Resp: 14   Temp: 98.2 °F (36.8 °C)   SpO2: 97%   Weight: 86.2 kg (190 lb)   Height: 5' 7" (1.702 m)                   No data to display                  9/27/2023    10:00 AM 3/28/2023     9:00 AM 9/21/2022    10:15 AM   Fall Risk Assessment - Outpatient   Mobility Status Ambulatory Ambulatory Ambulatory   Number of falls 0 0 0   Identified as fall risk False False False           Depression Screening  Over the past two weeks, has the patient felt down, depressed, or hopeless?: No  Over the past two weeks, has the patient felt little interest or pleasure in doing things?: No  Functional Ability/Safety Screening  Was the patient's timed Up & Go test unsteady or longer than 30 seconds?: No  Does the patient need help with phone, transportation, shopping, preparing meals, housework, laundry, meds, or managing money?: No  Does the patient's home have rugs in the hallway, lack grab bars in the bathroom, lack handrails on the stairs or have poor lighting?: No  Have you noticed any hearing difficulties?: No  Cognitive Function (Assessed through direct observation with due consideration of information obtained by way of patient reports and/or concerns raised by family, friends, caretakers, or others)    Does the patient repeat questions/statements in the same " day?: No  Does the patient have trouble remembering the date, year, and time?: No  Does the patient have difficulty managing finances?: No  Does the patient have a decreased sense of direction?: No      Assessment:       Problem List Items Addressed This Visit          Neuro    Diabetic neuropathy associated with type 2 diabetes mellitus    Relevant Medications    dulaglutide (TRULICITY) 0.75 mg/0.5 mL pen injector    Other Relevant Orders    CBC Auto Differential    Comprehensive Metabolic Panel    Lipid Panel    Microalbumin/Creatinine Ratio, Urine    PSA, Screening    Urinalysis, Reflex to Urine Culture    TSH    Hemoglobin A1C    CBC Auto Differential    Comprehensive Metabolic Panel    Lipid Panel    Microalbumin/Creatinine Ratio, Urine    PSA, Screening    Urinalysis, Reflex to Urine Culture    TSH    Hemoglobin A1C    Diabetic polyneuropathy    Relevant Medications    dulaglutide (TRULICITY) 0.75 mg/0.5 mL pen injector    Other Relevant Orders    CBC Auto Differential    Comprehensive Metabolic Panel    Lipid Panel    Microalbumin/Creatinine Ratio, Urine    PSA, Screening    Urinalysis, Reflex to Urine Culture    TSH    Hemoglobin A1C       Cardiac/Vascular    Coronary artery disease involving native coronary artery of native heart    Relevant Orders    CBC Auto Differential    Comprehensive Metabolic Panel    Lipid Panel    Microalbumin/Creatinine Ratio, Urine    PSA, Screening    Urinalysis, Reflex to Urine Culture    TSH    Hemoglobin A1C    Essential hypertension    Relevant Orders    CBC Auto Differential    Comprehensive Metabolic Panel    Lipid Panel    Microalbumin/Creatinine Ratio, Urine    PSA, Screening    Urinalysis, Reflex to Urine Culture    TSH    Hemoglobin A1C    Peripheral vascular disease    Relevant Orders    CBC Auto Differential    Comprehensive Metabolic Panel    Lipid Panel    Microalbumin/Creatinine Ratio, Urine    PSA, Screening    Urinalysis, Reflex to Urine Culture    TSH     Hemoglobin A1C    Dyslipidemia    Relevant Orders    CBC Auto Differential    Comprehensive Metabolic Panel    Lipid Panel    Microalbumin/Creatinine Ratio, Urine    PSA, Screening    Urinalysis, Reflex to Urine Culture    TSH    Hemoglobin A1C       Renal/    Prostate cancer screening    Relevant Orders    CBC Auto Differential    Comprehensive Metabolic Panel    Lipid Panel    Microalbumin/Creatinine Ratio, Urine    PSA, Screening    Urinalysis, Reflex to Urine Culture    TSH    Hemoglobin A1C    Stage 3a chronic kidney disease    Relevant Orders    CBC Auto Differential    Comprehensive Metabolic Panel    Lipid Panel    Microalbumin/Creatinine Ratio, Urine    PSA, Screening    Urinalysis, Reflex to Urine Culture    TSH    Hemoglobin A1C       Endocrine    Type 2 diabetes mellitus with hyperglycemia, with long-term current use of insulin    Relevant Medications    dulaglutide (TRULICITY) 0.75 mg/0.5 mL pen injector    Other Relevant Orders    CBC Auto Differential    Comprehensive Metabolic Panel    Lipid Panel    Microalbumin/Creatinine Ratio, Urine    PSA, Screening    Urinalysis, Reflex to Urine Culture    TSH    Hemoglobin A1C       Other    RESOLVED: Wellness examination - Primary    Relevant Orders    CBC Auto Differential    Comprehensive Metabolic Panel    Lipid Panel    Microalbumin/Creatinine Ratio, Urine    PSA, Screening    Urinalysis, Reflex to Urine Culture    TSH    Hemoglobin A1C       Medication List with Changes/Refills   Current Medications    ACCU-CHEK FASTCLIX LANCET DRUM [facility] [place]        ALLOPURINOL (ZYLOPRIM) 100 MG TABLET    TAKE 1 TABLET EVERY DAY    AMLODIPINE (NORVASC) 10 MG TABLET    TAKE 1 TABLET EVERY DAY    ASPIRIN (ECOTRIN) 81 MG EC TABLET    Take 81 mg by mouth once daily.    ATORVASTATIN (LIPITOR) 40 MG TABLET    TAKE 1 TABLET EVERY DAY    BLOOD SUGAR DIAGNOSTIC (ACCU-CHEK STEVE PLUS TEST STRP) STRP    1 strip by Misc.(Non-Drug; Combo Route) route 3 (three) times daily.     "DULAGLUTIDE (TRULICITY) 0.75 MG/0.5 ML PEN INJECTOR    Inject into the skin every 7 days.    DULOXETINE (CYMBALTA) 30 MG CAPSULE    TAKE 1 CAPSULE EVERY DAY    HYDRALAZINE (APRESOLINE) 25 MG TABLET    Take 1 tablet (25 mg total) by mouth 2 (two) times daily.    INSULIN NPH-INSULIN REGULAR, 70/30, (NOVOLIN 70/30 U-100 INSULIN) 100 UNIT/ML (70-30) INJECTION    INJECT 40 UNITS SUBCUTANEOUSLY EVERY MORNING AND 30 UNITS AT BEDTIME AS DIRECTED    INSULIN SYRINGE-NEEDLE U-100 (DROPLET INSULIN SYRINGE) 1 ML 30 GAUGE X 1/2" SYRG    USE  TO INJECT  INSULIN TWICE DAILY    LANCETS (ACCU-CHEK SOFTCLIX LANCETS) MISC    1 each by Misc.(Non-Drug; Combo Route) route 3 (three) times daily.    LISINOPRIL (PRINIVIL,ZESTRIL) 40 MG TABLET    Take 1 tablet (40 mg total) by mouth Daily.    METFORMIN (GLUCOPHAGE) 1000 MG TABLET    TAKE 1 TABLET TWICE DAILY   Discontinued Medications    ASPIRIN (ASPIR-81 ORAL)    Take 81 mg by mouth once daily.        Plan:       1. Insulin-dependent diabetes: A1C now 6.7. On 70/30 insulin 30 units in AM and 40 units in PM. Dietary changes.  Continue Trulicity    2. Hypogonadism: He was on testosterone injections in the past, but no longer    3. Coronary artery disease: He had a stent placed many years ago. Coronary bypass surgery in 2020. He is followed by Dr. Campbell. Because heart rate is 55-60, we have avoided beta blocker    4. Diabetic peripheral neuropathy: He stopped gabapentin because of fatigue.    5. Major depression in remission: Stable on Cymbalta    6. Hyperlipidemia: LDL at goal    7. History of elevated prostate-specific antigen (PSA): Was seen by urology and PSA normal in 2019. PSA normal again    8. Urinary frequency: Started Flomax in 2018, but not taking    9. Sleep apnea: He was diagnosed with moderate sleep apnea in 2019. He is no longer using device    10. Hypertension: Continue lisinopril, amlodipine, and hydralazine.  Cardiology stopped hydrochlorothiazide. Spironolactone was " discontinued in 2019 because of hyperkalemia    11. Peripheral vascular disease: He was told that he has blockage in his carotid vessels. Followed by Dr. Campbell    12. Chronic kidney disease stage IIIa, secondary to diabetes: Avoid NSAIDs, monitor creatinine    13.  Hyperkalemia:  He will adjust his diet; he has been eating a lot of bananas    14. Wellness: C-scope normal 6/2018, no polyps. Pneumovax 1/2021. Flu shot today          Medicare Annual Wellness and Personalized Prevention Plan:   Fall Risk + Home Safety + Hearing Impairment + Depression Screen + Cognitive Impairment Screen + Health Risk Assessment all reviewed    Health Maintenance Topics with due status: Not Due       Topic Last Completion Date    Colorectal Cancer Screening 06/27/2018    Eye Exam 01/12/2023    High Dose Statin 03/28/2023    Diabetes Urine Screening 09/25/2023    Lipid Panel 09/25/2023    Hemoglobin A1c 09/25/2023      The patient's Health Maintenance was reviewed and the following appears to be due at this time:   Health Maintenance Due   Topic Date Due    Hepatitis C Screening  Never done    Foot Exam  Never done    TETANUS VACCINE  Never done    Aspirin/Antiplatelet Therapy  Never done    Shingles Vaccine (1 of 2) Never done    Abdominal Aortic Aneurysm Screening  Never done    COVID-19 Vaccine (3 - Pfizer series) 05/05/2021    Influenza Vaccine (1) 09/01/2023       Advance Care Planning   I attest to discussing Advance Care Planning with patient and/or family member.  Education was provided including the importance of the Health Care Power of , Advance Directives, and/or [place] documentation.  The patient expressed understanding to the importance of this information and discussion.  Length of ACP conversation in minutes: 1       Opioid Screening: Patient medication list reviewed, patient is not taking prescription opioids. Patient is not using additional opioids than prescribed. Patient is at low risk of substance abuse based  on this opioid use history.     No follow-ups on file. In addition to their scheduled follow up, the patient has also been instructed to follow up on as needed basis.

## 2023-09-27 ENCOUNTER — OFFICE VISIT (OUTPATIENT)
Dept: INTERNAL MEDICINE | Facility: CLINIC | Age: 73
End: 2023-09-27
Payer: MEDICARE

## 2023-09-27 VITALS
HEART RATE: 68 BPM | WEIGHT: 190 LBS | HEIGHT: 67 IN | SYSTOLIC BLOOD PRESSURE: 122 MMHG | BODY MASS INDEX: 29.82 KG/M2 | TEMPERATURE: 98 F | OXYGEN SATURATION: 97 % | DIASTOLIC BLOOD PRESSURE: 64 MMHG | RESPIRATION RATE: 14 BRPM

## 2023-09-27 DIAGNOSIS — Z00.00 WELLNESS EXAMINATION: Primary | ICD-10-CM

## 2023-09-27 DIAGNOSIS — E11.65 TYPE 2 DIABETES MELLITUS WITH HYPERGLYCEMIA, WITH LONG-TERM CURRENT USE OF INSULIN: ICD-10-CM

## 2023-09-27 DIAGNOSIS — Z79.4 TYPE 2 DIABETES MELLITUS WITH HYPERGLYCEMIA, WITH LONG-TERM CURRENT USE OF INSULIN: ICD-10-CM

## 2023-09-27 DIAGNOSIS — I25.10 CORONARY ARTERY DISEASE INVOLVING NATIVE CORONARY ARTERY OF NATIVE HEART WITHOUT ANGINA PECTORIS: ICD-10-CM

## 2023-09-27 DIAGNOSIS — Z12.5 PROSTATE CANCER SCREENING: ICD-10-CM

## 2023-09-27 DIAGNOSIS — N18.31 STAGE 3A CHRONIC KIDNEY DISEASE: ICD-10-CM

## 2023-09-27 DIAGNOSIS — I73.9 PERIPHERAL VASCULAR DISEASE: ICD-10-CM

## 2023-09-27 DIAGNOSIS — E11.41 DIABETIC MONONEUROPATHY ASSOCIATED WITH TYPE 2 DIABETES MELLITUS: ICD-10-CM

## 2023-09-27 DIAGNOSIS — E78.5 DYSLIPIDEMIA: ICD-10-CM

## 2023-09-27 DIAGNOSIS — Z23 NEED FOR VACCINATION: ICD-10-CM

## 2023-09-27 DIAGNOSIS — I10 ESSENTIAL HYPERTENSION: ICD-10-CM

## 2023-09-27 DIAGNOSIS — E11.42 DIABETIC POLYNEUROPATHY ASSOCIATED WITH TYPE 2 DIABETES MELLITUS: ICD-10-CM

## 2023-09-27 PROCEDURE — 1159F MED LIST DOCD IN RCRD: CPT | Mod: CPTII,,, | Performed by: INTERNAL MEDICINE

## 2023-09-27 PROCEDURE — 3078F DIAST BP <80 MM HG: CPT | Mod: CPTII,,, | Performed by: INTERNAL MEDICINE

## 2023-09-27 PROCEDURE — 3066F PR DOCUMENTATION OF TREATMENT FOR NEPHROPATHY: ICD-10-PCS | Mod: CPTII,,, | Performed by: INTERNAL MEDICINE

## 2023-09-27 PROCEDURE — 3060F PR POS MICROALBUMINURIA RESULT DOCUMENTED/REVIEW: ICD-10-PCS | Mod: CPTII,,, | Performed by: INTERNAL MEDICINE

## 2023-09-27 PROCEDURE — 3288F PR FALLS RISK ASSESSMENT DOCUMENTED: ICD-10-PCS | Mod: CPTII,,, | Performed by: INTERNAL MEDICINE

## 2023-09-27 PROCEDURE — 3044F HG A1C LEVEL LT 7.0%: CPT | Mod: CPTII,,, | Performed by: INTERNAL MEDICINE

## 2023-09-27 PROCEDURE — 1101F PT FALLS ASSESS-DOCD LE1/YR: CPT | Mod: CPTII,,, | Performed by: INTERNAL MEDICINE

## 2023-09-27 PROCEDURE — 3074F SYST BP LT 130 MM HG: CPT | Mod: CPTII,,, | Performed by: INTERNAL MEDICINE

## 2023-09-27 PROCEDURE — G0439 PPPS, SUBSEQ VISIT: HCPCS | Mod: ,,, | Performed by: INTERNAL MEDICINE

## 2023-09-27 PROCEDURE — 90694 VACC AIIV4 NO PRSRV 0.5ML IM: CPT | Mod: ,,, | Performed by: INTERNAL MEDICINE

## 2023-09-27 PROCEDURE — 3008F BODY MASS INDEX DOCD: CPT | Mod: CPTII,,, | Performed by: INTERNAL MEDICINE

## 2023-09-27 PROCEDURE — 1159F PR MEDICATION LIST DOCUMENTED IN MEDICAL RECORD: ICD-10-PCS | Mod: CPTII,,, | Performed by: INTERNAL MEDICINE

## 2023-09-27 PROCEDURE — 3288F FALL RISK ASSESSMENT DOCD: CPT | Mod: CPTII,,, | Performed by: INTERNAL MEDICINE

## 2023-09-27 PROCEDURE — 2023F DILAT RTA XM W/O RTNOPTHY: CPT | Mod: CPTII,,, | Performed by: INTERNAL MEDICINE

## 2023-09-27 PROCEDURE — G0008 FLU VACCINE - QUADRIVALENT - ADJUVANTED: ICD-10-PCS | Mod: ,,, | Performed by: INTERNAL MEDICINE

## 2023-09-27 PROCEDURE — 3044F PR MOST RECENT HEMOGLOBIN A1C LEVEL <7.0%: ICD-10-PCS | Mod: CPTII,,, | Performed by: INTERNAL MEDICINE

## 2023-09-27 PROCEDURE — 3008F PR BODY MASS INDEX (BMI) DOCUMENTED: ICD-10-PCS | Mod: CPTII,,, | Performed by: INTERNAL MEDICINE

## 2023-09-27 PROCEDURE — 90694 FLU VACCINE - QUADRIVALENT - ADJUVANTED: ICD-10-PCS | Mod: ,,, | Performed by: INTERNAL MEDICINE

## 2023-09-27 PROCEDURE — 1160F PR REVIEW ALL MEDS BY PRESCRIBER/CLIN PHARMACIST DOCUMENTED: ICD-10-PCS | Mod: CPTII,,, | Performed by: INTERNAL MEDICINE

## 2023-09-27 PROCEDURE — 1101F PR PT FALLS ASSESS DOC 0-1 FALLS W/OUT INJ PAST YR: ICD-10-PCS | Mod: CPTII,,, | Performed by: INTERNAL MEDICINE

## 2023-09-27 PROCEDURE — 3078F PR MOST RECENT DIASTOLIC BLOOD PRESSURE < 80 MM HG: ICD-10-PCS | Mod: CPTII,,, | Performed by: INTERNAL MEDICINE

## 2023-09-27 PROCEDURE — G0008 ADMIN INFLUENZA VIRUS VAC: HCPCS | Mod: ,,, | Performed by: INTERNAL MEDICINE

## 2023-09-27 PROCEDURE — G0439 PR MEDICARE ANNUAL WELLNESS SUBSEQUENT VISIT: ICD-10-PCS | Mod: ,,, | Performed by: INTERNAL MEDICINE

## 2023-09-27 PROCEDURE — 3060F POS MICROALBUMINURIA REV: CPT | Mod: CPTII,,, | Performed by: INTERNAL MEDICINE

## 2023-09-27 PROCEDURE — 3074F PR MOST RECENT SYSTOLIC BLOOD PRESSURE < 130 MM HG: ICD-10-PCS | Mod: CPTII,,, | Performed by: INTERNAL MEDICINE

## 2023-09-27 PROCEDURE — 3066F NEPHROPATHY DOC TX: CPT | Mod: CPTII,,, | Performed by: INTERNAL MEDICINE

## 2023-09-27 PROCEDURE — 1160F RVW MEDS BY RX/DR IN RCRD: CPT | Mod: CPTII,,, | Performed by: INTERNAL MEDICINE

## 2023-09-27 PROCEDURE — 2023F PR DILATED RETINAL EXAM W/O EVID OF RETINOPATHY: ICD-10-PCS | Mod: CPTII,,, | Performed by: INTERNAL MEDICINE

## 2023-09-27 PROCEDURE — 4010F ACE/ARB THERAPY RXD/TAKEN: CPT | Mod: CPTII,,, | Performed by: INTERNAL MEDICINE

## 2023-09-27 PROCEDURE — 4010F PR ACE/ARB THEARPY RXD/TAKEN: ICD-10-PCS | Mod: CPTII,,, | Performed by: INTERNAL MEDICINE

## 2023-09-27 RX ORDER — DULAGLUTIDE 0.75 MG/.5ML
INJECTION, SOLUTION SUBCUTANEOUS
COMMUNITY

## 2023-09-27 RX ORDER — ASPIRIN 81 MG/1
81 TABLET ORAL DAILY
COMMUNITY

## 2023-10-16 ENCOUNTER — HOSPITAL ENCOUNTER (OUTPATIENT)
Dept: RADIOLOGY | Facility: CLINIC | Age: 73
Discharge: HOME | End: 2023-10-16
Attending: ORTHOPAEDIC SURGERY
Payer: MEDICARE

## 2023-10-16 ENCOUNTER — OFFICE VISIT (OUTPATIENT)
Dept: ORTHOPEDIC SURGERY | Facility: CLINIC | Age: 73
End: 2023-10-16
Payer: MEDICARE

## 2023-10-16 VITALS — BODY MASS INDEX: 29.83 KG/M2 | HEIGHT: 67 IN | WEIGHT: 190.06 LBS

## 2023-10-16 DIAGNOSIS — M54.16 LUMBAR RADICULOPATHY: ICD-10-CM

## 2023-10-16 DIAGNOSIS — M47.816 LUMBAR SPONDYLOSIS: Primary | ICD-10-CM

## 2023-10-16 DIAGNOSIS — M51.36 DDD (DEGENERATIVE DISC DISEASE), LUMBAR: ICD-10-CM

## 2023-10-16 PROCEDURE — 99204 PR OFFICE/OUTPT VISIT, NEW, LEVL IV, 45-59 MIN: ICD-10-PCS | Mod: S$GLB,,, | Performed by: ORTHOPAEDIC SURGERY

## 2023-10-16 PROCEDURE — 1159F MED LIST DOCD IN RCRD: CPT | Mod: CPTII,S$GLB,, | Performed by: ORTHOPAEDIC SURGERY

## 2023-10-16 PROCEDURE — 1125F AMNT PAIN NOTED PAIN PRSNT: CPT | Mod: CPTII,S$GLB,, | Performed by: ORTHOPAEDIC SURGERY

## 2023-10-16 PROCEDURE — 1125F PR PAIN SEVERITY QUANTIFIED, PAIN PRESENT: ICD-10-PCS | Mod: CPTII,S$GLB,, | Performed by: ORTHOPAEDIC SURGERY

## 2023-10-16 PROCEDURE — 72110 X-RAY EXAM L-2 SPINE 4/>VWS: CPT | Mod: ,,, | Performed by: ORTHOPAEDIC SURGERY

## 2023-10-16 PROCEDURE — 3008F BODY MASS INDEX DOCD: CPT | Mod: CPTII,S$GLB,, | Performed by: ORTHOPAEDIC SURGERY

## 2023-10-16 PROCEDURE — 3288F FALL RISK ASSESSMENT DOCD: CPT | Mod: CPTII,S$GLB,, | Performed by: ORTHOPAEDIC SURGERY

## 2023-10-16 PROCEDURE — 1101F PR PT FALLS ASSESS DOC 0-1 FALLS W/OUT INJ PAST YR: ICD-10-PCS | Mod: CPTII,S$GLB,, | Performed by: ORTHOPAEDIC SURGERY

## 2023-10-16 PROCEDURE — 1160F PR REVIEW ALL MEDS BY PRESCRIBER/CLIN PHARMACIST DOCUMENTED: ICD-10-PCS | Mod: CPTII,S$GLB,, | Performed by: ORTHOPAEDIC SURGERY

## 2023-10-16 PROCEDURE — 1101F PT FALLS ASSESS-DOCD LE1/YR: CPT | Mod: CPTII,S$GLB,, | Performed by: ORTHOPAEDIC SURGERY

## 2023-10-16 PROCEDURE — 3060F POS MICROALBUMINURIA REV: CPT | Mod: CPTII,S$GLB,, | Performed by: ORTHOPAEDIC SURGERY

## 2023-10-16 PROCEDURE — 3060F PR POS MICROALBUMINURIA RESULT DOCUMENTED/REVIEW: ICD-10-PCS | Mod: CPTII,S$GLB,, | Performed by: ORTHOPAEDIC SURGERY

## 2023-10-16 PROCEDURE — 3066F PR DOCUMENTATION OF TREATMENT FOR NEPHROPATHY: ICD-10-PCS | Mod: CPTII,S$GLB,, | Performed by: ORTHOPAEDIC SURGERY

## 2023-10-16 PROCEDURE — 3044F PR MOST RECENT HEMOGLOBIN A1C LEVEL <7.0%: ICD-10-PCS | Mod: CPTII,S$GLB,, | Performed by: ORTHOPAEDIC SURGERY

## 2023-10-16 PROCEDURE — 3066F NEPHROPATHY DOC TX: CPT | Mod: CPTII,S$GLB,, | Performed by: ORTHOPAEDIC SURGERY

## 2023-10-16 PROCEDURE — 1160F RVW MEDS BY RX/DR IN RCRD: CPT | Mod: CPTII,S$GLB,, | Performed by: ORTHOPAEDIC SURGERY

## 2023-10-16 PROCEDURE — 1159F PR MEDICATION LIST DOCUMENTED IN MEDICAL RECORD: ICD-10-PCS | Mod: CPTII,S$GLB,, | Performed by: ORTHOPAEDIC SURGERY

## 2023-10-16 PROCEDURE — 3288F PR FALLS RISK ASSESSMENT DOCUMENTED: ICD-10-PCS | Mod: CPTII,S$GLB,, | Performed by: ORTHOPAEDIC SURGERY

## 2023-10-16 PROCEDURE — 4010F PR ACE/ARB THEARPY RXD/TAKEN: ICD-10-PCS | Mod: CPTII,S$GLB,, | Performed by: ORTHOPAEDIC SURGERY

## 2023-10-16 PROCEDURE — 4010F ACE/ARB THERAPY RXD/TAKEN: CPT | Mod: CPTII,S$GLB,, | Performed by: ORTHOPAEDIC SURGERY

## 2023-10-16 PROCEDURE — 72110 XR LUMBAR SPINE AP AND LAT WITH FLEX/EXT: ICD-10-PCS | Mod: ,,, | Performed by: ORTHOPAEDIC SURGERY

## 2023-10-16 PROCEDURE — 99204 OFFICE O/P NEW MOD 45 MIN: CPT | Mod: S$GLB,,, | Performed by: ORTHOPAEDIC SURGERY

## 2023-10-16 PROCEDURE — 3008F PR BODY MASS INDEX (BMI) DOCUMENTED: ICD-10-PCS | Mod: CPTII,S$GLB,, | Performed by: ORTHOPAEDIC SURGERY

## 2023-10-16 PROCEDURE — 3044F HG A1C LEVEL LT 7.0%: CPT | Mod: CPTII,S$GLB,, | Performed by: ORTHOPAEDIC SURGERY

## 2023-10-16 RX ORDER — METHOCARBAMOL 500 MG/1
500 TABLET, FILM COATED ORAL 3 TIMES DAILY
Qty: 60 TABLET | Refills: 1 | Status: SHIPPED | OUTPATIENT
Start: 2023-10-16

## 2023-10-16 RX ORDER — MELOXICAM 15 MG/1
15 TABLET ORAL DAILY
Qty: 60 TABLET | Refills: 1 | Status: SHIPPED | OUTPATIENT
Start: 2023-10-16

## 2023-10-16 NOTE — PROGRESS NOTES
DATE: 10/16/2023  PATIENT: Sincere Malave    Attending Physician: Rao Bangura M.D.    CHIEF COMPLAINT: LBP and BLE pain    HISTORY:  Sincere Malave is a 73 y.o. male presents for initial evaluation of low back and b/l leg pain (Back - 3, Leg - 3). The pain has been present for 1 year. The patient describes the pain as dull and it radiates posterolaterally down BLE to the calves. He also complained of isolated pain in his b/l feet when he stands, no specific continuous shooting pain. The pain is worse with activity and improved by rest. There is no associated numbness and tingling. There is no subjective weakness. Prior treatments have included tylenol, but no PT, JANA or surgery.    The Patient denies myelopathic symptoms such as handwriting changes or difficulty with buttons/coins/keys. Denies perineal paresthesias, bowel/bladder dysfunction.    The patient does not smoke or endorse IVDU. He has DM (HA1C of 6.7). The patient is not on any blood thinners and does not take chronic narcotics. He is retired; he used to work offshore.    PAST MEDICAL/SURGICAL HISTORY:  Past Medical History:   Diagnosis Date    Anemia     Diabetic polyneuropathy     History of heart artery stent 6/25/2020    Sleep apnea      Past Surgical History:   Procedure Laterality Date    ANGIOGRAPHY OF ABDOMEN Left 06/22/2020    Procedure: Angiogram, Abdomen;  Surgeon: Sergio Poe MD;  Location: [facility] CATH/EP LAB;  Service: Cardiology;  Laterality: Left;    CHOLECYSTECTOMY      colonoscopy  06/27/2018    CORONARY ARTERY BYPASS GRAFT      CORONARY ARTERY BYPASS GRAFT (CABG) N/A 06/25/2020    Procedure: CORONARY ARTERY BYPASS GRAFT (CABG);  Surgeon: Rao Larose MD;  Location: [facility] OR;  Service: Cardiovascular;  Laterality: N/A;    ENDOSCOPIC HARVEST OF VEIN  06/25/2020    Procedure: SURGICAL PROCUREMENT, VEIN, ENDOSCOPIC;  Surgeon: Rao Larose MD;  Location: [facility] OR;  Service: Cardiovascular;;    LEFT HEART  "CATHETERIZATION Left 06/22/2020    Procedure: Left heart cath;  Surgeon: Sergio Poe MD;  Location: [facility] CATH[facility]EP LAB;  Service: Cardiology;  Laterality: Left;       Current Medications:   Current Outpatient Medications:     ACCU-CHEK FASTCLIX LANCET DRUM Misc, , Disp: , Rfl:     allopurinoL (ZYLOPRIM) 100 MG tablet, TAKE 1 TABLET EVERY DAY, Disp: 90 tablet, Rfl: 3    amLODIPine (NORVASC) 10 MG tablet, TAKE 1 TABLET EVERY DAY, Disp: 90 tablet, Rfl: 3    aspirin (ECOTRIN) 81 MG EC tablet, Take 81 mg by mouth once daily., Disp: , Rfl:     atorvastatin (LIPITOR) 40 MG tablet, TAKE 1 TABLET EVERY DAY, Disp: 90 tablet, Rfl: 3    blood sugar diagnostic (ACCU-CHEK STEVE PLUS TEST STRP) Strp, 1 strip by Misc.(Non-Drug; Combo Route) route 3 (three) times daily., Disp: 300 each, Rfl: 3    dulaglutide (TRULICITY) 0.75 mg/0.5 mL pen injector, Inject into the skin every 7 days., Disp: , Rfl:     DULoxetine (CYMBALTA) 30 MG capsule, TAKE 1 CAPSULE EVERY DAY, Disp: 90 capsule, Rfl: 3    hydrALAZINE (APRESOLINE) 25 MG tablet, Take 1 tablet (25 mg total) by mouth 2 (two) times daily., Disp: 180 tablet, Rfl: 3    insulin NPH-insulin regular, 70/30, (NOVOLIN 70/30 U-100 INSULIN) 100 unit/mL (70-30) injection, INJECT 40 UNITS SUBCUTANEOUSLY EVERY MORNING AND 30 UNITS AT BEDTIME AS DIRECTED, Disp: 60 mL, Rfl: 11    insulin syringe-needle U-100 (DROPLET INSULIN SYRINGE) 1 mL 30 gauge x 1/2" Syrg, USE  TO INJECT  INSULIN TWICE DAILY, Disp: 200 each, Rfl: 3    lancets (ACCU-CHEK SOFTCLIX LANCETS) Misc, 1 each by Misc.(Non-Drug; Combo Route) route 3 (three) times daily., Disp: 300 each, Rfl: 3    lisinopriL (PRINIVIL,ZESTRIL) 40 MG tablet, Take 1 tablet (40 mg total) by mouth Daily., Disp: 90 tablet, Rfl: 3    metFORMIN (GLUCOPHAGE) 1000 MG tablet, TAKE 1 TABLET TWICE DAILY, Disp: 180 tablet, Rfl: 3    meloxicam (MOBIC) 15 MG tablet, Take 1 tablet (15 mg total) by mouth once daily., Disp: 60 tablet, Rfl: 1    methocarbamoL (ROBAXIN) " 500 MG Tab, Take 1 tablet (500 mg total) by mouth 3 (three) times daily., Disp: 60 tablet, Rfl: 1    Social History:   Social History     Socioeconomic History    Marital status:    Tobacco Use    Smoking status: Former     Current packs/day: 0.25     Average packs/day: 0.3 packs/day for 1 year (0.3 ttl pk-yrs)     Types: Cigarettes    Smokeless tobacco: Never    Tobacco comments:     Dont remember   Substance and Sexual Activity    Alcohol use: Not Currently     Alcohol/week: 5.0 standard drinks of alcohol     Types: 5 Cans of beer per week     Comment: On weekends years ago    Drug use: Never    Sexual activity: Not Currently     Partners: Female     Birth control/protection: None     Social Determinants of Health     Financial Resource Strain: Low Risk  (9/27/2023)    Overall Financial Resource Strain (CARDIA)     Difficulty of Paying Living Expenses: Not hard at all   Food Insecurity: No Food Insecurity (9/27/2023)    Hunger Vital Sign     Worried About Running Out of Food in the Last Year: Never true     Ran Out of Food in the Last Year: Never true   Transportation Needs: No Transportation Needs (9/27/2023)    PRAPARE - Transportation     Lack of Transportation (Medical): No     Lack of Transportation (Non-Medical): No   Physical Activity: Inactive (9/27/2023)    Exercise Vital Sign     Days of Exercise per Week: 0 days     Minutes of Exercise per Session: 0 min   Stress: No Stress Concern Present (9/27/2023)    [place] [place] [employer] - Occupational Stress Questionnaire     Feeling of Stress : Not at all   Social Connections: Socially Integrated (9/27/2023)    Social Connection and Isolation Panel [NHANES]     Frequency of Communication with Friends and Family: Three times a week     Frequency of Social Gatherings with Friends and Family: Three times a week     Attends Shinto Services: More than 4 times per year     Active Member of Clubs or Organizations: Yes     Attends Club or  "Organization Meetings: More than 4 times per year     Marital Status:    Housing Stability: Low Risk  (9/27/2023)    Housing Stability Vital Sign     Unable to Pay for Housing in the Last Year: No     Number of Places Lived in the Last Year: 1     Unstable Housing in the Last Year: No       REVIEW OF SYSTEMS:  Constitution: Negative. Negative for chills, fever and night sweats.   Cardiovascular: Negative for chest pain and syncope.   Respiratory: Negative for cough and shortness of breath.   Gastrointestinal: See HPI. Negative for nausea/vomiting. Negative for abdominal pain.  Genitourinary: See HPI. Negative for discoloration or dysuria.  Hematologic/Lymphatic: negative for bleeding/clotting disorders.   Musculoskeletal: Negative for falls and muscle weakness.   Neurological: See HPI. no history of seizures. no history of cranial surgery or shunts.  Neurological: See HPI. No seizures.   Endocrine: Negative for polydipsia, polyphagia and polyuria.   Allergic/Immunologic: Negative for hives and persistent infections.     EXAM:  Ht 5' 7" (1.702 m)   Wt 86.2 kg (190 lb 0.6 oz)   BMI 29.76 kg/m²     PHYSICAL EXAMINATION:    General: The patient is a 73 y.o. male in no apparent distress, the patient is orientatied to person, place and time.  Psych: Normal mood and affect  HEENT: Vision grossly intact, hearing intact to the spoken word.  Lungs: Respirations unlabored.  Gait: Normal station and gait, no difficulty with toe or heel walk.   Skin: Dorsal lumbar skin negative for rashes, lesions, hairy patches and surgical scars. There is lower lumbar tenderness to palpation.  Range of motion: Lumbar range of motion is acceptable.  Spinal Balance: Global saggital and coronal spinal balance acceptable, no significant for scoliosis and kyphosis.  Musculoskeletal: No pain with the range of motion of the bilateral hips. No trochanteric tenderness to palpation.  Vascular: Bilateral lower extremities warm and well perfused, " Dorsalis pedis pulses 2+ bilaterally.  Neurological: Normal strength and tone in all major motor groups in the bilateral lower extremities. Normal sensation to light touch in the L2-S1 dermatomes bilaterally.  Deep tendon reflexes symmetric 2+ in the bilateral lower extremities.  Negative Babinski bilaterally. Straight leg raise negative bilaterally.    IMAGING:   Today I independently reviewed the following images and my interpretations are as follows:    AP, Lat and Flex/Ex  upright L-spine demonstrate spondylosis and DDD.      Body mass index is 29.76 kg/m².  Hemoglobin A1C   Date Value Ref Range Status   06/21/2020 7.7 (H) 4.0 - 5.6 % Final     Comment:     ADA Screening Guidelines:  5.7-6.4%  Consistent with prediabetes  >or=6.5%  Consistent with diabetes  High levels of fetal hemoglobin interfere with the HbA1C  assay. Heterozygous hemoglobin variants (HbS, HgC, etc)do  not significantly interfere with this assay.   However, presence of multiple variants may affect accuracy.       Hemoglobin A1c   Date Value Ref Range Status   09/25/2023 6.7 <=7.0 % Final   03/23/2023 6.6 <=7.0 % Final   09/20/2022 7.2 (H) <=7.0 % Final       ASSESSMENT/PLAN:    Diagnoses and all orders for this visit:    Lumbar spondylosis  -     Ambulatory referral/consult to Physical/Occupational Therapy; Future  -     meloxicam (MOBIC) 15 MG tablet; Take 1 tablet (15 mg total) by mouth once daily.  -     methocarbamoL (ROBAXIN) 500 MG Tab; Take 1 tablet (500 mg total) by mouth 3 (three) times daily.    DDD (degenerative disc disease), lumbar    Lumbar radiculopathy      Follow up if symptoms worsen or fail to improve.    Patient has lumbar spondylosis and BLE radiculopathy. I discussed the natural history of their diagnoses as well as surgical and nonsurgical treatment options. I educated the patient on the importance of core/back strengthening, correct posture, bending/lifting ergonomics, and low-impact aerobic exercises (walking,  elliptical, and aquatherapy). I prescribed mobic and robaxin. Continue medications. I will refer the patient to PT for core/back strengthening. Patient will follow up PRN. Next step is a lumbar MRI.    Rao Bangura MD  Orthopaedic Spine Surgeon  Department of Orthopaedic Surgery  [phone]

## 2023-10-18 ENCOUNTER — TELEPHONE (OUTPATIENT)
Dept: INTERNAL MEDICINE | Facility: CLINIC | Age: 73
End: 2023-10-18
Payer: MEDICARE

## 2023-10-18 NOTE — TELEPHONE ENCOUNTER
----- Message from Agata Lee sent at 10/18/2023  9:08 AM CDT -----  Regarding: Clarification  .Type:  Patient Returning Call    Who Called:[place]  Who Left Message for Patient:Wife  Does the patient know what this is regarding?:clarification   Would the patient rather a call back or a response via [facility]?   Best Call Back Number:[phone]  Additional Information: Need clarification on medication prescribed from orthopedic

## 2023-10-18 NOTE — TELEPHONE ENCOUNTER
He was prescribed robaxin and meloxicam and they want to make sure he is ok to take this with all his meds. Want your input.

## 2023-10-18 NOTE — TELEPHONE ENCOUNTER
Okay for Robaxin.  Meloxicam I would only take as needed because of his heart condition and because he is on aspirin.

## 2023-11-01 ENCOUNTER — TELEPHONE (OUTPATIENT)
Dept: INTERNAL MEDICINE | Facility: CLINIC | Age: 73
End: 2023-11-01
Payer: MEDICARE

## 2023-11-01 NOTE — TELEPHONE ENCOUNTER
----- Message from Maryam Forbes sent at 11/1/2023  8:17 AM CDT -----  Regarding: advice  Type:  Needs Medical Advice    Who Called: pt wife    Would the patient rather a call back or a response via [facility]?   Best Call Back Number: [phone]  Additional Information: pt called about needing to speak to a nurse. No further information was given.

## 2023-11-18 DIAGNOSIS — E78.5 DYSLIPIDEMIA: Primary | ICD-10-CM

## 2023-11-18 DIAGNOSIS — Z00.00 WELLNESS EXAMINATION: ICD-10-CM

## 2023-11-20 RX ORDER — ATORVASTATIN CALCIUM 40 MG/1
TABLET, FILM COATED ORAL
Qty: 90 TABLET | Refills: 10 | Status: SHIPPED | OUTPATIENT
Start: 2023-11-20

## 2023-11-20 RX ORDER — ALLOPURINOL 100 MG/1
TABLET ORAL
Qty: 90 TABLET | Refills: 10 | Status: SHIPPED | OUTPATIENT
Start: 2023-11-20

## 2023-12-07 DIAGNOSIS — I10 ESSENTIAL HYPERTENSION: ICD-10-CM

## 2023-12-07 RX ORDER — AMLODIPINE BESYLATE 10 MG/1
TABLET ORAL
Qty: 90 TABLET | Refills: 3 | Status: SHIPPED | OUTPATIENT
Start: 2023-12-07

## 2024-01-27 DIAGNOSIS — E11.65 TYPE 2 DIABETES MELLITUS WITH HYPERGLYCEMIA, WITH LONG-TERM CURRENT USE OF INSULIN: ICD-10-CM

## 2024-01-27 DIAGNOSIS — Z79.4 TYPE 2 DIABETES MELLITUS WITH HYPERGLYCEMIA, WITH LONG-TERM CURRENT USE OF INSULIN: ICD-10-CM

## 2024-01-29 RX ORDER — METFORMIN HYDROCHLORIDE 1000 MG/1
TABLET ORAL
Qty: 180 TABLET | Refills: 3 | Status: SHIPPED | OUTPATIENT
Start: 2024-01-29

## 2024-02-01 ENCOUNTER — OFFICE VISIT (OUTPATIENT)
Dept: INTERNAL MEDICINE | Facility: CLINIC | Age: 74
End: 2024-02-01
Payer: MEDICARE

## 2024-02-01 VITALS
SYSTOLIC BLOOD PRESSURE: 148 MMHG | TEMPERATURE: 98 F | WEIGHT: 190 LBS | OXYGEN SATURATION: 97 % | HEART RATE: 62 BPM | HEIGHT: 67 IN | RESPIRATION RATE: 16 BRPM | DIASTOLIC BLOOD PRESSURE: 62 MMHG | BODY MASS INDEX: 29.82 KG/M2

## 2024-02-01 DIAGNOSIS — J06.9 UPPER RESPIRATORY TRACT INFECTION, UNSPECIFIED TYPE: Primary | ICD-10-CM

## 2024-02-01 LAB
FLUAV AG UPPER RESP QL IA.RAPID: NOT DETECTED
FLUBV AG UPPER RESP QL IA.RAPID: NOT DETECTED
RSV A 5' UTR RNA NPH QL NAA+PROBE: DETECTED
SARS-COV-2 RNA RESP QL NAA+PROBE: NOT DETECTED

## 2024-02-01 PROCEDURE — 3077F SYST BP >= 140 MM HG: CPT | Mod: CPTII,,, | Performed by: INTERNAL MEDICINE

## 2024-02-01 PROCEDURE — 4010F ACE/ARB THERAPY RXD/TAKEN: CPT | Mod: CPTII,,, | Performed by: INTERNAL MEDICINE

## 2024-02-01 PROCEDURE — 1101F PT FALLS ASSESS-DOCD LE1/YR: CPT | Mod: CPTII,,, | Performed by: INTERNAL MEDICINE

## 2024-02-01 PROCEDURE — 3008F BODY MASS INDEX DOCD: CPT | Mod: CPTII,,, | Performed by: INTERNAL MEDICINE

## 2024-02-01 PROCEDURE — 1160F RVW MEDS BY RX/DR IN RCRD: CPT | Mod: CPTII,,, | Performed by: INTERNAL MEDICINE

## 2024-02-01 PROCEDURE — 0241U COVID/RSV/FLU A&B PCR: CPT | Performed by: INTERNAL MEDICINE

## 2024-02-01 PROCEDURE — 3288F FALL RISK ASSESSMENT DOCD: CPT | Mod: CPTII,,, | Performed by: INTERNAL MEDICINE

## 2024-02-01 PROCEDURE — 99213 OFFICE O/P EST LOW 20 MIN: CPT | Mod: ,,, | Performed by: INTERNAL MEDICINE

## 2024-02-01 PROCEDURE — 3078F DIAST BP <80 MM HG: CPT | Mod: CPTII,,, | Performed by: INTERNAL MEDICINE

## 2024-02-01 PROCEDURE — 1159F MED LIST DOCD IN RCRD: CPT | Mod: CPTII,,, | Performed by: INTERNAL MEDICINE

## 2024-02-01 RX ORDER — BENZONATATE 100 MG/1
100 CAPSULE ORAL 3 TIMES DAILY PRN
Qty: 30 CAPSULE | Refills: 0 | Status: SHIPPED | OUTPATIENT
Start: 2024-02-01 | End: 2024-02-11

## 2024-02-01 NOTE — PROGRESS NOTES
"Subjective:       Patient ID: Sincere Malave is a 74 y.o. male.    Chief Complaint: URI, Cough, and Sore Throat    74-year-old male reports cough and congestion over the past 3 days, without fever.  He has been taking over-the-counter decongestants.    URI   Associated symptoms include coughing and a sore throat. Pertinent negatives include no abdominal pain, chest pain, dysuria or headaches.   Cough  Associated symptoms include a sore throat. Pertinent negatives include no chest pain, fever, headaches or shortness of breath.   Sore Throat   Associated symptoms include coughing. Pertinent negatives include no abdominal pain, headaches or shortness of breath.     Review of Systems   Constitutional:  Negative for fever.   HENT:  Positive for sore throat. Negative for nosebleeds.    Eyes:  Negative for visual disturbance.   Respiratory:  Positive for cough. Negative for shortness of breath.    Cardiovascular:  Negative for chest pain.   Gastrointestinal:  Negative for abdominal pain.   Genitourinary:  Negative for dysuria.   Musculoskeletal:  Negative for gait problem.   Neurological:  Negative for headaches.         Objective:      Physical Exam  HENT:      Head: Normocephalic.      Mouth/Throat:      Pharynx: Oropharynx is clear.   Eyes:      Extraocular Movements: Extraocular movements intact.   Cardiovascular:      Rate and Rhythm: Normal rate and regular rhythm.   Pulmonary:      Breath sounds: Normal breath sounds.   Abdominal:      Palpations: Abdomen is soft.   Musculoskeletal:         General: No swelling.   Skin:     General: Skin is warm.   Neurological:      General: No focal deficit present.      Mental Status: He is alert and oriented to person, place, and time.   Psychiatric:         Mood and Affect: Mood normal.         Vitals:    02/01/24 0948   BP: (!) 148/62   Pulse: 62   Resp: 16   Temp: 98 °F (36.7 °C)   SpO2: 97%   Weight: 86.2 kg (190 lb)   Height: 5' 7" (1.702 m)      Assessment:     " "  Problem List Items Addressed This Visit    None  Visit Diagnoses       Upper respiratory tract infection, unspecified type    -  Primary    Relevant Orders    COVID/RSV/FLU A&B PCR            Medication List with Changes/Refills   New Medications    BENZONATATE (TESSALON) 100 MG CAPSULE    Take 1 capsule (100 mg total) by mouth 3 (three) times daily as needed for Cough.   Current Medications    ACCU-CHEK FASTCLIX LANCET DRUM [facility]        ALLOPURINOL (ZYLOPRIM) 100 MG TABLET    TAKE 1 TABLET EVERY DAY    AMLODIPINE (NORVASC) 10 MG TABLET    TAKE 1 TABLET EVERY DAY    ASPIRIN (ECOTRIN) 81 MG EC TABLET    Take 81 mg by mouth once daily.    ATORVASTATIN (LIPITOR) 40 MG TABLET    TAKE 1 TABLET EVERY DAY    BLOOD SUGAR DIAGNOSTIC (ACCU-CHEK STEVE PLUS TEST STRP) STRP    1 strip by Misc.(Non-Drug; Combo Route) route 3 (three) times daily.    DULAGLUTIDE (TRULICITY) 0.75 MG/0.5 ML PEN INJECTOR    Inject into the skin every 7 days.    DULOXETINE (CYMBALTA) 30 MG CAPSULE    TAKE 1 CAPSULE EVERY DAY    HYDRALAZINE (APRESOLINE) 25 MG TABLET    Take 1 tablet (25 mg total) by mouth 2 (two) times daily.    INSULIN NPH-INSULIN REGULAR, 70/30, (NOVOLIN 70/30 U-100 INSULIN) 100 UNIT/ML (70-30) INJECTION    INJECT 40 UNITS SUBCUTANEOUSLY EVERY MORNING AND 30 UNITS AT BEDTIME AS DIRECTED    INSULIN SYRINGE-NEEDLE U-100 (DROPLET INSULIN SYRINGE) 1 ML 30 GAUGE X 1/2" SYRG    USE  TO INJECT  INSULIN TWICE DAILY    LANCETS (ACCU-CHEK SOFTCLIX LANCETS) MISC    1 each by Misc.(Non-Drug; Combo Route) route 3 (three) times daily.    LISINOPRIL (PRINIVIL,ZESTRIL) 40 MG TABLET    Take 1 tablet (40 mg total) by mouth Daily.    MELOXICAM (MOBIC) 15 MG TABLET    Take 1 tablet (15 mg total) by mouth once daily.    METFORMIN (GLUCOPHAGE) 1000 MG TABLET    TAKE 1 TABLET TWICE DAILY    METHOCARBAMOL (ROBAXIN) 500 MG TAB    Take 1 tablet (500 mg total) by mouth 3 (three) times daily.        Plan:       Upper respiratory infection: Swabbed today for " COVID/influenza/RSV.  Start Tessalon Perles.  He does have coarse sounds in his lungs and may need antibiotics.  We will wait for swab results 1st

## 2024-02-20 ENCOUNTER — TELEPHONE (OUTPATIENT)
Dept: INTERNAL MEDICINE | Facility: CLINIC | Age: 74
End: 2024-02-20
Payer: MEDICARE

## 2024-02-20 NOTE — TELEPHONE ENCOUNTER
----- Message from Alan Ocampo sent at 2/20/2024 10:47 AM CST -----  .Type:  Needs Medical Advice    Who Called: Sincere  Symptoms (please be specific):    How long has patient had these symptoms:    Pharmacy name and phone #:    Would the patient rather a call back or a response via [facility]?   Best Call Back Number: [phone]  Additional Information: Patient was returning a call from the nurse that he missed, please call him bck.

## 2024-02-20 NOTE — TELEPHONE ENCOUNTER
Spoke with patient at this time. Explained I hadn't called him. Checked with front staff no one called and no message in chart either.

## 2024-03-05 DIAGNOSIS — Z79.4 TYPE 2 DIABETES MELLITUS WITH HYPERGLYCEMIA, WITH LONG-TERM CURRENT USE OF INSULIN: ICD-10-CM

## 2024-03-05 DIAGNOSIS — E11.65 TYPE 2 DIABETES MELLITUS WITH HYPERGLYCEMIA, WITH LONG-TERM CURRENT USE OF INSULIN: ICD-10-CM

## 2024-03-05 RX ORDER — SYRINGE AND NEEDLE,INSULIN,1ML 30 G X1/2"
SYRINGE, EMPTY DISPOSABLE MISCELLANEOUS
Qty: 200 EACH | Refills: 3 | Status: SHIPPED | OUTPATIENT
Start: 2024-03-05

## 2024-03-14 ENCOUNTER — TELEPHONE (OUTPATIENT)
Dept: INTERNAL MEDICINE | Facility: CLINIC | Age: 74
End: 2024-03-14
Payer: MEDICARE

## 2024-03-14 DIAGNOSIS — J06.9 UPPER RESPIRATORY TRACT INFECTION, UNSPECIFIED TYPE: Primary | ICD-10-CM

## 2024-03-14 RX ORDER — BENZONATATE 100 MG/1
100 CAPSULE ORAL 3 TIMES DAILY PRN
Qty: 30 CAPSULE | Refills: 0 | Status: SHIPPED | OUTPATIENT
Start: 2024-03-14 | End: 2024-03-24

## 2024-03-14 NOTE — TELEPHONE ENCOUNTER
----- Message from [facility] sent at 3/14/2024  8:56 AM CDT -----  Regarding: refill  .Type:  RX Refill Request    Who Called:  pt    Refill or New Rx: refill    RX Name and Strength: benzonatate (TESSALON) 100 MG capsule 30 capsule   Sig - Route: Take 1 capsule (100 mg total) by mouth 3 (three) times daily as needed for Cough      How is the patient currently taking it? (ex. 1XDay): 3 day    Is this a 30 day or 90 day RX: 90    Preferred Pharmacy with phone number: [facility] in [place]    Local or Mail Order: local    Ordering Provider: Himanshu    Would the patient rather a call back or a response via [facility]?  Leonard    Best Call Back Number: [phone]    Additional Information:  refill

## 2024-03-15 ENCOUNTER — TELEPHONE (OUTPATIENT)
Dept: INTERNAL MEDICINE | Facility: CLINIC | Age: 74
End: 2024-03-15
Payer: MEDICARE

## 2024-03-15 ENCOUNTER — HOSPITAL ENCOUNTER (OUTPATIENT)
Dept: RADIOLOGY | Facility: HOSPITAL | Age: 74
Discharge: HOME | End: 2024-03-15
Attending: INTERNAL MEDICINE
Payer: MEDICARE

## 2024-03-15 DIAGNOSIS — I25.110 CORONARY ARTERY DISEASE INVOLVING NATIVE CORONARY ARTERY OF NATIVE HEART WITH UNSTABLE ANGINA PECTORIS: ICD-10-CM

## 2024-03-15 DIAGNOSIS — Z23 NEED FOR VACCINATION: ICD-10-CM

## 2024-03-15 DIAGNOSIS — E11.65 TYPE 2 DIABETES MELLITUS WITH HYPERGLYCEMIA, WITH LONG-TERM CURRENT USE OF INSULIN: ICD-10-CM

## 2024-03-15 DIAGNOSIS — G47.33 OSA (OBSTRUCTIVE SLEEP APNEA): ICD-10-CM

## 2024-03-15 DIAGNOSIS — E78.5 HYPERLIPIDEMIA, UNSPECIFIED HYPERLIPIDEMIA TYPE: ICD-10-CM

## 2024-03-15 DIAGNOSIS — G47.30 SLEEP APNEA, UNSPECIFIED TYPE: ICD-10-CM

## 2024-03-15 DIAGNOSIS — I73.9 PERIPHERAL VASCULAR DISEASE: ICD-10-CM

## 2024-03-15 DIAGNOSIS — Z79.4 TYPE 2 DIABETES MELLITUS WITH HYPERGLYCEMIA, WITH LONG-TERM CURRENT USE OF INSULIN: ICD-10-CM

## 2024-03-15 DIAGNOSIS — E11.41 DIABETIC MONONEUROPATHY ASSOCIATED WITH TYPE 2 DIABETES MELLITUS: ICD-10-CM

## 2024-03-15 DIAGNOSIS — Z12.5 PROSTATE CANCER SCREENING: ICD-10-CM

## 2024-03-15 DIAGNOSIS — J06.9 UPPER RESPIRATORY TRACT INFECTION, UNSPECIFIED TYPE: ICD-10-CM

## 2024-03-15 DIAGNOSIS — Z00.00 WELLNESS EXAMINATION: ICD-10-CM

## 2024-03-15 DIAGNOSIS — E11.65 TYPE 2 DIABETES MELLITUS WITH HYPERGLYCEMIA, WITH LONG-TERM CURRENT USE OF INSULIN: Primary | ICD-10-CM

## 2024-03-15 DIAGNOSIS — I25.10 CORONARY ARTERY DISEASE INVOLVING NATIVE CORONARY ARTERY OF NATIVE HEART WITHOUT ANGINA PECTORIS: ICD-10-CM

## 2024-03-15 DIAGNOSIS — N18.31 STAGE 3A CHRONIC KIDNEY DISEASE: ICD-10-CM

## 2024-03-15 DIAGNOSIS — I10 ESSENTIAL HYPERTENSION: ICD-10-CM

## 2024-03-15 DIAGNOSIS — J06.9 UPPER RESPIRATORY TRACT INFECTION, UNSPECIFIED TYPE: Primary | ICD-10-CM

## 2024-03-15 DIAGNOSIS — E11.42 DIABETIC POLYNEUROPATHY ASSOCIATED WITH TYPE 2 DIABETES MELLITUS: ICD-10-CM

## 2024-03-15 DIAGNOSIS — Z79.4 TYPE 2 DIABETES MELLITUS WITH HYPERGLYCEMIA, WITH LONG-TERM CURRENT USE OF INSULIN: Primary | ICD-10-CM

## 2024-03-15 DIAGNOSIS — E78.5 DYSLIPIDEMIA: ICD-10-CM

## 2024-03-15 PROCEDURE — 71046 X-RAY EXAM CHEST 2 VIEWS: CPT | Mod: TC

## 2024-03-15 RX ORDER — AZELASTINE 1 MG/ML
1 SPRAY, METERED NASAL 2 TIMES DAILY
Qty: 30 ML | Refills: 0 | Status: SHIPPED | OUTPATIENT
Start: 2024-03-15 | End: 2024-04-03

## 2024-03-15 RX ORDER — FLUTICASONE PROPIONATE 50 MCG
1 SPRAY, SUSPENSION (ML) NASAL 2 TIMES DAILY
Qty: 16 G | Refills: 1 | Status: SHIPPED | OUTPATIENT
Start: 2024-03-15 | End: 2024-04-03

## 2024-03-15 NOTE — TELEPHONE ENCOUNTER
His cough has come back in last 3-4 days. Wife is wanting a chest xray, and if you think any labs, if possible.

## 2024-03-15 NOTE — TELEPHONE ENCOUNTER
----- Message from Flo Ledesma sent at 3/15/2024  8:17 AM CDT -----  .Type:  Needs Medical Advice    Who Called: pt's wife  Symptoms (please be specific): cough   How long has patient had these symptoms:  3 days  Pharmacy name and phone #:    Would the patient rather a call back or a response via [facility]? Call back  Best Call Back Number: [phone]  Additional Information:

## 2024-03-26 ENCOUNTER — PATIENT OUTREACH (OUTPATIENT)
Dept: ADMINISTRATIVE | Facility: HOSPITAL | Age: 74
End: 2024-03-26
Payer: MEDICARE

## 2024-03-26 NOTE — LETTER
Dear Kenneth [facility] is committed to your overall health. Periodically we review the health information in your chart to make sure you are up to date on all of your recommended tests and/or procedures.       Our review of your chart shows that you may be due for       [facility] Score: 5     Eye Exam  Hemoglobin A1c  Foot Exam  Uncontrolled BP  AAA Screening    Tetanus Vaccine  Shingles/Zoster Vaccine  RSV Vaccine                If you have had any of the above done at another facility, please let us know and we will request a copy of the report to update your [facility] record.      Gentle reminder your next Primary Care visit with Dr Huggins is on 4/3/2024 @ 9:00 am      At your convenience I would like to speak to you to help get these items scheduled (if needed) and also see if there is anything else we can do to help you. Please send me a message via your patient portal or give me a call at [phone].  I am looking forward to speaking with you soon.     Sincerely,      Rupa Care Coordinator  Himanshu Huggins II, MD and your [facility] Primary Care Team

## 2024-03-26 NOTE — PROGRESS NOTES
Health Maintenance Topic(s) Outreach Outcomes & Actions Taken:    Eye Exam - Outreach Outcomes & Actions Taken  : External Records Requested & Care Team Updated if Applicable and AMAURI sent to Dr Enoc Sneed    Blood Pressure - Outreach Outcomes & Actions Taken  : Primary Care Follow Up Visit Scheduled  and Patient has Primary care visit on 4/5/2024    Lab(s) - Outreach Outcomes & Actions Taken  : Labs ordered by primary care      Diabetic Foot Exam - Outreach Outcomes & Actions Taken  : Patient has primary care visit on 4/5/2024    AAA Screening - Outreach Outcomes & Actions Taken  : Patient has primary care visit on 4/5/2024                         Additional Notes:           Care Management, Digital Medicine, and/or Education Referrals      Next Steps - Referral Actions: Digital Medicine Outcomes and Actions Taken: Patient is eligible but I did not speak with them and no referrals sent

## 2024-03-26 NOTE — LETTER
AUTHORIZATION FOR RELEASE OF   CONFIDENTIAL INFORMATION    Dear Dr Sneed,    We are seeing Sincere Malave, date of birth 1950, in the clinic at [address]. Himanshu Huggins II, MD is the patient's PCP. Sincere Malave has an outstanding lab/procedure at the time we reviewed his chart. In order to help keep his health information updated, he has authorized us to request the following medical record(s):        (  )  MAMMOGRAM                                      (  )  COLONOSCOPY      (  )  PAP SMEAR                                          (  )  OUTSIDE LAB RESULTS     (  )  DEXA SCAN                                          (  x)  EYE EXAM   diabetic recent         (  )  FOOT EXAM                                          (  )  ENTIRE RECORD     (  )  OUTSIDE IMMUNIZATIONS                 (  )  _______________         Please fax records to [facility], Voitier, Thomas L II, MD, [phone]     If you have any questions, please contact Rupa at [phone].     Thank you in advance for any help with this matter.      Patient Name: Sincere Malave  : 1950  Patient Phone #: [phone]

## 2024-03-28 ENCOUNTER — LAB VISIT (OUTPATIENT)
Dept: LAB | Facility: HOSPITAL | Age: 74
End: 2024-03-28
Attending: INTERNAL MEDICINE
Payer: MEDICARE

## 2024-03-28 DIAGNOSIS — Z00.00 WELLNESS EXAMINATION: ICD-10-CM

## 2024-03-28 DIAGNOSIS — I25.10 CORONARY ARTERY DISEASE INVOLVING NATIVE CORONARY ARTERY OF NATIVE HEART WITHOUT ANGINA PECTORIS: ICD-10-CM

## 2024-03-28 DIAGNOSIS — I73.9 PERIPHERAL VASCULAR DISEASE: ICD-10-CM

## 2024-03-28 DIAGNOSIS — I10 ESSENTIAL HYPERTENSION: ICD-10-CM

## 2024-03-28 DIAGNOSIS — E11.42 DIABETIC POLYNEUROPATHY ASSOCIATED WITH TYPE 2 DIABETES MELLITUS: ICD-10-CM

## 2024-03-28 DIAGNOSIS — E78.5 DYSLIPIDEMIA: ICD-10-CM

## 2024-03-28 DIAGNOSIS — N18.31 STAGE 3A CHRONIC KIDNEY DISEASE: ICD-10-CM

## 2024-03-28 DIAGNOSIS — Z12.5 PROSTATE CANCER SCREENING: ICD-10-CM

## 2024-03-28 DIAGNOSIS — E11.41 DIABETIC MONONEUROPATHY ASSOCIATED WITH TYPE 2 DIABETES MELLITUS: ICD-10-CM

## 2024-03-28 DIAGNOSIS — Z79.4 TYPE 2 DIABETES MELLITUS WITH HYPERGLYCEMIA, WITH LONG-TERM CURRENT USE OF INSULIN: ICD-10-CM

## 2024-03-28 DIAGNOSIS — E11.65 TYPE 2 DIABETES MELLITUS WITH HYPERGLYCEMIA, WITH LONG-TERM CURRENT USE OF INSULIN: ICD-10-CM

## 2024-03-28 LAB
ALBUMIN SERPL-MCNC: 3.7 G/DL (ref 3.4–4.8)
ALBUMIN/GLOB SERPL: 1.2 RATIO (ref 1.1–2)
ALP SERPL-CCNC: 64 UNIT/L (ref 40–150)
ALT SERPL-CCNC: 29 UNIT/L (ref 0–55)
APPEARANCE UR: CLEAR
AST SERPL-CCNC: 17 UNIT/L (ref 5–34)
BACTERIA #/AREA URNS AUTO: NORMAL /HPF
BASOPHILS # BLD AUTO: 0.03 X10(3)/MCL
BASOPHILS NFR BLD AUTO: 0.4 %
BILIRUB SERPL-MCNC: 1.1 MG/DL
BILIRUB UR QL STRIP.AUTO: NEGATIVE
BUN SERPL-MCNC: 18 MG/DL (ref 8.4–25.7)
CALCIUM SERPL-MCNC: 9.5 MG/DL (ref 8.8–10)
CHLORIDE SERPL-SCNC: 104 MMOL/L (ref 98–107)
CHOLEST SERPL-MCNC: 105 MG/DL
CHOLEST/HDLC SERPL: 3 {RATIO} (ref 0–5)
CO2 SERPL-SCNC: 25 MMOL/L (ref 23–31)
COLOR UR AUTO: YELLOW
CREAT SERPL-MCNC: 1.35 MG/DL (ref 0.73–1.18)
CREAT UR-MCNC: 104.7 MG/DL (ref 63–166)
EOSINOPHIL # BLD AUTO: 0.28 X10(3)/MCL (ref 0–0.9)
EOSINOPHIL NFR BLD AUTO: 3.6 %
ERYTHROCYTE [DISTWIDTH] IN BLOOD BY AUTOMATED COUNT: 14.4 % (ref 11.5–17)
EST. AVERAGE GLUCOSE BLD GHB EST-MCNC: 142.7 MG/DL
GFR SERPLBLD CREATININE-BSD FMLA CKD-EPI: 56 MLS/MIN/1.73/M2
GLOBULIN SER-MCNC: 3.2 GM/DL (ref 2.4–3.5)
GLUCOSE SERPL-MCNC: 91 MG/DL (ref 82–115)
GLUCOSE UR QL STRIP.AUTO: 100
HBA1C MFR BLD: 6.6 %
HCT VFR BLD AUTO: 40.3 % (ref 42–52)
HDLC SERPL-MCNC: 32 MG/DL (ref 35–60)
HGB BLD-MCNC: 13.1 G/DL (ref 14–18)
IMM GRANULOCYTES # BLD AUTO: 0.07 X10(3)/MCL (ref 0–0.04)
IMM GRANULOCYTES NFR BLD AUTO: 0.9 %
KETONES UR QL STRIP.AUTO: NEGATIVE
LDLC SERPL CALC-MCNC: 46 MG/DL (ref 50–140)
LEUKOCYTE ESTERASE UR QL STRIP.AUTO: NEGATIVE
LYMPHOCYTES # BLD AUTO: 2.3 X10(3)/MCL (ref 0.6–4.6)
LYMPHOCYTES NFR BLD AUTO: 29.8 %
MCH RBC QN AUTO: 28.6 PG (ref 27–31)
MCHC RBC AUTO-ENTMCNC: 32.5 G/DL (ref 33–36)
MCV RBC AUTO: 88 FL (ref 80–94)
MICROALBUMIN UR-MCNC: 219.5 UG/ML
MICROALBUMIN/CREAT RATIO PNL UR: 209.6 MG/GM CR (ref 0–30)
MONOCYTES # BLD AUTO: 0.57 X10(3)/MCL (ref 0.1–1.3)
MONOCYTES NFR BLD AUTO: 7.4 %
NEUTROPHILS # BLD AUTO: 4.47 X10(3)/MCL (ref 2.1–9.2)
NEUTROPHILS NFR BLD AUTO: 57.9 %
NITRITE UR QL STRIP.AUTO: NEGATIVE
PH UR STRIP.AUTO: 5.5 [PH]
PLATELET # BLD AUTO: 231 X10(3)/MCL (ref 130–400)
PMV BLD AUTO: 9.4 FL (ref 7.4–10.4)
POTASSIUM SERPL-SCNC: 4.8 MMOL/L (ref 3.5–5.1)
PROT SERPL-MCNC: 6.9 GM/DL (ref 5.8–7.6)
PROT UR QL STRIP.AUTO: 30
PSA SERPL-MCNC: 1.98 NG/ML
RBC # BLD AUTO: 4.58 X10(6)/MCL (ref 4.7–6.1)
RBC #/AREA URNS AUTO: NORMAL /HPF
RBC UR QL AUTO: NEGATIVE
SODIUM SERPL-SCNC: 139 MMOL/L (ref 136–145)
SP GR UR STRIP.AUTO: >=1.03 (ref 1–1.03)
SQUAMOUS #/AREA URNS AUTO: NORMAL /HPF
TRIGL SERPL-MCNC: 134 MG/DL (ref 34–140)
TSH SERPL-ACNC: 1.13 UIU/ML (ref 0.35–4.94)
UROBILINOGEN UR STRIP-ACNC: 0.2
VLDLC SERPL CALC-MCNC: 27 MG/DL
WBC # SPEC AUTO: 7.72 X10(3)/MCL (ref 4.5–11.5)
WBC #/AREA URNS AUTO: NORMAL /HPF

## 2024-03-28 PROCEDURE — 84443 ASSAY THYROID STIM HORMONE: CPT

## 2024-03-28 PROCEDURE — 82043 UR ALBUMIN QUANTITATIVE: CPT

## 2024-03-28 PROCEDURE — 80053 COMPREHEN METABOLIC PANEL: CPT

## 2024-03-28 PROCEDURE — 81003 URINALYSIS AUTO W/O SCOPE: CPT

## 2024-03-28 PROCEDURE — 36415 COLL VENOUS BLD VENIPUNCTURE: CPT

## 2024-03-28 PROCEDURE — 83036 HEMOGLOBIN GLYCOSYLATED A1C: CPT

## 2024-03-28 PROCEDURE — 84153 ASSAY OF PSA TOTAL: CPT

## 2024-03-28 PROCEDURE — 85025 COMPLETE CBC W/AUTO DIFF WBC: CPT

## 2024-03-28 PROCEDURE — 80061 LIPID PANEL: CPT

## 2024-04-02 NOTE — PROGRESS NOTES
"Subjective:       Patient ID: Sincere Malave is a 74 y.o. male.      Patient Care Team:  Himanshu Huggins II, MD as PCP - General (Internal Medicine)  Shaggy Campbell MD as Consulting Physician (Cardiology)  Rupa Echeverria LPN as Licensed Practical Nurse    Chief Complaint: Diabetes, Hypertension, Peripheral Vascular Disease, Dyslipidemia, Coronary Artery Disease, Chronic Kidney Disease, Anemia, Peripheral Neuropathy, and Sleep Apnea      74-year-old male seen today for followup of insulin-dependent diabetes, coronary artery disease, diabetic neuropathy, and hyperlipidemia among other conditions.        Review of Systems   Constitutional:  Negative for fever.   HENT:  Negative for nosebleeds.    Eyes:  Negative for visual disturbance.   Respiratory:  Negative for shortness of breath.    Cardiovascular:  Negative for chest pain.   Gastrointestinal:  Negative for abdominal pain.   Genitourinary:  Negative for dysuria.   Musculoskeletal:  Negative for gait problem.   Neurological:  Negative for headaches.           Patient Reported Health Risk Assessment         Objective:      Physical Exam  HENT:      Head: Normocephalic.      Mouth/Throat:      Pharynx: Oropharynx is clear.   Eyes:      Extraocular Movements: Extraocular movements intact.   Cardiovascular:      Rate and Rhythm: Normal rate and regular rhythm.   Pulmonary:      Breath sounds: Normal breath sounds.   Abdominal:      Palpations: Abdomen is soft.   Musculoskeletal:         General: No swelling.   Skin:     General: Skin is warm.   Neurological:      General: No focal deficit present.      Mental Status: He is alert and oriented to person, place, and time.   Psychiatric:         Mood and Affect: Mood normal.         Vitals:    04/03/24 0850   BP: 136/72   Pulse: 68   Resp: 16   Temp: 98.4 °F (36.9 °C)   SpO2: 97%   Weight: 87.5 kg (193 lb)   Height: 5' 7" (1.702 m)                   No data to display                  4/3/2024     9:00 AM 2/1/2024 " "   10:00 AM 10/16/2023     1:00 PM 9/27/2023    10:00 AM 3/28/2023     9:00 AM 9/21/2022    10:15 AM   Fall Risk Assessment - Outpatient   Mobility Status Ambulatory Ambulatory Ambulatory Ambulatory Ambulatory Ambulatory   Number of falls 0 0 0 0 0 0   Identified as fall risk False False False False False False                  Assessment:       Problem List Items Addressed This Visit          Neuro    Diabetic neuropathy associated with type 2 diabetes mellitus - Primary    Diabetic polyneuropathy       Cardiac/Vascular    Coronary artery disease involving native coronary artery of native heart    Essential hypertension    Peripheral vascular disease    Dyslipidemia       Renal/    Prostate cancer screening    Stage 3a chronic kidney disease       Endocrine    Type 2 diabetes mellitus with hyperglycemia, with long-term current use of insulin       Other    RESOLVED: Wellness examination       Medication List with Changes/Refills   Current Medications    ACCU-CHEK FASTCLIX LANCET DRUM [facility]        ALLOPURINOL (ZYLOPRIM) 100 MG TABLET    TAKE 1 TABLET EVERY DAY    AMLODIPINE (NORVASC) 10 MG TABLET    TAKE 1 TABLET EVERY DAY    ASPIRIN (ECOTRIN) 81 MG EC TABLET    Take 81 mg by mouth once daily.    ATORVASTATIN (LIPITOR) 40 MG TABLET    TAKE 1 TABLET EVERY DAY    BLOOD SUGAR DIAGNOSTIC (ACCU-CHEK STEVE PLUS TEST STRP) STRP    1 strip by Misc.(Non-Drug; Combo Route) route 3 (three) times daily.    DROPLET INSULIN SYRINGE 1 ML 30 GAUGE X 1/2" SYRG    USE TO INJECT INSULIN TWO TIMES DAILY    DULAGLUTIDE (TRULICITY) 0.75 MG/0.5 ML PEN INJECTOR    Inject into the skin every 7 days.    DULOXETINE (CYMBALTA) 30 MG CAPSULE    TAKE 1 CAPSULE EVERY DAY    HYDRALAZINE (APRESOLINE) 25 MG TABLET    Take 1 tablet (25 mg total) by mouth 2 (two) times daily.    INSULIN NPH-INSULIN REGULAR, 70/30, (NOVOLIN 70/30 U-100 INSULIN) 100 UNIT/ML (70-30) INJECTION    INJECT 40 UNITS SUBCUTANEOUSLY EVERY MORNING AND 30 UNITS AT BEDTIME AS " DIRECTED    LANCETS (ACCU-CHEK SOFTCLIX LANCETS) MISC    1 each by Misc.(Non-Drug; Combo Route) route 3 (three) times daily.    LISINOPRIL (PRINIVIL,ZESTRIL) 40 MG TABLET    Take 1 tablet (40 mg total) by mouth Daily.    MELOXICAM (MOBIC) 15 MG TABLET    Take 1 tablet (15 mg total) by mouth once daily.    METFORMIN (GLUCOPHAGE) 1000 MG TABLET    TAKE 1 TABLET TWICE DAILY    METHOCARBAMOL (ROBAXIN) 500 MG TAB    Take 1 tablet (500 mg total) by mouth 3 (three) times daily.    NITROGLYCERIN (NITROSTAT) 0.4 MG SL TABLET    Place 0.4 mg under the tongue as needed for Chest pain.   Discontinued Medications    AZELASTINE (ASTELIN) 137 MCG (0.1 %) NASAL SPRAY    1 spray (137 mcg total) by Nasal route 2 (two) times daily.    FLUTICASONE PROPIONATE (FLONASE) 50 MCG/ACTUATION NASAL SPRAY    1 spray (50 mcg total) by Each Nostril route 2 (two) times a day.        Plan:       1. Insulin-dependent diabetes: A1C now 6.6. On 70/30 insulin 30 units in AM and 40 units in PM. Dietary changes.  Continue Trulicity    2. Hypogonadism: He was on testosterone injections in the past, but no longer    3. Coronary artery disease: He had a stent placed many years ago. Coronary bypass surgery in 2020. He is followed by Dr. Campbell. Because heart rate is 55-60, we have avoided beta blocker    4. Diabetic peripheral neuropathy: He stopped gabapentin because of fatigue.    5. Major depression in remission: Stable on Cymbalta    6. Hyperlipidemia: LDL at goal. Stop statin for 10 days because of myalgia he reports    7. History of elevated prostate-specific antigen (PSA): Was seen by urology and PSA normal in 2019. PSA normal again    8. Urinary frequency: Started Flomax in 2018, but not taking    9. Sleep apnea: He was diagnosed with moderate sleep apnea in 2019. He is no longer using device    10. Hypertension: Continue lisinopril, amlodipine, and hydralazine.  Cardiology stopped hydrochlorothiazide. Spironolactone was discontinued in 2019 because  of hyperkalemia    11. Peripheral vascular disease: He was told that he has blockage in his carotid vessels. Followed by Dr. Campbell    12. Chronic kidney disease stage IIIa, secondary to diabetes: Avoid NSAIDs, monitor creatinine    13.  Hyperkalemia:  He will adjust his diet; he has been eating a lot of bananas; normal this time    14. Wellness: C-scope normal 6/2018, no polyps. Pneumovax 1/2021.           Medicare Annual Wellness and Personalized Prevention Plan:   Fall Risk + Home Safety + Hearing Impairment + Depression Screen + Cognitive Impairment Screen + Health Risk Assessment all reviewed    Health Maintenance Topics with due status: Not Due       Topic Last Completion Date    Colorectal Cancer Screening 06/27/2018    High Dose Statin 02/01/2024    Aspirin/Antiplatelet Therapy 02/01/2024    Diabetes Urine Screening 03/28/2024    Lipid Panel 03/28/2024    Hemoglobin A1c 03/28/2024      The patient's Health Maintenance was reviewed and the following appears to be due at this time:   Health Maintenance Due   Topic Date Due    Hepatitis C Screening  Never done    Foot Exam  Never done    TETANUS VACCINE  Never done    Shingles Vaccine (1 of 2) Never done    RSV Vaccine (Age 60+ and Pregnant patients) (1 - 1-dose 60+ series) Never done    Abdominal Aortic Aneurysm Screening  Never done    COVID-19 Vaccine (3 - 2023-24 season) 09/01/2023    Eye Exam  01/12/2024       Advance Care Planning   I attest to discussing Advance Care Planning with patient and/or family member.  Education was provided including the importance of the Health Care Power of , Advance Directives, and/or [place] documentation.  The patient expressed understanding to the importance of this information and discussion.  Length of ACP conversation in minutes: 0       Opioid Screening: Patient medication list reviewed, patient is not taking prescription opioids. Patient is not using additional opioids than prescribed. Patient is at low risk of  substance abuse based on this opioid use history.     No follow-ups on file. In addition to their scheduled follow up, the patient has also been instructed to follow up on as needed basis.

## 2024-04-03 ENCOUNTER — OFFICE VISIT (OUTPATIENT)
Dept: INTERNAL MEDICINE | Facility: CLINIC | Age: 74
End: 2024-04-03
Payer: MEDICARE

## 2024-04-03 VITALS
RESPIRATION RATE: 16 BRPM | HEIGHT: 67 IN | OXYGEN SATURATION: 97 % | BODY MASS INDEX: 30.29 KG/M2 | HEART RATE: 68 BPM | SYSTOLIC BLOOD PRESSURE: 136 MMHG | DIASTOLIC BLOOD PRESSURE: 72 MMHG | TEMPERATURE: 98 F | WEIGHT: 193 LBS

## 2024-04-03 DIAGNOSIS — E11.41 DIABETIC MONONEUROPATHY ASSOCIATED WITH TYPE 2 DIABETES MELLITUS: Primary | ICD-10-CM

## 2024-04-03 DIAGNOSIS — N18.31 STAGE 3A CHRONIC KIDNEY DISEASE: ICD-10-CM

## 2024-04-03 DIAGNOSIS — E11.65 TYPE 2 DIABETES MELLITUS WITH HYPERGLYCEMIA, WITH LONG-TERM CURRENT USE OF INSULIN: ICD-10-CM

## 2024-04-03 DIAGNOSIS — Z79.4 TYPE 2 DIABETES MELLITUS WITH HYPERGLYCEMIA, WITH LONG-TERM CURRENT USE OF INSULIN: ICD-10-CM

## 2024-04-03 DIAGNOSIS — I73.9 PERIPHERAL VASCULAR DISEASE: ICD-10-CM

## 2024-04-03 DIAGNOSIS — E11.42 DIABETIC POLYNEUROPATHY ASSOCIATED WITH TYPE 2 DIABETES MELLITUS: ICD-10-CM

## 2024-04-03 DIAGNOSIS — Z12.5 PROSTATE CANCER SCREENING: ICD-10-CM

## 2024-04-03 DIAGNOSIS — E78.5 DYSLIPIDEMIA: ICD-10-CM

## 2024-04-03 DIAGNOSIS — Z00.00 WELLNESS EXAMINATION: ICD-10-CM

## 2024-04-03 DIAGNOSIS — I25.10 CORONARY ARTERY DISEASE INVOLVING NATIVE CORONARY ARTERY OF NATIVE HEART WITHOUT ANGINA PECTORIS: ICD-10-CM

## 2024-04-03 DIAGNOSIS — I10 ESSENTIAL HYPERTENSION: ICD-10-CM

## 2024-04-03 PROCEDURE — 3066F NEPHROPATHY DOC TX: CPT | Mod: CPTII,,, | Performed by: INTERNAL MEDICINE

## 2024-04-03 PROCEDURE — 3060F POS MICROALBUMINURIA REV: CPT | Mod: CPTII,,, | Performed by: INTERNAL MEDICINE

## 2024-04-03 PROCEDURE — 4010F ACE/ARB THERAPY RXD/TAKEN: CPT | Mod: CPTII,,, | Performed by: INTERNAL MEDICINE

## 2024-04-03 PROCEDURE — 99214 OFFICE O/P EST MOD 30 MIN: CPT | Mod: ,,, | Performed by: INTERNAL MEDICINE

## 2024-04-03 PROCEDURE — 1159F MED LIST DOCD IN RCRD: CPT | Mod: CPTII,,, | Performed by: INTERNAL MEDICINE

## 2024-04-03 PROCEDURE — 3288F FALL RISK ASSESSMENT DOCD: CPT | Mod: CPTII,,, | Performed by: INTERNAL MEDICINE

## 2024-04-03 PROCEDURE — 1160F RVW MEDS BY RX/DR IN RCRD: CPT | Mod: CPTII,,, | Performed by: INTERNAL MEDICINE

## 2024-04-03 PROCEDURE — 3044F HG A1C LEVEL LT 7.0%: CPT | Mod: CPTII,,, | Performed by: INTERNAL MEDICINE

## 2024-04-03 PROCEDURE — 1101F PT FALLS ASSESS-DOCD LE1/YR: CPT | Mod: CPTII,,, | Performed by: INTERNAL MEDICINE

## 2024-04-03 PROCEDURE — 3075F SYST BP GE 130 - 139MM HG: CPT | Mod: CPTII,,, | Performed by: INTERNAL MEDICINE

## 2024-04-03 PROCEDURE — 3008F BODY MASS INDEX DOCD: CPT | Mod: CPTII,,, | Performed by: INTERNAL MEDICINE

## 2024-04-03 PROCEDURE — 3078F DIAST BP <80 MM HG: CPT | Mod: CPTII,,, | Performed by: INTERNAL MEDICINE

## 2024-04-03 RX ORDER — NITROGLYCERIN 0.4 MG/1
0.4 TABLET SUBLINGUAL
COMMUNITY
Start: 2024-02-08

## 2024-04-15 ENCOUNTER — TELEPHONE (OUTPATIENT)
Dept: INTERNAL MEDICINE | Facility: CLINIC | Age: 74
End: 2024-04-15
Payer: MEDICARE

## 2024-04-15 DIAGNOSIS — I25.10 CORONARY ARTERY DISEASE INVOLVING NATIVE CORONARY ARTERY OF NATIVE HEART WITHOUT ANGINA PECTORIS: Primary | ICD-10-CM

## 2024-04-15 RX ORDER — ATORVASTATIN CALCIUM 10 MG/1
10 TABLET, FILM COATED ORAL DAILY
Qty: 30 TABLET | Refills: 11 | Status: SHIPPED | OUTPATIENT
Start: 2024-04-15 | End: 2025-04-15

## 2024-04-15 NOTE — TELEPHONE ENCOUNTER
----- Message from Jodie Mendez sent at 4/15/2024  8:46 AM CDT -----  Regarding: medical advise  Type:  Needs Medical Advice    Who Called: pt     Pharmacy name and phone #:  [facility] in [place]     Would the patient rather a call back or a response via [facility]? C/b     Best Call Back Number: [phone]    Additional Information: pt wife is calling in because she wants to speak with the nurse about atorvastatin (LIPITOR) 40 MG tablet she would like for the dosage to be lowered. Please call to advise.

## 2024-04-15 NOTE — TELEPHONE ENCOUNTER
Myalgia has gotten better since stopping lipitor 40. Please advise.   Post-Care Instructions: I reviewed with the patient in detail post-care instructions. Patient is not to engage in any heavy lifting, exercise, or swimming for the next 14 days. Should the patient develop any fevers, chills, bleeding, severe pain patient will contact the office immediately.

## 2024-04-16 ENCOUNTER — TELEPHONE (OUTPATIENT)
Dept: INTERNAL MEDICINE | Facility: CLINIC | Age: 74
End: 2024-04-16
Payer: MEDICARE

## 2024-04-16 NOTE — TELEPHONE ENCOUNTER
----- Message from [facility] sent at 4/16/2024 10:59 AM CDT -----  .Type:  Needs Medical Advice    Who Called:  pt's wife ( [place])    Symptoms (please be specific):  no     How long has patient had these symptoms:   no    Pharmacy name and phone #:   no    Would the patient rather a call back or a response via [facility]?      Best Call Back Number:  [phone]    Additional Information:  pt wants to tell Kale that all of his paper work is done he said you will know what he is talking about thanks

## 2024-04-24 DIAGNOSIS — I10 ESSENTIAL HYPERTENSION: ICD-10-CM

## 2024-04-24 RX ORDER — LISINOPRIL 40 MG/1
40 TABLET ORAL DAILY
Qty: 90 TABLET | Refills: 3 | Status: SHIPPED | OUTPATIENT
Start: 2024-04-24 | End: 2025-04-24

## 2024-05-16 DIAGNOSIS — I10 ESSENTIAL HYPERTENSION: ICD-10-CM

## 2024-05-16 RX ORDER — HYDRALAZINE HYDROCHLORIDE 25 MG/1
25 TABLET, FILM COATED ORAL 2 TIMES DAILY
Qty: 180 TABLET | Refills: 3 | Status: SHIPPED | OUTPATIENT
Start: 2024-05-16 | End: 2025-05-16

## 2024-07-11 ENCOUNTER — TELEPHONE (OUTPATIENT)
Dept: INTERNAL MEDICINE | Facility: CLINIC | Age: 74
End: 2024-07-11

## 2024-07-11 ENCOUNTER — CLINICAL SUPPORT (OUTPATIENT)
Dept: INTERNAL MEDICINE | Facility: CLINIC | Age: 74
End: 2024-07-11
Payer: MEDICARE

## 2024-07-11 VITALS — DIASTOLIC BLOOD PRESSURE: 70 MMHG | SYSTOLIC BLOOD PRESSURE: 156 MMHG

## 2024-07-11 DIAGNOSIS — I10 ESSENTIAL HYPERTENSION: Primary | ICD-10-CM

## 2024-07-11 RX ORDER — HYDRALAZINE HYDROCHLORIDE 50 MG/1
50 TABLET, FILM COATED ORAL 3 TIMES DAILY
Qty: 90 TABLET | Refills: 2 | Status: SHIPPED | OUTPATIENT
Start: 2024-07-11 | End: 2025-07-11

## 2024-07-11 NOTE — TELEPHONE ENCOUNTER
----- Message from Flo Ledesma sent at 7/11/2024  8:52 AM CDT -----  .Who Called: Sincere Malave    Caller is requesting assistance/information from provider's office.    Symptoms (please be specific): High Blood pressure 190/60   How long has patient had these symptoms:    List of preferred pharmacies on file (remove unneeded): [unfilled]  If different, enter pharmacy into here including location and phone number: n/a      Preferred Method of Contact: Phone Call  Patient's Preferred Phone Number on File: [phone]   Best Call Back Number, if different:  Additional Information:

## 2024-07-11 NOTE — TELEPHONE ENCOUNTER
156/73 and 156/70 at nurse visit today. One BP reading was with his machine and the other was manually.

## 2024-07-11 NOTE — TELEPHONE ENCOUNTER
Since yesterday monitoring BP feeling off with a headache, lightheaded, overly tired. Taking all meds. Thought it was allergies and he took an allergy medication. Not a decongestant.   Yesterdays readings:    188/63 2:30  188/70 5:00  206/71 8:00  184/76 8:00  178/80 6:30 L  203/76 R   196/77 R   190/66 8:50       Will have him come in for nurse visit to check BP this morning at 11.

## 2024-07-22 DIAGNOSIS — I25.5 ISCHEMIC CARDIOMYOPATHY: ICD-10-CM

## 2024-07-22 DIAGNOSIS — I10 HTN (HYPERTENSION): Primary | ICD-10-CM

## 2024-07-26 ENCOUNTER — HOSPITAL ENCOUNTER (OUTPATIENT)
Dept: RADIOLOGY | Facility: HOSPITAL | Age: 74
Discharge: HOME | End: 2024-07-26
Attending: INTERNAL MEDICINE
Payer: MEDICARE

## 2024-07-26 DIAGNOSIS — I10 HTN (HYPERTENSION): ICD-10-CM

## 2024-07-26 PROCEDURE — 76770 US EXAM ABDO BACK WALL COMP: CPT | Mod: TC

## 2024-07-30 ENCOUNTER — TELEPHONE (OUTPATIENT)
Dept: INTERNAL MEDICINE | Facility: CLINIC | Age: 74
End: 2024-07-30
Payer: MEDICARE

## 2024-07-30 RX ORDER — NIFEDIPINE 60 MG/1
60 TABLET, EXTENDED RELEASE ORAL DAILY
COMMUNITY
Start: 2024-07-22

## 2024-07-30 NOTE — TELEPHONE ENCOUNTER
I did not see anything worrisome on his labs.  He looked slightly dehydrated on his labs, causing creatinine to go up to 1.44 and calcium to bump up slightly.  Nothing overly concerning.

## 2024-07-30 NOTE — TELEPHONE ENCOUNTER
----- Message from Maryam Forbes sent at 7/30/2024  8:38 AM CDT -----  Regarding: test results  Who Called: Sincere Malave    Caller is requesting information on test results.    Name of Test (Lab/Mammo/Etc): lab  Date of Test: 7/26    Patient's Preferred Phone Number on File: [phone]     Additional Information: stated that she would like a call back with the results for recent labs. Please advise

## 2024-07-30 NOTE — TELEPHONE ENCOUNTER
Spoke with jazmine at this time at [facility]. He will credit all of the tests that were repeated when they should not have been ran.

## 2024-08-22 ENCOUNTER — LAB VISIT (OUTPATIENT)
Dept: LAB | Facility: HOSPITAL | Age: 74
End: 2024-08-22
Attending: SPECIALIST
Payer: MEDICARE

## 2024-08-22 DIAGNOSIS — Z01.810 PRE-OPERATIVE CARDIOVASCULAR EXAMINATION: ICD-10-CM

## 2024-08-22 DIAGNOSIS — I71.40 ABDOMINAL AORTIC ANEURYSM WITHOUT RUPTURE: Primary | ICD-10-CM

## 2024-08-22 DIAGNOSIS — I70.1 ATHEROSCLEROSIS OF RENAL ARTERY: ICD-10-CM

## 2024-08-22 LAB
ANION GAP SERPL CALC-SCNC: 10 MEQ/L
BASOPHILS # BLD AUTO: 0.06 X10(3)/MCL
BASOPHILS NFR BLD AUTO: 0.6 %
BUN SERPL-MCNC: 35 MG/DL (ref 8.4–25.7)
CALCIUM SERPL-MCNC: 9.8 MG/DL (ref 8.8–10)
CHLORIDE SERPL-SCNC: 109 MMOL/L (ref 98–107)
CO2 SERPL-SCNC: 22 MMOL/L (ref 23–31)
CREAT SERPL-MCNC: 1.73 MG/DL (ref 0.73–1.18)
CREAT/UREA NIT SERPL: 20
EOSINOPHIL # BLD AUTO: 0.48 X10(3)/MCL (ref 0–0.9)
EOSINOPHIL NFR BLD AUTO: 5.2 %
ERYTHROCYTE [DISTWIDTH] IN BLOOD BY AUTOMATED COUNT: 15 % (ref 11.5–17)
GFR SERPLBLD CREATININE-BSD FMLA CKD-EPI: 41 ML/MIN/1.73/M2
GLUCOSE SERPL-MCNC: 122 MG/DL (ref 82–115)
HCT VFR BLD AUTO: 39.8 % (ref 42–52)
HGB BLD-MCNC: 12.8 G/DL (ref 14–18)
IMM GRANULOCYTES # BLD AUTO: 0.06 X10(3)/MCL (ref 0–0.04)
IMM GRANULOCYTES NFR BLD AUTO: 0.6 %
LYMPHOCYTES # BLD AUTO: 2.62 X10(3)/MCL (ref 0.6–4.6)
LYMPHOCYTES NFR BLD AUTO: 28.1 %
MCH RBC QN AUTO: 29.2 PG (ref 27–31)
MCHC RBC AUTO-ENTMCNC: 32.2 G/DL (ref 33–36)
MCV RBC AUTO: 90.9 FL (ref 80–94)
MONOCYTES # BLD AUTO: 0.67 X10(3)/MCL (ref 0.1–1.3)
MONOCYTES NFR BLD AUTO: 7.2 %
NEUTROPHILS # BLD AUTO: 5.43 X10(3)/MCL (ref 2.1–9.2)
NEUTROPHILS NFR BLD AUTO: 58.3 %
PLATELET # BLD AUTO: 298 X10(3)/MCL (ref 130–400)
PMV BLD AUTO: 9.3 FL (ref 7.4–10.4)
POTASSIUM SERPL-SCNC: 5.8 MMOL/L (ref 3.5–5.1)
RBC # BLD AUTO: 4.38 X10(6)/MCL (ref 4.7–6.1)
SODIUM SERPL-SCNC: 141 MMOL/L (ref 136–145)
WBC # BLD AUTO: 9.32 X10(3)/MCL (ref 4.5–11.5)

## 2024-08-22 PROCEDURE — 36415 COLL VENOUS BLD VENIPUNCTURE: CPT

## 2024-08-22 PROCEDURE — 85025 COMPLETE CBC W/AUTO DIFF WBC: CPT

## 2024-08-22 PROCEDURE — 80048 BASIC METABOLIC PNL TOTAL CA: CPT

## 2024-08-27 DIAGNOSIS — I10 ESSENTIAL HYPERTENSION: ICD-10-CM

## 2024-08-27 RX ORDER — DULOXETIN HYDROCHLORIDE 30 MG/1
30 CAPSULE, DELAYED RELEASE ORAL DAILY
Qty: 90 CAPSULE | Refills: 3 | Status: SHIPPED | OUTPATIENT
Start: 2024-08-27 | End: 2025-08-27

## 2024-09-13 ENCOUNTER — LAB VISIT (OUTPATIENT)
Dept: LAB | Facility: HOSPITAL | Age: 74
End: 2024-09-13
Attending: INTERNAL MEDICINE
Payer: MEDICARE

## 2024-09-13 DIAGNOSIS — I10 ESSENTIAL HYPERTENSION, MALIGNANT: ICD-10-CM

## 2024-09-13 DIAGNOSIS — I70.1 ATHEROSCLEROSIS OF RENAL ARTERY: Primary | ICD-10-CM

## 2024-09-13 LAB
ANION GAP SERPL CALC-SCNC: 12 MEQ/L
BUN SERPL-MCNC: 32.1 MG/DL (ref 8.4–25.7)
CALCIUM SERPL-MCNC: 9.8 MG/DL (ref 8.8–10)
CHLORIDE SERPL-SCNC: 106 MMOL/L (ref 98–107)
CO2 SERPL-SCNC: 23 MMOL/L (ref 23–31)
CREAT SERPL-MCNC: 1.98 MG/DL (ref 0.73–1.18)
CREAT/UREA NIT SERPL: 16
GFR SERPLBLD CREATININE-BSD FMLA CKD-EPI: 35 ML/MIN/1.73/M2
GLUCOSE SERPL-MCNC: 180 MG/DL (ref 82–115)
POTASSIUM SERPL-SCNC: 4.8 MMOL/L (ref 3.5–5.1)
SODIUM SERPL-SCNC: 141 MMOL/L (ref 136–145)

## 2024-09-13 PROCEDURE — 80048 BASIC METABOLIC PNL TOTAL CA: CPT

## 2024-09-13 PROCEDURE — 36415 COLL VENOUS BLD VENIPUNCTURE: CPT

## 2024-09-20 ENCOUNTER — LAB VISIT (OUTPATIENT)
Dept: LAB | Facility: HOSPITAL | Age: 74
End: 2024-09-20
Attending: INTERNAL MEDICINE
Payer: MEDICARE

## 2024-09-20 DIAGNOSIS — I10 ESSENTIAL HYPERTENSION, MALIGNANT: ICD-10-CM

## 2024-09-20 DIAGNOSIS — I70.1 ATHEROSCLEROSIS OF RENAL ARTERY: Primary | ICD-10-CM

## 2024-09-20 LAB
ANION GAP SERPL CALC-SCNC: 7 MEQ/L
BUN SERPL-MCNC: 38.3 MG/DL (ref 8.4–25.7)
CALCIUM SERPL-MCNC: 10 MG/DL (ref 8.8–10)
CHLORIDE SERPL-SCNC: 110 MMOL/L (ref 98–107)
CO2 SERPL-SCNC: 25 MMOL/L (ref 23–31)
CREAT SERPL-MCNC: 2.1 MG/DL (ref 0.73–1.18)
CREAT/UREA NIT SERPL: 18
GFR SERPLBLD CREATININE-BSD FMLA CKD-EPI: 33 ML/MIN/1.73/M2
GLUCOSE SERPL-MCNC: 155 MG/DL (ref 82–115)
POTASSIUM SERPL-SCNC: 5.3 MMOL/L (ref 3.5–5.1)
SODIUM SERPL-SCNC: 142 MMOL/L (ref 136–145)

## 2024-09-20 PROCEDURE — 80048 BASIC METABOLIC PNL TOTAL CA: CPT

## 2024-09-20 PROCEDURE — 36415 COLL VENOUS BLD VENIPUNCTURE: CPT

## 2024-09-25 ENCOUNTER — TELEPHONE (OUTPATIENT)
Dept: INTERNAL MEDICINE | Facility: CLINIC | Age: 74
End: 2024-09-25
Payer: MEDICARE

## 2024-09-25 NOTE — TELEPHONE ENCOUNTER
----- Message from Agata Lee sent at 9/25/2024  1:15 PM CDT -----  .Type:  Patient Returning Call    Who Called:[place]  Who Left Message for Patient:wife  Does the patient know what this is regarding?:records  Would the patient rather a call back or a response via [facility]?   Best Call Back Number:[phone]  Additional Information: Please get records from [facility] [place] from recent hospital stay

## 2024-09-26 ENCOUNTER — TELEPHONE (OUTPATIENT)
Dept: INTERNAL MEDICINE | Facility: CLINIC | Age: 74
End: 2024-09-26
Payer: MEDICARE

## 2024-09-26 ENCOUNTER — LAB VISIT (OUTPATIENT)
Dept: LAB | Facility: HOSPITAL | Age: 74
End: 2024-09-26
Attending: INTERNAL MEDICINE
Payer: MEDICARE

## 2024-09-26 DIAGNOSIS — N17.9 ACUTE KIDNEY FAILURE, UNSPECIFIED: ICD-10-CM

## 2024-09-26 DIAGNOSIS — I70.1 ATHEROSCLEROSIS OF RENAL ARTERY: Primary | ICD-10-CM

## 2024-09-26 DIAGNOSIS — I10 ESSENTIAL HYPERTENSION, MALIGNANT: ICD-10-CM

## 2024-09-26 LAB
CREAT 24H UR-MCNC: 1587 MG/DAY (ref 710–1650)
CREAT UR-MCNC: 105.8 MG/DL (ref 63–166)
TOTAL VOLUME  (OHS): 1500 ML

## 2024-09-26 PROCEDURE — 82384 ASSAY THREE CATECHOLAMINES: CPT

## 2024-09-26 PROCEDURE — 82570 ASSAY OF URINE CREATININE: CPT

## 2024-09-26 NOTE — TELEPHONE ENCOUNTER
1. Are there any outstanding tasks in the patient's chart? Yes, fasting labs    2. Is there any documentation in the chart? No, labs needed     I have cancelled duplicate labs that were done at [facility]. Still needs labs that were not done.

## 2024-09-30 ENCOUNTER — LAB VISIT (OUTPATIENT)
Dept: LAB | Facility: HOSPITAL | Age: 74
End: 2024-09-30
Attending: INTERNAL MEDICINE
Payer: MEDICARE

## 2024-09-30 DIAGNOSIS — N18.31 STAGE 3A CHRONIC KIDNEY DISEASE: ICD-10-CM

## 2024-09-30 DIAGNOSIS — I25.10 CORONARY ARTERY DISEASE INVOLVING NATIVE CORONARY ARTERY OF NATIVE HEART WITHOUT ANGINA PECTORIS: ICD-10-CM

## 2024-09-30 DIAGNOSIS — I73.9 PERIPHERAL VASCULAR DISEASE: ICD-10-CM

## 2024-09-30 DIAGNOSIS — I10 ESSENTIAL HYPERTENSION: ICD-10-CM

## 2024-09-30 DIAGNOSIS — Z12.5 PROSTATE CANCER SCREENING: ICD-10-CM

## 2024-09-30 DIAGNOSIS — E11.42 DIABETIC POLYNEUROPATHY ASSOCIATED WITH TYPE 2 DIABETES MELLITUS: ICD-10-CM

## 2024-09-30 DIAGNOSIS — Z79.4 TYPE 2 DIABETES MELLITUS WITH HYPERGLYCEMIA, WITH LONG-TERM CURRENT USE OF INSULIN: ICD-10-CM

## 2024-09-30 DIAGNOSIS — E11.65 TYPE 2 DIABETES MELLITUS WITH HYPERGLYCEMIA, WITH LONG-TERM CURRENT USE OF INSULIN: ICD-10-CM

## 2024-09-30 DIAGNOSIS — E11.41 DIABETIC MONONEUROPATHY ASSOCIATED WITH TYPE 2 DIABETES MELLITUS: ICD-10-CM

## 2024-09-30 DIAGNOSIS — Z00.00 WELLNESS EXAMINATION: ICD-10-CM

## 2024-09-30 DIAGNOSIS — E78.5 DYSLIPIDEMIA: ICD-10-CM

## 2024-09-30 LAB
BACTERIA #/AREA URNS AUTO: NORMAL /HPF
BILIRUB UR QL STRIP.AUTO: NEGATIVE
CHOLEST SERPL-MCNC: 122 MG/DL
CHOLEST/HDLC SERPL: 3 {RATIO} (ref 0–5)
CLARITY UR: CLEAR
COLLECT DURATION TIME UR: 24 H
COLOR UR AUTO: NORMAL
CREAT UR-MCNC: 105.9 MG/DL (ref 63–166)
DOPAMINE 24H UR-MRATE: 176 MCG/24 H (ref 65–400)
EPINEPH 24H UR-MRATE: 1.4 MCG/24 H
EST. AVERAGE GLUCOSE BLD GHB EST-MCNC: 142.7 MG/DL
GLUCOSE UR QL STRIP: NEGATIVE
HBA1C MFR BLD: 6.6 %
HDLC SERPL-MCNC: 37 MG/DL (ref 35–60)
HGB UR QL STRIP: NEGATIVE
KETONES UR QL STRIP: NEGATIVE
LDLC SERPL CALC-MCNC: 57 MG/DL (ref 50–140)
LEUKOCYTE ESTERASE UR QL STRIP: NEGATIVE
MICROALBUMIN UR-MCNC: 39.5 UG/ML
MICROALBUMIN/CREAT RATIO PNL UR: 37.3 MG/GM CR (ref 0–30)
NITRITE UR QL STRIP: NEGATIVE
NOREPINEPH 24H UR-MRATE: 69 MCG/24 H (ref 15–80)
PH UR STRIP: 5.5 [PH]
PROT UR QL STRIP: NEGATIVE
RBC #/AREA URNS AUTO: NORMAL /HPF
SP GR UR STRIP.AUTO: 1.02 (ref 1–1.03)
SPECIMEN VOL 24H UR: 1500 ML
SQUAMOUS #/AREA URNS AUTO: NORMAL /HPF
TRIGL SERPL-MCNC: 142 MG/DL (ref 34–140)
TSH SERPL-ACNC: 1.28 UIU/ML (ref 0.35–4.94)
UROBILINOGEN UR STRIP-ACNC: 0.2
VLDLC SERPL CALC-MCNC: 28 MG/DL
WBC #/AREA URNS AUTO: NORMAL /HPF

## 2024-09-30 PROCEDURE — 82570 ASSAY OF URINE CREATININE: CPT

## 2024-09-30 PROCEDURE — 36415 COLL VENOUS BLD VENIPUNCTURE: CPT

## 2024-09-30 PROCEDURE — 82043 UR ALBUMIN QUANTITATIVE: CPT

## 2024-09-30 PROCEDURE — 81003 URINALYSIS AUTO W/O SCOPE: CPT

## 2024-09-30 PROCEDURE — 80061 LIPID PANEL: CPT

## 2024-09-30 PROCEDURE — 81001 URINALYSIS AUTO W/SCOPE: CPT

## 2024-09-30 PROCEDURE — 84443 ASSAY THYROID STIM HORMONE: CPT

## 2024-09-30 PROCEDURE — 83036 HEMOGLOBIN GLYCOSYLATED A1C: CPT

## 2024-10-02 PROBLEM — N18.32 STAGE 3B CHRONIC KIDNEY DISEASE: Status: ACTIVE | Noted: 2024-10-02

## 2024-10-02 PROBLEM — F32.5 MAJOR DEPRESSION IN REMISSION: Status: ACTIVE | Noted: 2024-10-02

## 2024-10-02 NOTE — PROGRESS NOTES
Subjective:       Patient ID: Sincere Malave is a 74 y.o. male.      Patient Care Team:  Himanshu Huggins II, MD as PCP - General (Internal Medicine)  Shaggy Campbell MD as Consulting Physician (Cardiology)  Rupa Echeverria LPN as Licensed Practical Nurse  Valerio Geronimo MD as Consulting Physician (Vascular Surgery)  Raji Meade MD as Consulting Physician (Nephrology)    Chief Complaint: Medicare AWV Follow Up, Anxiety (3 weeks ), and Dizziness (Since July )      74-year-old male seen today for followup of insulin-dependent diabetes, coronary artery disease, diabetic neuropathy, and hyperlipidemia among other conditions.  He reports ongoing dizziness that started back in July 2024 related to high blood pressure.      Review of Systems   Constitutional:  Negative for fever.   HENT:  Negative for nosebleeds.    Eyes:  Negative for visual disturbance.   Respiratory:  Negative for shortness of breath.    Cardiovascular:  Negative for chest pain.   Gastrointestinal:  Negative for abdominal pain.   Genitourinary:  Negative for dysuria.   Musculoskeletal:  Negative for gait problem.   Neurological:  Positive for dizziness. Negative for headaches.           Patient Reported Health Risk Assessment         Objective:      Physical Exam  HENT:      Head: Normocephalic.      Mouth/Throat:      Pharynx: Oropharynx is clear.   Eyes:      Extraocular Movements: Extraocular movements intact.   Cardiovascular:      Rate and Rhythm: Normal rate and regular rhythm.   Pulmonary:      Breath sounds: Normal breath sounds.   Abdominal:      Palpations: Abdomen is soft.   Musculoskeletal:         General: No swelling.   Skin:     General: Skin is warm.   Neurological:      General: No focal deficit present.      Mental Status: He is alert and oriented to person, place, and time.   Psychiatric:         Mood and Affect: Mood normal.         Vitals:    10/03/24 0813   BP: 139/80   BP Location: Left arm   Patient  "Position: Sitting   Pulse: 60   Resp: 16   Temp: 97.5 °F (36.4 °C)   SpO2: 98%   Weight: 87 kg (191 lb 12.8 oz)   Height: 5' 7" (1.702 m)                     No data to display                  10/3/2024     8:30 AM 4/3/2024     9:00 AM 2/1/2024    10:00 AM 10/16/2023     1:00 PM 9/27/2023    10:00 AM 3/28/2023     9:00 AM 9/21/2022    10:15 AM   Fall Risk Assessment - Outpatient   Mobility Status Ambulatory Ambulatory Ambulatory Ambulatory Ambulatory Ambulatory Ambulatory   Number of falls 2+ 0 0 0 0 0 0   Identified as fall risk True False False False False False False                  Assessment:       Problem List Items Addressed This Visit       Coronary artery disease involving native coronary artery of native heart    Relevant Orders    CBC Auto Differential    Comprehensive Metabolic Panel    Lipid Panel    Microalbumin/Creatinine Ratio, Urine    Urinalysis, Reflex to Urine Culture    TSH    Hemoglobin A1C    CBC Auto Differential    Comprehensive Metabolic Panel    Type 2 diabetes mellitus with hyperglycemia, with long-term current use of insulin    Relevant Orders    CBC Auto Differential    Comprehensive Metabolic Panel    Lipid Panel    Microalbumin/Creatinine Ratio, Urine    Urinalysis, Reflex to Urine Culture    TSH    Hemoglobin A1C    CBC Auto Differential    Comprehensive Metabolic Panel    Essential hypertension    Relevant Orders    CBC Auto Differential    Comprehensive Metabolic Panel    Lipid Panel    Microalbumin/Creatinine Ratio, Urine    Urinalysis, Reflex to Urine Culture    TSH    Hemoglobin A1C    CBC Auto Differential    Comprehensive Metabolic Panel    RESOLVED: Wellness examination - Primary    Relevant Orders    CBC Auto Differential    Comprehensive Metabolic Panel    Lipid Panel    Microalbumin/Creatinine Ratio, Urine    Urinalysis, Reflex to Urine Culture    TSH    Hemoglobin A1C    CBC Auto Differential    Comprehensive Metabolic Panel    Diabetic polyneuropathy    Relevant Orders "    CBC Auto Differential    Comprehensive Metabolic Panel    Lipid Panel    Microalbumin/Creatinine Ratio, Urine    Urinalysis, Reflex to Urine Culture    TSH    Hemoglobin A1C    CBC Auto Differential    Comprehensive Metabolic Panel    Prostate cancer screening    Relevant Orders    CBC Auto Differential    Comprehensive Metabolic Panel    Lipid Panel    Microalbumin/Creatinine Ratio, Urine    Urinalysis, Reflex to Urine Culture    TSH    Hemoglobin A1C    CBC Auto Differential    Comprehensive Metabolic Panel    Dyslipidemia    Relevant Orders    CBC Auto Differential    Comprehensive Metabolic Panel    Lipid Panel    Microalbumin/Creatinine Ratio, Urine    Urinalysis, Reflex to Urine Culture    TSH    Hemoglobin A1C    CBC Auto Differential    Comprehensive Metabolic Panel    Stage 3a chronic kidney disease    Relevant Orders    CBC Auto Differential    Comprehensive Metabolic Panel    Lipid Panel    Microalbumin/Creatinine Ratio, Urine    Urinalysis, Reflex to Urine Culture    TSH    Hemoglobin A1C    CBC Auto Differential    Comprehensive Metabolic Panel    Stage 3b chronic kidney disease    Relevant Orders    CBC Auto Differential    Comprehensive Metabolic Panel    Lipid Panel    Microalbumin/Creatinine Ratio, Urine    Urinalysis, Reflex to Urine Culture    TSH    Hemoglobin A1C    CBC Auto Differential    Comprehensive Metabolic Panel    Major depression in remission    Relevant Orders    CBC Auto Differential    Comprehensive Metabolic Panel    Lipid Panel    Microalbumin/Creatinine Ratio, Urine    Urinalysis, Reflex to Urine Culture    TSH    Hemoglobin A1C    CBC Auto Differential    Comprehensive Metabolic Panel       Medication List with Changes/Refills   Current Medications    ACCU-CHEK FASTCLIX LANCET DRUM MISC        ALLOPURINOL (ZYLOPRIM) 100 MG TABLET    TAKE 1 TABLET EVERY DAY    ASPIRIN (ECOTRIN) 81 MG EC TABLET    Take 81 mg by mouth once daily.    ATORVASTATIN (LIPITOR) 10 MG TABLET    Take 1  "tablet (10 mg total) by mouth once daily.    BLOOD SUGAR DIAGNOSTIC (ACCU-CHEK STEVE PLUS TEST STRP) STRP    1 strip by Misc.(Non-Drug; Combo Route) route 3 (three) times daily.    CLOPIDOGREL (PLAVIX) 75 MG TABLET    Take 75 mg by mouth once daily.    DROPLET INSULIN SYRINGE 1 ML 30 GAUGE X 1/2" SYRG    USE TO INJECT INSULIN TWO TIMES DAILY    DULAGLUTIDE (TRULICITY) 0.75 MG/0.5 ML PEN INJECTOR    Inject 0.75 mg into the skin every 7 days.    DULOXETINE (CYMBALTA) 30 MG CAPSULE    Take 1 capsule (30 mg total) by mouth once daily.    HYDRALAZINE (APRESOLINE) 100 MG TABLET    Take 100 mg by mouth every 8 (eight) hours.    INSULIN NPH-INSULIN REGULAR, 70/30, (NOVOLIN 70/30 U-100 INSULIN) 100 UNIT/ML (70-30) INJECTION    INJECT 40 UNITS SUBCUTANEOUSLY EVERY MORNING AND 30 UNITS AT BEDTIME AS DIRECTED    LANCETS (ACCU-CHEK SOFTCLIX LANCETS) MISC    1 each by Misc.(Non-Drug; Combo Route) route 3 (three) times daily.    LISINOPRIL (PRINIVIL,ZESTRIL) 20 MG TABLET    Take 20 mg by mouth once daily.    METFORMIN (GLUCOPHAGE) 1000 MG TABLET    TAKE 1 TABLET TWICE DAILY    NITROGLYCERIN (NITROSTAT) 0.4 MG SL TABLET    Place 0.4 mg under the tongue as needed for Chest pain.   Discontinued Medications    HYDRALAZINE (APRESOLINE) 50 MG TABLET    Take 1 tablet (50 mg total) by mouth 3 (three) times daily.    LISINOPRIL (PRINIVIL,ZESTRIL) 40 MG TABLET    Take 1 tablet (40 mg total) by mouth once daily.    MELOXICAM (MOBIC) 15 MG TABLET    Take 1 tablet (15 mg total) by mouth once daily.    METHOCARBAMOL (ROBAXIN) 500 MG TAB    Take 1 tablet (500 mg total) by mouth 3 (three) times daily.    NIFEDIPINE (PROCARDIA-XL) 60 MG (OSM) 24 HR TABLET    Take 60 mg by mouth once daily.        Plan:       1. Insulin-dependent diabetes: A1C is 6.6. On 70/30 insulin 30 units in AM and 40 units in PM. Dietary changes.  Continue Trulicity, stop metformin because of elevated creatinine    2. Hypogonadism: He was on testosterone injections in the " past, but no longer    3. Coronary artery disease: He had a stent placed many years ago. Coronary bypass surgery 6-vessel in 2020. He is followed by Dr. Campbell. Because heart rate is 55-60, we have avoided beta blocker    4. Diabetic peripheral neuropathy: He stopped gabapentin because of fatigue.    5. Major depression in remission: Stable on Cymbalta    6. Hyperlipidemia: LDL at goal.  Continue atorvastatin    7. History of elevated prostate-specific antigen (PSA): Was seen by urology and PSA normal in 2019. PSA normal again 7/2024    8. Urinary frequency: Started Flomax in 2018, but not taking    9. Sleep apnea: He was diagnosed with moderate sleep apnea in 2019. He is no longer using device. He will see Dr. Meade soon to have a repeat study    10. Hypertension: Continue lisinopril 20 mg and hydralazine.  Cardiology stopped hydrochlorothiazide and nifedipine. Spironolactone was discontinued in 2019 because of hyperkalemia. Hospitalized with near syncope and hypotension 9/2024 at [facility] after initiation of spironolactone, creatinine was up to 2.79.  Lisinopril was decreased to 20 mg.  Dizziness and unsteady gait.  He had bilateral renal angioplasty with Dr. Janell blair with stent placement on the right and PTA on the left without stent in 8/2024.  Ultrasound showed residual left renal artery stenosis.    11. Peripheral vascular disease: He was told that he has blockage in his carotid vessels.  Renal artery stenosis, as above.  Followed by Dr. Campbell, continue Plavix and aspirin    12. Chronic kidney disease stage IIIa/b, secondary to diabetes: Avoid NSAIDs, creatinine was decent over the past few years.  He underwent angiogram in 8/2024 for renal artery stenosis and received contrast.  He then was started on spironolactone in 9/2024 and was hospitalized with elevated creatinine and dehydration.  Recheck kidney function next week.  Hydration encouraged    13.  Hyperkalemia:  He will adjust his diet; he has been eating  a lot of bananas; 5.3 this time    14. Wellness: C-scope normal 6/2018, no polyps. Pneumovax 1/2021.     Start metformin.  He says that dizziness was actually better this morning, for the 1st time in a while.  Dizziness started in July 2024, before renal artery procedure.  He then received contrast and was started on spironolactone which affected kidney function and blood pressure.  We will see how he does over the next few weeks.  Dizziness is related to fluctuations in blood pressure and dehydration.  He will have a sleep study in the near future with Dr. Meade.          Medicare Annual Wellness and Personalized Prevention Plan:   Fall Risk + Home Safety + Hearing Impairment + Depression Screen + Cognitive Impairment Screen + Health Risk Assessment all reviewed    Health Maintenance Topics with due status: Not Due       Topic Last Completion Date    Colorectal Cancer Screening 06/27/2018    Eye Exam 06/03/2024    Diabetes Urine Screening 09/30/2024    Lipid Panel 09/30/2024    Hemoglobin A1c 09/30/2024    High Dose Statin 10/03/2024    Aspirin/Antiplatelet Therapy 10/03/2024      The patient's Health Maintenance was reviewed and the following appears to be due at this time:   Health Maintenance Due   Topic Date Due    Hepatitis C Screening  Never done    Foot Exam  Never done    TETANUS VACCINE  Never done    Shingles Vaccine (1 of 2) Never done    RSV Vaccine (Age 60+ and Pregnant patients) (1 - Risk 60-74 years 1-dose series) Never done    Abdominal Aortic Aneurysm Screening  Never done    Influenza Vaccine (1) 09/01/2024       Advance Care Planning   I attest to discussing Advance Care Planning with patient and/or family member.  Education was provided including the importance of the Health Care Power of , Advance Directives, and/or [place] documentation.  The patient expressed understanding to the importance of this information and discussion.  Length of ACP conversation in minutes: 1    Advance Care  Planning     Date: 10/03/2024    Living Will  During this visit, I engaged the patient  in the voluntary advance care planning process.  The patient and I reviewed the role for advance directives and their purpose in directing future healthcare if the patient's unable to speak for him/herself.  At this point in time, the patient does have full decision-making capacity.  We discussed different extreme health states that he could experience, and reviewed what kind of medical care he would want in those situations.  The patient communicated that if he were comatose and had little chance of a meaningful recovery, he would want machines/life-sustaining treatments used. In addition to the above preference, other important end-of-life issues for the patient include  x . The patient has completed a living will to reflect these preferences.  I spent a total of 1 minutes engaging the patient in this advance care planning discussion.                 Opioid Screening: Patient medication list reviewed, patient is not taking prescription opioids. Patient is not using additional opioids than prescribed. Patient is at low risk of substance abuse based on this opioid use history.     No follow-ups on file. In addition to their scheduled follow up, the patient has also been instructed to follow up on as needed basis.

## 2024-10-03 ENCOUNTER — OFFICE VISIT (OUTPATIENT)
Dept: INTERNAL MEDICINE | Facility: CLINIC | Age: 74
End: 2024-10-03
Payer: MEDICARE

## 2024-10-03 VITALS
DIASTOLIC BLOOD PRESSURE: 80 MMHG | HEART RATE: 60 BPM | SYSTOLIC BLOOD PRESSURE: 139 MMHG | WEIGHT: 191.81 LBS | BODY MASS INDEX: 30.1 KG/M2 | RESPIRATION RATE: 16 BRPM | HEIGHT: 67 IN | OXYGEN SATURATION: 98 % | TEMPERATURE: 98 F

## 2024-10-03 DIAGNOSIS — I25.10 CORONARY ARTERY DISEASE INVOLVING NATIVE CORONARY ARTERY OF NATIVE HEART WITHOUT ANGINA PECTORIS: ICD-10-CM

## 2024-10-03 DIAGNOSIS — E78.5 DYSLIPIDEMIA: ICD-10-CM

## 2024-10-03 DIAGNOSIS — Z00.00 WELLNESS EXAMINATION: Primary | ICD-10-CM

## 2024-10-03 DIAGNOSIS — N18.32 STAGE 3B CHRONIC KIDNEY DISEASE: ICD-10-CM

## 2024-10-03 DIAGNOSIS — N18.31 STAGE 3A CHRONIC KIDNEY DISEASE: ICD-10-CM

## 2024-10-03 DIAGNOSIS — Z12.5 PROSTATE CANCER SCREENING: ICD-10-CM

## 2024-10-03 DIAGNOSIS — F32.5 MAJOR DEPRESSION IN REMISSION: ICD-10-CM

## 2024-10-03 DIAGNOSIS — E11.42 DIABETIC POLYNEUROPATHY ASSOCIATED WITH TYPE 2 DIABETES MELLITUS: ICD-10-CM

## 2024-10-03 DIAGNOSIS — E11.65 TYPE 2 DIABETES MELLITUS WITH HYPERGLYCEMIA, WITH LONG-TERM CURRENT USE OF INSULIN: ICD-10-CM

## 2024-10-03 DIAGNOSIS — Z79.4 TYPE 2 DIABETES MELLITUS WITH HYPERGLYCEMIA, WITH LONG-TERM CURRENT USE OF INSULIN: ICD-10-CM

## 2024-10-03 DIAGNOSIS — I10 ESSENTIAL HYPERTENSION: ICD-10-CM

## 2024-10-03 RX ORDER — HYDRALAZINE HYDROCHLORIDE 100 MG/1
100 TABLET, FILM COATED ORAL EVERY 8 HOURS
COMMUNITY
Start: 2024-09-24

## 2024-10-03 RX ORDER — LISINOPRIL 20 MG/1
20 TABLET ORAL DAILY
COMMUNITY
Start: 2024-09-24

## 2024-10-03 RX ORDER — CLOPIDOGREL BISULFATE 75 MG/1
75 TABLET ORAL DAILY
COMMUNITY
Start: 2024-08-29 | End: 2025-02-25

## 2024-10-08 ENCOUNTER — LAB VISIT (OUTPATIENT)
Dept: LAB | Facility: HOSPITAL | Age: 74
End: 2024-10-08
Attending: INTERNAL MEDICINE
Payer: MEDICARE

## 2024-10-08 DIAGNOSIS — Z79.4 TYPE 2 DIABETES MELLITUS WITH HYPERGLYCEMIA, WITH LONG-TERM CURRENT USE OF INSULIN: ICD-10-CM

## 2024-10-08 DIAGNOSIS — I10 ESSENTIAL HYPERTENSION: ICD-10-CM

## 2024-10-08 DIAGNOSIS — N18.32 STAGE 3B CHRONIC KIDNEY DISEASE: ICD-10-CM

## 2024-10-08 DIAGNOSIS — Z00.00 WELLNESS EXAMINATION: ICD-10-CM

## 2024-10-08 DIAGNOSIS — E11.42 DIABETIC POLYNEUROPATHY ASSOCIATED WITH TYPE 2 DIABETES MELLITUS: ICD-10-CM

## 2024-10-08 DIAGNOSIS — N18.31 STAGE 3A CHRONIC KIDNEY DISEASE: ICD-10-CM

## 2024-10-08 DIAGNOSIS — F32.5 MAJOR DEPRESSION IN REMISSION: ICD-10-CM

## 2024-10-08 DIAGNOSIS — I25.10 CORONARY ARTERY DISEASE INVOLVING NATIVE CORONARY ARTERY OF NATIVE HEART WITHOUT ANGINA PECTORIS: ICD-10-CM

## 2024-10-08 DIAGNOSIS — Z12.5 PROSTATE CANCER SCREENING: ICD-10-CM

## 2024-10-08 DIAGNOSIS — E78.5 DYSLIPIDEMIA: ICD-10-CM

## 2024-10-08 DIAGNOSIS — E11.65 TYPE 2 DIABETES MELLITUS WITH HYPERGLYCEMIA, WITH LONG-TERM CURRENT USE OF INSULIN: ICD-10-CM

## 2024-10-08 LAB
ALBUMIN SERPL-MCNC: 3.9 G/DL (ref 3.4–4.8)
ALBUMIN/GLOB SERPL: 1.3 RATIO (ref 1.1–2)
ALP SERPL-CCNC: 63 UNIT/L (ref 40–150)
ALT SERPL-CCNC: 24 UNIT/L (ref 0–55)
ANION GAP SERPL CALC-SCNC: 10 MEQ/L
AST SERPL-CCNC: 16 UNIT/L (ref 5–34)
BASOPHILS # BLD AUTO: 0.05 X10(3)/MCL
BASOPHILS NFR BLD AUTO: 0.7 %
BILIRUB SERPL-MCNC: 0.8 MG/DL
BUN SERPL-MCNC: 25.6 MG/DL (ref 8.4–25.7)
CALCIUM SERPL-MCNC: 10 MG/DL (ref 8.8–10)
CHLORIDE SERPL-SCNC: 108 MMOL/L (ref 98–107)
CO2 SERPL-SCNC: 23 MMOL/L (ref 23–31)
CREAT SERPL-MCNC: 1.69 MG/DL (ref 0.73–1.18)
CREAT/UREA NIT SERPL: 15
EOSINOPHIL # BLD AUTO: 0.39 X10(3)/MCL (ref 0–0.9)
EOSINOPHIL NFR BLD AUTO: 5.3 %
ERYTHROCYTE [DISTWIDTH] IN BLOOD BY AUTOMATED COUNT: 13.9 % (ref 11.5–17)
GFR SERPLBLD CREATININE-BSD FMLA CKD-EPI: 43 ML/MIN/1.73/M2
GLOBULIN SER-MCNC: 3 GM/DL (ref 2.4–3.5)
GLUCOSE SERPL-MCNC: 93 MG/DL (ref 82–115)
HCT VFR BLD AUTO: 37.5 % (ref 42–52)
HGB BLD-MCNC: 12.3 G/DL (ref 14–18)
IMM GRANULOCYTES # BLD AUTO: 0.05 X10(3)/MCL (ref 0–0.04)
IMM GRANULOCYTES NFR BLD AUTO: 0.7 %
LYMPHOCYTES # BLD AUTO: 1.93 X10(3)/MCL (ref 0.6–4.6)
LYMPHOCYTES NFR BLD AUTO: 26.1 %
MCH RBC QN AUTO: 29.6 PG (ref 27–31)
MCHC RBC AUTO-ENTMCNC: 32.8 G/DL (ref 33–36)
MCV RBC AUTO: 90.1 FL (ref 80–94)
MONOCYTES # BLD AUTO: 0.5 X10(3)/MCL (ref 0.1–1.3)
MONOCYTES NFR BLD AUTO: 6.8 %
NEUTROPHILS # BLD AUTO: 4.48 X10(3)/MCL (ref 2.1–9.2)
NEUTROPHILS NFR BLD AUTO: 60.4 %
PLATELET # BLD AUTO: 248 X10(3)/MCL (ref 130–400)
PMV BLD AUTO: 9.3 FL (ref 7.4–10.4)
POTASSIUM SERPL-SCNC: 4.5 MMOL/L (ref 3.5–5.1)
PROT SERPL-MCNC: 6.9 GM/DL (ref 5.8–7.6)
RBC # BLD AUTO: 4.16 X10(6)/MCL (ref 4.7–6.1)
SODIUM SERPL-SCNC: 141 MMOL/L (ref 136–145)
WBC # BLD AUTO: 7.4 X10(3)/MCL (ref 4.5–11.5)

## 2024-10-08 PROCEDURE — 36415 COLL VENOUS BLD VENIPUNCTURE: CPT

## 2024-10-08 PROCEDURE — 80053 COMPREHEN METABOLIC PANEL: CPT

## 2024-10-08 PROCEDURE — 85025 COMPLETE CBC W/AUTO DIFF WBC: CPT

## 2024-10-08 RX ORDER — METFORMIN HYDROCHLORIDE 1000 MG/1
1000 TABLET ORAL
Start: 2024-10-08

## 2024-10-22 ENCOUNTER — TELEPHONE (OUTPATIENT)
Dept: INTERNAL MEDICINE | Facility: CLINIC | Age: 74
End: 2024-10-22
Payer: MEDICARE

## 2024-10-22 DIAGNOSIS — R42 DIZZINESS AND GIDDINESS: Primary | ICD-10-CM

## 2024-10-22 NOTE — TELEPHONE ENCOUNTER
----- Message from Agata sent at 10/22/2024  9:33 AM CDT -----  .Type:  Patient Returning Call    Who Called:[place]  Who Left Message for Patient:Wife  Does the patient know what this is regarding?:labs   Would the patient rather a call back or a response via [facility]?   Best Call Back Number:[phone]   Additional Information: Please send labs to Dr Raji Meade ph# [phone] please send most recent  labs

## 2024-10-30 ENCOUNTER — HOSPITAL ENCOUNTER (OUTPATIENT)
Dept: RADIOLOGY | Facility: HOSPITAL | Age: 74
Discharge: HOME | End: 2024-10-30
Attending: INTERNAL MEDICINE
Payer: MEDICARE

## 2024-10-30 DIAGNOSIS — R42 DIZZINESS AND GIDDINESS: ICD-10-CM

## 2024-10-30 PROCEDURE — 70553 MRI BRAIN STEM W/O & W/DYE: CPT | Mod: TC

## 2024-10-30 PROCEDURE — 25500020 PHARM REV CODE 255: Performed by: INTERNAL MEDICINE

## 2024-10-30 PROCEDURE — A9577 INJ MULTIHANCE: HCPCS | Performed by: INTERNAL MEDICINE

## 2024-10-30 RX ADMIN — GADOBENATE DIMEGLUMINE 20 ML: 529 INJECTION, SOLUTION INTRAVENOUS at 08:10

## 2024-11-04 DIAGNOSIS — N18.32 CHRONIC KIDNEY DISEASE (CKD) STAGE G3B/A1, MODERATELY DECREASED GLOMERULAR FILTRATION RATE (GFR) BETWEEN 30-44 ML/MIN/1.73 SQUARE METER AND ALBUMINURIA CREATININE RATIO LESS THAN 30 MG/G: Primary | ICD-10-CM

## 2024-11-04 DIAGNOSIS — D64.9 CHRONIC ANEMIA: ICD-10-CM

## 2024-11-04 DIAGNOSIS — I15.0 RENOVASCULAR HYPERTENSION: ICD-10-CM

## 2024-11-04 DIAGNOSIS — I77.9 DISORDER OF CAROTID ARTERY: ICD-10-CM

## 2024-11-04 DIAGNOSIS — I77.9 CAROTID ARTERY DISEASE: ICD-10-CM

## 2024-11-04 DIAGNOSIS — E11.65: ICD-10-CM

## 2024-11-04 DIAGNOSIS — E11.65 HYPERGLYCEMIA DUE TO TYPE 2 DIABETES MELLITUS: ICD-10-CM

## 2024-11-06 ENCOUNTER — TELEPHONE (OUTPATIENT)
Dept: INTERNAL MEDICINE | Facility: CLINIC | Age: 74
End: 2024-11-06
Payer: MEDICARE

## 2024-11-06 DIAGNOSIS — E11.65 TYPE 2 DIABETES MELLITUS WITH HYPERGLYCEMIA, WITH LONG-TERM CURRENT USE OF INSULIN: ICD-10-CM

## 2024-11-06 DIAGNOSIS — Z79.4 TYPE 2 DIABETES MELLITUS WITH HYPERGLYCEMIA, WITH LONG-TERM CURRENT USE OF INSULIN: ICD-10-CM

## 2024-11-06 RX ORDER — LANCETS
EACH MISCELLANEOUS 3 TIMES DAILY
Qty: 306 EACH | Refills: 3 | Status: SHIPPED | OUTPATIENT
Start: 2024-11-06

## 2024-11-06 RX ORDER — BLOOD SUGAR DIAGNOSTIC
STRIP MISCELLANEOUS 3 TIMES DAILY
Qty: 300 STRIP | Refills: 3 | Status: SHIPPED | OUTPATIENT
Start: 2024-11-06

## 2024-11-06 NOTE — TELEPHONE ENCOUNTER
Pt wife calling about next steps for pt, lightheaded with movement and SOB but test are normal, sleep apnea getting machine soon, MRI clear per wife, what needs to be done next, please advise

## 2024-11-06 NOTE — TELEPHONE ENCOUNTER
----- Message from Scott Lema sent at 11/6/2024 11:01 AM CST -----  Regarding: advice  Who Called: pt's wife     Caller is requesting assistance/information from provider's office.    Symptoms (please be specific):   How long has patient had these symptoms:    List of preferred pharmacies on file (remove unneeded): [unfilled]  If different, enter pharmacy into here including location and phone number:       Preferred Method of Contact: Phone Call  Patient's Preferred Phone Number on File: [phone]   Best Call Back Number, if different:    Additional Information: pt's wife requesting call back from nurse in regards to advise on pt's health concern. Please advise

## 2024-11-06 NOTE — TELEPHONE ENCOUNTER
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

## 2024-11-07 ENCOUNTER — OFFICE VISIT (OUTPATIENT)
Dept: INTERNAL MEDICINE | Facility: CLINIC | Age: 74
End: 2024-11-07
Payer: MEDICARE

## 2024-11-07 DIAGNOSIS — F32.5 MAJOR DEPRESSION IN REMISSION: ICD-10-CM

## 2024-11-07 DIAGNOSIS — Z79.4 TYPE 2 DIABETES MELLITUS WITH HYPERGLYCEMIA, WITH LONG-TERM CURRENT USE OF INSULIN: ICD-10-CM

## 2024-11-07 DIAGNOSIS — I73.9 PERIPHERAL VASCULAR DISEASE: ICD-10-CM

## 2024-11-07 DIAGNOSIS — N18.32 STAGE 3B CHRONIC KIDNEY DISEASE: ICD-10-CM

## 2024-11-07 DIAGNOSIS — E11.41 DIABETIC MONONEUROPATHY ASSOCIATED WITH TYPE 2 DIABETES MELLITUS: Primary | ICD-10-CM

## 2024-11-07 DIAGNOSIS — Z23 NEED FOR VACCINATION: ICD-10-CM

## 2024-11-07 DIAGNOSIS — E11.42 DIABETIC POLYNEUROPATHY ASSOCIATED WITH TYPE 2 DIABETES MELLITUS: ICD-10-CM

## 2024-11-07 DIAGNOSIS — I10 ESSENTIAL HYPERTENSION: ICD-10-CM

## 2024-11-07 DIAGNOSIS — E78.5 DYSLIPIDEMIA: ICD-10-CM

## 2024-11-07 DIAGNOSIS — E11.65 TYPE 2 DIABETES MELLITUS WITH HYPERGLYCEMIA, WITH LONG-TERM CURRENT USE OF INSULIN: ICD-10-CM

## 2024-11-07 LAB
ALBUMIN SERPL-MCNC: 4.1 G/DL (ref 3.4–4.8)
ALBUMIN/GLOB SERPL: 1.3 RATIO (ref 1.1–2)
ALP SERPL-CCNC: 71 UNIT/L (ref 40–150)
ALT SERPL-CCNC: 27 UNIT/L (ref 0–55)
ANION GAP SERPL CALC-SCNC: 9 MEQ/L
AST SERPL-CCNC: 17 UNIT/L (ref 5–34)
BILIRUB SERPL-MCNC: 0.7 MG/DL
BUN SERPL-MCNC: 24.9 MG/DL (ref 8.4–25.7)
CALCIUM SERPL-MCNC: 9.4 MG/DL (ref 8.8–10)
CHLORIDE SERPL-SCNC: 107 MMOL/L (ref 98–107)
CO2 SERPL-SCNC: 23 MMOL/L (ref 23–31)
CREAT SERPL-MCNC: 1.5 MG/DL (ref 0.72–1.25)
CREAT/UREA NIT SERPL: 17
GFR SERPLBLD CREATININE-BSD FMLA CKD-EPI: 49 ML/MIN/1.73/M2
GLOBULIN SER-MCNC: 3.2 GM/DL (ref 2.4–3.5)
GLUCOSE SERPL-MCNC: 129 MG/DL (ref 82–115)
POTASSIUM SERPL-SCNC: 4.7 MMOL/L (ref 3.5–5.1)
PROT SERPL-MCNC: 7.3 GM/DL (ref 5.8–7.6)
SODIUM SERPL-SCNC: 139 MMOL/L (ref 136–145)

## 2024-11-07 PROCEDURE — 36415 COLL VENOUS BLD VENIPUNCTURE: CPT | Performed by: INTERNAL MEDICINE

## 2024-11-07 PROCEDURE — 80053 COMPREHEN METABOLIC PANEL: CPT | Performed by: INTERNAL MEDICINE

## 2024-11-07 RX ORDER — NIFEDIPINE 60 MG/1
60 TABLET, EXTENDED RELEASE ORAL
COMMUNITY
Start: 2024-08-29

## 2024-11-07 NOTE — PROGRESS NOTES
Subjective:       Patient ID: Sincere Malave is a 74 y.o. male.      Patient Care Team:  Himanshu Huggins II, MD as PCP - General (Internal Medicine)  Shaggy Campbell MD as Consulting Physician (Cardiology)  Rupa Echeverria LPN as Licensed Practical Nurse  Valerio Geronimo MD as Consulting Physician (Vascular Surgery)  Raji Meade MD as Consulting Physician (Nephrology)    Chief Complaint: vitals      74-year-old male seen today for followup of insulin-dependent diabetes, coronary artery disease, diabetic neuropathy, and hyperlipidemia among other conditions.  He reports ongoing dizziness that started back in July 2024, see previous note for more details      Review of Systems   Constitutional:  Negative for fever.   HENT:  Negative for nosebleeds.    Eyes:  Negative for visual disturbance.   Respiratory:  Negative for shortness of breath.    Cardiovascular:  Negative for chest pain.   Gastrointestinal:  Negative for abdominal pain.   Genitourinary:  Negative for dysuria.   Musculoskeletal:  Negative for gait problem.   Neurological:  Positive for dizziness. Negative for headaches.           Patient Reported Health Risk Assessment         Objective:      Physical Exam  HENT:      Head: Normocephalic.      Mouth/Throat:      Pharynx: Oropharynx is clear.   Eyes:      Extraocular Movements: Extraocular movements intact.   Cardiovascular:      Rate and Rhythm: Normal rate and regular rhythm.   Pulmonary:      Breath sounds: Normal breath sounds.   Abdominal:      Palpations: Abdomen is soft.   Musculoskeletal:         General: No swelling.   Skin:     General: Skin is warm.   Neurological:      General: No focal deficit present.      Mental Status: He is alert and oriented to person, place, and time.   Psychiatric:         Mood and Affect: Mood normal.         Vitals:    11/07/24 1016   BP: (P) 122/60   BP Location: (P) Right arm   Patient Position: (P) Sitting   Pulse: (!) (P) 52   Resp: (P) 16  "  Temp: (P) 96.8 °F (36 °C)   SpO2: (P) 98%   Weight: (P) 87.1 kg (192 lb)   Height: (P) 5' 7" (1.702 m)                       No data to display                  11/7/2024    10:30 AM 10/3/2024     8:30 AM 4/3/2024     9:00 AM 2/1/2024    10:00 AM 10/16/2023     1:00 PM 9/27/2023    10:00 AM 3/28/2023     9:00 AM   Fall Risk Assessment - Outpatient   Mobility Status Ambulatory Ambulatory Ambulatory Ambulatory Ambulatory Ambulatory Ambulatory   Number of falls 0 2+ 0 0 0 0 0   Identified as fall risk False True False False False False False                  Assessment:       Problem List Items Addressed This Visit       Type 2 diabetes mellitus with hyperglycemia, with long-term current use of insulin    Essential hypertension    Diabetic neuropathy associated with type 2 diabetes mellitus - Primary    Peripheral vascular disease    Diabetic polyneuropathy    Dyslipidemia    Stage 3b chronic kidney disease    Major depression in remission       Medication List with Changes/Refills   Current Medications    ACCU-CHEK STEVE PLUS TEST STRP STRP    TEST BLOOD SUGAR THREE TIMES DAILY    ACCU-CHEK FASTCLIX LANCET DRUM MISC    TEST BLOOD SUGAR THREE TIMES DAILY    ALLOPURINOL (ZYLOPRIM) 100 MG TABLET    TAKE 1 TABLET EVERY DAY    ASPIRIN (ECOTRIN) 81 MG EC TABLET    Take 81 mg by mouth once daily.    ATORVASTATIN (LIPITOR) 10 MG TABLET    Take 1 tablet (10 mg total) by mouth once daily.    CLOPIDOGREL (PLAVIX) 75 MG TABLET    Take 75 mg by mouth once daily.    DROPLET INSULIN SYRINGE 1 ML 30 GAUGE X 1/2" SYRG    USE TO INJECT INSULIN TWO TIMES DAILY    DULAGLUTIDE (TRULICITY) 0.75 MG/0.5 ML PEN INJECTOR    Inject 0.75 mg into the skin every 7 days.    DULOXETINE (CYMBALTA) 30 MG CAPSULE    Take 1 capsule (30 mg total) by mouth once daily.    HYDRALAZINE (APRESOLINE) 100 MG TABLET    Take 100 mg by mouth every 8 (eight) hours.    INSULIN NPH-INSULIN REGULAR, 70/30, (NOVOLIN 70/30 U-100 INSULIN) 100 UNIT/ML (70-30) INJECTION  "   INJECT 40 UNITS SUBCUTANEOUSLY EVERY MORNING AND 30 UNITS AT BEDTIME AS DIRECTED    LANCETS (ACCU-CHEK SOFTCLIX LANCETS) MISC    1 each by Misc.(Non-Drug; Combo Route) route 3 (three) times daily.    LISINOPRIL (PRINIVIL,ZESTRIL) 20 MG TABLET    Take 20 mg by mouth once daily.    METFORMIN (GLUCOPHAGE) 1000 MG TABLET    Take 1 tablet (1,000 mg total) by mouth daily with breakfast.    NIFEDIPINE (ADALAT CC) 60 MG TBSR    Take 60 mg by mouth.    NITROGLYCERIN (NITROSTAT) 0.4 MG SL TABLET    Place 0.4 mg under the tongue as needed for Chest pain.        Plan:       1. Insulin-dependent diabetes: A1C is 6.6. On 70/30 insulin 30 units in AM and 40 units in PM. Dietary changes.  Continue Trulicity, stopped metformin at last visit because of elevated creatinine, then restarted it once creatinine improved    2. Hypogonadism: He was on testosterone injections in the past, but no longer    3. Coronary artery disease: He had a stent placed many years ago. Coronary bypass surgery 6-vessel in 2020. He is followed by Dr. Campbell. Because heart rate is 55-60, we have avoided beta blocker    4. Diabetic peripheral neuropathy: He stopped gabapentin because of fatigue.    5. Major depression in remission: Stable on Cymbalta    6. Hyperlipidemia: LDL at goal.  Continue atorvastatin    7. History of elevated prostate-specific antigen (PSA): Was seen by urology and PSA normal in 2019. PSA normal again 7/2024    8. Urinary frequency: Started Flomax in 2018, but not taking    9. Sleep apnea: He was diagnosed with moderate sleep apnea in 2019. He is no longer using device. He is followed by Dr. Meade (ENT), and sleep study was recently done.  Patient will re-initiate sleep apnea device soon    10. Hypertension: Continue lisinopril 20 mg and hydralazine.  Cardiology stopped hydrochlorothiazide and nifedipine. Spironolactone was discontinued in 2019 because of hyperkalemia. Hospitalized with near syncope and hypotension 9/2024 at [facility]  after initiation of spironolactone, creatinine was up to 2.79.  Lisinopril was decreased to 20 mg.  Dizziness and unsteady gait.  He had bilateral renal angioplasty with Dr. Gary with stent placement on the right and PTA on the left without stent in 8/2024.  Ultrasound showed residual left renal artery stenosis.    11. Peripheral vascular disease: He was told that he has blockage in his carotid vessels.  Renal artery stenosis, as above.  Followed by Dr. Campbell, continue Plavix and aspirin    12. Chronic kidney disease stage IIIa/b, secondary to diabetes: Avoid NSAIDs, creatinine was decent over the past few years.  He underwent angiogram in 8/2024 for renal artery stenosis and received contrast.  He then was started on spironolactone in 9/2024 and was hospitalized with elevated creatinine and dehydration.  Recheck of kidney function 10/08/2024 showed improvement in creatinine to 1.68    13.  Hyperkalemia:  He will adjust his diet; he has been eating a lot of bananas; 4.5 last month    14. Dizziness: Dizziness started in July 2024, before renal artery procedure.  He then received contrast and was started on spironolactone which affected kidney function and blood pressure.  Dizziness is related to fluctuations in blood pressure and dehydration and possibly heart rate.  MRI of the brain 10/30/2024 showed no acute findings.  ENT referred him to [facility], and he was start physical therapy soon.  We discussed compression stockings.    15. Wellness: C-scope normal 6/2018, no polyps. Pneumovax 1/2021.  Flu shot today    Check CMP today          Medicare Annual Wellness and Personalized Prevention Plan:   Fall Risk + Home Safety + Hearing Impairment + Depression Screen + Cognitive Impairment Screen + Health Risk Assessment all reviewed    Health Maintenance Topics with due status: Not Due       Topic Last Completion Date    Colorectal Cancer Screening 06/27/2018    Eye Exam 06/03/2024    Diabetes Urine Screening 09/30/2024     Lipid Panel 09/30/2024    Hemoglobin A1c 09/30/2024    High Dose Statin 11/07/2024    Aspirin/Antiplatelet Therapy 11/07/2024      The patient's Health Maintenance was reviewed and the following appears to be due at this time:   Health Maintenance Due   Topic Date Due    Hepatitis C Screening  Never done    Foot Exam  Never done    TETANUS VACCINE  Never done    Shingles Vaccine (1 of 2) Never done    Abdominal Aortic Aneurysm Screening  Never done    Influenza Vaccine (1) 09/01/2024       Advance Care Planning   I attest to discussing Advance Care Planning with patient and/or family member.  Education was provided including the importance of the Health Care Power of , Advance Directives, and/or [place] documentation.  The patient expressed understanding to the importance of this information and discussion.  Length of ACP conversation in minutes: 0    Advance Care Planning     Date: 10/03/2024    Living Will  During this visit, I engaged the patient  in the voluntary advance care planning process.  The patient and I reviewed the role for advance directives and their purpose in directing future healthcare if the patient's unable to speak for him/herself.  At this point in time, the patient does have full decision-making capacity.  We discussed different extreme health states that he could experience, and reviewed what kind of medical care he would want in those situations.  The patient communicated that if he were comatose and had little chance of a meaningful recovery, he would want machines/life-sustaining treatments used. In addition to the above preference, other important end-of-life issues for the patient include  x . The patient has completed a living will to reflect these preferences.  I spent a total of 1 minutes engaging the patient in this advance care planning discussion.                 Opioid Screening: Patient medication list reviewed, patient is not taking prescription opioids. Patient is  not using additional opioids than prescribed. Patient is at low risk of substance abuse based on this opioid use history.     No follow-ups on file. In addition to their scheduled follow up, the patient has also been instructed to follow up on as needed basis.

## 2024-12-03 ENCOUNTER — TELEPHONE (OUTPATIENT)
Dept: INTERNAL MEDICINE | Facility: CLINIC | Age: 74
End: 2024-12-03
Payer: MEDICARE

## 2024-12-03 NOTE — TELEPHONE ENCOUNTER
Spoke with terri wife and explained we received a letter from [employer] that patient needed to re-apply to program for trulicity. She stated she had not received anything and will contact them today. She will then drop off paperwork once received from [employer] for us to fill out our portion.

## 2024-12-12 ENCOUNTER — TELEPHONE (OUTPATIENT)
Dept: INTERNAL MEDICINE | Facility: CLINIC | Age: 74
End: 2024-12-12
Payer: MEDICARE

## 2024-12-12 DIAGNOSIS — E11.41 DIABETIC MONONEUROPATHY ASSOCIATED WITH TYPE 2 DIABETES MELLITUS: ICD-10-CM

## 2024-12-12 DIAGNOSIS — E11.42 DIABETIC POLYNEUROPATHY ASSOCIATED WITH TYPE 2 DIABETES MELLITUS: ICD-10-CM

## 2024-12-12 DIAGNOSIS — Z79.4 TYPE 2 DIABETES MELLITUS WITH HYPERGLYCEMIA, WITH LONG-TERM CURRENT USE OF INSULIN: Primary | ICD-10-CM

## 2024-12-12 DIAGNOSIS — E11.65 TYPE 2 DIABETES MELLITUS WITH HYPERGLYCEMIA, WITH LONG-TERM CURRENT USE OF INSULIN: Primary | ICD-10-CM

## 2024-12-12 NOTE — TELEPHONE ENCOUNTER
Needed refill of insluin.     Also wanted to make you aware he  had MRI brain d/t issues with memory and some tremors. Went over results with her as it showed mild changes just wanted to make you aware as she has noticed some changes with him, but explained more of clinical diagnosis, etc. JEFERSON

## 2024-12-12 NOTE — TELEPHONE ENCOUNTER
----- Message from Bambisa sent at 12/12/2024  2:02 PM CST -----  Regarding: paperwork  Who Called: pt's wife    Caller is requesting assistance/information from provider's office.    Symptoms (please be specific):    How long has patient had these symptoms:    List of preferred pharmacies on file (remove unneeded): [unfilled]  If different, enter pharmacy into here including location and phone number:       Preferred Method of Contact: Phone Call  Patient's Preferred Phone Number on File: [phone]  Best Call Back Number, if different:  Additional Information: pt wife is requesting a call back regarding some paperwork that was supposed to be faxed to the office. Also says she has a few questions she'd like answered

## 2024-12-16 ENCOUNTER — TELEPHONE (OUTPATIENT)
Dept: INTERNAL MEDICINE | Facility: CLINIC | Age: 74
End: 2024-12-16
Payer: MEDICARE

## 2024-12-16 DIAGNOSIS — E11.65 TYPE 2 DIABETES MELLITUS WITH HYPERGLYCEMIA, WITH LONG-TERM CURRENT USE OF INSULIN: ICD-10-CM

## 2024-12-16 DIAGNOSIS — E11.42 DIABETIC POLYNEUROPATHY ASSOCIATED WITH TYPE 2 DIABETES MELLITUS: ICD-10-CM

## 2024-12-16 DIAGNOSIS — E11.41 DIABETIC MONONEUROPATHY ASSOCIATED WITH TYPE 2 DIABETES MELLITUS: ICD-10-CM

## 2024-12-16 DIAGNOSIS — Z79.4 TYPE 2 DIABETES MELLITUS WITH HYPERGLYCEMIA, WITH LONG-TERM CURRENT USE OF INSULIN: ICD-10-CM

## 2024-12-16 NOTE — TELEPHONE ENCOUNTER
----- Message from Agata sent at 12/16/2024 10:33 AM CST -----  .Type:  RX Refill Request    Who Called: [place]   Please send to [facility] mail order pharmacy   Refill or New Rx:refill  RX Name and Strength:novolog  How is the patient currently taking it? (ex. 1XDay):2/day  Is this a 30 day or 90 day RX:9  Preferred Pharmacy with phone number:[facility]  Local or Mail Order:Mail Order  Ordering Provider:Bhavna  Would the patient rather a call back or a response via [facility]?   Best Call Back Number:[phone]   Additional Information: novolog      Please send to [email or web address] order pharmacy

## 2025-01-06 ENCOUNTER — TELEPHONE (OUTPATIENT)
Dept: INTERNAL MEDICINE | Facility: CLINIC | Age: 75
End: 2025-01-06
Payer: MEDICARE

## 2025-01-13 ENCOUNTER — OFFICE VISIT (OUTPATIENT)
Dept: INTERNAL MEDICINE | Facility: CLINIC | Age: 75
End: 2025-01-13
Payer: MEDICARE

## 2025-01-13 VITALS
HEART RATE: 56 BPM | OXYGEN SATURATION: 98 % | HEIGHT: 67 IN | SYSTOLIC BLOOD PRESSURE: 138 MMHG | TEMPERATURE: 98 F | BODY MASS INDEX: 30.76 KG/M2 | RESPIRATION RATE: 16 BRPM | WEIGHT: 196 LBS | DIASTOLIC BLOOD PRESSURE: 68 MMHG

## 2025-01-13 DIAGNOSIS — E11.65 TYPE 2 DIABETES MELLITUS WITH HYPERGLYCEMIA, WITH LONG-TERM CURRENT USE OF INSULIN: ICD-10-CM

## 2025-01-13 DIAGNOSIS — I73.9 PERIPHERAL VASCULAR DISEASE: ICD-10-CM

## 2025-01-13 DIAGNOSIS — E78.5 DYSLIPIDEMIA: ICD-10-CM

## 2025-01-13 DIAGNOSIS — F32.5 MAJOR DEPRESSION IN REMISSION: ICD-10-CM

## 2025-01-13 DIAGNOSIS — I10 ESSENTIAL HYPERTENSION: ICD-10-CM

## 2025-01-13 DIAGNOSIS — N18.32 STAGE 3B CHRONIC KIDNEY DISEASE: ICD-10-CM

## 2025-01-13 DIAGNOSIS — E11.41 DIABETIC MONONEUROPATHY ASSOCIATED WITH TYPE 2 DIABETES MELLITUS: Primary | ICD-10-CM

## 2025-01-13 DIAGNOSIS — I25.10 CORONARY ARTERY DISEASE INVOLVING NATIVE CORONARY ARTERY OF NATIVE HEART WITHOUT ANGINA PECTORIS: ICD-10-CM

## 2025-01-13 DIAGNOSIS — Z79.4 TYPE 2 DIABETES MELLITUS WITH HYPERGLYCEMIA, WITH LONG-TERM CURRENT USE OF INSULIN: ICD-10-CM

## 2025-01-13 PROCEDURE — 1101F PT FALLS ASSESS-DOCD LE1/YR: CPT | Mod: CPTII,,, | Performed by: INTERNAL MEDICINE

## 2025-01-13 PROCEDURE — 1160F RVW MEDS BY RX/DR IN RCRD: CPT | Mod: CPTII,,, | Performed by: INTERNAL MEDICINE

## 2025-01-13 PROCEDURE — 3075F SYST BP GE 130 - 139MM HG: CPT | Mod: CPTII,,, | Performed by: INTERNAL MEDICINE

## 2025-01-13 PROCEDURE — 99214 OFFICE O/P EST MOD 30 MIN: CPT | Mod: ,,, | Performed by: INTERNAL MEDICINE

## 2025-01-13 PROCEDURE — 3008F BODY MASS INDEX DOCD: CPT | Mod: CPTII,,, | Performed by: INTERNAL MEDICINE

## 2025-01-13 PROCEDURE — 1123F ACP DISCUSS/DSCN MKR DOCD: CPT | Mod: CPTII,,, | Performed by: INTERNAL MEDICINE

## 2025-01-13 PROCEDURE — 3078F DIAST BP <80 MM HG: CPT | Mod: CPTII,,, | Performed by: INTERNAL MEDICINE

## 2025-01-13 PROCEDURE — 1125F AMNT PAIN NOTED PAIN PRSNT: CPT | Mod: CPTII,,, | Performed by: INTERNAL MEDICINE

## 2025-01-13 PROCEDURE — 3288F FALL RISK ASSESSMENT DOCD: CPT | Mod: CPTII,,, | Performed by: INTERNAL MEDICINE

## 2025-01-13 PROCEDURE — 1159F MED LIST DOCD IN RCRD: CPT | Mod: CPTII,,, | Performed by: INTERNAL MEDICINE

## 2025-01-13 RX ORDER — VALSARTAN 80 MG/1
80 TABLET ORAL DAILY
COMMUNITY
Start: 2024-12-31

## 2025-01-13 RX ORDER — NIFEDIPINE 60 MG/1
60 TABLET, EXTENDED RELEASE ORAL 2 TIMES DAILY
COMMUNITY
Start: 2024-12-23

## 2025-01-13 NOTE — PROGRESS NOTES
"Subjective:       Patient ID: Sincere Malave is a 74 y.o. male.      Patient Care Team:  Himanshu Huggins II, MD as PCP - General (Internal Medicine)  Shaggy Campbell MD as Consulting Physician (Cardiology)  Rupa Echeverria LPN as Licensed Practical Nurse  Valerio Geronimo MD as Consulting Physician (Vascular Surgery)  Raji Meade MD as Consulting Physician (Nephrology)    Chief Complaint: memory issues      74-year-old male seen today for followup of insulin-dependent diabetes, coronary artery disease, diabetic neuropathy, and hyperlipidemia among other conditions.        Review of Systems   Constitutional:  Negative for fever.   HENT:  Negative for nosebleeds.    Eyes:  Negative for visual disturbance.   Respiratory:  Negative for shortness of breath.    Cardiovascular:  Negative for chest pain.   Gastrointestinal:  Negative for abdominal pain.   Genitourinary:  Negative for dysuria.   Musculoskeletal:  Negative for gait problem.   Neurological:  Positive for dizziness. Negative for headaches.           Patient Reported Health Risk Assessment         Objective:      Physical Exam  HENT:      Head: Normocephalic.      Mouth/Throat:      Pharynx: Oropharynx is clear.   Eyes:      Extraocular Movements: Extraocular movements intact.   Cardiovascular:      Rate and Rhythm: Normal rate and regular rhythm.   Pulmonary:      Breath sounds: Normal breath sounds.   Abdominal:      Palpations: Abdomen is soft.   Musculoskeletal:         General: No swelling.   Skin:     General: Skin is warm.   Neurological:      General: No focal deficit present.      Mental Status: He is alert and oriented to person, place, and time.   Psychiatric:         Mood and Affect: Mood normal.         Vitals:    01/13/25 0834   BP: 138/68   Pulse: (!) 56   Resp: 16   Temp: 98.2 °F (36.8 °C)   SpO2: 98%   Weight: 88.9 kg (196 lb)   Height: 5' 7" (1.702 m)                         No data to display                  1/13/2025    " " 8:45 AM 11/7/2024    10:30 AM 10/3/2024     8:30 AM 4/3/2024     9:00 AM 2/1/2024    10:00 AM 10/16/2023     1:00 PM 9/27/2023    10:00 AM   Fall Risk Assessment - Outpatient   Mobility Status Ambulatory Ambulatory Ambulatory Ambulatory Ambulatory Ambulatory Ambulatory   Number of falls 0 0 2+ 0 0 0 0   Identified as fall risk False False True False False False False                  Assessment:       Problem List Items Addressed This Visit       Coronary artery disease involving native coronary artery of native heart    Type 2 diabetes mellitus with hyperglycemia, with long-term current use of insulin    Essential hypertension    Diabetic neuropathy associated with type 2 diabetes mellitus - Primary    Peripheral vascular disease    Dyslipidemia    Stage 3b chronic kidney disease    Major depression in remission       Medication List with Changes/Refills   Current Medications    ACCU-CHEK STEVE PLUS TEST STRP STRP    TEST BLOOD SUGAR THREE TIMES DAILY    ACCU-CHEK FASTCLIX LANCET DRUM MISC    TEST BLOOD SUGAR THREE TIMES DAILY    ALLOPURINOL (ZYLOPRIM) 100 MG TABLET    TAKE 1 TABLET EVERY DAY    ASPIRIN (ECOTRIN) 81 MG EC TABLET    Take 81 mg by mouth once daily.    ATORVASTATIN (LIPITOR) 10 MG TABLET    Take 1 tablet (10 mg total) by mouth once daily.    CLOPIDOGREL (PLAVIX) 75 MG TABLET    Take 75 mg by mouth once daily.    DROPLET INSULIN SYRINGE 1 ML 30 GAUGE X 1/2" SYRG    USE TO INJECT INSULIN TWO TIMES DAILY    DULAGLUTIDE (TRULICITY) 0.75 MG/0.5 ML PEN INJECTOR    Inject 0.75 mg into the skin every 7 days.    DULOXETINE (CYMBALTA) 30 MG CAPSULE    Take 1 capsule (30 mg total) by mouth once daily.    HYDRALAZINE (APRESOLINE) 100 MG TABLET    Take 100 mg by mouth every 8 (eight) hours.    INSULIN NPH-INSULIN REGULAR, 70/30, (NOVOLIN 70/30 U-100 INSULIN) 100 UNIT/ML (70-30) INJECTION    40 units q am and 30 units q hs    LANCETS (ACCU-CHEK SOFTCLIX LANCETS) MISC    1 each by Misc.(Non-Drug; Combo Route) route " 3 (three) times daily.    METFORMIN (GLUCOPHAGE) 1000 MG TABLET    Take 1 tablet (1,000 mg total) by mouth daily with breakfast.    NIFEDIPINE (PROCARDIA-XL) 60 MG (OSM) 24 HR TABLET    Take 60 mg by mouth 2 (two) times daily.    NITROGLYCERIN (NITROSTAT) 0.4 MG SL TABLET    Place 0.4 mg under the tongue as needed for Chest pain.    VALSARTAN (DIOVAN) 80 MG TABLET    Take 80 mg by mouth once daily.   Discontinued Medications    LISINOPRIL (PRINIVIL,ZESTRIL) 20 MG TABLET    Take 20 mg by mouth once daily.    NIFEDIPINE (ADALAT CC) 60 MG TBSR    Take 60 mg by mouth.        Plan:       1. Insulin-dependent diabetes: A1C is 6.6. On 70/30 insulin 30 units in AM and 40 units in PM. Dietary changes.  Continue Trulicity, stopped metformin at last visit because of elevated creatinine, then restarted it once creatinine improved    2. Hypogonadism: He was on testosterone injections in the past, but no longer    3. Coronary artery disease: He had a stent placed many years ago. Coronary bypass surgery 6-vessel in 2020. He is followed by Dr. Campbell. Because heart rate is 55-60, we have avoided beta blocker    4. Diabetic peripheral neuropathy: He stopped gabapentin because of fatigue.    5. Major depression in remission: Stable on Cymbalta    6. Hyperlipidemia: LDL at goal.  Continue atorvastatin    7. History of elevated prostate-specific antigen (PSA): Was seen by urology and PSA normal in 2019. PSA normal again 7/2024    8. Urinary frequency: Started Flomax in 2018, but not taking    9. Sleep apnea: He was diagnosed with moderate sleep apnea in 2019. He is no longer using device. He is followed by Dr. Meade (ENT), restarted sleep apnea device in 2024    10. Hypertension: Continue lisinopril 20 mg and hydralazine.  Cardiology stopped hydrochlorothiazide and nifedipine. Spironolactone was discontinued in 2019 because of hyperkalemia. Hospitalized with near syncope and hypotension 9/20[facility] at [facility] after initiation of  spironolactone, creatinine was up to 2.79.  Lisinopril was decreased to 20 mg.  Dizziness and unsteady gait.  He had bilateral renal angioplasty with Dr. Gary with stent placement on the right and PTA on the left without stent in 8/2024.  Ultrasound showed residual left renal artery stenosis.    11. Peripheral vascular disease: He was told that he has blockage in his carotid vessels.  Renal artery stenosis, as above.  Followed by Dr. Campbell, continue Plavix and aspirin    12. Chronic kidney disease stage IIIa/b, secondary to diabetes: Avoid NSAIDs, creatinine was decent over the past few years.  He underwent angiogram in 8/2024 for renal artery stenosis and received contrast.  He then was started on spironolactone in 9/2024 and was hospitalized with elevated creatinine and dehydration.  And creatinine improved to 1.50    13.  Hyperkalemia:  He will adjust his diet; he has been eating a lot of bananas; 4.5 last month    14. Dizziness: Dizziness started in July 2024, before renal artery procedure.  He then received contrast and was started on spironolactone which affected kidney function and blood pressure.  Dizziness is related to fluctuations in blood pressure and dehydration and possibly heart rate.  MRI of the brain 10/2024 showed no acute findings.  ENT referred him to [facility]    15. Decline in memory: Decided not to start medication yet    16. Wellness: C-scope normal 6/2018, no polyps. Pneumovax 1/2021.                Medicare Annual Wellness and Personalized Prevention Plan:   Fall Risk + Home Safety + Hearing Impairment + Depression Screen + Cognitive Impairment Screen + Health Risk Assessment all reviewed    Health Maintenance Topics with due status: Not Due       Topic Last Completion Date    Colorectal Cancer Screening 06/27/2018    Diabetic Eye Exam 06/03/2024    Diabetes Urine Screening 09/30/2024    Lipid Panel 09/30/2024    Hemoglobin A1c 09/30/2024    High Dose Statin 11/07/2024     Aspirin/Antiplatelet Therapy 11/07/2024      The patient's Health Maintenance was reviewed and the following appears to be due at this time:   Health Maintenance Due   Topic Date Due    Hepatitis C Screening  Never done    Foot Exam  Never done    TETANUS VACCINE  Never done    Shingles Vaccine (1 of 2) Never done    RSV Vaccine (Age 60+ and Pregnant patients) (1 - Risk 60-74 years 1-dose series) Never done    Abdominal Aortic Aneurysm Screening  Never done       Advance Care Planning   I attest to discussing Advance Care Planning with patient and/or family member.  Education was provided including the importance of the Health Care Power of , Advance Directives, and/or [place] documentation.  The patient expressed understanding to the importance of this information and discussion.  Length of ACP conversation in minutes: 0    Advance Care Planning     Date: 10/03/2024    Living Will  During this visit, I engaged the patient  in the voluntary advance care planning process.  The patient and I reviewed the role for advance directives and their purpose in directing future healthcare if the patient's unable to speak for him/herself.  At this point in time, the patient does have full decision-making capacity.  We discussed different extreme health states that he could experience, and reviewed what kind of medical care he would want in those situations.  The patient communicated that if he were comatose and had little chance of a meaningful recovery, he would want machines/life-sustaining treatments used. In addition to the above preference, other important end-of-life issues for the patient include  x . The patient has completed a living will to reflect these preferences.  I spent a total of 1 minutes engaging the patient in this advance care planning discussion.                 Opioid Screening: Patient medication list reviewed, patient is not taking prescription opioids. Patient is not using additional opioids  than prescribed. Patient is at low risk of substance abuse based on this opioid use history.     No follow-ups on file. In addition to their scheduled follow up, the patient has also been instructed to follow up on as needed basis.

## 2025-01-24 ENCOUNTER — LAB VISIT (OUTPATIENT)
Dept: LAB | Facility: HOSPITAL | Age: 75
End: 2025-01-24
Attending: INTERNAL MEDICINE
Payer: MEDICARE

## 2025-01-24 DIAGNOSIS — I10 ESSENTIAL HYPERTENSION, MALIGNANT: Primary | ICD-10-CM

## 2025-01-24 LAB
ANION GAP SERPL CALC-SCNC: 9 MEQ/L
BUN SERPL-MCNC: 28 MG/DL (ref 8.4–25.7)
CALCIUM SERPL-MCNC: 9.7 MG/DL (ref 8.8–10)
CHLORIDE SERPL-SCNC: 105 MMOL/L (ref 98–107)
CO2 SERPL-SCNC: 24 MMOL/L (ref 23–31)
CREAT SERPL-MCNC: 1.5 MG/DL (ref 0.72–1.25)
CREAT/UREA NIT SERPL: 19
GFR SERPLBLD CREATININE-BSD FMLA CKD-EPI: 49 ML/MIN/1.73/M2
GLUCOSE SERPL-MCNC: 139 MG/DL (ref 82–115)
POTASSIUM SERPL-SCNC: 4.6 MMOL/L (ref 3.5–5.1)
SODIUM SERPL-SCNC: 138 MMOL/L (ref 136–145)

## 2025-01-24 PROCEDURE — 36415 COLL VENOUS BLD VENIPUNCTURE: CPT

## 2025-01-24 PROCEDURE — 80048 BASIC METABOLIC PNL TOTAL CA: CPT

## 2025-01-29 DIAGNOSIS — Z00.00 WELLNESS EXAMINATION: ICD-10-CM

## 2025-01-29 DIAGNOSIS — E78.5 DYSLIPIDEMIA: ICD-10-CM

## 2025-01-30 RX ORDER — ALLOPURINOL 100 MG/1
100 TABLET ORAL DAILY
Qty: 90 TABLET | Refills: 3 | Status: SHIPPED | OUTPATIENT
Start: 2025-01-30 | End: 2026-01-30

## 2025-02-03 DIAGNOSIS — E11.65 TYPE 2 DIABETES MELLITUS WITH HYPERGLYCEMIA, WITH LONG-TERM CURRENT USE OF INSULIN: Primary | ICD-10-CM

## 2025-02-03 DIAGNOSIS — Z79.4 TYPE 2 DIABETES MELLITUS WITH HYPERGLYCEMIA, WITH LONG-TERM CURRENT USE OF INSULIN: Primary | ICD-10-CM

## 2025-02-03 RX ORDER — DULAGLUTIDE 0.75 MG/.5ML
0.75 INJECTION, SOLUTION SUBCUTANEOUS
Qty: 12 PEN | Refills: 3 | Status: SHIPPED | OUTPATIENT
Start: 2025-02-03 | End: 2026-02-03

## 2025-02-26 ENCOUNTER — LAB VISIT (OUTPATIENT)
Dept: LAB | Facility: HOSPITAL | Age: 75
End: 2025-02-26
Attending: INTERNAL MEDICINE
Payer: MEDICARE

## 2025-02-26 DIAGNOSIS — I10 ESSENTIAL HYPERTENSION, MALIGNANT: Primary | ICD-10-CM

## 2025-02-26 LAB
ANION GAP SERPL CALC-SCNC: 9 MEQ/L
BUN SERPL-MCNC: 37.5 MG/DL (ref 8.4–25.7)
CALCIUM SERPL-MCNC: 9.2 MG/DL (ref 8.8–10)
CHLORIDE SERPL-SCNC: 112 MMOL/L (ref 98–107)
CO2 SERPL-SCNC: 21 MMOL/L (ref 23–31)
CREAT SERPL-MCNC: 1.62 MG/DL (ref 0.72–1.25)
CREAT/UREA NIT SERPL: 23
GFR SERPLBLD CREATININE-BSD FMLA CKD-EPI: 45 ML/MIN/1.73/M2
GLUCOSE SERPL-MCNC: 123 MG/DL (ref 82–115)
POTASSIUM SERPL-SCNC: 4.8 MMOL/L (ref 3.5–5.1)
SODIUM SERPL-SCNC: 142 MMOL/L (ref 136–145)

## 2025-02-26 PROCEDURE — 80048 BASIC METABOLIC PNL TOTAL CA: CPT

## 2025-02-26 PROCEDURE — 36415 COLL VENOUS BLD VENIPUNCTURE: CPT

## 2025-03-26 DIAGNOSIS — E11.42 DIABETIC POLYNEUROPATHY ASSOCIATED WITH TYPE 2 DIABETES MELLITUS: ICD-10-CM

## 2025-03-26 DIAGNOSIS — Z79.4 TYPE 2 DIABETES MELLITUS WITH HYPERGLYCEMIA, WITH LONG-TERM CURRENT USE OF INSULIN: ICD-10-CM

## 2025-03-26 DIAGNOSIS — E11.41 DIABETIC MONONEUROPATHY ASSOCIATED WITH TYPE 2 DIABETES MELLITUS: ICD-10-CM

## 2025-03-26 DIAGNOSIS — E11.65 TYPE 2 DIABETES MELLITUS WITH HYPERGLYCEMIA, WITH LONG-TERM CURRENT USE OF INSULIN: ICD-10-CM

## 2025-03-26 RX ORDER — METFORMIN HYDROCHLORIDE 1000 MG/1
1000 TABLET ORAL 2 TIMES DAILY
Qty: 180 TABLET | Refills: 3 | Status: SHIPPED | OUTPATIENT
Start: 2025-03-26 | End: 2026-03-26

## 2025-04-02 ENCOUNTER — TELEPHONE (OUTPATIENT)
Dept: INTERNAL MEDICINE | Facility: CLINIC | Age: 75
End: 2025-04-02
Payer: MEDICARE

## 2025-04-07 ENCOUNTER — LAB VISIT (OUTPATIENT)
Dept: LAB | Facility: HOSPITAL | Age: 75
End: 2025-04-07
Attending: INTERNAL MEDICINE
Payer: MEDICARE

## 2025-04-07 DIAGNOSIS — F32.5 MAJOR DEPRESSION IN REMISSION: ICD-10-CM

## 2025-04-07 DIAGNOSIS — N18.32 STAGE 3B CHRONIC KIDNEY DISEASE: ICD-10-CM

## 2025-04-07 DIAGNOSIS — E78.5 DYSLIPIDEMIA: ICD-10-CM

## 2025-04-07 DIAGNOSIS — I10 ESSENTIAL HYPERTENSION: ICD-10-CM

## 2025-04-07 DIAGNOSIS — Z00.00 WELLNESS EXAMINATION: ICD-10-CM

## 2025-04-07 DIAGNOSIS — Z12.5 PROSTATE CANCER SCREENING: ICD-10-CM

## 2025-04-07 DIAGNOSIS — I25.10 CORONARY ARTERY DISEASE INVOLVING NATIVE CORONARY ARTERY OF NATIVE HEART WITHOUT ANGINA PECTORIS: ICD-10-CM

## 2025-04-07 DIAGNOSIS — N18.31 STAGE 3A CHRONIC KIDNEY DISEASE: ICD-10-CM

## 2025-04-07 DIAGNOSIS — E11.42 DIABETIC POLYNEUROPATHY ASSOCIATED WITH TYPE 2 DIABETES MELLITUS: ICD-10-CM

## 2025-04-07 DIAGNOSIS — E11.65 TYPE 2 DIABETES MELLITUS WITH HYPERGLYCEMIA, WITH LONG-TERM CURRENT USE OF INSULIN: ICD-10-CM

## 2025-04-07 DIAGNOSIS — Z79.4 TYPE 2 DIABETES MELLITUS WITH HYPERGLYCEMIA, WITH LONG-TERM CURRENT USE OF INSULIN: ICD-10-CM

## 2025-04-07 LAB
ALBUMIN SERPL-MCNC: 3.7 G/DL (ref 3.4–4.8)
ALBUMIN/GLOB SERPL: 1.1 RATIO (ref 1.1–2)
ALP SERPL-CCNC: 60 UNIT/L (ref 40–150)
ALT SERPL-CCNC: 21 UNIT/L (ref 0–55)
ANION GAP SERPL CALC-SCNC: 8 MEQ/L
AST SERPL-CCNC: 14 UNIT/L (ref 11–45)
BACTERIA #/AREA URNS AUTO: NORMAL /HPF
BASOPHILS # BLD AUTO: 0.07 X10(3)/MCL
BASOPHILS NFR BLD AUTO: 1 %
BILIRUB SERPL-MCNC: 0.5 MG/DL
BILIRUB UR QL STRIP.AUTO: NEGATIVE
BUN SERPL-MCNC: 27.7 MG/DL (ref 8.4–25.7)
CALCIUM SERPL-MCNC: 9.4 MG/DL (ref 8.8–10)
CHLORIDE SERPL-SCNC: 110 MMOL/L (ref 98–107)
CHOLEST SERPL-MCNC: 117 MG/DL
CHOLEST/HDLC SERPL: 3 {RATIO} (ref 0–5)
CLARITY UR: CLEAR
CO2 SERPL-SCNC: 22 MMOL/L (ref 23–31)
COLOR UR AUTO: ABNORMAL
CREAT SERPL-MCNC: 1.54 MG/DL (ref 0.72–1.25)
CREAT UR-MCNC: 151.6 MG/DL (ref 63–166)
CREAT/UREA NIT SERPL: 18
EOSINOPHIL # BLD AUTO: 0.35 X10(3)/MCL (ref 0–0.9)
EOSINOPHIL NFR BLD AUTO: 4.8 %
ERYTHROCYTE [DISTWIDTH] IN BLOOD BY AUTOMATED COUNT: 13.9 % (ref 11.5–17)
EST. AVERAGE GLUCOSE BLD GHB EST-MCNC: 131.2 MG/DL
GFR SERPLBLD CREATININE-BSD FMLA CKD-EPI: 47 ML/MIN/1.73/M2
GLOBULIN SER-MCNC: 3.4 GM/DL (ref 2.4–3.5)
GLUCOSE SERPL-MCNC: 92 MG/DL (ref 82–115)
GLUCOSE UR QL STRIP: NEGATIVE
HBA1C MFR BLD: 6.2 %
HCT VFR BLD AUTO: 37.4 % (ref 42–52)
HDLC SERPL-MCNC: 35 MG/DL (ref 35–60)
HGB BLD-MCNC: 12 G/DL (ref 14–18)
HGB UR QL STRIP: NEGATIVE
IMM GRANULOCYTES # BLD AUTO: 0.07 X10(3)/MCL (ref 0–0.04)
IMM GRANULOCYTES NFR BLD AUTO: 1 %
KETONES UR QL STRIP: NEGATIVE
LDLC SERPL CALC-MCNC: 56 MG/DL (ref 50–140)
LEUKOCYTE ESTERASE UR QL STRIP: NEGATIVE
LYMPHOCYTES # BLD AUTO: 1.81 X10(3)/MCL (ref 0.6–4.6)
LYMPHOCYTES NFR BLD AUTO: 24.7 %
MCH RBC QN AUTO: 28.7 PG (ref 27–31)
MCHC RBC AUTO-ENTMCNC: 32.1 G/DL (ref 33–36)
MCV RBC AUTO: 89.5 FL (ref 80–94)
MICROALBUMIN UR-MCNC: 139.7 UG/ML
MICROALBUMIN/CREAT RATIO PNL UR: 92.2 MG/GM CR (ref 0–30)
MONOCYTES # BLD AUTO: 0.63 X10(3)/MCL (ref 0.1–1.3)
MONOCYTES NFR BLD AUTO: 8.6 %
NEUTROPHILS # BLD AUTO: 4.4 X10(3)/MCL (ref 2.1–9.2)
NEUTROPHILS NFR BLD AUTO: 59.9 %
NITRITE UR QL STRIP: NEGATIVE
PH UR STRIP: 6 [PH]
PLATELET # BLD AUTO: 243 X10(3)/MCL (ref 130–400)
PMV BLD AUTO: 9.2 FL (ref 7.4–10.4)
POTASSIUM SERPL-SCNC: 4.5 MMOL/L (ref 3.5–5.1)
PROT SERPL-MCNC: 7.1 GM/DL (ref 5.8–7.6)
PROT UR QL STRIP: ABNORMAL
RBC # BLD AUTO: 4.18 X10(6)/MCL (ref 4.7–6.1)
RBC #/AREA URNS AUTO: NORMAL /HPF
SODIUM SERPL-SCNC: 140 MMOL/L (ref 136–145)
SP GR UR STRIP.AUTO: 1.02 (ref 1–1.03)
SQUAMOUS #/AREA URNS AUTO: NORMAL /HPF
TRIGL SERPL-MCNC: 129 MG/DL (ref 34–140)
TSH SERPL-ACNC: 1.51 UIU/ML (ref 0.35–4.94)
UROBILINOGEN UR STRIP-ACNC: 1
VLDLC SERPL CALC-MCNC: 26 MG/DL
WBC # BLD AUTO: 7.33 X10(3)/MCL (ref 4.5–11.5)
WBC #/AREA URNS AUTO: NORMAL /HPF

## 2025-04-07 PROCEDURE — 82043 UR ALBUMIN QUANTITATIVE: CPT

## 2025-04-07 PROCEDURE — 85025 COMPLETE CBC W/AUTO DIFF WBC: CPT

## 2025-04-07 PROCEDURE — 80061 LIPID PANEL: CPT

## 2025-04-07 PROCEDURE — 83036 HEMOGLOBIN GLYCOSYLATED A1C: CPT

## 2025-04-07 PROCEDURE — 81003 URINALYSIS AUTO W/O SCOPE: CPT

## 2025-04-07 PROCEDURE — 84443 ASSAY THYROID STIM HORMONE: CPT

## 2025-04-07 PROCEDURE — 36415 COLL VENOUS BLD VENIPUNCTURE: CPT

## 2025-04-07 PROCEDURE — 80053 COMPREHEN METABOLIC PANEL: CPT

## 2025-04-09 NOTE — PROGRESS NOTES
Subjective:       Patient ID: Sincere Malave is a 75 y.o. male.      Patient Care Team:  Himanshu Huggins II, MD as PCP - General (Internal Medicine)  Shaggy Campbell MD as Consulting Physician (Cardiology)  Rupa Echeverria LPN as Licensed Practical Nurse  Valerio Geronimo MD as Consulting Physician (Vascular Surgery)  Raji Meade MD as Consulting Physician (Nephrology)  Medicare, [employer] Managed as Hypertension Digital Medicine Contract  Medicare, [employer] Managed as Diabetes Digital Medicine Contract  Keerthi Arora, PharmD as Hypertension Digital Medicine Clinician  Keerthi Arora, PharmD as Diabetes Digital Medicine Clinician  Himanshu Huggins II, MD as Diabetes Digital Medicine Responsible Provider (Internal Medicine)  Himanshu Huggins II, MD as Hypertension Digital Medicine Responsible Provider (Internal Medicine)  Conrad Ngo MD (Nephrology)    Chief Complaint: Hypertension, Diabetes, Peripheral Vascular Disease, Coronary Artery Disease, Chronic Kidney Disease, Anemia, and Sleep Apnea      75-year-old male seen today for followup of insulin-dependent diabetes, coronary artery disease, diabetic neuropathy, and hyperlipidemia among other conditions.  He continues to have dizziness, although slightly improved over the past few weeks.  No chest pain      Review of Systems   Constitutional:  Negative for fever.   HENT:  Negative for nosebleeds.    Eyes:  Negative for visual disturbance.   Respiratory:  Negative for shortness of breath.    Cardiovascular:  Negative for chest pain.   Gastrointestinal:  Negative for abdominal pain.   Genitourinary:  Negative for dysuria.   Musculoskeletal:  Negative for gait problem.   Neurological:  Positive for dizziness. Negative for headaches.           Patient Reported Health Risk Assessment         Objective:      Physical Exam  HENT:      Head: Normocephalic.      Mouth/Throat:      Pharynx: Oropharynx is clear.   Eyes:      Extraocular  "Movements: Extraocular movements intact.   Cardiovascular:      Rate and Rhythm: Normal rate and regular rhythm.   Pulmonary:      Breath sounds: Normal breath sounds.   Abdominal:      Palpations: Abdomen is soft.   Musculoskeletal:         General: No swelling.   Skin:     General: Skin is warm.   Neurological:      General: No focal deficit present.      Mental Status: He is alert and oriented to person, place, and time.   Psychiatric:         Mood and Affect: Mood normal.         Vitals:    04/10/25 0800   BP: 130/62   Pulse: (!) 56   Resp: 16   Temp: 98.2 °F (36.8 °C)   SpO2: 99%   Weight: 89.8 kg (198 lb)   Height: 5' 7" (1.702 m)                           No data to display                  4/10/2025     8:30 AM 1/13/2025     8:45 AM 11/7/2024    10:30 AM 10/3/2024     8:30 AM 4/3/2024     9:00 AM 2/1/2024    10:00 AM 10/16/2023     1:00 PM   Fall Risk Assessment - Outpatient   Mobility Status Ambulatory Ambulatory Ambulatory Ambulatory Ambulatory Ambulatory Ambulatory   Number of falls 0 0 0 2+ 0 0 0   Identified as fall risk False False False True False False False                  Assessment:       Problem List Items Addressed This Visit       Type 2 diabetes mellitus with hyperglycemia, with long-term current use of insulin    Relevant Orders    CBC Auto Differential    Comprehensive Metabolic Panel    Lipid Panel    Microalbumin/Creatinine Ratio, Urine    PSA, Screening    Urinalysis, Reflex to Urine Culture    TSH    Hemoglobin A1C    Essential hypertension    Relevant Orders    CBC Auto Differential    Comprehensive Metabolic Panel    Lipid Panel    Microalbumin/Creatinine Ratio, Urine    PSA, Screening    Urinalysis, Reflex to Urine Culture    TSH    Hemoglobin A1C    Diabetic neuropathy associated with type 2 diabetes mellitus - Primary    Relevant Orders    CBC Auto Differential    Comprehensive Metabolic Panel    Lipid Panel    Microalbumin/Creatinine Ratio, Urine    PSA, Screening    Urinalysis, " Reflex to Urine Culture    TSH    Hemoglobin A1C    RESOLVED: Wellness examination    Relevant Orders    CBC Auto Differential    Comprehensive Metabolic Panel    Lipid Panel    Microalbumin/Creatinine Ratio, Urine    PSA, Screening    Urinalysis, Reflex to Urine Culture    TSH    Hemoglobin A1C    Peripheral vascular disease    Relevant Orders    CBC Auto Differential    Comprehensive Metabolic Panel    Lipid Panel    Microalbumin/Creatinine Ratio, Urine    PSA, Screening    Urinalysis, Reflex to Urine Culture    TSH    Hemoglobin A1C    Prostate cancer screening    Relevant Orders    CBC Auto Differential    Comprehensive Metabolic Panel    Lipid Panel    Microalbumin/Creatinine Ratio, Urine    PSA, Screening    Urinalysis, Reflex to Urine Culture    TSH    Hemoglobin A1C    Dyslipidemia    Relevant Orders    CBC Auto Differential    Comprehensive Metabolic Panel    Lipid Panel    Microalbumin/Creatinine Ratio, Urine    PSA, Screening    Urinalysis, Reflex to Urine Culture    TSH    Hemoglobin A1C    Stage 3a chronic kidney disease    Relevant Orders    CBC Auto Differential    Comprehensive Metabolic Panel    Lipid Panel    Microalbumin/Creatinine Ratio, Urine    PSA, Screening    Urinalysis, Reflex to Urine Culture    TSH    Hemoglobin A1C    Major depression in remission    Relevant Orders    CBC Auto Differential    Comprehensive Metabolic Panel    Lipid Panel    Microalbumin/Creatinine Ratio, Urine    PSA, Screening    Urinalysis, Reflex to Urine Culture    TSH    Hemoglobin A1C       Medication List with Changes/Refills   Current Medications    ACCU-CHEK STEVE PLUS TEST STRP STRP    TEST BLOOD SUGAR THREE TIMES DAILY    ACCU-CHEK FASTCLIX LANCET DRUM MISC    TEST BLOOD SUGAR THREE TIMES DAILY    ALLOPURINOL (ZYLOPRIM) 100 MG TABLET    Take 1 tablet (100 mg total) by mouth once daily.    ASPIRIN (ECOTRIN) 81 MG EC TABLET    Take 81 mg by mouth once daily.    ATORVASTATIN (LIPITOR) 10 MG TABLET    Take 1  "tablet (10 mg total) by mouth once daily.    BLOOD-GLUCOSE SENSOR (FREESTYLE MK 3 PLUS SENSOR) MIKE    Apply a new sensor to upper outer arm every 15 days.    CLOPIDOGREL (PLAVIX) 75 MG TABLET    Take 1 tablet by mouth once daily.    DROPLET INSULIN SYRINGE 1 ML 30 GAUGE X 1/2" SYRG    USE TO INJECT INSULIN TWO TIMES DAILY    DULAGLUTIDE (TRULICITY) 0.75 MG/0.5 ML PEN INJECTOR    Inject 0.75 mg into the skin every 7 days.    DULOXETINE (CYMBALTA) 30 MG CAPSULE    Take 1 capsule (30 mg total) by mouth once daily.    HYDRALAZINE (APRESOLINE) 100 MG TABLET    Take 100 mg by mouth 2 (two) times a day.    INSULIN NPH-INSULIN REGULAR, 70/30, (NOVOLIN 70/30 U-100 INSULIN) 100 UNIT/ML (70-30) INJECTION    INJECT 40 UNITS UNDER THE SKIN EVERY MORNING AND 30 UNITS AT BEDTIME    ISOSORBIDE MONONITRATE (IMDUR) 30 MG 24 HR TABLET    Take 30 mg by mouth 2 (two) times a day.    LANCETS (ACCU-CHEK SOFTCLIX LANCETS) MISC    1 each by Misc.(Non-Drug; Combo Route) route 3 (three) times daily.    METFORMIN (GLUCOPHAGE) 1000 MG TABLET    Take 1 tablet (1,000 mg total) by mouth 2 (two) times daily.    METHYLDOPA (ALDOMET) 500 MG TABLET    Take 500 mg by mouth 2 (two) times daily.    NIFEDIPINE (PROCARDIA-XL) 60 MG (OSM) 24 HR TABLET    Take 60 mg by mouth 2 (two) times daily.    NITROGLYCERIN (NITROSTAT) 0.4 MG SL TABLET    Place 0.4 mg under the tongue as needed for Chest pain.    VALSARTAN (DIOVAN) 320 MG TABLET    Take 1 tablet by mouth once daily.        Plan:       1. Insulin-dependent diabetes: A1C is 6.2. On 70/30 insulin 30 units in AM and 35 units in PM. Dietary changes.  Continue Trulicity and metformin.  Start Jardiance 10 mg (samples given today)    2. Hypogonadism: He was on testosterone injections in the past, but no longer    3. Coronary artery disease: He had a stent placed many years ago. Coronary bypass surgery 6-vessel in 2020. He is followed by Dr. Campbell. [facility] 2/2025 showed stenosis in 1 of the grafts, PCI was not " done because of high-risk vessel.  Because heart rate is 55-60, avoid beta blocker.  No chest pain.  Continue aspirin and Plavix    4. Diabetic peripheral neuropathy: He stopped gabapentin because of fatigue.    5. Major depression in remission: Stable on Cymbalta    6. Hyperlipidemia: LDL at goal.  Continue atorvastatin 10 mg    7. History of elevated prostate-specific antigen (PSA): Was seen by urology and PSA normal in 2019. PSA normal again 7/2024    8. Urinary frequency: Started Flomax in 2018, but not taking    9. Sleep apnea: He was diagnosed with moderate sleep apnea in 2019. He is no longer using device. He is followed by Dr. Meade (ENT), restarted sleep apnea device in 2024    10. Hypertension: Cardiology stopped hydrochlorothiazide. Spironolactone was discontinued in 2019 because of hyperkalemia. Hospitalized with near syncope and hypotension 9/2024 at [facility] after initiation of spironolactone, creatinine was up to 2.79.  Occasional dizziness and unsteady gait.  He had bilateral renal angioplasty with Dr. Gary with stent placement on the right and PTA on the left without stent in 8/2024.  Ultrasound showed residual left renal artery stenosis.  Currently on methyldopa 500 mg twice a day, nifedipine 60 mg twice a day, valsartan 320 mg daily, and hydralazine twice a day.  Stable    11. Peripheral vascular disease: He was told that he has blockage in his carotid vessels.  Renal artery stenosis, as above.  Followed by Dr. Campbell, continue Plavix and aspirin    12. Chronic kidney disease stage IIIa/b, secondary to diabetes: Avoid NSAIDs, creatinine was decent over the past few years.  He underwent angiogram in 8/2024 for renal artery stenosis and received contrast.  He then was started on spironolactone in 9/2024 and was hospitalized with elevated creatinine and dehydration.  I spoke with his nephrologist from [facility] this week, Dr. Conrad Ngo, and he feels that the patient may have had cholesterol emboli  after angiogram in 8/2024, since creatinine was stable and normal prior to this.  Nephrology recently decreased hydralazine to twice a day because of orthostasis and recommended adding Jardiance    13.  Hyperkalemia:  Resolved    14. Dizziness: Dizziness started in July 2024, before renal artery procedure.  He then received contrast and was started on spironolactone which affected kidney function and blood pressure.  Dizziness, as above.  MRI of the brain 10/2024 showed no acute findings.  ENT referred him to [facility].  Dizziness likely multifactorial, but could be related to bradycardia.  Dizziness has improved slightly over the past month; monitor    15. Decline in memory: Decided not to start medication yet    16. Wellness: C-scope normal 6/2018, no polyps; discuss Cologuard today, we will consider at next visit. Pneumovax 1/2021.        I spent a total of 55 minutes on the day of the visit, including reviewing notes from Nephrologist.This includes face to face time and non-face to face time preparing to see the patient (eg, review of tests), obtaining and/or reviewing separately obtained history, documenting clinical information in the electronic or other health record, independently interpreting results and communicating results to the patient/family/caregiver, or care coordinator.         Medicare Annual Wellness and Personalized Prevention Plan:   Fall Risk + Home Safety + Hearing Impairment + Depression Screen + Cognitive Impairment Screen + Health Risk Assessment all reviewed    Health Maintenance Topics with due status: Not Due       Topic Last Completion Date    Colorectal Cancer Screening 06/27/2018    Diabetes Urine Screening 04/07/2025    Lipid Panel 04/07/2025    Hemoglobin A1c 04/07/2025    High Dose Statin 04/10/2025    Aspirin/Antiplatelet Therapy 04/10/2025      The patient's Health Maintenance was reviewed and the following appears to be due at this time:   Health Maintenance Due   Topic Date Due     Hepatitis C Screening  Never done    Foot Exam  Never done    TETANUS VACCINE  Never done    Shingles Vaccine (1 of 2) Never done    RSV Vaccine (Age 60+ and Pregnant patients) (1 - Risk 60-74 years 1-dose series) Never done    Abdominal Aortic Aneurysm Screening  Never done    Diabetic Eye Exam  06/03/2025       Advance Care Planning   I attest to discussing Advance Care Planning with patient and/or family member.  Education was provided including the importance of the Health Care Power of , Advance Directives, and/or [place] documentation.  The patient expressed understanding to the importance of this information and discussion.  Length of ACP conversation in minutes: 0    Advance Care Planning     Date: 10/03/2024    Living Will  During this visit, I engaged the patient  in the voluntary advance care planning process.  The patient and I reviewed the role for advance directives and their purpose in directing future healthcare if the patient's unable to speak for him/herself.  At this point in time, the patient does have full decision-making capacity.  We discussed different extreme health states that he could experience, and reviewed what kind of medical care he would want in those situations.  The patient communicated that if he were comatose and had little chance of a meaningful recovery, he would want machines/life-sustaining treatments used. In addition to the above preference, other important end-of-life issues for the patient include  x . The patient has completed a living will to reflect these preferences.  I spent a total of 1 minutes engaging the patient in this advance care planning discussion.                 Opioid Screening: Patient medication list reviewed, patient is not taking prescription opioids. Patient is not using additional opioids than prescribed. Patient is at low risk of substance abuse based on this opioid use history.     No follow-ups on file. In addition to their scheduled  follow up, the patient has also been instructed to follow up on as needed basis.

## 2025-04-10 ENCOUNTER — OFFICE VISIT (OUTPATIENT)
Dept: INTERNAL MEDICINE | Facility: CLINIC | Age: 75
End: 2025-04-10
Payer: MEDICARE

## 2025-04-10 VITALS
OXYGEN SATURATION: 99 % | TEMPERATURE: 98 F | BODY MASS INDEX: 31.08 KG/M2 | HEART RATE: 56 BPM | HEIGHT: 67 IN | SYSTOLIC BLOOD PRESSURE: 130 MMHG | DIASTOLIC BLOOD PRESSURE: 62 MMHG | RESPIRATION RATE: 16 BRPM | WEIGHT: 198 LBS

## 2025-04-10 DIAGNOSIS — N18.31 STAGE 3A CHRONIC KIDNEY DISEASE: ICD-10-CM

## 2025-04-10 DIAGNOSIS — E11.65 TYPE 2 DIABETES MELLITUS WITH HYPERGLYCEMIA, WITH LONG-TERM CURRENT USE OF INSULIN: ICD-10-CM

## 2025-04-10 DIAGNOSIS — E11.49 OTHER DIABETIC NEUROLOGICAL COMPLICATION ASSOCIATED WITH TYPE 2 DIABETES MELLITUS: Primary | ICD-10-CM

## 2025-04-10 DIAGNOSIS — Z00.00 WELLNESS EXAMINATION: ICD-10-CM

## 2025-04-10 DIAGNOSIS — I10 ESSENTIAL HYPERTENSION: ICD-10-CM

## 2025-04-10 DIAGNOSIS — F32.5 MAJOR DEPRESSION IN REMISSION: ICD-10-CM

## 2025-04-10 DIAGNOSIS — E78.5 DYSLIPIDEMIA: ICD-10-CM

## 2025-04-10 DIAGNOSIS — Z12.5 PROSTATE CANCER SCREENING: ICD-10-CM

## 2025-04-10 DIAGNOSIS — I73.9 PERIPHERAL VASCULAR DISEASE: ICD-10-CM

## 2025-04-10 DIAGNOSIS — Z79.4 TYPE 2 DIABETES MELLITUS WITH HYPERGLYCEMIA, WITH LONG-TERM CURRENT USE OF INSULIN: ICD-10-CM

## 2025-04-10 RX ORDER — CLOPIDOGREL BISULFATE 75 MG/1
1 TABLET ORAL DAILY
COMMUNITY
Start: 2025-03-26

## 2025-04-14 DIAGNOSIS — I25.10 CORONARY ARTERY DISEASE INVOLVING NATIVE CORONARY ARTERY OF NATIVE HEART WITHOUT ANGINA PECTORIS: ICD-10-CM

## 2025-04-14 RX ORDER — ATORVASTATIN CALCIUM 10 MG/1
10 TABLET, FILM COATED ORAL DAILY
Qty: 90 TABLET | Refills: 3 | Status: SHIPPED | OUTPATIENT
Start: 2025-04-14 | End: 2026-04-14

## 2025-04-15 DIAGNOSIS — Z79.4 TYPE 2 DIABETES MELLITUS WITH HYPERGLYCEMIA, WITH LONG-TERM CURRENT USE OF INSULIN: ICD-10-CM

## 2025-04-15 DIAGNOSIS — E11.65 TYPE 2 DIABETES MELLITUS WITH HYPERGLYCEMIA, WITH LONG-TERM CURRENT USE OF INSULIN: ICD-10-CM

## 2025-04-15 RX ORDER — LANCETS 28 GAUGE
EACH MISCELLANEOUS
Qty: 200 EACH | Refills: 3 | Status: SHIPPED | OUTPATIENT
Start: 2025-04-15

## 2025-04-28 ENCOUNTER — LAB VISIT (OUTPATIENT)
Dept: LAB | Facility: HOSPITAL | Age: 75
End: 2025-04-28
Attending: INTERNAL MEDICINE
Payer: MEDICARE

## 2025-04-28 DIAGNOSIS — I10 ESSENTIAL HYPERTENSION: ICD-10-CM

## 2025-04-28 LAB — POTASSIUM SERPL-SCNC: 4.7 MMOL/L (ref 3.5–5.1)

## 2025-04-28 PROCEDURE — 84244 ASSAY OF RENIN: CPT

## 2025-04-28 PROCEDURE — 36415 COLL VENOUS BLD VENIPUNCTURE: CPT

## 2025-04-28 PROCEDURE — 84132 ASSAY OF SERUM POTASSIUM: CPT

## 2025-05-05 ENCOUNTER — RESULTS FOLLOW-UP (OUTPATIENT)
Dept: INTERNAL MEDICINE | Facility: CLINIC | Age: 75
End: 2025-05-05

## 2025-05-27 ENCOUNTER — TELEPHONE (OUTPATIENT)
Dept: INTERNAL MEDICINE | Facility: CLINIC | Age: 75
End: 2025-05-27
Payer: MEDICARE

## 2025-05-27 DIAGNOSIS — I10 ESSENTIAL HYPERTENSION: ICD-10-CM

## 2025-05-27 DIAGNOSIS — E11.65 TYPE 2 DIABETES MELLITUS WITH HYPERGLYCEMIA, WITH LONG-TERM CURRENT USE OF INSULIN: Primary | ICD-10-CM

## 2025-05-27 DIAGNOSIS — Z79.4 TYPE 2 DIABETES MELLITUS WITH HYPERGLYCEMIA, WITH LONG-TERM CURRENT USE OF INSULIN: Primary | ICD-10-CM

## 2025-05-27 NOTE — TELEPHONE ENCOUNTER
Finishing up with samples of jardiance requesting prescription sent to pharmacy. BP and BS have been steady. Highest sugar has gotten was 125. Do you want him to back off on metformin or insulin? Also on trulicity that they get through patient assistance program at no cost. Does he continue all this? Please advise.       Also, flaktio discontinued the plavix.

## 2025-05-27 NOTE — TELEPHONE ENCOUNTER
Yes, continue all the same diabetic medications because we need tight glucose control.  If he loses any more weight, we can consider decreasing insulin

## 2025-05-27 NOTE — TELEPHONE ENCOUNTER
Copied from CRM #[id number]. Topic: General Inquiry - Patient Advice  >> May 27, 2025  9:26 AM Amy wrote:  ..Who Called: Sincere Malave    Caller is requesting assistance/information from provider's office.    Symptoms (please be specific): na   How long has patient had these symptoms:  na  List of preferred pharmacies on file (remove unneeded): [unfilled]  If different, enter pharmacy into here including location and phone number: na      Preferred Method of Contact: Phone Call  Patient's Preferred Phone Number on File: [phone]   Best Call Back Number, if different:  Additional Information: pt 's spouse-[place] wants nurse to give her a call regarding medication. He received samples

## 2025-05-28 ENCOUNTER — TELEPHONE (OUTPATIENT)
Dept: INTERNAL MEDICINE | Facility: CLINIC | Age: 75
End: 2025-05-28
Payer: MEDICARE

## 2025-05-28 DIAGNOSIS — E11.65 TYPE 2 DIABETES MELLITUS WITH HYPERGLYCEMIA, WITH LONG-TERM CURRENT USE OF INSULIN: ICD-10-CM

## 2025-05-28 DIAGNOSIS — Z79.4 TYPE 2 DIABETES MELLITUS WITH HYPERGLYCEMIA, WITH LONG-TERM CURRENT USE OF INSULIN: ICD-10-CM

## 2025-05-28 NOTE — TELEPHONE ENCOUNTER
Copied from CRM #[id number]. Topic: Medications - Medication Question  >> May 28, 2025 11:50 AM Omid wrote:  .Who Called: [place] (wife)    Caller is requesting assistance/information from provider's office.    Symptoms (please be specific): N/A   How long has patient had these symptoms:  N/A    List of preferred pharmacies on file (remove unneeded): [facility] - [place], [place] - [address]  [address] [address]  Phone: [phone] Fax: [phone]    Preferred Method of Contact: Phone Call    Patient's Preferred Phone Number on File: [phone]     Best Call Back Number, if different:    Additional Information: Called stating pt would like his empagliflozin (JARDIANCE) 10 mg tablet to be canceled at [facility] in [place] and re-sent to [facility] above with a 3 month supply for insurance purposes. Please advise, than you.

## 2025-06-02 DIAGNOSIS — E11.65 TYPE 2 DIABETES MELLITUS WITH HYPERGLYCEMIA, WITH LONG-TERM CURRENT USE OF INSULIN: ICD-10-CM

## 2025-06-02 DIAGNOSIS — Z79.4 TYPE 2 DIABETES MELLITUS WITH HYPERGLYCEMIA, WITH LONG-TERM CURRENT USE OF INSULIN: ICD-10-CM

## 2025-06-09 ENCOUNTER — PATIENT OUTREACH (OUTPATIENT)
Facility: CLINIC | Age: 75
End: 2025-06-09
Payer: MEDICARE

## 2025-06-09 LAB
LEFT EYE DM RETINOPATHY: NEGATIVE
RIGHT EYE DM RETINOPATHY: NEGATIVE

## 2025-06-09 NOTE — PROGRESS NOTES
Health Maintenance Topic(s) Outreach Outcomes & Actions Taken:    Eye Exam - Outreach Outcomes & Actions Taken  : Diabetic Eye External Records Uploaded, Care Team & History Updated if Applicable       Additional Notes:  Upload Diabetic Eye Exam 6/9/2025 Dr Enoc Maciel

## 2025-06-22 DIAGNOSIS — I10 ESSENTIAL HYPERTENSION: ICD-10-CM

## 2025-06-23 RX ORDER — DULOXETIN HYDROCHLORIDE 30 MG/1
30 CAPSULE, DELAYED RELEASE ORAL DAILY
Qty: 90 CAPSULE | Refills: 3 | Status: SHIPPED | OUTPATIENT
Start: 2025-06-23 | End: 2026-06-23

## 2025-09-02 ENCOUNTER — TELEPHONE (OUTPATIENT)
Dept: INTERNAL MEDICINE | Facility: CLINIC | Age: 75
End: 2025-09-02
Payer: MEDICARE

## (undated) DEVICE — CLIP LIGACLIP LIGATING TITANIUM LG LT400

## (undated) DEVICE — SPONGE GAUZE 4X8

## (undated) DEVICE — BLANKET HYPOTHERMIA LARGE

## (undated) DEVICE — PREP CHLORA 26ML

## (undated) DEVICE — SMARTNEEDLE 18G BARE TIP

## (undated) DEVICE — TUBING SUCTION FRESENIUS 9108484

## (undated) DEVICE — CLIP APPLIER MSM20 STERILMED  ETHMSM20

## (undated) DEVICE — SUTURE SILK 2-0 18 A185H

## (undated) DEVICE — PACK PERFUSION

## (undated) DEVICE — CABLE PACING 6' DISPOSABLE  FL-601-97

## (undated) DEVICE — SPONGE XRAY DETECTABLE 4X4

## (undated) DEVICE — GUIDEWIRE J TIP EXCHANGE FIXED CORE 260

## (undated) DEVICE — CONTAINER SPECIMEN 4 OZ STERILE 1053

## (undated) DEVICE — SHEATH PINNACLE 5FRX10CM W/GUIDEWIRE

## (undated) DEVICE — TUBING INSUFFLATION 10FT

## (undated) DEVICE — HEMOSTAT FIBRILLAR 2X4 1962

## (undated) DEVICE — UNDERGLOVE BIOGEL PI MICRO BLUE SZ 7

## (undated) DEVICE — TRAY CV ACCESS W/AUX A

## (undated) DEVICE — Device

## (undated) DEVICE — SUTURE ETHIBOND 2-0 SH 36 X523H

## (undated) DEVICE — SUTURE STEEL 6 18 M654G

## (undated) DEVICE — DRAPE SLUSH 66X44 ORS-330

## (undated) DEVICE — CATHETER ANGIO OMNI FLUSH 10732201

## (undated) DEVICE — CANNULA 14FR RC2014

## (undated) DEVICE — GLOVE BIOGEL PI ULTRA TOUCH G GRAY SZ6.5

## (undated) DEVICE — SUTURE SILK 0 PSL 30 590H

## (undated) DEVICE — DRAIN 19FR TROCAR  072231

## (undated) DEVICE — BAG DECANTER 10-102

## (undated) DEVICE — DRESSING TEGADERM 2 1/3 X 2 3/4 TD1002

## (undated) DEVICE — SOLUTION ANTIFOG FOG1001

## (undated) DEVICE — MARKER CORONARY BYPASS GRAFT 40149

## (undated) DEVICE — SUTURE SILK 2-0 CT-1 18 C022D

## (undated) DEVICE — BULB DRAIN 100CC 70740

## (undated) DEVICE — SYSTEM VEIN HARVESTING VSP550

## (undated) DEVICE — SUTURE STEELPACING 2-0 SH 24 TPW32

## (undated) DEVICE — SUTURE VICRYL PS-2 4-0 27 J426H

## (undated) DEVICE — UNDERGLOVE BIOGEL PI MICRO BLUE SZ 6.5

## (undated) DEVICE — DRAIN CHEST DRY SUCTION

## (undated) DEVICE — SUTURE VICRYL 2-0 CT-1 36 VCP945H

## (undated) DEVICE — SUCTION YANKAUER 34970

## (undated) DEVICE — PUNCH AORTIC 4.0 RCC-40

## (undated) DEVICE — TIP BOVIE 4 0014A

## (undated) DEVICE — STAPLER SKIN NON ROTATE PXW35

## (undated) DEVICE — SUTURE VICRYL 3-0 PS-1 27 J936H

## (undated) DEVICE — SUTURE SILK 4-0 18 A183H

## (undated) DEVICE — CATHETER THORACIC 28FR 8028

## (undated) DEVICE — PUNCH AORTC 3.5MM RCC-35

## (undated) DEVICE — CANNULA MAMMARY 31001

## (undated) DEVICE — SHEATH INTRODUCER SLENDER 6FX10CM

## (undated) DEVICE — MARKER SKIN W/ RULER 77734

## (undated) DEVICE — TUBING M/M PRESSURE LL 72

## (undated) DEVICE — CANNULA VENOUS 36-46FR TF36460

## (undated) DEVICE — POUCH INSTRUMENT 1018

## (undated) DEVICE — CATHETER EXPO MLTPK 5FX100-110CM

## (undated) DEVICE — TAPE CLOTH 3 MEDIPORE 2863

## (undated) DEVICE — DRAPE BILATERAL LEG 84X80

## (undated) DEVICE — INSERT OCTOBASE 28707

## (undated) DEVICE — COVER LIGHT HANDLE LB53

## (undated) DEVICE — GLOVE BIOGEL PI ULTRA TOUCH GRAY SZ8.0

## (undated) DEVICE — DRESSING MEPORE 2.5 X 3   670800

## (undated) DEVICE — HEMOSTAT OSTENE 2.5GR

## (undated) DEVICE — TRAY CV ACCESS W AUX B

## (undated) DEVICE — SOLUTION NACL 0.9% 3000ML

## (undated) DEVICE — PAD BOVIE ADULT

## (undated) DEVICE — DRAPE SPLIT CV 66350

## (undated) DEVICE — RESERVOIR FRESENIUS 9108474

## (undated) DEVICE — SUTURE PROLENE 7-0 BV175-6 30

## (undated) DEVICE — INSERT EVERGRIP86

## (undated) DEVICE — GLOVE SURG BIOGEL LTX PF ST SZ 6.5

## (undated) DEVICE — DRAPE IOBAN 23X33 6651EZ

## (undated) DEVICE — SUTURE PROLENE 6-0 C-1 30 M8706

## (undated) DEVICE — SUTURE PROLENE 4-0 SH 36 8521H

## (undated) DEVICE — SYRINGE SAFETY 1ML TB 27GA X 1/2 305789

## (undated) DEVICE — LOOP MINI BLUE 30-713

## (undated) DEVICE — SUTURE PROLENE 6-0 C-1 30 8706H

## (undated) DEVICE — SUTURE SILK 2 60 SA8H

## (undated) DEVICE — SPONGE GAUZE 2S 4X4 STERILE NON21424

## (undated) DEVICE — SET CARDIOPLEGIA MYOTHERM XP41B

## (undated) DEVICE — GLOVE BIOGEL PI GOLD SZ 6.5

## (undated) DEVICE — BANDAGE ACE STERILE 4 REB3114

## (undated) DEVICE — PLEDGET 4.5X6 007970
Type: IMPLANTABLE DEVICE | Site: HEART | Status: NON-FUNCTIONAL
Removed: 2020-06-25

## (undated) DEVICE — SUTURE PROLENE 4-0 RB-1 36 8557H

## (undated) DEVICE — WAX BONE 2.5G (YELLOW)

## (undated) DEVICE — CATHETER DIAGNOSTIC DXTERITY 5FR JL3.5

## (undated) DEVICE — CLIP APPLIER MCS20 STERILMED ETHMCS20

## (undated) DEVICE — SET AUTOTRANSFUSION FRESENIUS 9005104